# Patient Record
Sex: FEMALE | Race: WHITE | Employment: OTHER | URBAN - METROPOLITAN AREA
[De-identification: names, ages, dates, MRNs, and addresses within clinical notes are randomized per-mention and may not be internally consistent; named-entity substitution may affect disease eponyms.]

---

## 2017-01-16 ENCOUNTER — OFFICE VISIT (OUTPATIENT)
Dept: FAMILY MEDICINE CLINIC | Age: 82
End: 2017-01-16

## 2017-01-16 DIAGNOSIS — Z71.89 ACP (ADVANCE CARE PLANNING): Primary | ICD-10-CM

## 2017-01-16 NOTE — PROGRESS NOTES
Patient in for ACP with Ally Connors MSW intern ACP facilitator and Alton Mishra RN ACP facilitator. Documents reviewed and updated.

## 2017-01-18 RX ORDER — ENALAPRIL MALEATE AND HYDROCHLOROTHIAZIDE 5; 12.5 MG/1; MG/1
TABLET ORAL
Qty: 90 TAB | Refills: 3 | Status: SHIPPED | OUTPATIENT
Start: 2017-01-18 | End: 2018-01-10 | Stop reason: SDUPTHER

## 2017-01-18 RX ORDER — ROPINIROLE 1 MG/1
TABLET, FILM COATED ORAL
Qty: 90 TAB | Refills: 3 | Status: SHIPPED | OUTPATIENT
Start: 2017-01-18 | End: 2017-11-06

## 2017-01-18 RX ORDER — VERAPAMIL HYDROCHLORIDE 240 MG/1
TABLET, FILM COATED, EXTENDED RELEASE ORAL
Qty: 90 TAB | Refills: 3 | Status: SHIPPED | OUTPATIENT
Start: 2017-01-18 | End: 2018-01-10 | Stop reason: SDUPTHER

## 2017-02-06 ENCOUNTER — OFFICE VISIT (OUTPATIENT)
Dept: FAMILY MEDICINE CLINIC | Age: 82
End: 2017-02-06

## 2017-02-06 ENCOUNTER — HOSPITAL ENCOUNTER (OUTPATIENT)
Dept: LAB | Age: 82
Discharge: HOME | End: 2017-02-06
Payer: MEDICARE

## 2017-02-06 ENCOUNTER — HOSPITAL ENCOUNTER (OUTPATIENT)
Dept: GENERAL RADIOLOGY | Age: 82
Discharge: HOME | End: 2017-02-06
Payer: MEDICARE

## 2017-02-06 VITALS
DIASTOLIC BLOOD PRESSURE: 52 MMHG | TEMPERATURE: 97.6 F | SYSTOLIC BLOOD PRESSURE: 154 MMHG | HEIGHT: 65 IN | BODY MASS INDEX: 31.32 KG/M2 | RESPIRATION RATE: 16 BRPM | OXYGEN SATURATION: 95 % | HEART RATE: 69 BPM | WEIGHT: 188 LBS

## 2017-02-06 DIAGNOSIS — I87.2 VENOUS (PERIPHERAL) INSUFFICIENCY: ICD-10-CM

## 2017-02-06 DIAGNOSIS — Z13.39 SCREENING FOR ALCOHOLISM: ICD-10-CM

## 2017-02-06 DIAGNOSIS — E03.9 HYPOTHYROIDISM, UNSPECIFIED TYPE: ICD-10-CM

## 2017-02-06 DIAGNOSIS — M19.90 ARTHRITIS: ICD-10-CM

## 2017-02-06 DIAGNOSIS — E78.2 MIXED HYPERLIPIDEMIA: ICD-10-CM

## 2017-02-06 DIAGNOSIS — R73.02 IGT (IMPAIRED GLUCOSE TOLERANCE): ICD-10-CM

## 2017-02-06 DIAGNOSIS — D64.9 ANEMIA, UNSPECIFIED TYPE: ICD-10-CM

## 2017-02-06 DIAGNOSIS — R06.09 DYSPNEA ON EXERTION: ICD-10-CM

## 2017-02-06 DIAGNOSIS — D53.1 OTHER MEGALOBLASTIC ANEMIAS, NOT ELSEWHERE CLASSIFIED: ICD-10-CM

## 2017-02-06 DIAGNOSIS — I10 ESSENTIAL HYPERTENSION: ICD-10-CM

## 2017-02-06 DIAGNOSIS — N39.0 CHRONIC UTI: ICD-10-CM

## 2017-02-06 DIAGNOSIS — Z13.31 SCREENING FOR DEPRESSION: ICD-10-CM

## 2017-02-06 DIAGNOSIS — Z00.00 ROUTINE GENERAL MEDICAL EXAMINATION AT A HEALTH CARE FACILITY: Primary | ICD-10-CM

## 2017-02-06 PROCEDURE — 84439 ASSAY OF FREE THYROXINE: CPT

## 2017-02-06 PROCEDURE — 71020 XR CHEST PA LAT: CPT

## 2017-02-06 PROCEDURE — 80061 LIPID PANEL: CPT

## 2017-02-06 PROCEDURE — 84443 ASSAY THYROID STIM HORMONE: CPT

## 2017-02-06 PROCEDURE — 80053 COMPREHEN METABOLIC PANEL: CPT

## 2017-02-06 PROCEDURE — 85025 COMPLETE CBC W/AUTO DIFF WBC: CPT

## 2017-02-06 PROCEDURE — 36415 COLL VENOUS BLD VENIPUNCTURE: CPT

## 2017-02-06 PROCEDURE — 82728 ASSAY OF FERRITIN: CPT

## 2017-02-06 PROCEDURE — 82607 VITAMIN B-12: CPT

## 2017-02-06 NOTE — MR AVS SNAPSHOT
Visit Information Date & Time Provider Department Dept. Phone Encounter #  
 2/6/2017  7:45 AM Simin Sandoval [place] [address] [phone] [id number] Upcoming Health Maintenance Date Due  
 MEDICARE YEARLY EXAM 1/18/2017 GLAUCOMA SCREENING Q2Y 1/6/2019 DTaP/Tdap/Td series (2 - Td) 2/6/2027 Allergies as of 2/6/2017  Review Complete On: 2/6/2017 By: Ludwin Preciado Severity Noted Reaction Type Reactions Aspirin  02/03/2014    Other (comments) Swelling shut of eyelids Gabapentin  01/18/2016    Other (comments) Vision change Macrobid [Nitrofurantoin Monohyd/m-cryst]  02/08/2010    Unknown (comments) Pcn [Penicillins]  02/08/2010    Hives Current Immunizations  Reviewed on 11/1/2016 Name Date Influenza High Dose Vaccine PF 11/1/2016, 10/14/2015, 9/29/2014, 10/3/2013 Influenza Vaccine Split 10/22/2012, 10/11/2011, 10/13/2010 Pneumococcal Conjugate (PCV-13) 10/14/2015 Pneumococcal Vaccine (Unspecified Type) 1/1/2005 Zoster 12/15/2011 Not reviewed this visit You Were Diagnosed With   
  
 Codes Comments Routine general medical examination at a health care facility    -  Primary ICD-10-CM: Z00.00 ICD-9-CM: V70.0 Screening for alcoholism     ICD-10-CM: Z13.89 ICD-9-CM: V79.1 Screening for depression     ICD-10-CM: Z13.89 ICD-9-CM: V79.0 IGT (impaired glucose tolerance)     ICD-10-CM: R73.02 
ICD-9-CM: 790.22 Hypothyroidism, unspecified type     ICD-10-CM: E03.9 ICD-9-CM: 244.9 Essential hypertension     ICD-10-CM: I10 
ICD-9-CM: 401.9 Mixed hyperlipidemia     ICD-10-CM: E78.2 ICD-9-CM: 272.2 Venous (peripheral) insufficiency     ICD-10-CM: I87.2 ICD-9-CM: 459.81 Anemia, unspecified type     ICD-10-CM: D64.9 ICD-9-CM: 285.9 Arthritis     ICD-10-CM: M19.90 ICD-9-CM: 716.90 Chronic UTI     ICD-10-CM: N39.0 ICD-9-CM: 599.0  Dyspnea on exertion     ICD-10-CM: R06.09 
 ICD-9-CM: 786.09 Other megaloblastic anemias, not elsewhere classified     ICD-10-CM: D53.1 ICD-9-CM: 504. 3 Vitals BP Pulse Temp Resp Height(growth percentile) Weight(growth percentile) 154/52 69 97.6 °F (36.4 °C) (Oral) 16 5' 5\" (1.651 m) 188 lb (85.3 kg) SpO2 BMI OB Status Smoking Status 95% 31.28 kg/m2 Postmenopausal Former Smoker Vitals History BMI and BSA Data Body Mass Index Body Surface Area  
 31.28 kg/m 2 1.98 m 2 Preferred Pharmacy Pharmacy Name Phone [address] [facility] [phone] Your Updated Medication List  
  
   
This list is accurate as of: 2/6/17  8:30 AM.  Always use your most recent med list.  
  
  
  
  
 CRANBERRY CONCENTRATE PO Take  by mouth. cyclobenzaprine 10 mg tablet Commonly known as:  FLEXERIL  
TAKE 1 TABLET 3 TIMES DAILY AS NEEDED FOR MUSCLE SPASMS. enalapril-hydroCHLOROthiazide 5-12.5 mg tablet Commonly known as:  VASERETIC  
TAKE 1 TABLET EVERY DAY  
  
 ferrous sulfate 325 mg (65 mg iron) tablet Take  by mouth two (2) times a day. Lancets Misc Commonly known as:  [address] [place] [address],[address] As [address]  
  
 latanoprost 0.005 % ophthalmic solution Commonly known as:  Ivory Campos Administer 1 Drop to both eyes nightly. levothyroxine 100 mcg tablet Commonly known as:  SYNTHROID  
TAKE 1 TABLET DAILY BEFORE BREAKFAST. lovastatin 40 mg tablet Commonly known as:  MEVACOR  
TAKE 1 TABLET EVERY EVENING TO LOWER CHOLESTEROL MULTIVITAMIN PO Take  by mouth. nitrofurantoin 50 mg capsule Commonly known as:  MACRODANTIN Take  by mouth daily. rOPINIRole 1 mg tablet Commonly known as:  REQUIP  
TAKE 1 TABLET BY MOUTH EVERY NIGHT. STOOL SOFTENER PO Take  by mouth. trimethoprim-sulfamethoxazole  mg per tablet Commonly known as:  Deepa Vignesh Take 1 Tab by mouth daily. verapamil  mg CR tablet Commonly known as:  CALAN-SR  
TAKE 1 TABLET EVERY DAY  
  
 VITAMIN C 500 mg tablet Generic drug:  ascorbic acid (vitamin C) Take  by mouth. VITAMIN D3 1,000 unit tablet Generic drug:  cholecalciferol Take  by mouth daily. We Performed the Following AMB POC HEMOGLOBIN A1C [93121 CPT(R)] CBC WITH AUTOMATED DIFF [36977 CPT(R)] [address] [[id number] [facility]] FERRITIN [70986 CPT(R)] LIPID PANEL [93879 CPT(R)] METABOLIC PANEL, COMPREHENSIVE [24651 CPT(R)] [place] ANNUAL ALCOHOL SCREEN 15 MIN [id number] [facility]] T4, FREE [id number] CPT(R)] TSH 3RD GENERATION [60527 CPT(R)] VITAMIN B12 & FOLATE [83396 CPT(R)] To-Do List   
 02/06/2017 Imaging:  XR CHEST PA LAT Patient Instructions Medicare Wellness Visit, Female The best way to live healthy is to have a healthy lifestyle by eating a well-balanced diet, exercising regularly, limiting alcohol and stopping smoking. Regular physical exams and screening tests are another way to keep healthy. Preventive exams provided by your health care provider can find health problems before they become diseases or illnesses. Preventive services including immunizations, screening tests, monitoring and exams can help you take care of your own health. All people over age 72 should have a pneumovax  and and a prevnar shot to prevent pneumonia. These are once in a lifetime unless you and your provider decide differently. All people over 65 should have a yearly flu shot and a tetanus vaccine every 10 years. A bone mass density to screen for osteoporosis or thinning of the bones should be done every 2 years after 65. Screening for diabetes mellitus with a blood sugar test should be done every year.  
 
Glaucoma is a disease of the eye due to increased ocular pressure that can lead to blindness and it should be done every year by an eye professional. 
 
 Cardiovascular screening tests that check for elevated lipids (fatty part of blood) which can lead to heart disease and strokes should be done every 5 years. Colorectal screening that evaluates for blood or polyps in your colon should be done yearly as a stool test or every five years as a flexible sigmoidoscope or every 10 years as a colonoscopy up to age 76. Breast cancer screening with a mammogram is recommended biennially  for women age 54-69. Screening for cervical cancer with a pap smear and pelvic exam is recommended for women after age 72 years every 2 years up to age 79 or when the provider and patient decide to stop. If there is a history of cervical abnormalities or other increased risk for cancer then the test is recommended yearly. Hepatitis C screening is also recommended for anyone born between [birth year] through [address]. A shingles vaccine is also recommended once in a lifetime after age 61. Your Medicare Wellness Exam is recommended annually. Here is a list of your current Health Maintenance items with a due date: 
Health Maintenance Due Topic Date Due  Up to date   Introducing [facility] SERVICES! Meaghan Pang introduces Billdesk patient portal. Now you can access parts of your medical record, email your doctor's office, and request medication refills online. 1. In your internet browser, go to [email or web address] 2. Click on the First Time User? Click Here link in the Sign In box. You will see the New Member Sign Up page. 3. Enter your Billdesk Access Code exactly as it appears below. You will not need to use this code after youve completed the sign-up process. If you do not sign up before the expiration date, you must request a new code. · Billdesk Access Code: [email or web address] Expires: 5/7/2017  7:43 AM 
 
4.  Enter the last four digits of your Social Security Number (xxxx) and Date of Birth (mm/dd/yyyy) as indicated and click Submit. You will be taken to the next sign-up page. 5. Create a Expanite ID. This will be your Expanite login ID and cannot be changed, so think of one that is secure and easy to remember. 6. Create a Expanite password. You can change your password at any time. 7. Enter your Password Reset Question and Answer. This can be used at a later time if you forget your password. 8. Enter your e-mail address. You will receive e-mail notification when new information is available in [address] 9. Click Sign Up. You can now view and download portions of your medical record. 10. Click the Download Summary menu link to download a portable copy of your medical information. If you have questions, please visit the Frequently Asked Questions section of the Expanite website. Remember, Expanite is NOT to be used for urgent needs. For medical emergencies, dial 911. Now available from your iPhone and Android! Please provide this summary of care documentation to your next provider. Your primary care clinician is listed as Pati Luna. If you have any questions after today's visit, please call [phone].

## 2017-02-06 NOTE — PATIENT INSTRUCTIONS

## 2017-02-06 NOTE — PROGRESS NOTES
This is a Subsequent Medicare Annual Wellness Visit providing Personalized Prevention Plan Services (PPPS) (Performed 12 months after initial AWV and PPPS )    I have reviewed the patient's medical history in detail and updated the computerized patient record. History     Past Medical History   Diagnosis Date    Arthritis      hands/low back    Diabetes ([address]      borderline    GERD (gastroesophageal reflux disease)     Glaucoma     Hypercholesterolemia     Hypertension     Ill-defined condition      borderline anemia    Ill-defined condition      bladder infections    Impaired glucose tolerance     Menopause     Thyroid disease       Past Surgical History   Procedure Laterality Date    Hx cataract removal       bilateral    Hx mohs procedure       right    Hx orthopaedic       carpal tunnel release -bilateral    Hx dilation and curettage      Hx breast biopsy Bilateral 1990     benign     Current Outpatient Prescriptions   Medication Sig Dispense Refill    rOPINIRole (REQUIP) 1 mg tablet TAKE 1 TABLET BY MOUTH EVERY NIGHT. 90 Tab 3    enalapril-hydroCHLOROthiazide (VASERETIC) 5-12.5 mg tablet TAKE 1 TABLET EVERY DAY 90 Tab 3    verapamil ER (CALAN-SR) 240 mg CR tablet TAKE 1 TABLET EVERY DAY 90 Tab 3    lovastatin (MEVACOR) 40 mg tablet TAKE 1 TABLET EVERY EVENING TO LOWER CHOLESTEROL 90 Tab 5    cyclobenzaprine (FLEXERIL) 10 mg tablet TAKE 1 TABLET 3 TIMES DAILY AS NEEDED FOR MUSCLE SPASMS. 30 Tab 2    trimethoprim-sulfamethoxazole (BACTRIM, SEPTRA)  mg per tablet Take 1 Tab by mouth daily.  nitrofurantoin (MACRODANTIN) 50 mg capsule Take  by mouth daily.  ferrous sulfate 325 mg (65 mg iron) tablet Take  by mouth two (2) times a day.  levothyroxine (SYNTHROID) 100 mcg tablet TAKE 1 TABLET DAILY BEFORE BREAKFAST. 90 Tab 1    latanoprost (XALATAN) 0.005 % ophthalmic solution Administer 1 Drop to both eyes nightly.       Lancets (ACCU-CHEK MULTICLIX LANCET) Misc As dir 1 Package 11    DOCUSATE CALCIUM (STOOL SOFTENER PO) Take  by mouth.  ASCORBIC ACID/VITAMIN E (CRANBERRY CONCENTRATE PO) Take  by mouth.  ascorbic acid (VITAMIN C) 500 mg tablet Take  by mouth.  cholecalciferol, vitamin d3, (VITAMIN D) 1,000 unit tablet Take  by mouth daily.  MULTIVITAMINS (MULTIVITAMIN PO) Take  by mouth. Allergies   Allergen Reactions    Aspirin Other (comments)     Swelling shut of eyelids    Gabapentin Other (comments)     Vision change    Macrobid [Nitrofurantoin Monohyd/M-Cryst] Unknown (comments)    Pcn [Penicillins] Hives     Family History   Problem Relation Age of Onset    Coronary Artery Disease Other      mother  of MI age 80, brother  age 72 of MI, sister has hear problems    Breast Cancer Sister 48     Social History   Substance Use Topics    Smoking status: Former Smoker     Packs/day: 1.00     Years: 15.00     Types: Cigarettes     Quit date: 1995    Smokeless tobacco: Never Used    Alcohol use 0.6 oz/week     0 Standard drinks or equivalent, 1 Glasses of wine per week      Comment: Rare--maybe once a month     Patient Active Problem List   Diagnosis Code    IGT (impaired glucose tolerance) R73.02    Restless leg syndrome G25.81    Hypothyroid E03.9    Hypertension I10    Arthritis M19.90    Hyperlipidemia E78.5    Shortness of breath R06.02    Venous (peripheral) insufficiency I87.2    Advanced care planning/counseling discussion Z71.89    Anemia D64.9    Chronic UTI N39.0     ARthritis - recently injected right knee at [address], shoulders are feeling better after PT. Notes neck is popping when she moves her head. And lower back hurts when she makes beds. Hypothyroidism - no hair loss, no constipation, some dry fragile nails, no palpitations. Taking medication each morning on an empty stomach. Shortness of breath - if she walks fast or carries things up steps. Denies chest pain. Does not make her cough.   Echo and stress test normal 2014. Nor orthopnea, some irregular heartbeats. No associated nausea. Sometimes feels like she's going to pass out and has to stand still and rest.    Restless legs and venous insufficiency - medication helps but still not sleeping well. HTN - no home monitoring. No vision change, no headache, no lower extremity edema. Anemia - had colonoscopy and barium enema - all normal last year. Chronic UTI - sees urology, now alternating prophylactic antibiotic every other day. No symptoms. Depression Risk Factor Screening:     PHQ 2 / 9, over the last two weeks 2/6/2017   Little interest or pleasure in doing things Not at all   Feeling down, depressed or hopeless Not at all   Total Score PHQ 2 0     Alcohol Risk Factor Screening: On any occasion during the past 3 months, have you had more than 3 drinks containing alcohol? No    Do you average more than 7 drinks per week? No      Functional Ability and Level of Safety:     Hearing Loss   none    Activities of Daily Living   Self-care. Requires assistance with: no ADLs    Fall Risk     Fall Risk Assessment, last 12 mths 2/6/2017   Able to walk? Yes   Fall in past 12 months? No     Abuse Screen   Patient is not abused    Review of Systems   A comprehensive review of systems was negative except for that written in the HPI. Physical Examination     Evaluation of Cognitive Function:  Mood/affect:  happy  Appearance: age appropriate  Family member/caregiver input: none    Visit Vitals    /52    Pulse 69    Temp 97.6 °F (36.4 °C) (Oral)    Resp 16    Ht 5' 5\" (1.651 m)    Wt 188 lb (85.3 kg)    SpO2 95%    BMI 31.28 kg/m2     General:  Alert, cooperative, no distress, appears stated age. Head:  Normocephalic, without obvious abnormality, atraumatic. Eyes:  Conjunctivae/corneas clear. PERRL, EOMs intact. Ears:  Normal TMs and external ear canals both ears. Nose: Nares normal. Septum midline.  Mucosa normal. No drainage or sinus tenderness. Throat: Lips, mucosa, and tongue normal. Teeth and gums normal.   Neck: Supple, symmetrical, trachea midline, no adenopathy, thyroid: no enlargement/tenderness/nodules, no carotid bruit and no JVD. Back:   Symmetric, no curvature. ROM normal. No CVA tenderness. Lungs:   Clear to auscultation bilaterally. Chest wall:  No tenderness or deformity. Heart:  Regular rate and rhythm, S1, S2 normal, no murmur, click, rub or gallop. Abdomen:   Soft, non-tender. Bowel sounds normal. No masses,  No organomegaly. Extremities: Extremities normal, atraumatic, no cyanosis or edema. Pulses: 2+ and symmetric all extremities. Skin: Skin color, texture, turgor normal. No rashes or lesions. Lymph nodes: Cervical, supraclavicular, and axillary nodes normal.   Neurologic: CNII-XII intact. Normal strength, sensation and reflexes throughout. A1C=6.0%    Patient Care Team:  Yonis Granger MD as PCP - General (Internal Medicine)  Christina Murguia MD as Physician (Cardiology)    Advice/Referrals/Counseling   Education and counseling provided:  Are appropriate based on today's review and evaluation  End-of-Life planning (with patient's consent)  Screening Mammography  Colorectal cancer screening tests    Assessment/Plan       ICD-10-CM ICD-9-CM    1. Routine general medical examination at a health care facility - Health Maintenance reviewed - updated. Z00.00 V70.0    2. Screening for alcoholism Z13.89 V79.1 DEPRESSION SCREEN ANNUAL      PA ANNUAL ALCOHOL SCREEN 15 MIN   3. Screening for depression Z13.89 V79.0    4. IGT (impaired glucose tolerance) - A1C well controlled at 6, continue lifestyle modification R73.02 790.22 AMB POC HEMOGLOBIN A1C   5. Hypothyroidism, unspecified type - No changes in medications pending lab results. E03.9 244.9 TSH 3RD GENERATION      T4, FREE   6.  Essential hypertension - wide pulse pressure, given advanced age, at goal I10 18.6 CBC WITH AUTOMATED DIFF      METABOLIC PANEL, COMPREHENSIVE   7. Mixed hyperlipidemia - No changes in medications pending lab results. G59.3 799.4 METABOLIC PANEL, COMPREHENSIVE      LIPID PANEL   8. Venous (peripheral) insufficiency I87.2 459.81    9. Anemia, unspecified type - repeat CBC today, may be contributing to dyspnea D64.9 285.9 FERRITIN, B12 folate   10. Arthritis - continue PRN ibuprofen, no more than twice a day, on a full stomach M19.90 716.90    11. Chronic UTI -continue per urology N39.0 599.0    12. Dyspnea on exertion - CXR, labs, follow up with cardiology. R06.09 786.09 XR CHEST PA LAT     Orders Placed This Encounter    Depression Screen Annual    XR CHEST PA LAT     Standing Status:   Future     Standing Expiration Date:   3/6/2018     Order Specific Question:   Reason for Exam     Answer:   dyspnea on exertion     Order Specific Question:   Is Patient Allergic to Contrast Dye? Answer:   No    CBC WITH AUTOMATED DIFF    METABOLIC PANEL, COMPREHENSIVE    LIPID PANEL    TSH 3RD GENERATION    T4, FREE    FERRITIN    VITAMIN B12 & FOLATE    AMB POC HEMOGLOBIN A1C    Annual  Alcohol Screen 15 min ()     Current Outpatient Prescriptions   Medication Sig Dispense Refill    rOPINIRole (REQUIP) 1 mg tablet TAKE 1 TABLET BY MOUTH EVERY NIGHT. 90 Tab 3    enalapril-hydroCHLOROthiazide (VASERETIC) 5-12.5 mg tablet TAKE 1 TABLET EVERY DAY 90 Tab 3    verapamil ER (CALAN-SR) 240 mg CR tablet TAKE 1 TABLET EVERY DAY 90 Tab 3    lovastatin (MEVACOR) 40 mg tablet TAKE 1 TABLET EVERY EVENING TO LOWER CHOLESTEROL 90 Tab 5    cyclobenzaprine (FLEXERIL) 10 mg tablet TAKE 1 TABLET 3 TIMES DAILY AS NEEDED FOR MUSCLE SPASMS. 30 Tab 2    trimethoprim-sulfamethoxazole (BACTRIM, SEPTRA)  mg per tablet Take 1 Tab by mouth daily.  nitrofurantoin (MACRODANTIN) 50 mg capsule Take  by mouth daily.  ferrous sulfate 325 mg (65 mg iron) tablet Take  by mouth two (2) times a day.       levothyroxine (SYNTHROID) 100 mcg tablet TAKE 1 TABLET DAILY BEFORE BREAKFAST. 90 Tab 1    latanoprost (XALATAN) 0.005 % ophthalmic solution Administer 1 Drop to both eyes nightly.  Lancets (ACCU-CHEK MULTICLIX LANCET) Misc As dir 1 Package 11    DOCUSATE CALCIUM (STOOL SOFTENER PO) Take  by mouth.  ASCORBIC ACID/VITAMIN E (CRANBERRY CONCENTRATE PO) Take  by mouth.  ascorbic acid (VITAMIN C) 500 mg tablet Take  by mouth.  cholecalciferol, vitamin d3, (VITAMIN D) 1,000 unit tablet Take  by mouth daily.  MULTIVITAMINS (MULTIVITAMIN PO) Take  by mouth. Verbal and written instructions (see AVS) provided. Patient expresses understanding of diagnosis and treatment plan.

## 2017-02-06 NOTE — PROGRESS NOTES
Chief Complaint   Patient presents with   Shanita Leaks Annual Wellness Visit     Patient is here for a Medicare Wellness Visit.

## 2017-02-07 LAB
ALBUMIN SERPL-MCNC: 4.3 G/DL (ref 3.5–4.7)
ALBUMIN/GLOB SERPL: 1.9 {RATIO} (ref 1.1–2.5)
ALP SERPL-CCNC: 102 IU/L (ref 39–117)
ALT SERPL-CCNC: 10 IU/L (ref 0–32)
AST SERPL-CCNC: 17 IU/L (ref 0–40)
BASOPHILS # BLD AUTO: 0.1 X10E3/UL (ref 0–0.2)
BASOPHILS NFR BLD AUTO: 1 %
BILIRUB SERPL-MCNC: 0.3 MG/DL (ref 0–1.2)
BUN SERPL-MCNC: 22 MG/DL (ref 8–27)
BUN/CREAT SERPL: 23 (ref 11–26)
CALCIUM SERPL-MCNC: 9.7 MG/DL (ref 8.7–10.3)
CHLORIDE SERPL-SCNC: 100 MMOL/L (ref 96–106)
CHOLEST SERPL-MCNC: 190 MG/DL (ref 100–199)
CO2 SERPL-SCNC: 26 MMOL/L (ref 18–29)
CREAT SERPL-MCNC: 0.95 MG/DL (ref 0.57–1)
EOSINOPHIL # BLD AUTO: 0.6 X10E3/UL (ref 0–0.4)
EOSINOPHIL NFR BLD AUTO: 10 %
ERYTHROCYTE [DISTWIDTH] IN BLOOD BY AUTOMATED COUNT: 13.9 % (ref 12.3–15.4)
FERRITIN SERPL-MCNC: 448 NG/ML (ref 15–150)
FOLATE SERPL-MCNC: >20 NG/ML
GLOBULIN SER CALC-MCNC: 2.3 G/DL (ref 1.5–4.5)
GLUCOSE SERPL-MCNC: 131 MG/DL (ref 65–99)
HCT VFR BLD AUTO: 34.1 % (ref 34–46.6)
HDLC SERPL-MCNC: 96 MG/DL
HGB BLD-MCNC: 11 G/DL (ref 11.1–15.9)
IMM GRANULOCYTES # BLD: 0 X10E3/UL (ref 0–0.1)
IMM GRANULOCYTES NFR BLD: 0 %
INTERPRETATION, 910389: NORMAL
INTERPRETATION: NORMAL
LDLC SERPL CALC-MCNC: 67 MG/DL (ref 0–99)
LYMPHOCYTES # BLD AUTO: 1.6 X10E3/UL (ref 0.7–3.1)
LYMPHOCYTES NFR BLD AUTO: 26 %
MCH RBC QN AUTO: 30.4 PG (ref 26.6–33)
MCHC RBC AUTO-ENTMCNC: 32.3 G/DL (ref 31.5–35.7)
MCV RBC AUTO: 94 FL (ref 79–97)
MONOCYTES # BLD AUTO: 0.6 X10E3/UL (ref 0.1–0.9)
MONOCYTES NFR BLD AUTO: 9 %
NEUTROPHILS # BLD AUTO: 3.4 X10E3/UL (ref 1.4–7)
NEUTROPHILS NFR BLD AUTO: 54 %
PDF IMAGE, 910387: NORMAL
PLATELET # BLD AUTO: 265 X10E3/UL (ref 150–379)
POTASSIUM SERPL-SCNC: 4.4 MMOL/L (ref 3.5–5.2)
PROT SERPL-MCNC: 6.6 G/DL (ref 6–8.5)
RBC # BLD AUTO: 3.62 X10E6/UL (ref 3.77–5.28)
SODIUM SERPL-SCNC: 140 MMOL/L (ref 134–144)
T4 FREE SERPL-MCNC: 1.71 NG/DL (ref 0.82–1.77)
TRIGL SERPL-MCNC: 137 MG/DL (ref 0–149)
TSH SERPL DL<=0.005 MIU/L-ACNC: 2.22 UIU/ML (ref 0.45–4.5)
VIT B12 SERPL-MCNC: 1027 PG/ML (ref 211–946)
VLDLC SERPL CALC-MCNC: 27 MG/DL (ref 5–40)
WBC # BLD AUTO: 6.2 X10E3/UL (ref 3.4–10.8)

## 2017-02-08 DIAGNOSIS — R93.89 ABNORMAL CXR: Primary | ICD-10-CM

## 2017-02-08 LAB — HBA1C MFR BLD HPLC: 6 % (ref 4.8–5.6)

## 2017-02-15 ENCOUNTER — HOSPITAL ENCOUNTER (OUTPATIENT)
Dept: GENERAL RADIOLOGY | Age: 82
Discharge: HOME | End: 2017-02-15
Payer: MEDICARE

## 2017-02-15 ENCOUNTER — TELEPHONE (OUTPATIENT)
Dept: FAMILY MEDICINE CLINIC | Age: 82
End: 2017-02-15

## 2017-02-15 DIAGNOSIS — R93.89 ABNORMAL CXR: ICD-10-CM

## 2017-02-15 PROCEDURE — 71020 XR CHEST PA LAT: CPT

## 2017-02-15 NOTE — TELEPHONE ENCOUNTER
I saw this and called th patient myself on the 8th and ordered her repeat study. I thought I had documented it in an orders only encounter, but now I don't see my note. I did speak directly to patient and she will go for repeat imaging.

## 2017-03-07 ENCOUNTER — OFFICE VISIT (OUTPATIENT)
Dept: CARDIOLOGY CLINIC | Age: 82
End: 2017-03-07

## 2017-03-07 VITALS
OXYGEN SATURATION: 94 % | HEART RATE: 73 BPM | DIASTOLIC BLOOD PRESSURE: 60 MMHG | RESPIRATION RATE: 18 BRPM | SYSTOLIC BLOOD PRESSURE: 130 MMHG | HEIGHT: 65 IN | WEIGHT: 190.2 LBS | BODY MASS INDEX: 31.69 KG/M2

## 2017-03-07 DIAGNOSIS — I87.2 VENOUS (PERIPHERAL) INSUFFICIENCY: ICD-10-CM

## 2017-03-07 DIAGNOSIS — I10 ESSENTIAL HYPERTENSION: ICD-10-CM

## 2017-03-07 DIAGNOSIS — G25.81 RESTLESS LEG SYNDROME: ICD-10-CM

## 2017-03-07 DIAGNOSIS — E78.2 MIXED HYPERLIPIDEMIA: ICD-10-CM

## 2017-03-07 DIAGNOSIS — I83.812 VARICOSE VEINS OF LEFT LOWER EXTREMITIES WITH PAIN: Primary | ICD-10-CM

## 2017-03-07 NOTE — PROGRESS NOTES
Craven Cogan NP  Subjective/HPI:     China Burt is a 80 y.o. female is here for vascular consult. Ms. Rigoberto Morton reports her left leg has now become uncomfortable reporting a swelling and stinging discomfort that awakens her at night, during the day she does notice increasing left leg edema. Previously used compression stockings for alleviation of symptoms however use of the stockings has become prohibitive secondary to pain and discomfort. The patient denies chest pain/ shortness of breath, orthopnea, PND, LE edema, palpitations, syncope, presyncope or fatigue. PCP Provider  Cleopatra Rueda MD  Past Medical History:   Diagnosis Date    Arthritis     hands/low back    Diabetes ([facility] [address]     borderline    GERD (gastroesophageal reflux disease)     Glaucoma     Hypercholesterolemia     Hypertension     Ill-defined condition     borderline anemia    Ill-defined condition     bladder infections    Impaired glucose tolerance     Menopause     Thyroid disease       Past Surgical History:   Procedure Laterality Date    HX BREAST BIOPSY Bilateral     benign    HX CATARACT REMOVAL      bilateral    HX DILATION AND CURETTAGE      HX MOHS PROCEDURES      right    HX ORTHOPAEDIC      carpal tunnel release -bilateral     Allergies   Allergen Reactions    Aspirin Other (comments)     Swelling shut of eyelids    Gabapentin Other (comments)     Vision change    Macrobid [Nitrofurantoin Monohyd/M-Cryst] Unknown (comments)    Pcn [Penicillins] Hives      Family History   Problem Relation Age of Onset    Coronary Artery Disease Other      mother  of MI age 80, brother  age 72 of MI, sister has hear problems    Breast Cancer Sister 48      Current Outpatient Prescriptions   Medication Sig    rOPINIRole (REQUIP) 1 mg tablet TAKE 1 TABLET BY MOUTH EVERY NIGHT.     enalapril-hydroCHLOROthiazide (VASERETIC) 5-12.5 mg tablet TAKE 1 TABLET EVERY DAY    verapamil ER (CALAN-SR) 240 mg CR tablet TAKE 1 TABLET EVERY DAY    lovastatin (MEVACOR) 40 mg tablet TAKE 1 TABLET EVERY EVENING TO LOWER CHOLESTEROL    cyclobenzaprine (FLEXERIL) 10 mg tablet TAKE 1 TABLET 3 TIMES DAILY AS NEEDED FOR MUSCLE SPASMS.  trimethoprim-sulfamethoxazole (BACTRIM, SEPTRA)  mg per tablet Take 1 Tab by mouth daily.  nitrofurantoin (MACRODANTIN) 50 mg capsule Take  by mouth daily.  ferrous sulfate 325 mg (65 mg iron) tablet Take  by mouth two (2) times a day.  levothyroxine (SYNTHROID) 100 mcg tablet TAKE 1 TABLET DAILY BEFORE BREAKFAST.  latanoprost (XALATAN) 0.005 % ophthalmic solution Administer 1 Drop to both eyes nightly.  Lancets (ACCU-CHEK MULTICLIX LANCET) Misc As dir    DOCUSATE CALCIUM (STOOL SOFTENER PO) Take  by mouth.  ASCORBIC ACID/VITAMIN E (CRANBERRY CONCENTRATE PO) Take  by mouth.  ascorbic acid (VITAMIN C) 500 mg tablet Take  by mouth.  cholecalciferol, vitamin d3, (VITAMIN D) 1,000 unit tablet Take  by mouth daily.  MULTIVITAMINS (MULTIVITAMIN PO) Take  by mouth. No current facility-administered medications for this visit. Vitals:    03/07/17 1355   BP: 130/60   Pulse: 73   Resp: 18   SpO2: 94%   Weight: 190 lb 3.2 oz (86.3 kg)   Height: 5' 5\" (1.651 m)     Social History     Social History    Marital status:      Spouse name: N/A    Number of children: N/A    Years of education: N/A     Occupational History    Not on file. 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 Lives with .        I have reviewed the nurses notes, vitals, problem list, allergy list, medical history, family, social history and medications. Review of Symptoms:    General: Pt denies excessive weight gain or loss. Pt is able to conduct ADL's  HEENT: Denies blurred vision, headaches, epistaxis and difficulty swallowing. Respiratory: Denies shortness of breath, ROWE, wheezing or stridor. Cardiovascular: Denies precordial pain, palpitations, edema or PND  Gastrointestinal: Denies poor appetite, indigestion, abdominal pain or blood in stool  Musculoskeletal: Denies pain or swelling from muscles or joints  Neurologic: Denies tremor, paresthesias, or sensory motor disturbance  Skin: Denies rash, itching or texture change. Physical Exam:      General: Well developed, in no acute distress, cooperative and alert  HEENT: No carotid bruits, no JVD, trach is midline. Neck Supple, PEERL, EOM intact. Heart:  Normal S1/S2 negative S3 or S4. Regular, no murmur, gallop or rub.   Respiratory: Clear bilaterally x 4, no wheezing or rales  Abdomen:   Soft, non-tender, no masses, bowel sounds are active.   Extremities: Bilateral feet normal cap refill, no cyanosis, atraumatic. Left lower extremity: Moderate torturous varicosities, +1 ankle edema. Right lower extremity trivial edema with small degree of varicosities. Neuro: A&Ox3, speech clear, gait stable. Skin: Skin color is normal. No rashes or lesions.  Non diaphoretic  Vascular: 2+ pulses symmetric in all extremities    Cardiographics    ECG: Is a 54-year-old female with a history of bilateral venous reflux  Results for orders placed or performed in visit on 02/03/14   CARDIAC HOLTER MONITOR, 24 HOURS    Narrative    ECG Monitor/24 hours, Complete    Reason for Holter Monitor   PVC's    Heartbeat    Slowest 53  Average 71  Fastest  113    Results:   Underlying Rhythm: Normal sinus rhythm      Atrial Arrhythmias: premature atrial contractions; occasional and  a few short runs of PSVT    AV Conduction: normal    Ventricular Arrhythmias: premature ventricular contractions;  frequent and ventricular couplets and one ventricular triplet    ST Segment Analysis:non-specific changes     Symptom Correlation:  Shortness of breath corresponds to NSR with activity. Comment:   Normal sinus rhythm with frequent PVC's occasional PAC's, a few  short runs of PSVT     Vannesa Valdez MD            Results for orders placed or performed in visit on 12/05/11   AMB POC EKG ROUTINE W/ 12 LEADS, INTER & REP    Impression    Normal sinus rhythm. Nonspecific ST wave changes in the lateral  chest leads. Cardiology Labs:  Lab Results   Component Value Date/Time    Cholesterol, total 190 02/06/2017 08:36 AM    HDL Cholesterol 96 02/06/2017 08:36 AM    LDL, calculated 67 02/06/2017 08:36 AM    Triglyceride 137 02/06/2017 08:36 AM       Lab Results   Component Value Date/Time    Sodium 140 02/06/2017 08:36 AM    Potassium 4.4 02/06/2017 08:36 AM    Chloride 100 02/06/2017 08:36 AM    CO2 26 02/06/2017 08:36 AM    Glucose 131 02/06/2017 08:36 AM    BUN 22 02/06/2017 08:36 AM    Creatinine 0.95 02/06/2017 08:36 AM    BUN/Creatinine ratio 23 02/06/2017 08:36 AM    GFR est AA 64 02/06/2017 08:36 AM    GFR est non-AA 55 02/06/2017 08:36 AM    Calcium 9.7 02/06/2017 08:36 AM    Bilirubin, total 0.3 02/06/2017 08:36 AM    AST (SGOT) 17 02/06/2017 08:36 AM    Alk. phosphatase 102 02/06/2017 08:36 AM    Protein, total 6.6 02/06/2017 08:36 AM    Albumin 4.3 02/06/2017 08:36 AM    A-G Ratio 1.9 02/06/2017 08:36 AM    ALT (SGPT) 10 02/06/2017 08:36 AM           Assessment:     Assessment:     Aydee was seen today for vascular access problem. Diagnoses and all orders for this visit:    Venous (peripheral) insufficiency  -     DUPLEX LOWER EXT VENOUS LEFT; Future    Restless leg syndrome  -     DUPLEX LOWER EXT VENOUS LEFT; Future    Essential hypertension  -     DUPLEX LOWER EXT VENOUS LEFT; Future        ICD-10-CM ICD-9-CM    1. Venous (peripheral) insufficiency I87.2 459.81 DUPLEX LOWER EXT VENOUS LEFT   2. Restless leg syndrome G25.81 333.94 DUPLEX LOWER EXT VENOUS LEFT   3. Essential hypertension I10 401.9 DUPLEX LOWER EXT VENOUS LEFT     Orders Placed This Encounter    DUPLEX LOWER EXT VENOUS LEFT     Standing Status:   Future     Standing Expiration Date:   4/7/2018    OMEPRAZOLE, BULK,     Sig: Take 20 mg by mouth daily. Plan:     Patient is a 77-year-old female with a history of bilateral venous reflux in the lower extremities. 2 years ago she underwent right GS V ablation with resolution of her symptoms, at the time compression stockings were worn to alleviate symptoms of left lower extremity venous reflux. Her left leg has become increasingly uncomfortable and unable to use compression stockings, the discomfort often wakes her up at night. Will perform venous reflux study on the left lower extremity, she states she is unable to use compression stockings at this point and will elevate her legs as needed for discomfort. Follow-up when testing complete. Fawad Waddell NP       Pt seen and examined in details. Agree with NP A&P.      2. BP controlled.    3. On statin. Recent labs noted. 4. F/u with Dr. Denisse Marcos for cardiac care.      Serina Chaudhry MD

## 2017-03-07 NOTE — MR AVS SNAPSHOT
Visit Information Date & Time Provider Department Dept. Phone Encounter #  
 3/7/2017  2:00 PM Charlette Calhoun [facility] [id number] Follow-up Instructions Routing History Follow-up and Disposition History Your Appointments 3/21/2017  2:15 PM  
New Patient with MD [facility]) Appt Note: overdue annual  CP $ 0/tjb  
 [address]  
[phone] [address] [facility] Upcoming Health Maintenance Date Due  
 MEDICARE YEARLY EXAM 2/7/2018 GLAUCOMA SCREENING Q2Y 1/6/2019 DTaP/Tdap/Td series (2 - Td) 2/6/2027 Allergies as of 3/7/2017  Review Complete On: 3/7/2017 By: Dorota Farmer MD  
  
 Severity Noted Reaction Type Reactions Aspirin  02/03/2014    Other (comments) Swelling shut of eyelids Gabapentin  01/18/2016    Other (comments) Vision change Macrobid [Nitrofurantoin Monohyd/m-cryst]  02/08/2010    Unknown (comments) Pcn [Penicillins]  02/08/2010    Hives Current Immunizations  Reviewed on 11/1/2016 Name Date Influenza High Dose Vaccine PF 11/1/2016, 10/14/2015, 9/29/2014, 10/3/2013 Influenza Vaccine Split 10/22/2012, 10/11/2011, 10/13/2010 Pneumococcal Conjugate (PCV-13) 10/14/2015 Pneumococcal Vaccine (Unspecified Type) 1/1/2005 Zoster 12/15/2011 Not reviewed this visit You Were Diagnosed With   
  
 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 
ICD-9-CM: 401.9 Mixed hyperlipidemia     ICD-10-CM: E78.2 ICD-9-CM: 272.2 Vitals BP Pulse Resp Height(growth percentile) Weight(growth percentile) SpO2  
 130/60 (BP 1 Location: Right arm, BP Patient Position: Sitting) 73 18 5' 5\" (1.651 m) 190 lb 3.2 oz (86.3 kg) 94% BMI OB Status Smoking Status 31.65 kg/m2 Postmenopausal Former Smoker Vitals History BMI and BSA Data Body Mass Index Body Surface Area  
 31.65 kg/m 2 1.99 m 2 Preferred Pharmacy Pharmacy Name Phone [address], [address] [place] [address] Santhosh Feldman [phone] Your Updated Medication List  
  
   
This list is accurate as of: 3/7/17  2:34 PM.  Always use your most recent med list.  
  
  
  
  
 CRANBERRY CONCENTRATE PO Take  by mouth. cyclobenzaprine 10 mg tablet Commonly known as:  FLEXERIL  
TAKE 1 TABLET 3 TIMES DAILY AS NEEDED FOR MUSCLE SPASMS. enalapril-hydroCHLOROthiazide 5-12.5 mg tablet Commonly known as:  VASERETIC  
TAKE 1 TABLET EVERY DAY  
  
 ferrous sulfate 325 mg (65 mg iron) tablet Take  by mouth two (2) times a day. Lancets Misc Commonly known as:  [address] [place] [address] As dir  
  
 latanoprost 0.005 % ophthalmic solution Commonly known as:  Gio Mathews Administer 1 Drop to both eyes nightly. levothyroxine 100 mcg tablet Commonly known as:  SYNTHROID  
TAKE 1 TABLET DAILY BEFORE BREAKFAST. lovastatin 40 mg tablet Commonly known as:  MEVACOR  
TAKE 1 TABLET EVERY EVENING TO LOWER CHOLESTEROL MULTIVITAMIN PO Take  by mouth. nitrofurantoin 50 mg capsule Commonly known as:  MACRODANTIN Take  by mouth daily. rOPINIRole 1 mg tablet Commonly known as:  REQUIP  
TAKE 1 TABLET BY MOUTH EVERY NIGHT. STOOL SOFTENER PO Take  by mouth. trimethoprim-sulfamethoxazole  mg per tablet Commonly known as:  Sonja Collie Take 1 Tab by mouth daily. verapamil  mg CR tablet Commonly known as:  CALAN-SR  
TAKE 1 TABLET EVERY DAY  
  
 VITAMIN C 500 mg tablet Generic drug:  ascorbic acid (vitamin C) Take  by mouth. VITAMIN D3 1,000 unit tablet Generic drug:  cholecalciferol Take  by mouth daily. To-Do List   
 03/07/2017 Imaging:  DUPLEX LOWER EXT VENOUS BILAT Introducing [facility]! [address] introduces [employer] patient portal. Now you can access parts of your medical record, email your doctor's office, and request medication refills online. 1. In your internet browser, go to [email or web address] 2. Click on the First Time User? Click Here link in the Sign In box. You will see the New Member Sign Up page. 3. Enter your Vigoda Access Code exactly as it appears below. You will not need to use this code after youve completed the sign-up process. If you do not sign up before the expiration date, you must request a new code. · V[employer]oda Access Code: [email or web address] Expires: 5/7/2017  7:43 AM 
 
4. Enter the last four digits of your Social Security Number (xxxx) and Date of Birth (mm/dd/yyyy) as indicated and click Submit. You will be taken to the next sign-up page. 5. Create a Vigoda ID. This will be your Vigoda login ID and cannot be changed, so think of one that is secure and easy to remember. 6. Create a Vigoda password. You can change your password at any time. 7. Enter your Password Reset Question and Answer. This can be used at a later time if you forget your password. 8. Enter your e-mail address. You will receive e-mail notification when new information is available in [address] 9. Click Sign Up. You can now view and download portions of your medical record. 10. Click the Download Summary menu link to download a portable copy of your medical information. If you have questions, please visit the Frequently Asked Questions section of the Vigoda website. Remember, Vigoda is NOT to be used for urgent needs. For medical emergencies, dial 911. Now available from your iPhone and Android! Please provide this summary of care documentation to your next provider. Your primary care clinician is listed as Ivania Ye. If you have any questions after today's visit, please call [phone].

## 2017-03-21 ENCOUNTER — OFFICE VISIT (OUTPATIENT)
Dept: CARDIOLOGY CLINIC | Age: 82
End: 2017-03-21

## 2017-03-21 VITALS
OXYGEN SATURATION: 96 % | RESPIRATION RATE: 16 BRPM | WEIGHT: 190.3 LBS | HEART RATE: 83 BPM | HEIGHT: 65 IN | BODY MASS INDEX: 31.71 KG/M2

## 2017-03-21 DIAGNOSIS — R09.89 WEAK ARTERIAL PULSE: ICD-10-CM

## 2017-03-21 DIAGNOSIS — I87.2 VENOUS (PERIPHERAL) INSUFFICIENCY: ICD-10-CM

## 2017-03-21 DIAGNOSIS — E78.5 HYPERLIPIDEMIA, UNSPECIFIED HYPERLIPIDEMIA TYPE: ICD-10-CM

## 2017-03-21 DIAGNOSIS — I10 ESSENTIAL HYPERTENSION: ICD-10-CM

## 2017-03-21 DIAGNOSIS — I83.812 VARICOSE VEINS OF LEFT LOWER EXTREMITIES WITH PAIN: ICD-10-CM

## 2017-03-21 DIAGNOSIS — R06.09 DOE (DYSPNEA ON EXERTION): Primary | ICD-10-CM

## 2017-03-21 DIAGNOSIS — G25.81 RESTLESS LEG SYNDROME: ICD-10-CM

## 2017-03-21 NOTE — MR AVS SNAPSHOT
Visit Information Date & Time Provider Department Dept. Phone Encounter #  
 3/21/2017  2:30 PM Marycarmen Mensah, [address] [facility] [phone] [id number] Your Appointments 3/21/2017  3:30 PM  
Office Visit with ECHO, [facility] [address] [place] [address]) Appt Note: Per Dr YOUNG $0CP REM DUPLEX LOWER EXT VENOUS BILAT [62541 CPT(R)]  
 [address] [facility]  
[phone] [address] [facility]  
  
    
 3/30/2017  9:00 AM  
ECHO CARDIOGRAMS 2D with STRESSECHO, [facility] [address] [place] [address]) Appt Note: Per Nikolas $0CP REM 5'5\", 190.4LBS 2D ECHO COMPLETE ADULT (TTE) W OR WO CONTR [41142 CPT(R)] DUPLEX CAROTID BILATERAL [63224 CPT(R)]  DUPLEX UPPER EXT ARTERY LEFT [65178 CPT(R)] STRESS TEST LEXISCAN/CARDIOLITE [[id number] Custom] [address] [facility]  
[phone] [address] [facility]  
  
    
 3/30/2017 10:00 AM  
ULTRASOUND with Rand MARROQUIN [address] [place] [address]) Appt Note: Per Nikolas $0CP REM 5'5\", 190.4LBS 2D ECHO COMPLETE ADULT (TTE) W OR WO CONTR [54780 CPT(R)] DUPLEX CAROTID BILATERAL [83390 CPT(R)]  DUPLEX UPPER EXT ARTERY LEFT [87779 CPT(R)] STRESS TEST LEXISCAN/CARDIOLITE [[id number] Custom] [address] [facility]  
[phone] [address] [facility]  
  
    
 3/30/2017 11:00 AM  
Office Visit with ECHO, [facility] [address]) Appt Note: Per Nikolas $0CP REM 5'5\", 190.4LBS 2D ECHO COMPLETE ADULT (TTE) W OR WO CONTR [74063 CPT(R)] DUPLEX CAROTID BILATERAL [50433 CPT(R)]  DUPLEX UPPER EXT ARTERY LEFT [20520 CPT(R)] STRESS TEST LEXISCAN/CARDIOLITE [[id number] Custom] [address] [facility]  
[phone]  
  
    
 3/30/2017 12:30 PM  
STRESS TEST with NUCLEAR, [facility]  
 [facility]) Appt Note: Chema Connor $0CP REM 5'5\", 190.4LBS 2D ECHO COMPLETE ADULT (TTE) W OR WO CONTR [76545 CPT(R)] DUPLEX CAROTID BILATERAL [85046 CPT(R)]  DUPLEX UPPER EXT ARTERY LEFT [59343 CPT(R)] STRESS TEST LEXISCAN/CARDIOLITE [[id number] Custom] [address]  
[phone] [address] Upcoming Health Maintenance Date Due  
 MEDICARE YEARLY EXAM 2/7/2018 GLAUCOMA SCREENING Q2Y 1/6/2019 DTaP/Tdap/Td series (2 - Td) 2/6/2027 Allergies as of 3/21/2017  Review Complete On: 3/21/2017 By: Fawad Waddell NP Severity Noted Reaction Type Reactions Aspirin  02/03/2014    Other (comments) Swelling shut of eyelids Gabapentin  01/18/2016    Other (comments) Vision change Macrobid [Nitrofurantoin Monohyd/m-cryst]  02/08/2010    Unknown (comments) Pcn [Penicillins]  02/08/2010    Hives Current Immunizations  Reviewed on 11/1/2016 Name Date Influenza High Dose Vaccine PF 11/1/2016, 10/14/2015, 9/29/2014, 10/3/2013 Influenza Vaccine Split 10/22/2012, 10/11/2011, 10/13/2010 Pneumococcal Conjugate (PCV-13) 10/14/2015 Pneumococcal Vaccine (Unspecified Type) 1/1/2005 Zoster 12/15/2011 Not reviewed this visit You Were Diagnosed With   
  
 Codes Comments Hyperlipidemia, unspecified hyperlipidemia type    -  Primary ICD-10-CM: E78.5 ICD-9-CM: 272.4 Essential hypertension     ICD-10-CM: I10 
ICD-9-CM: 401.9 ROWE (dyspnea on exertion)     ICD-10-CM: R06.09 
ICD-9-CM: 786.09 Venous (peripheral) insufficiency     ICD-10-CM: I87.2 ICD-9-CM: 459.81 Vitals BP Pulse Resp Height(growth percentile) Weight(growth percentile) SpO2  
 (!) 0/0 (BP 1 Location: Left arm, BP Patient Position: Sitting) 83 16 5' 5\" (1.651 m) 190 lb 4.8 oz (86.3 kg) 96% BMI OB Status Smoking Status 31.67 kg/m2 Postmenopausal Former Smoker Vitals History BMI and BSA Data Body Mass Index Body Surface Area  
 31.67 kg/m 2 1.99 m 2 Preferred Pharmacy Pharmacy Name Phone [address], [address] [facility] [phone] Your Updated Medication List  
  
   
This list is accurate as of: 3/21/17  3:17 PM.  Always use your most recent med list.  
  
  
  
  
 CRANBERRY CONCENTRATE PO Take  by mouth. cyclobenzaprine 10 mg tablet Commonly known as:  FLEXERIL  
TAKE 1 TABLET 3 TIMES DAILY AS NEEDED FOR MUSCLE SPASMS. enalapril-hydroCHLOROthiazide 5-12.5 mg tablet Commonly known as:  VASERETIC  
TAKE 1 TABLET EVERY DAY  
  
 ferrous sulfate 325 mg (65 mg iron) tablet Take  by mouth two (2) times a day. Lancets Misc Commonly known as:  [address] [place] [address],[address] As dir  
  
 latanoprost 0.005 % ophthalmic solution Commonly known as:  Joe Fitch Administer 1 Drop to both eyes nightly. levothyroxine 100 mcg tablet Commonly known as:  SYNTHROID  
TAKE 1 TABLET DAILY BEFORE BREAKFAST. lovastatin 40 mg tablet Commonly known as:  MEVACOR  
TAKE 1 TABLET EVERY EVENING TO LOWER CHOLESTEROL MULTIVITAMIN PO Take  by mouth. nitrofurantoin 50 mg capsule Commonly known as:  MACRODANTIN Take  by mouth daily. rOPINIRole 1 mg tablet Commonly known as:  REQUIP  
TAKE 1 TABLET BY MOUTH EVERY NIGHT. STOOL SOFTENER PO Take  by mouth. trimethoprim-sulfamethoxazole  mg per tablet Commonly known as:  Doralee Hipps Take 1 Tab by mouth daily. verapamil  mg CR tablet Commonly known as:  CALAN-SR  
TAKE 1 TABLET EVERY DAY  
  
 VITAMIN C 500 mg tablet Generic drug:  ascorbic acid (vitamin C) Take  by mouth. VITAMIN D3 1,000 unit tablet Generic drug:  cholecalciferol Take  by mouth daily. We Performed the Following AMB POC EKG ROUTINE W/ 12 LEADS, INTER & REP [60710 CPT(R)] To-Do List   
 03/22/2017 ECHO:  2D ECHO COMPLETE ADULT (TTE) W OR WO CONTR   
  
 03/22/2017 Imaging:  DUPLEX CAROTID BILATERAL   
  
 03/22/2017 Imaging:  DUPLEX UPPER EXT ARTERY LEFT   
  
 03/22/2017 ECG:  STRESS TEST LEXISCAN/CARDIOLITE Introducing [facility]! Junito Macias introduces 24h00 patient portal. Now you can access parts of your medical record, email your doctor's office, and request medication refills online. 1. In your internet browser, go to [email or web address] 2. Click on the First Time User? Click Here link in the Sign In box. You will see the New Member Sign Up page. 3. Enter your 24h00 Access Code exactly as it appears below. You will not need to use this code after youve completed the sign-up process. If you do not sign up before the expiration date, you must request a new code. · 24h00 Access Code: [email or web address] Expires: 5/7/2017  8:43 AM 
 
4. Enter the last four digits of your Social Security Number (xxxx) and Date of Birth (mm/dd/yyyy) as indicated and click Submit. You will be taken to the next sign-up page. 5. Create a 24h00 ID. This will be your 24h00 login ID and cannot be changed, so think of one that is secure and easy to remember. 6. Create a 24h00 password. You can change your password at any time. 7. Enter your Password Reset Question and Answer. This can be used at a later time if you forget your password. 8. Enter your e-mail address. You will receive e-mail notification when new information is available in [address] 9. Click Sign Up. You can now view and download portions of your medical record. 10. Click the Download Summary menu link to download a portable copy of your medical information.  
 
If you have questions, please visit the Frequently Asked Questions section of the [employer]. Remember, [employer] is NOT to be used for urgent needs. For medical emergencies, dial 911. Now available from your iPhone and Android! Please provide this summary of care documentation to your next provider. Your primary care clinician is listed as Hedy Kennedy. If you have any questions after today's visit, please call [phone].

## 2017-03-21 NOTE — PROGRESS NOTES
Gilmar Matson NP  Subjective/HPI:     Erwin Andrews is a 80 y.o. female is here for routine f/u. The patient denies exertional chest pain/resting shortness of breath, orthopnea, PND, LE edema, palpitations, syncope, presyncope or fatigue. Patient reports she has noticed increasing dyspnea on exertion especially climbing a flight of stairs, she feels this has been progressive since her last evaluation with general cardiology in . She denies exertional chest pain, nausea vomiting or diaphoresis. Lab work from Dr. Jillian Coleman office reviewed at this consult. PCP Provider  Gabriela Yin MD  Past Medical History:   Diagnosis Date    Arthritis     hands/low back    Diabetes ([address]     borderline    GERD (gastroesophageal reflux disease)     Glaucoma     Hypercholesterolemia     Hypertension     Ill-defined condition     borderline anemia    Ill-defined condition     bladder infections    Impaired glucose tolerance     Menopause     Thyroid disease       Past Surgical History:   Procedure Laterality Date    HX BREAST BIOPSY Bilateral     benign    HX CATARACT REMOVAL      bilateral    HX DILATION AND CURETTAGE      HX MOHS PROCEDURES      right    HX ORTHOPAEDIC      carpal tunnel release -bilateral     Allergies   Allergen Reactions    Aspirin Other (comments)     Swelling shut of eyelids    Gabapentin Other (comments)     Vision change    Macrobid [Nitrofurantoin Monohyd/M-Cryst] Unknown (comments)    Pcn [Penicillins] Hives      Family History   Problem Relation Age of Onset    Coronary Artery Disease Other      mother  of MI age 80, brother  age 72 of MI, sister has hear problems    Breast Cancer Sister 48      Current Outpatient Prescriptions   Medication Sig    rOPINIRole (REQUIP) 1 mg tablet TAKE 1 TABLET BY MOUTH EVERY NIGHT.     enalapril-hydroCHLOROthiazide (VASERETIC) 5-12.5 mg tablet TAKE 1 TABLET EVERY DAY    verapamil ER (CALAN-SR) 240 mg CR tablet TAKE 1 TABLET EVERY DAY    lovastatin (MEVACOR) 40 mg tablet TAKE 1 TABLET EVERY EVENING TO LOWER CHOLESTEROL    cyclobenzaprine (FLEXERIL) 10 mg tablet TAKE 1 TABLET 3 TIMES DAILY AS NEEDED FOR MUSCLE SPASMS.  trimethoprim-sulfamethoxazole (BACTRIM, SEPTRA)  mg per tablet Take 1 Tab by mouth daily.  nitrofurantoin (MACRODANTIN) 50 mg capsule Take  by mouth daily.  ferrous sulfate 325 mg (65 mg iron) tablet Take  by mouth two (2) times a day.  levothyroxine (SYNTHROID) 100 mcg tablet TAKE 1 TABLET DAILY BEFORE BREAKFAST.  latanoprost (XALATAN) 0.005 % ophthalmic solution Administer 1 Drop to both eyes nightly.  Lancets (ACCU-CHEK MULTICLIX LANCET) Misc As dir    DOCUSATE CALCIUM (STOOL SOFTENER PO) Take  by mouth.  ASCORBIC ACID/VITAMIN E (CRANBERRY CONCENTRATE PO) Take  by mouth.  ascorbic acid (VITAMIN C) 500 mg tablet Take  by mouth.  cholecalciferol, vitamin d3, (VITAMIN D) 1,000 unit tablet Take  by mouth daily.  MULTIVITAMINS (MULTIVITAMIN PO) Take  by mouth. No current facility-administered medications for this visit. Vitals:    03/21/17 1414 03/21/17 1429   BP: 142/58 (!) 0/0   Pulse: 83    Resp: 16    SpO2: 96%    Weight: 190 lb 4.8 oz (86.3 kg)    Height: 5' 5\" (1.651 m)      Social History     Social History    Marital status:      Spouse name: N/A    Number of children: N/A    Years of education: N/A     Occupational History    Not on file. 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 Lives with .        I have reviewed the nurses notes, vitals, problem list, allergy list, medical history, family, social history and medications. Review of Symptoms:    General: Pt denies excessive weight gain or loss. Pt is able to conduct ADL's  HEENT: Denies blurred vision, headaches, epistaxis and difficulty swallowing. Respiratory: Denies shortness of breath, + ROWE, denies wheezing or stridor. Cardiovascular: Denies precordial pain, palpitations, edema or PND  Gastrointestinal: Denies poor appetite, indigestion, abdominal pain or blood in stool  Musculoskeletal: Denies pain or swelling from muscles or joints. Neurologic: Denies tremor, paresthesias, or sensory motor disturbance  Skin: Denies rash, itching or texture change. Physical Exam:      General: Well developed, in no acute distress, cooperative and alert  HEENT: No carotid bruits, no JVD, trach is midline. Neck Supple, PEERL  Heart:  Normal S1/S2 negative S3 or S4. Regular, no murmur, gallop or rub.   Respiratory: Clear bilaterally x 4, no wheezing or rales  Abdomen:   Soft, non-tender, no masses, bowel sounds are active.   Extremities:  No edema, normal cap refill, no cyanosis, atraumatic. Neuro: A&Ox3, speech clear, gait stable. Skin: Skin color is normal. No rashes or lesions. Non diaphoretic  Vascular: 2+ pulses bilateral lower extremities. +2 right radial.  Absent radial and ulnar palpable pulses left wrist, confirmed with Doppler.     Cardiographics    ECG: Sinus  Results for orders placed or performed in visit on 02/03/14   CARDIAC HOLTER MONITOR, 24 HOURS    Narrative    ECG Monitor/24 hours, Complete    Reason for Holter Monitor   PVC's    Heartbeat    Slowest 53  Average 71  Fastest  113    Results:   Underlying Rhythm: Normal sinus rhythm      Atrial Arrhythmias: premature atrial contractions; occasional and  a few short runs of PSVT    AV Conduction: normal    Ventricular Arrhythmias: premature ventricular contractions;  frequent and ventricular couplets and one ventricular triplet    ST Segment Analysis:non-specific changes     Symptom Correlation:  Shortness of breath corresponds to NSR with activity. Comment:   Normal sinus rhythm with frequent PVC's occasional PAC's, a few  short runs of PSVT     Jonathan Guerra MD            Results for orders placed or performed in visit on 12/05/11   AMB POC EKG ROUTINE W/ 12 LEADS, INTER & REP    Impression    Normal sinus rhythm. Nonspecific ST wave changes in the lateral  chest leads. Cardiology Labs:  Lab Results   Component Value Date/Time    Cholesterol, total 190 02/06/2017 08:36 AM    HDL Cholesterol 96 02/06/2017 08:36 AM    LDL, calculated 67 02/06/2017 08:36 AM    Triglyceride 137 02/06/2017 08:36 AM       Lab Results   Component Value Date/Time    Sodium 140 02/06/2017 08:36 AM    Potassium 4.4 02/06/2017 08:36 AM    Chloride 100 02/06/2017 08:36 AM    CO2 26 02/06/2017 08:36 AM    Glucose 131 02/06/2017 08:36 AM    BUN 22 02/06/2017 08:36 AM    Creatinine 0.95 02/06/2017 08:36 AM    BUN/Creatinine ratio 23 02/06/2017 08:36 AM    GFR est AA 64 02/06/2017 08:36 AM    GFR est non-AA 55 02/06/2017 08:36 AM    Calcium 9.7 02/06/2017 08:36 AM    Bilirubin, total 0.3 02/06/2017 08:36 AM    AST (SGOT) 17 02/06/2017 08:36 AM    Alk. phosphatase 102 02/06/2017 08:36 AM    Protein, total 6.6 02/06/2017 08:36 AM    Albumin 4.3 02/06/2017 08:36 AM    A-G Ratio 1.9 02/06/2017 08:36 AM    ALT (SGPT) 10 02/06/2017 08:36 AM           Assessment:     Assessment:     Aydee was seen today for new patient. Diagnoses and all orders for this visit:    ROWE (dyspnea on exertion)  -     DUPLEX UPPER EXT ARTERY LEFT; Future  -     DUPLEX CAROTID BILATERAL; Future  -     2D ECHO COMPLETE ADULT (TTE) W OR WO CONTR; Future  -     LEXISCAN/CARDIOLITE, Clinic Performed; Future    Hyperlipidemia, unspecified hyperlipidemia type  -     AMB POC EKG ROUTINE W/ 12 LEADS, INTER & REP  -     DUPLEX UPPER EXT ARTERY LEFT;  Future  - DUPLEX CAROTID BILATERAL; Future  -     2D ECHO COMPLETE ADULT (TTE) W OR WO CONTR; Future  -     LEXISCAN/CARDIOLITE, Clinic Performed; Future    Essential hypertension  -     DUPLEX UPPER EXT ARTERY LEFT; Future  -     DUPLEX CAROTID BILATERAL; Future  -     2D ECHO COMPLETE ADULT (TTE) W OR WO CONTR; Future  -     LEXISCAN/CARDIOLITE, Clinic Performed; Future    Venous (peripheral) insufficiency  -     DUPLEX UPPER EXT ARTERY LEFT; Future  -     DUPLEX CAROTID BILATERAL; Future  -     2D ECHO COMPLETE ADULT (TTE) W OR WO CONTR; Future  -     LEXISCAN/CARDIOLITE, Clinic Performed; Future    Weak arterial pulse        ICD-10-CM ICD-9-CM    1. ROWE (dyspnea on exertion) R06.09 786.09 DUPLEX UPPER EXT ARTERY LEFT      DUPLEX CAROTID BILATERAL      2D ECHO COMPLETE ADULT (TTE) W OR WO CONTR      STRESS TEST LEXISCAN/CARDIOLITE   2. Hyperlipidemia, unspecified hyperlipidemia type E78.5 272.4 AMB POC EKG ROUTINE W/ 12 LEADS, INTER & REP      DUPLEX UPPER EXT ARTERY LEFT      DUPLEX CAROTID BILATERAL      2D ECHO COMPLETE ADULT (TTE) W OR WO CONTR      STRESS TEST LEXISCAN/CARDIOLITE   3. Essential hypertension I10 401.9 DUPLEX UPPER EXT ARTERY LEFT      DUPLEX CAROTID BILATERAL      2D ECHO COMPLETE ADULT (TTE) W OR WO CONTR      STRESS TEST LEXISCAN/CARDIOLITE   4. Venous (peripheral) insufficiency I87.2 459.81 DUPLEX UPPER EXT ARTERY LEFT      DUPLEX CAROTID BILATERAL      2D ECHO COMPLETE ADULT (TTE) W OR WO CONTR      STRESS TEST LEXISCAN/CARDIOLITE   5.  Weak arterial pulse R09.89 785.9      Orders Placed This Encounter    DUPLEX UPPER EXT ARTERY LEFT     Standing Status:   Future     Standing Expiration Date:   4/21/2018    DUPLEX CAROTID BILATERAL     Standing Status:   Future     Standing Expiration Date:   4/21/2018    AMB POC EKG ROUTINE W/ 12 LEADS, INTER & REP     Order Specific Question:   Reason for Exam:     Answer:   routine    LEXISCAN/CARDIOLITE, Clinic Performed     Standing Status:   Future Standing Expiration Date:   9/21/2017     Order Specific Question:   Reason for Exam:     Answer:   ROWE    2D ECHO COMPLETE ADULT (TTE) W OR WO CONTR     Standing Status:   Future     Standing Expiration Date:   9/21/2017     Order Specific Question:   Reason for Exam:     Answer:   ROWE     Order Specific Question:   Contrast Enhancement (Bubble Study, Definity, Optison) may be used if criteria listed in established evidence-based protocol has been identified. Answer:   Yes        Plan:     1. Dyspnea on exertion: Patient reports this has been progressive and feels slightly worse than evaluation in 2014. Will evaluate with Janki Riggs as due to knee pain she is unable to ambulate sufficiently, and echocardiogram.  2.  Absent palpable left radial and ulnar pulse: Confirmed with Doppler however systolic pressure at radial and brachial is 88 mmHg. Will evaluate for subclavian steal syndrome with duplex study. At present she denies aching, cramping or  discoloration of her left hand or forearm. 3.  Hypertension controlled continue current medications  4. Hyperlipidemia: LDL 67 continue current therapy  5. Venous insufficiency and venous reflux: Followed by Dr. Senait Dobbs she is pending further evaluation of her right leg.   Follow-up with Dr. Zelda Cruz, NP

## 2017-03-30 ENCOUNTER — CLINICAL SUPPORT (OUTPATIENT)
Dept: CARDIOLOGY CLINIC | Age: 82
End: 2017-03-30

## 2017-03-30 ENCOUNTER — OFFICE VISIT (OUTPATIENT)
Dept: CARDIOLOGY CLINIC | Age: 82
End: 2017-03-30

## 2017-03-30 DIAGNOSIS — I10 ESSENTIAL HYPERTENSION: ICD-10-CM

## 2017-03-30 DIAGNOSIS — R06.09 DOE (DYSPNEA ON EXERTION): ICD-10-CM

## 2017-03-30 DIAGNOSIS — I87.2 VENOUS (PERIPHERAL) INSUFFICIENCY: ICD-10-CM

## 2017-03-30 DIAGNOSIS — E78.5 HYPERLIPIDEMIA, UNSPECIFIED HYPERLIPIDEMIA TYPE: ICD-10-CM

## 2017-03-30 NOTE — PROGRESS NOTES
Dani: Please call patient pumping strength of her heart looks good on echo, mild leak on the mitral valve. Still awaiting results of her stress test and vascular duplex.

## 2017-03-30 NOTE — PROCEDURES
[facility]  *** FINAL REPORT ***    Name: Jackie Ocasio  MRN: [id number]        Outpatient  : 05 Mar 1932  HIS Order #: [id number]  [id number] [facility] Visit #: [id number]  Date: 30 Mar 2017    TYPE OF TEST: Cerebrovascular Duplex    REASON FOR TEST  Dizziness/vertigo, Subclavian steal syndrome    Right Carotid:-             Proximal               Mid                 Distal  cm/s  Systolic  Diastolic  Systolic  Diastolic  Systolic  Diastolic  CCA:     76.2      10.0       69.0       9.0       79.0      10.0  Bulb:  ECA:    105.0  ICA:    122.0      27.0                            79.0      19.0  ICA/CCA:  1.5       2.7    ICA Stenosis: <50%    Right Vertebral:-  Finding: Antegrade  Sys:  Ninfa:    Right Subclavian: Normal    Left Carotid:-            Proximal                Mid                 Distal  cm/s  Systolic  Diastolic  Systolic  Diastolic  Systolic  Diastolic  CCA:     44.7      12.0       70.0      10.0       74.0      12.0  Bulb:  ECA:    175.0  ICA:     86.0      19.0                           112.0      29.0  ICA/CCA:  1.2       1.6    ICA Stenosis: <50%    Left Vertebral:-  Finding: Retrograde  Sys:  Ninfa:    Left Subclavian: Stenotic    INTERPRETATION/FINDINGS  1. Bilateral <50% stenosis of the internal carotid arteries. 2. No significant stenosis in the external carotid arteries  bilaterally. 3. Antegrade flow in the right vertebral artery. 4. Retrograde flow in the left vertebral artery. 5. Normal flow in the right subclavian artery. 6. Muted, Monophasic flow in the left subclavian artery suggestive of  proximal stenosis. Plaque Morphology:  1. Calcific plaque in the bulb and right ICA. 2. Calcific plaque in the bulb and left ICA. ADDITIONAL COMMENTS    I have personally reviewed the data relevant to the interpretation of  this  study. TECHNOLOGIST: Estefany Rivera RVT  Signed: 2017 12:57 PM    PHYSICIAN: Landon Zambrano.  Charlene Saravia MD  Signed: 2017 05:39 PM

## 2017-03-30 NOTE — PROCEDURES
[facility]  *** FINAL REPORT ***    Name: Bulmaro Colon  MRN: [id number]        Outpatient  : 05 Mar 1932  HIS Order #: [id number]  [address] [facility] Visit #: [id number]  Date: 30 Mar 2017    TYPE OF TEST: Extremity Arterial Duplex    REASON FOR TEST                            Right                     Left  Artery               PSV   Finding             PSV   Finding  ------------------  -----  ---------------    -----  ---------------  Subclavian:                                    42.0  Normal  Axillary:                                      47.0  Normal  Brachial:                                      52.0  Normal  Radial:                                        49.0  Normal  Ulnar:                                         44.0  Normal    Digit pressures:      R:        L:  Wrist/brachial index: R:        L:      INTERPRETATION/FINDINGS  Left arm :  1. No significant occlusive disease in the arteries examined. ADDITIONAL COMMENTS  Monophasic signal is suggestive of more proximal stenosis. Recommend  further evaluation if clinically indicated. I have personally reviewed the data relevant to the interpretation of  this  study. TECHNOLOGIST: Cliff Ledesma RVT  Signed: 2017 01:00 PM    PHYSICIAN: Yolande Mata.  Lucero Herrera MD  Signed: 2017 05:42 PM

## 2017-03-31 ENCOUNTER — TELEPHONE (OUTPATIENT)
Dept: CARDIOLOGY CLINIC | Age: 82
End: 2017-03-31

## 2017-03-31 NOTE — TELEPHONE ENCOUNTER
----- Message from Brittany Sierra NP sent at 3/31/2017  9:02 AM EDT -----  Please call patient stress test is normal thanks.

## 2017-04-03 ENCOUNTER — TELEPHONE (OUTPATIENT)
Dept: CARDIOLOGY CLINIC | Age: 82
End: 2017-04-03

## 2017-04-03 NOTE — PROGRESS NOTES
Spoke with patient  Verified patient with 2 patient identifier  Informed per Dr Holly Dorado  patient pumping strength of her heart looks good on echo, mild leak on the mitral valve. Still awaiting results of her stress test and vascular duplex.    Patient verbalized understanding

## 2017-04-03 NOTE — TELEPHONE ENCOUNTER
----- Message from Abdulkadir Dickerson MD sent at 3/31/2017  4:09 PM EDT -----  Inform her stress test and Echo are ok. Called pt,verified pt with two pt identifiers, told pt that her stress test and echo are okay. She verbalized that she understood everything. Verified appt tomorrow with  at 1:45 pm for vascular consult.

## 2017-04-04 ENCOUNTER — OFFICE VISIT (OUTPATIENT)
Dept: CARDIOLOGY CLINIC | Age: 82
End: 2017-04-04

## 2017-04-04 ENCOUNTER — TELEPHONE (OUTPATIENT)
Dept: CARDIOLOGY CLINIC | Age: 82
End: 2017-04-04

## 2017-04-04 VITALS
OXYGEN SATURATION: 97 % | BODY MASS INDEX: 31.69 KG/M2 | HEART RATE: 75 BPM | DIASTOLIC BLOOD PRESSURE: 60 MMHG | HEIGHT: 65 IN | SYSTOLIC BLOOD PRESSURE: 130 MMHG | RESPIRATION RATE: 18 BRPM | WEIGHT: 190.2 LBS

## 2017-04-04 DIAGNOSIS — I73.9 PAD (PERIPHERAL ARTERY DISEASE) (HCC): ICD-10-CM

## 2017-04-04 DIAGNOSIS — E78.2 MIXED HYPERLIPIDEMIA: ICD-10-CM

## 2017-04-04 DIAGNOSIS — I10 ESSENTIAL HYPERTENSION: ICD-10-CM

## 2017-04-04 DIAGNOSIS — I87.2 VENOUS (PERIPHERAL) INSUFFICIENCY: ICD-10-CM

## 2017-04-04 DIAGNOSIS — I83.812 VARICOSE VEINS OF LEFT LOWER EXTREMITIES WITH PAIN: Primary | ICD-10-CM

## 2017-04-04 NOTE — PROGRESS NOTES
4/4/2017 1:39 PM      Subjective:     Susan Jhaveri is here for f/u visit. She continues to c/o left LE symptoms. After last visit LE venous doppler done and significant left GSV reflux is noted. Rt GSV is s/p successful closure. Since she has not been wearing compression stockings, I have advised her to try compression stockings for 6-8 wks before considering any further intervention. Aching/pain? Left leg. [x] One leg [] Both legs  Heaviness? [x] One leg [] Both legs  Tiredness/fatigue? [] One leg [] Both legs  Itching/burning? [x] One leg [] Both legs  Swollen ankles? [x] One leg [] Both legs  Leg cramps? [x] One leg [] Both legs  Restless legs? [x] One leg [] Both legs  Throbbing? [x] One leg [] Both legs        Visit Vitals    /60 (BP 1 Location: Right arm, BP Patient Position: Sitting)    Pulse 75    Resp 18    Ht 5' 5\" (1.651 m)    Wt 190 lb 3.2 oz (86.3 kg)    SpO2 97%    BMI 31.65 kg/m2       Current Outpatient Prescriptions   Medication Sig    rOPINIRole (REQUIP) 1 mg tablet TAKE 1 TABLET BY MOUTH EVERY NIGHT.  enalapril-hydroCHLOROthiazide (VASERETIC) 5-12.5 mg tablet TAKE 1 TABLET EVERY DAY    verapamil ER (CALAN-SR) 240 mg CR tablet TAKE 1 TABLET EVERY DAY    lovastatin (MEVACOR) 40 mg tablet TAKE 1 TABLET EVERY EVENING TO LOWER CHOLESTEROL    cyclobenzaprine (FLEXERIL) 10 mg tablet TAKE 1 TABLET 3 TIMES DAILY AS NEEDED FOR MUSCLE SPASMS.  trimethoprim-sulfamethoxazole (BACTRIM, SEPTRA)  mg per tablet Take 1 Tab by mouth daily.  nitrofurantoin (MACRODANTIN) 50 mg capsule Take  by mouth daily.  ferrous sulfate 325 mg (65 mg iron) tablet Take  by mouth two (2) times a day.  levothyroxine (SYNTHROID) 100 mcg tablet TAKE 1 TABLET DAILY BEFORE BREAKFAST.  latanoprost (XALATAN) 0.005 % ophthalmic solution Administer 1 Drop to both eyes nightly.     Lancets (ACCU-CHEK MULTICLIX LANCET) Misc As dir    DOCUSATE CALCIUM (STOOL SOFTENER PO) Take  by mouth.  ASCORBIC ACID/VITAMIN E (CRANBERRY CONCENTRATE PO) Take  by mouth.  ascorbic acid (VITAMIN C) 500 mg tablet Take  by mouth.  cholecalciferol, vitamin d3, (VITAMIN D) 1,000 unit tablet Take  by mouth daily.  MULTIVITAMINS (MULTIVITAMIN PO) Take  by mouth. No current facility-administered medications for this visit. Objective:      Visit Vitals    /60 (BP 1 Location: Right arm, BP Patient Position: Sitting)    Pulse 75    Resp 18    Ht 5' 5\" (1.651 m)    Wt 190 lb 3.2 oz (86.3 kg)    SpO2 97%    BMI 31.65 kg/m2       Data Review:       Past Medical History:   Diagnosis Date    Arthritis     hands/low back    Diabetes (HCC)     borderline    GERD (gastroesophageal reflux disease)     Glaucoma     Hypercholesterolemia     Hypertension     Ill-defined condition     borderline anemia    Ill-defined condition     bladder infections    Impaired glucose tolerance     Menopause     Thyroid disease       Past Surgical History:   Procedure Laterality Date    HX BREAST BIOPSY Bilateral     benign    HX CATARACT REMOVAL      bilateral    HX DILATION AND CURETTAGE      HX MOHS PROCEDURES      right    HX ORTHOPAEDIC      carpal tunnel release -bilateral     Allergies   Allergen Reactions    Aspirin Other (comments)     Swelling shut of eyelids    Gabapentin Other (comments)     Vision change    Macrobid [Nitrofurantoin Monohyd/M-Cryst] Unknown (comments)    Pcn [Penicillins] Hives      Family History   Problem Relation Age of Onset    Coronary Artery Disease Other      mother  of MI age 80, brother  age 72 of MI, sister has hear problems    Breast Cancer Sister 48      Social History     Social History    Marital status:      Spouse name: N/A    Number of children: N/A    Years of education: N/A     Occupational History    Not on file.      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 Review of Systems     General: Not Present- Anorexia, Chills, Dietary Changes, Fatigue, Fever, Medication Changes, Night Sweats, Weight Gain > 10lbs. and Weight Loss > 10lbs. .  Skin: Not Present- Bruising and Excessive Sweating. HEENT: Not Present- Headache, Visual Loss and Vertigo. Respiratory: Not Present- Cough, Decreased Exercise Tolerance, Difficulty Breathing, Snoring and Wheezing. Cardiovascular: Not Present- Abnormal Blood Pressure, Chest Pain, Difficulty Breathing On Exertion, Fainting / Blacking Out, Irregular Heart Beat, Orthopnea, Palpitations, Paroxysmal Nocturnal Dyspnea, Rapid Heart Rate, Shortness of Breath. Gastrointestinal: Not Present- Black, Tarry Stool, Bloody Stool, Diarrhea, Hematemesis, Rectal Bleeding and Vomiting. Musculoskeletal: Not Present- Muscle Pain and Muscle Weakness. Neurological: Not Present- Dizziness. Psychiatric: Not Present- Depression. Endocrine: Not Present- Cold Intolerance, Heat Intolerance and Thyroid Problems. Hematology: Not Present- Abnormal Bleeding, Anemia, Blood Clots and Easy Bruising. Physical Exam   The physical exam findings are as follows:     General   Mental Status - Alert. General Appearance - Cooperative and Well groomed. Not in acute distress. Orientation - Oriented to time, Oriented to place and Oriented to person. Build & Nutrition - Well developed. Skin   General: - Normal.      HEENT  Head - Normal.  Eye - Normal.  Mouth & Throat - Normal.      Neck   Carotid Arteries - normal upstroke.  No Bruits. Thyroid: Gland - Normal size and consistency. Chest and Lung Exam   Inspection:   Chest Wall: - Normal. Accessory muscles - No use of accessory muscles in breathing. Auscultation:   Breath sounds: - Normal.      Cardiovascular   Inspection: Jugular vein - Bilateral - Inspection Normal.  Palpation/Percussion:   Apical Impulse: - Normal.  Auscultation: Rhythm - Regular. Heart Sounds - S1 WNL and S2 WNL. No S3 or S4. Murmurs & Other Heart Sounds: Auscultation of the heart reveals - No Murmurs. Abdomen   Palpation/Percussion: Palpation and Percussion of the abdomen reveal - No Palpable abdominal masses. Liver: - Normal.  Spleen: - Normal.  Auscultation: Auscultation of the abdomen reveals - Bowel sounds normal.      Neurologic   Mental Status: Affect - normal.  Motor: - Normal. Gait - Normal.    B/l palpable DP and PT. Lt. LE varicose veins and edema. PHYSICIAN TO COMPLETE BELOW THIS POINT    Date of Initial Physician Evaluation:_________03/07/2017____________  Check all that apply:  [x] Reviewed Venous history  [x] Physical examination of the affected leg(s)   [x] Duplex or Doppler Scan results reviewed. Duplex or Doppler Ultrasound of the venous system demonstrate:  [x] Absence of deep venous thrombosis  [x] Greater and/or lesser saphenous vein or  valvular incompetence/reflux that correlates with        patients symptoms  []   valvular incompetence/reflux that correlates with patients symptoms    [x] Graduated, elasticized compression stockings (20-30 mmHg), has been prescribed. [] Standing Photos taken of leg(s)  [] Front     [] Back     [x] Front and back   [x] Other causes of patients leg(s) symptoms have been ruled out             Assessment:       ICD-10-CM ICD-9-CM    1. 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 Venous (peripheral) insufficiency I87.2 459.81    3. Essential hypertension I10 401.9    4.  Mixed hyperlipidemia E78.2 272.2    5. PAD (peripheral artery disease) ([facility]) I73.9 443.9        CEAP class:      []C1   []C2   [x]C3    []C4    []4a    []4b   []C5   []C6           Plan:     1. Left GSV insufficiency and varicose veins: compression stockings, instruction given on daily leg elevation, mild exercise and for weight reduction. F/u in 6-8 wks. 2. BP controlled.    3. On statin. Recent labs noted.    4. F/u with Dr. Belkys Merino for cardiac care    Abdulkadir Dickerson MD  4/4/2017  1:39 PM

## 2017-04-04 NOTE — PROGRESS NOTES
Spoke with patient  Verified patient with 2 patient identifier  Informed patient per Rand Davidson NP, she has a blockage in the blood vessel to her left arm, she was asymptomatic in consult, she can follow up with Dr Ai Wilkins as planned to further discuss and to review all her other test results. Patient verbalized understanding, has f/u appointment today.

## 2017-04-04 NOTE — MR AVS SNAPSHOT
Visit Information Date & Time Provider Department Dept. Phone Encounter #  
 4/4/2017  1:45 PM Jason Alcocer, [address] [facility] [phone] [id number] Your Appointments 4/4/2017  1:45 PM  
Vascular Consult with Jason Alcocer MD  
[facility] [address] [place] [address]) Appt Note: Per Dr YOUNG $0CP REM; Test Results Per Dr YOUNG $0CP REM  
 [address] [facility]  
[phone] [address] [address] [address]  
  
    
 5/16/2017  2:45 PM  
Vascular Consult with Jason Alcocer MD  
[facility] [address] [facility]) Appt Note: 6WEEKS PER DR WHITMAN. $0CP  
 [address] [facility]  
[phone] Upcoming Health Maintenance Date Due  
 MEDICARE YEARLY EXAM 2/7/2018 GLAUCOMA SCREENING Q2Y 1/6/2019 DTaP/Tdap/Td series (2 - Td) 2/6/2027 Allergies as of 4/4/2017  Review Complete On: 4/4/2017 By: Jason Alcocer MD  
  
 Severity Noted Reaction Type Reactions Aspirin  02/03/2014    Other (comments) Swelling shut of eyelids Gabapentin  01/18/2016    Other (comments) Vision change Macrobid [Nitrofurantoin Monohyd/m-cryst]  02/08/2010    Unknown (comments) Pcn [Penicillins]  02/08/2010    Hives Current Immunizations  Reviewed on 11/1/2016 Name Date Influenza High Dose Vaccine PF 11/1/2016, 10/14/2015, 9/29/2014, 10/3/2013 Influenza Vaccine Split 10/22/2012, 10/11/2011, 10/13/2010 Pneumococcal Conjugate (PCV-13) 10/14/2015 Pneumococcal Vaccine (Unspecified Type) 1/1/2005 Zoster 12/15/2011 Not reviewed this visit You Were Diagnosed With   
  
 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   
 Essential hypertension     ICD-10-CM: I10 
ICD-9-CM: 401.9 Mixed hyperlipidemia     ICD-10-CM: E78.2 ICD-9-CM: 272.2 PAD (peripheral artery disease) (HCC)     ICD-10-CM: I73.9 ICD-9-CM: 443. 9 Vitals BP Pulse Resp Height(growth percentile) Weight(growth percentile) SpO2  
 130/60 (BP 1 Location: Right arm, BP Patient Position: Sitting) 75 18 5' 5\" (1.651 m) 190 lb 3.2 oz (86.3 kg) 97% BMI OB Status Smoking Status 31.65 kg/m2 Postmenopausal Former Smoker Vitals History BMI and BSA Data Body Mass Index Body Surface Area  
 31.65 kg/m 2 1.99 m 2 Preferred Pharmacy Pharmacy Name Phone [address], [address] [place] [address] [phone] Your Updated Medication List  
  
   
This list is accurate as of: 4/4/17  1:40 PM.  Always use your most recent med list.  
  
  
  
  
 CRANBERRY CONCENTRATE PO Take  by mouth. cyclobenzaprine 10 mg tablet Commonly known as:  FLEXERIL  
TAKE 1 TABLET 3 TIMES DAILY AS NEEDED FOR MUSCLE SPASMS. enalapril-hydroCHLOROthiazide 5-12.5 mg tablet Commonly known as:  VASERETIC  
TAKE 1 TABLET EVERY DAY  
  
 ferrous sulfate 325 mg (65 mg iron) tablet Take  by mouth two (2) times a day. Lancets Misc Commonly known as:  [address] [place] [address] As dir  
  
 latanoprost 0.005 % ophthalmic solution Commonly known as:  Jass Paldelbert Administer 1 Drop to both eyes nightly. levothyroxine 100 mcg tablet Commonly known as:  SYNTHROID  
TAKE 1 TABLET DAILY BEFORE BREAKFAST. lovastatin 40 mg tablet Commonly known as:  MEVACOR  
TAKE 1 TABLET EVERY EVENING TO LOWER CHOLESTEROL MULTIVITAMIN PO Take  by mouth. nitrofurantoin 50 mg capsule Commonly known as:  MACRODANTIN Take  by mouth daily. rOPINIRole 1 mg tablet Commonly known as:  REQUIP  
TAKE 1 TABLET BY MOUTH EVERY NIGHT. STOOL SOFTENER PO Take  by mouth. trimethoprim-sulfamethoxazole  mg per tablet Commonly known as:  Doyal Lawson Take 1 Tab by mouth daily. verapamil  mg CR tablet Commonly known as:  CALAN-SR  
TAKE 1 TABLET EVERY DAY  
  
 VITAMIN C 500 mg tablet Generic drug:  ascorbic acid (vitamin C) Take  by mouth. VITAMIN D3 1,000 unit tablet Generic drug:  cholecalciferol Take  by mouth daily. Introducing [facility]! Emily Aleman introduces Wavestream patient portal. Now you can access parts of your medical record, email your doctor's office, and request medication refills online. 1. In your internet browser, go to [email or web address] 2. Click on the First Time User? Click Here link in the Sign In box. You will see the New Member Sign Up page. 3. Enter your Wavestream Access Code exactly as it appears below. You will not need to use this code after youve completed the sign-up process. If you do not sign up before the expiration date, you must request a new code. · Wavestream Access Code: [email or web address] Expires: 5/7/2017  8:43 AM 
 
4. Enter the last four digits of your Social Security Number (xxxx) and Date of Birth (mm/dd/yyyy) as indicated and click Submit. You will be taken to the next sign-up page. 5. Create a Wavestream ID. This will be your Wavestream login ID and cannot be changed, so think of one that is secure and easy to remember. 6. Create a Wavestream password. You can change your password at any time. 7. Enter your Password Reset Question and Answer. This can be used at a later time if you forget your password. 8. Enter your e-mail address. You will receive e-mail notification when new information is available in [address] 9. Click Sign Up. You can now view and download portions of your medical record. 10. Click the Download Summary menu link to download a portable copy of your medical information. If you have questions, please visit the Frequently Asked Questions section of the [email or web address] website. Remember, [employer] is NOT to be used for urgent needs. For medical emergencies, dial 911. Now available from your iPhone and Android! Please provide this summary of care documentation to your next provider. Your primary care clinician is listed as Maris Hallie. If you have any questions after today's visit, please call [phone].

## 2017-04-04 NOTE — PROGRESS NOTES
Buddy Clement, please call patient, she has a blockage in the blood vessel to her left arm, she was asymptomatic in consult, she can follow up with Dr Darcy Quinteros as planned to further discuss and to review all her other test results

## 2017-04-04 NOTE — TELEPHONE ENCOUNTER
Spoke with patient  Verified patient with 2 patient identifier    Informed patient has vascular consult today need f/u up with  Dr Alexi Green reference test results.

## 2017-04-26 ENCOUNTER — HOSPITAL ENCOUNTER (OUTPATIENT)
Dept: GENERAL RADIOLOGY | Age: 82
Discharge: HOME | End: 2017-04-26
Attending: ORTHOPAEDIC SURGERY
Payer: MEDICARE

## 2017-04-26 DIAGNOSIS — M16.12 PRIMARY OSTEOARTHRITIS OF LEFT HIP: ICD-10-CM

## 2017-04-26 PROCEDURE — 74011636320 HC RX REV CODE- 636/320: Performed by: RADIOLOGY

## 2017-04-26 PROCEDURE — 20610 DRAIN/INJ JOINT/BURSA W/O US: CPT

## 2017-04-26 PROCEDURE — 74011250636 HC RX REV CODE- 250/636: Performed by: RADIOLOGY

## 2017-04-26 PROCEDURE — 74011000250 HC RX REV CODE- 250: Performed by: RADIOLOGY

## 2017-04-26 RX ORDER — BUPIVACAINE HYDROCHLORIDE 5 MG/ML
2 INJECTION, SOLUTION EPIDURAL; INTRACAUDAL
Status: COMPLETED | OUTPATIENT
Start: 2017-04-26 | End: 2017-04-26

## 2017-04-26 RX ORDER — LIDOCAINE HYDROCHLORIDE 10 MG/ML
5 INJECTION INFILTRATION; PERINEURAL
Status: COMPLETED | OUTPATIENT
Start: 2017-04-26 | End: 2017-04-26

## 2017-04-26 RX ORDER — TRIAMCINOLONE ACETONIDE 40 MG/ML
40 INJECTION, SUSPENSION INTRA-ARTICULAR; INTRAMUSCULAR
Status: COMPLETED | OUTPATIENT
Start: 2017-04-26 | End: 2017-04-26

## 2017-04-26 RX ADMIN — TRIAMCINOLONE ACETONIDE 40 MG: 40 INJECTION, SUSPENSION INTRA-ARTICULAR; INTRAMUSCULAR at 10:52

## 2017-04-26 RX ADMIN — IOHEXOL 1 ML: 300 INJECTION, SOLUTION INTRAVENOUS at 10:52

## 2017-04-26 RX ADMIN — LIDOCAINE HYDROCHLORIDE 5 ML: 10 INJECTION, SOLUTION INFILTRATION; PERINEURAL at 10:52

## 2017-04-26 RX ADMIN — BUPIVACAINE HYDROCHLORIDE 10 MG: 5 INJECTION, SOLUTION EPIDURAL; INTRACAUDAL; PERINEURAL at 10:52

## 2017-05-03 ENCOUNTER — TELEPHONE (OUTPATIENT)
Dept: FAMILY MEDICINE CLINIC | Age: 82
End: 2017-05-03

## 2017-05-03 NOTE — TELEPHONE ENCOUNTER
Patient calling in refill of HYDROcodone-acetaminophen (NORCO) 5-325 mg per tablet   Patient can be reached at [phone]

## 2017-05-04 RX ORDER — HYDROCODONE BITARTRATE AND ACETAMINOPHEN 5; 325 MG/1; MG/1
1 TABLET ORAL
Qty: 30 TAB | Refills: 0 | Status: SHIPPED | OUTPATIENT
Start: 2017-05-04 | End: 2017-11-22

## 2017-06-08 ENCOUNTER — OFFICE VISIT (OUTPATIENT)
Dept: FAMILY MEDICINE CLINIC | Age: 82
End: 2017-06-08

## 2017-06-08 VITALS
SYSTOLIC BLOOD PRESSURE: 151 MMHG | RESPIRATION RATE: 17 BRPM | WEIGHT: 192 LBS | DIASTOLIC BLOOD PRESSURE: 67 MMHG | HEIGHT: 65 IN | OXYGEN SATURATION: 96 % | TEMPERATURE: 97.9 F | HEART RATE: 77 BPM | BODY MASS INDEX: 31.99 KG/M2

## 2017-06-08 DIAGNOSIS — H81.10 BPPV (BENIGN PAROXYSMAL POSITIONAL VERTIGO), UNSPECIFIED LATERALITY: Primary | ICD-10-CM

## 2017-06-08 RX ORDER — MELOXICAM 7.5 MG/1
TABLET ORAL DAILY
COMMUNITY
End: 2018-07-24

## 2017-06-08 RX ORDER — MECLIZINE HCL 12.5 MG 12.5 MG/1
12.5 TABLET ORAL
Qty: 30 TAB | Refills: 2 | Status: SHIPPED | OUTPATIENT
Start: 2017-06-08 | End: 2017-06-18

## 2017-06-08 NOTE — PATIENT INSTRUCTIONS
Benign Paroxysmal Positional Vertigo (BPPV): Care Instructions  Your Care Instructions    Benign paroxysmal positional vertigo, also called BPPV, is an inner ear problem. It causes a spinning or whirling sensation when you move your head. This sensation is called vertigo. The vertigo usually lasts for less than a minute. People often have vertigo spells for a few days or weeks. Then the vertigo goes away. But it may come back again. The vertigo may be mild, or it may be bad enough to cause unsteadiness, nausea, and vomiting. When you move, your inner ear sends messages to the brain. This helps you keep your balance. Vertigo can happen when debris builds up in the inner ear. The buildup can cause the inner ear to send the wrong message to the brain. Your doctor may move you in different positions to help your vertigo get better faster. This is called the Epley maneuver. Your doctor may also prescribe medicines or exercises to help with your symptoms. Follow-up care is a key part of your treatment and safety. Be sure to make and go to all appointments, and call your doctor if you are having problems. It's also a good idea to know your test results and keep a list of the medicines you take. How can you care for yourself at home? · If your doctor suggests that you do Grace-Daroff exercises:  ¨ Sit on the edge of a bed or sofa. Quickly lie down on the side that causes the worst vertigo. Lie on your side with your ear down. ¨ Stay in this position for at least 30 seconds or until the vertigo goes away. ¨ Sit up. If this causes vertigo, wait for it to stop. ¨ Repeat the procedure on the other side. ¨ Repeat this 10 times. Do these exercises 2 times a day until the vertigo is gone. When should you call for help? Call 911 anytime you think you may need emergency care. For example, call if:  · You have symptoms of a stroke.  These may include:  ¨ Sudden numbness, tingling, weakness, or loss of movement in your face, arm, or leg, especially on only one side of your body. ¨ Sudden vision changes. ¨ Sudden trouble speaking. ¨ Sudden confusion or trouble understanding simple statements. ¨ Sudden problems with walking or balance. ¨ A sudden, severe headache that is different from past headaches. Call your doctor now or seek immediate medical care if:  · You have new or worse nausea and vomiting. · You have new symptoms such as hearing loss or roaring in your ears. Watch closely for changes in your health, and be sure to contact your doctor if:  · You are not getting better as expected. · Your vertigo gets worse. Where can you learn more? Go to http://catrachoHome-Accountreynaldo.info/. Enter  in the search box to learn more about \"Benign Paroxysmal Positional Vertigo (BPPV): Care Instructions. \"  Current as of: July 29, 2016  Content Version: 11.2  © 9761-0361 [employer]. Care instructions adapted under license by [employer] (which disclaims liability or warranty for this information). If you have questions about a medical condition or this instruction, always ask your healthcare professional. [place] [address] any warranty or liability for your use of this information. Cawthorne Exercises for Vertigo: Care Instructions  Your Care Instructions  Simple exercises can help you regain your balance when you have vertigo. If you have Ménière's disease, benign paroxysmal positional vertigo (BPPV), or another inner ear problem, you may have vertigo off and on. Do these exercises first thing in the morning and before you go to bed. You might get dizzy when you first start them. If this happens, try to do them for at least 5 minutes. Do a group of exercises at a time, starting at the top of the list. It may take several weeks before you can do all the exercises without feeling dizzy. Follow-up care is a key part of your treatment and safety.  Be sure to make and go to all appointments, and call your doctor if you are having problems. It's also a good idea to know your test results and keep a list of the medicines you take. How can you care for yourself at home? Exercise 1  While sitting on the side of the bed and holding your head still:  · Look up as far as you can. · Look down as far as you can. · Look from side to side as far as you can. · Stretch your arm straight out in front of you. Focus on your index finger. Continue to focus on your finger while you bring it to your nose. Exercise 2  While sitting on the side of the bed:  · Bring your head as far back as you can. · Bring your head forward to touch your chin to your chest.  · Turn your head from side to side. · Do these exercises first with your eyes open. Then try with your eyes closed. Exercise 3  While sitting on the side of the bed:  · Shrug your shoulders straight upward, then relax them. · Bend over and try to touch the ground with your fingers. Then go back to a sitting position. · Toss a small ball from one hand to the other. Throw the ball higher than your eyes so you have to look up. Exercise 4  While standing (with someone close by if you feel uncomfortable):  · Repeat Exercise 1.  · Repeat Exercise 2.  · Pass a ball between your legs and above your head. · Sit down and then stand up. Repeat. Turn around in a [place] a different way each time you stand. · With someone close by to help you, try the above exercises with your eyes closed. Exercise 5  In a room that is cleared of obstacles:  · Walk to a corner of the room, turn to your right, and walk back to the starting point. Now, repeat and turn left. · Walk up and down a slope. Now try stairs. · While holding on to someone's arm, try these exercises with your eyes closed. When should you call for help? Watch closely for changes in your health, and be sure to contact your doctor if:  · Your vertigo gets worse.   · You want more information about vertigo. · You want more information about exercises for vertigo. Where can you learn more? Go to http://catracho-reynaldo.info/. Enter B043 in the search box to learn more about \"Cawthorne Exercises for Vertigo: Care Instructions. \"  Current as of: July 29, 2016  Content Version: 11.2  © 9550-8329 [employer]. Care instructions adapted under license by [employer] Â® (which disclaims liability or warranty for this information). If you have questions about a medical condition or this instruction, always ask your healthcare professional. [place] [address] any warranty or liability for your use of this information.

## 2017-06-08 NOTE — MR AVS SNAPSHOT
Visit Information Date & Time Provider Department Dept. Phone Encounter #  
 6/8/2017 11:00 AM [facility] [phone] [id number] Your Appointments 6/27/2017  1:30 PM  
Vascular Consult with MD [facility]) Appt Note: 6WEEKS PER DR WHITMAN. $0CP; 6WEEKS PER DR WHITMAN. $0CP  
 [facility]  
[phone] [facility] Upcoming Health Maintenance Date Due INFLUENZA AGE 9 TO ADULT 8/1/2017 MEDICARE YEARLY EXAM 2/7/2018 GLAUCOMA SCREENING Q2Y 1/6/2019 DTaP/Tdap/Td series (2 - Td) 2/6/2027 Allergies as of 6/8/2017  Review Complete On: 6/8/2017 By: Remy Jones Severity Noted Reaction Type Reactions Aspirin  02/03/2014    Other (comments) Swelling shut of eyelids Gabapentin  01/18/2016    Other (comments) Vision change Macrobid [Nitrofurantoin Monohyd/m-cryst]  02/08/2010    Unknown (comments) Pcn [Penicillins]  02/08/2010    Hives Current Immunizations  Reviewed on 11/1/2016 Name Date Influenza High Dose Vaccine PF 11/1/2016, 10/14/2015, 9/29/2014, 10/3/2013 Influenza Vaccine Split 10/22/2012, 10/11/2011, 10/13/2010 Pneumococcal Conjugate (PCV-13) 10/14/2015 Pneumococcal Vaccine (Unspecified Type) 1/1/2005 Zoster 12/15/2011 Not reviewed this visit Vitals BP Pulse Temp Resp Height(growth percentile) Weight(growth percentile) 151/67 (BP 1 Location: Left arm, BP Patient Position: Sitting) 77 97.9 °F (36.6 °C) (Oral) 17 5' 5\" (1.651 m) 192 lb (87.1 kg) SpO2 BMI OB Status Smoking Status 96% 31.95 kg/m2 Postmenopausal Former Smoker BMI and BSA Data Body Mass Index Body Surface Area 31.95 kg/m 2 2 m 2 Preferred Pharmacy Pharmacy Name Phone [address], [address] [place] [address] Godwin Del Valle [phone] Your Updated Medication List  
  
   
This list is accurate as of: 6/8/17 12:20 PM.  Always use your most recent med list.  
  
  
  
  
 CRANBERRY CONCENTRATE PO Take  by mouth.  
  
 enalapril-hydroCHLOROthiazide 5-12.5 mg tablet Commonly known as:  VASERETIC  
TAKE 1 TABLET EVERY DAY  
  
 ferrous sulfate 325 mg (65 mg iron) tablet Take  by mouth two (2) times a day. HYDROcodone-acetaminophen 5-325 mg per tablet Commonly known as:  Maryfrances Grumbles Take 1 Tab by mouth nightly as needed for Pain. Max Daily Amount: 1 Tab. Lancets Misc Commonly known as:  [address] [place] [address]  
  
 latanoprost 0.005 % ophthalmic solution Commonly known as:  Ruth Christiansen Administer 1 Drop to both eyes nightly. levothyroxine 100 mcg tablet Commonly known as:  SYNTHROID  
TAKE 1 TABLET DAILY BEFORE BREAKFAST. lovastatin 40 mg tablet Commonly known as:  MEVACOR  
TAKE 1 TABLET EVERY EVENING TO LOWER CHOLESTEROL  
  
 meclizine 12.5 mg tablet Commonly known as:  ANTIVERT Take 1 Tab by mouth three (3) times daily as needed for up to 10 days. Do not take with hydrocodone or cyclobenzaprine  
  
 meloxicam 7.5 mg tablet Commonly known as:  MOBIC Take  by mouth daily. MULTIVITAMIN PO Take  by mouth. nitrofurantoin 50 mg capsule Commonly known as:  MACRODANTIN Take  by mouth daily. rOPINIRole 1 mg tablet Commonly known as:  REQUIP  
TAKE 1 TABLET BY MOUTH EVERY NIGHT. STOOL SOFTENER PO Take  by mouth. trimethoprim-sulfamethoxazole  mg per tablet Commonly known as:  Autumn Manus Take 1 Tab by mouth daily. verapamil  mg CR tablet Commonly known as:  CALAN-SR  
TAKE 1 TABLET EVERY DAY  
  
 VITAMIN C 500 mg tablet Generic drug:  ascorbic acid (vitamin C) Take  by mouth. VITAMIN D3 1,000 unit tablet Generic drug:  cholecalciferol Take  by mouth daily. Prescriptions Sent to Pharmacy Refills  
 meclizine (ANTIVERT) 12.5 mg tablet 2 Sig: Take 1 Tab by mouth three (3) times daily as needed for up to 10 days. Do not take with hydrocodone or cyclobenzaprine Class: Normal  
 Pharmacy: [address], [address] [place] [address] #: [phone] Route: Oral  
  
Patient Instructions Benign Paroxysmal Positional Vertigo (BPPV): Care Instructions Your Care Instructions Benign paroxysmal positional vertigo, also called BPPV, is an inner ear problem. It causes a spinning or whirling sensation when you move your head. This sensation is called vertigo. The vertigo usually lasts for less than a minute. People often have vertigo spells for a few days or weeks. Then the vertigo goes away. But it may come back again. The vertigo may be mild, or it may be bad enough to cause unsteadiness, nausea, and vomiting. When you move, your inner ear sends messages to the brain. This helps you keep your balance. Vertigo can happen when debris builds up in the inner ear. The buildup can cause the inner ear to send the wrong message to the brain. Your doctor may move you in different positions to help your vertigo get better faster. This is called the Epley maneuver. Your doctor may also prescribe medicines or exercises to help with your symptoms. Follow-up care is a key part of your treatment and safety. Be sure to make and go to all appointments, and call your doctor if you are having problems. It's also a good idea to know your test results and keep a list of the medicines you take. How can you care for yourself at home? · If your doctor suggests that you do Grace-Daroff exercises: ¨ Sit on the edge of a bed or sofa. Quickly lie down on the side that causes the worst vertigo. Lie on your side with your ear down. ¨ Stay in this position for at least 30 seconds or until the vertigo goes away. ¨ Sit up. If this causes vertigo, wait for it to stop. ¨ Repeat the procedure on the other side. ¨ Repeat this 10 times. Do these exercises 2 times a day until the vertigo is gone. When should you call for help? Call 911 anytime you think you may need emergency care. For example, call if: 
· You have symptoms of a stroke. These may include: 
¨ Sudden numbness, tingling, weakness, or loss of movement in your face, arm, or leg, especially on only one side of your body. ¨ Sudden vision changes. ¨ Sudden trouble speaking. ¨ Sudden confusion or trouble understanding simple statements. ¨ Sudden problems with walking or balance. ¨ A sudden, severe headache that is different from past headaches. Call your doctor now or seek immediate medical care if: 
· You have new or worse nausea and vomiting. · You have new symptoms such as hearing loss or roaring in your ears. Watch closely for changes in your health, and be sure to contact your doctor if: 
· You are not getting better as expected. · Your vertigo gets worse. Where can you learn more? Go to http://catracho-reynaldo.info/. Enter  in the search box to learn more about \"Benign Paroxysmal Positional Vertigo (BPPV): Care Instructions. \" Current as of: July 29, 2016 Content Version: 11.2 © 2164-1059 [employer]. Care instructions adapted under license by [employer] (which disclaims liability or warranty for this information). If you have questions about a medical condition or this instruction, always ask your healthcare professional. [place] [address] any warranty or liability for your use of this information. Cawthorne Exercises for Vertigo: Care Instructions Your Care Instructions Simple exercises can help you regain your balance when you have vertigo. If you have Ménière's disease, benign paroxysmal positional vertigo (BPPV), or another inner ear problem, you may have vertigo off and on. Do these exercises first thing in the morning and before you go to bed. You might get dizzy when you first start them. If this happens, try to do them for at least 5 minutes. Do a group of exercises at a time, starting at the top of the list. It may take several weeks before you can do all the exercises without feeling dizzy. Follow-up care is a key part of your treatment and safety. Be sure to make and go to all appointments, and call your doctor if you are having problems. It's also a good idea to know your test results and keep a list of the medicines you take. How can you care for yourself at home? Exercise 1 While sitting on the side of the bed and holding your head still: 
· Look up as far as you can. · Look down as far as you can. · Look from side to side as far as you can. · Stretch your arm straight out in front of you. Focus on your index finger. Continue to focus on your finger while you bring it to your nose. Exercise 2 While sitting on the side of the bed: · Bring your head as far back as you can. · Bring your head forward to touch your chin to your chest. 
· Turn your head from side to side. · Do these exercises first with your eyes open. Then try with your eyes closed. Exercise 3 While sitting on the side of the bed: · Shrug your shoulders straight upward, then relax them. · Bend over and try to touch the ground with your fingers. Then go back to a sitting position. · Toss a small ball from one hand to the other. Throw the ball higher than your eyes so you have to look up. Exercise 4 While standing (with someone close by if you feel uncomfortable): 
· Repeat Exercise 1. 
· Repeat Exercise 2. 
· Pass a ball between your legs and above your head. · Sit down and then stand up. Repeat. Turn around in a [place] a different way each time you stand. · With someone close by to help you, try the above exercises with your eyes closed. Exercise 5 In a room that is cleared of obstacles: · Walk to a corner of the room, turn to your right, and walk back to the starting point. Now, repeat and turn left. · Walk up and down a slope. Now try stairs. · While holding on to someone's arm, try these exercises with your eyes closed. When should you call for help? Watch closely for changes in your health, and be sure to contact your doctor if: 
· Your vertigo gets worse. · You want more information about vertigo. · You want more information about exercises for vertigo. Where can you learn more? Go to http://catracho-reynaldo.info/. Enter L831 in the search box to learn more about \"Cawthorne Exercises for Vertigo: Care Instructions. \" Current as of: July 29, 2016 Content Version: 11.2 © 3368-2664 [employer]. Care instructions adapted under license by [employer] (which disclaims liability or warranty for this information). If you have questions about a medical condition or this instruction, always ask your healthcare professional. [place] [address] any warranty or liability for your use of this information. Introducing [facility]! Alexandra Carter introduces [employer] patient portal. Now you can access parts of your medical record, email your doctor's office, and request medication refills online. 1. In your internet browser, go to [email or web address] 2. Click on the First Time User? Click Here link in the Sign In box. You will see the New Member Sign Up page. 3. Enter your [employer] Access Code exactly as it appears below. You will not need to use this code after youve completed the sign-up process. If you do not sign up before the expiration date, you must request a new code. · [employer] Access Code: [email or web address] Expires: 9/6/2017 12:11 PM 
 
4. Enter the last four digits of your Social Security Number (xxxx) and Date of Birth (mm/dd/yyyy) as indicated and click Submit.  You will be taken to the next sign-up page. 5. Create a Gencia ID. This will be your [employer] login ID and cannot be changed, so think of one that is secure and easy to remember. 6. Create a [employer] password. You can change your password at any time. 7. Enter your Password Reset Question and Answer. This can be used at a later time if you forget your password. 8. Enter your e-mail address. You will receive e-mail notification when new information is available in [address] 9. Click Sign Up. You can now view and download portions of your medical record. 10. Click the Download Summary menu link to download a portable copy of your medical information. If you have questions, please visit the Frequently Asked Questions section of the [employer] website. Remember, [employer] is NOT to be used for urgent needs. For medical emergencies, dial 911. Now available from your iPhone and Android! Please provide this summary of care documentation to your next provider. Your primary care clinician is listed as Kaitlyn Izaguirre. If you have any questions after today's visit, please call [phone].

## 2017-06-08 NOTE — PROGRESS NOTES
86yo with recent vertigo. Has been going through evaluation for back pain, will be having epidural injection next week. Symptoms onset with vertigo when she laid down on an exam table for an xray. She also noted it at PT and sometimes at night when she lays down. Also sometimes when she bends over. No new medications. No acute URI symptoms. BG and blood pressure have been ok on home monitoring and at PT. Past Medical History, Surgical History, and Social History reviewed. Current Outpatient Prescriptions on File Prior to Visit   Medication Sig Dispense Refill    HYDROcodone-acetaminophen (NORCO) 5-325 mg per tablet Take 1 Tab by mouth nightly as needed for Pain. Max Daily Amount: 1 Tab. 30 Tab 0    rOPINIRole (REQUIP) 1 mg tablet TAKE 1 TABLET BY MOUTH EVERY NIGHT. 90 Tab 3    enalapril-hydroCHLOROthiazide (VASERETIC) 5-12.5 mg tablet TAKE 1 TABLET EVERY DAY 90 Tab 3    verapamil ER (CALAN-SR) 240 mg CR tablet TAKE 1 TABLET EVERY DAY 90 Tab 3    lovastatin (MEVACOR) 40 mg tablet TAKE 1 TABLET EVERY EVENING TO LOWER CHOLESTEROL 90 Tab 5    trimethoprim-sulfamethoxazole (BACTRIM, SEPTRA)  mg per tablet Take 1 Tab by mouth daily.  nitrofurantoin (MACRODANTIN) 50 mg capsule Take  by mouth daily.  ferrous sulfate 325 mg (65 mg iron) tablet Take  by mouth two (2) times a day.  levothyroxine (SYNTHROID) 100 mcg tablet TAKE 1 TABLET DAILY BEFORE BREAKFAST. 90 Tab 1    latanoprost (XALATAN) 0.005 % ophthalmic solution Administer 1 Drop to both eyes nightly.  Lancets (ACCU-CHEK MULTICLIX LANCET) Misc As dir 1 Package 11    DOCUSATE CALCIUM (STOOL SOFTENER PO) Take  by mouth.  ASCORBIC ACID/VITAMIN E (CRANBERRY CONCENTRATE PO) Take  by mouth.  ascorbic acid (VITAMIN C) 500 mg tablet Take  by mouth.  cholecalciferol, vitamin d3, (VITAMIN D) 1,000 unit tablet Take  by mouth daily.  MULTIVITAMINS (MULTIVITAMIN PO) Take  by mouth.        No current facility-administered medications on file prior to visit. ROS negative except as per HPI. Visit Vitals    /67 (BP 1 Location: Left arm, BP Patient Position: Sitting)    Pulse 77    Temp 97.9 °F (36.6 °C) (Oral)    Resp 17    Ht 5' 5\" (1.651 m)    Wt 192 lb (87.1 kg)    SpO2 96%    BMI 31.95 kg/m2     Gen: alert, oriented, no acute distress  HEENT: Clear conjunctiva and clear sclera, PERRL,  moist mucous membranes. EACs and TMs bilaterally normal.  Lungs: no increased respiratory effort, clear to ausculation bilaterally  Heart: regular rate and rhythm, no murmurs, rubs or gallops  Abdomen: soft, nontender, not distended. Normal bowel sounds. Neuro: nystagmus with lateral gaze - otherwise intact CN exam.  DTRs and strength intact and symmetric. Sensation intact. Extremities:no edema or cyanosis    Assessment/Plan:    1. BPPV (benign paroxysmal positional vertigo), unspecified laterality  Meclizine - avoid using with hydrocodone or flexeril, and PT order given. Medications Discontinued During This Encounter   Medication Reason    cyclobenzaprine (FLEXERIL) 10 mg tablet Not A Current Medication       Orders Placed This Encounter    meloxicam (MOBIC) 7.5 mg tablet     Sig: Take  by mouth daily.  meclizine (ANTIVERT) 12.5 mg tablet     Sig: Take 1 Tab by mouth three (3) times daily as needed for up to 10 days. Do not take with hydrocodone or cyclobenzaprine     Dispense:  30 Tab     Refill:  2       Current Outpatient Prescriptions   Medication Sig Dispense Refill    meloxicam (MOBIC) 7.5 mg tablet Take  by mouth daily.  meclizine (ANTIVERT) 12.5 mg tablet Take 1 Tab by mouth three (3) times daily as needed for up to 10 days. Do not take with hydrocodone or cyclobenzaprine 30 Tab 2    HYDROcodone-acetaminophen (NORCO) 5-325 mg per tablet Take 1 Tab by mouth nightly as needed for Pain. Max Daily Amount: 1 Tab.  30 Tab 0    rOPINIRole (REQUIP) 1 mg tablet TAKE 1 TABLET BY MOUTH EVERY NIGHT. 90 Tab 3    enalapril-hydroCHLOROthiazide (VASERETIC) 5-12.5 mg tablet TAKE 1 TABLET EVERY DAY 90 Tab 3    verapamil ER (CALAN-SR) 240 mg CR tablet TAKE 1 TABLET EVERY DAY 90 Tab 3    lovastatin (MEVACOR) 40 mg tablet TAKE 1 TABLET EVERY EVENING TO LOWER CHOLESTEROL 90 Tab 5    trimethoprim-sulfamethoxazole (BACTRIM, SEPTRA)  mg per tablet Take 1 Tab by mouth daily.  nitrofurantoin (MACRODANTIN) 50 mg capsule Take  by mouth daily.  ferrous sulfate 325 mg (65 mg iron) tablet Take  by mouth two (2) times a day.  levothyroxine (SYNTHROID) 100 mcg tablet TAKE 1 TABLET DAILY BEFORE BREAKFAST. 90 Tab 1    latanoprost (XALATAN) 0.005 % ophthalmic solution Administer 1 Drop to both eyes nightly.  Lancets (ACCU-CHEK MULTICLIX LANCET) Misc As dir 1 Package 11    DOCUSATE CALCIUM (STOOL SOFTENER PO) Take  by mouth.  ASCORBIC ACID/VITAMIN E (CRANBERRY CONCENTRATE PO) Take  by mouth.  ascorbic acid (VITAMIN C) 500 mg tablet Take  by mouth.  cholecalciferol, vitamin d3, (VITAMIN D) 1,000 unit tablet Take  by mouth daily.  MULTIVITAMINS (MULTIVITAMIN PO) Take  by mouth. Verbal and written instructions (see AVS) provided. Patient expresses understanding of diagnosis and treatment plan.

## 2017-06-28 ENCOUNTER — OFFICE VISIT (OUTPATIENT)
Dept: CARDIOLOGY CLINIC | Age: 82
End: 2017-06-28

## 2017-06-28 VITALS
HEART RATE: 77 BPM | DIASTOLIC BLOOD PRESSURE: 52 MMHG | OXYGEN SATURATION: 95 % | SYSTOLIC BLOOD PRESSURE: 132 MMHG | RESPIRATION RATE: 18 BRPM | BODY MASS INDEX: 31.47 KG/M2 | HEIGHT: 65 IN | WEIGHT: 188.9 LBS

## 2017-06-28 DIAGNOSIS — I87.2 VENOUS (PERIPHERAL) INSUFFICIENCY: ICD-10-CM

## 2017-06-28 DIAGNOSIS — G25.81 RESTLESS LEG SYNDROME: ICD-10-CM

## 2017-06-28 DIAGNOSIS — I83.812 VARICOSE VEINS OF LEFT LOWER EXTREMITIES WITH PAIN: Primary | ICD-10-CM

## 2017-06-28 NOTE — PROGRESS NOTES
Chief Complaint   Patient presents with    Other     vascular consult- wants to discuss holding off on RF Ablation

## 2017-06-28 NOTE — PROGRESS NOTES
6/28/2017 4:32 PM      Subjective:     Jude Zhu is here for f/u visit. Has been wearing compression stockings. Left leg symptoms still present but better. Visit Vitals    /52 (BP 1 Location: Right arm, BP Patient Position: Sitting)    Pulse 77    Resp 18    Ht 5' 5\" (1.651 m)    Wt 188 lb 14.4 oz (85.7 kg)    SpO2 95%    BMI 31.43 kg/m2       Current Outpatient Prescriptions   Medication Sig    meloxicam (MOBIC) 7.5 mg tablet Take  by mouth daily.  HYDROcodone-acetaminophen (NORCO) 5-325 mg per tablet Take 1 Tab by mouth nightly as needed for Pain. Max Daily Amount: 1 Tab.  rOPINIRole (REQUIP) 1 mg tablet TAKE 1 TABLET BY MOUTH EVERY NIGHT.  enalapril-hydroCHLOROthiazide (VASERETIC) 5-12.5 mg tablet TAKE 1 TABLET EVERY DAY    verapamil ER (CALAN-SR) 240 mg CR tablet TAKE 1 TABLET EVERY DAY    lovastatin (MEVACOR) 40 mg tablet TAKE 1 TABLET EVERY EVENING TO LOWER CHOLESTEROL    trimethoprim-sulfamethoxazole (BACTRIM, SEPTRA)  mg per tablet Take 1 Tab by mouth daily.  nitrofurantoin (MACRODANTIN) 50 mg capsule Take  by mouth daily.  ferrous sulfate 325 mg (65 mg iron) tablet Take  by mouth two (2) times a day.  levothyroxine (SYNTHROID) 100 mcg tablet TAKE 1 TABLET DAILY BEFORE BREAKFAST.  latanoprost (XALATAN) 0.005 % ophthalmic solution Administer 1 Drop to both eyes nightly.  Lancets (ACCU-CHEK MULTICLIX LANCET) Misc As dir    DOCUSATE CALCIUM (STOOL SOFTENER PO) Take  by mouth.  ASCORBIC ACID/VITAMIN E (CRANBERRY CONCENTRATE PO) Take  by mouth.  ascorbic acid (VITAMIN C) 500 mg tablet Take  by mouth.  cholecalciferol, vitamin d3, (VITAMIN D) 1,000 unit tablet Take  by mouth daily.  MULTIVITAMINS (MULTIVITAMIN PO) Take  by mouth. No current facility-administered medications for this visit.           Objective:      Visit Vitals    /52 (BP 1 Location: Right arm, BP Patient Position: Sitting)    Pulse 77    Resp 18    Ht 5' 5\" (1.651 m)    Wt 188 lb 14.4 oz (85.7 kg)    SpO2 95%    BMI 31.43 kg/m2       Data Review:     Reviewed and/or ordered active problem list, medication list tests    Past Medical History:   Diagnosis Date    Arthritis     hands/low back    Diabetes ([facility] [place] [address]     borderline    GERD (gastroesophageal reflux disease)     Glaucoma     Hypercholesterolemia     Hypertension     Ill-defined condition     borderline anemia    Ill-defined condition     bladder infections    Impaired glucose tolerance     Menopause     Thyroid disease       Past Surgical History:   Procedure Laterality Date    HX BREAST BIOPSY Bilateral     benign    HX CATARACT REMOVAL      bilateral    HX DILATION AND CURETTAGE      HX MOHS PROCEDURES      right    HX ORTHOPAEDIC      carpal tunnel release -bilateral     Allergies   Allergen Reactions    Aspirin Other (comments)     Swelling shut of eyelids    Gabapentin Other (comments)     Vision change    Macrobid [Nitrofurantoin Monohyd/M-Cryst] Unknown (comments)    Pcn [Penicillins] Hives      Family History   Problem Relation Age of Onset    Coronary Artery Disease Other      mother  of MI age 80, brother  age 72 of MI, sister has hear problems    Breast Cancer Sister 48      Social History     Social History    Marital status:      Spouse name: N/A    Number of children: N/A    Years of education: N/A     Occupational History    Not on file. 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 Lives with . Review of Systems     General: Not Present- Anorexia, Chills, Dietary Changes, Fatigue, Fever, Medication Changes, Night Sweats, Weight Gain > 10lbs. and Weight Loss > 10lbs. .  Skin: Not Present- Bruising and Excessive Sweating. HEENT: Not Present- Headache, Visual Loss and Vertigo. Respiratory: Not Present- Cough, Decreased Exercise Tolerance, Difficulty Breathing, Snoring and Wheezing. Cardiovascular: Not Present- Abnormal Blood Pressure, Chest Pain, Difficulty Breathing On Exertion, Fainting / Blacking Out, Irregular Heart Beat,   Gastrointestinal: Not Present- Black, Tarry Stool, Bloody Stool, Diarrhea, Hematemesis, Rectal Bleeding and Vomiting. Musculoskeletal: Not Present- Muscle Pain and Muscle Weakness. Neurological: Not Present- Dizziness. Psychiatric: Not Present- Depression. Endocrine: Not Present- Cold Intolerance, Heat Intolerance and Thyroid Problems. Hematology: Not Present- Abnormal Bleeding, Anemia, Blood Clots and Easy Bruising. Physical Exam   The physical exam findings are as follows:     General   Mental Status - Alert. General Appearance - Cooperative and Well groomed. Not in acute distress. Orientation - Oriented to time, Oriented to place and Oriented to person. Build & Nutrition - Well developed. Skin   General: - Normal.      HEENT  Head - Normal.  Eye - Normal.  Mouth & Throat - Normal.      Neck   Carotid Arteries - normal upstroke. No Bruits. Thyroid: Gland - Normal size and consistency. Chest and Lung Exam   Inspection:   Chest Wall: - Normal. Accessory muscles - No use of accessory muscles in breathing. Auscultation:   Breath sounds: - Normal.      Cardiovascular   Inspection: Jugular vein - Bilateral - Inspection Normal.  Palpation/Percussion:   Apical Impulse: - Normal.  Auscultation: Rhythm - Regular. Heart Sounds - S1 WNL and S2 WNL. No S3 or S4.   Murmurs & Other Heart Sounds: Auscultation of the heart reveals - No Murmurs. Abdomen   Palpation/Percussion: Palpation and Percussion of the abdomen reveal - No Palpable abdominal masses. Liver: - Normal.  Spleen: - Normal.  Auscultation: Auscultation of the abdomen reveals - Bowel sounds normal.      Neurologic   Mental Status: Affect - normal.  Motor: - Normal. Gait - Normal.      PHYSICIAN TO COMPLETE    Date of Physician Reevaluation:___________04/04/2017____________    Patient is symptomatic with varicosities despite compliance with conservative therapy. Has failed conservative treatment. Check all that apply:  [x] Other causes of patients leg(s) symptoms have been ruled out  [x] Completed conservative treatment to include: compression stockings, medication, leg elevation, mild exercise & weight         reduction (as appropriate). Time length of conservative treatment[de-identified] _______6-8 wks__________    Patient is symptomatic with varicosities causing the following: (check all that apply):  [x] Has persistent aching, cramping, burning, pain, itching, and/or swelling during activity or after prolonged standing. [] Significant, recurrent superficial phlebitis  [] Hemorrhage from a ruptured varix  [] Non-healing skin ulceration of the leg  [] Other complications associated:  ___________________      Duplex or Doppler Ultrasound of the venous system demonstrate:  [x] Absence of deep venous thrombosis  [x] Greater and/or lesser saphenous vein or  valvular incompetence/reflux that correlates with        patients symptoms  []   valvular incompetence/reflux that correlates with patients symptoms         Assessment:       ICD-10-CM ICD-9-CM    1. 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 Venous (peripheral) insufficiency I87.2 459.81    3. Restless leg syndrome G25.81 333.94        Plan:     1. Left GSV insufficiency and varicose veins: Recommend left GSV RF ablation since still symptomatic despite conservative management.  She is planning to have samuel surgery and will call back if se decides to proceed. Continue leg elevation, exercise and wt loss.     2. BP controlled.    3. F/u with Dr. Ai Wilkins for cardiac care    Arjun Graves MD  6/28/2017  4:32 PM

## 2017-06-28 NOTE — MR AVS SNAPSHOT
Visit Information Date & Time Provider Department Dept. Phone Encounter #  
 6/28/2017  3:30 PM Alfredo Jones, [address] [place] [address] [facility] [phone] Upcoming Health Maintenance Date Due INFLUENZA AGE 9 TO ADULT 8/1/2017 MEDICARE YEARLY EXAM 2/7/2018 GLAUCOMA SCREENING Q2Y 1/6/2019 DTaP/Tdap/Td series (2 - Td) 2/6/2027 Allergies as of 6/28/2017  Review Complete On: 6/28/2017 By: Moises Garcias LPN Severity Noted Reaction Type Reactions Aspirin  02/03/2014    Other (comments) Swelling shut of eyelids Gabapentin  01/18/2016    Other (comments) Vision change Macrobid [Nitrofurantoin Monohyd/m-cryst]  02/08/2010    Unknown (comments) Pcn [Penicillins]  02/08/2010    Hives Current Immunizations  Reviewed on 11/1/2016 Name Date Influenza High Dose Vaccine PF 11/1/2016, 10/14/2015, 9/29/2014, 10/3/2013 Influenza Vaccine Split 10/22/2012, 10/11/2011, 10/13/2010 Pneumococcal Conjugate (PCV-13) 10/14/2015 Pneumococcal Vaccine (Unspecified Type) 1/1/2005 Zoster 12/15/2011 Not reviewed this visit Vitals BP Pulse Resp Height(growth percentile) Weight(growth percentile) SpO2  
 132/52 (BP 1 Location: Right arm, BP Patient Position: Sitting) 77 18 5' 5\" (1.651 m) 188 lb 14.4 oz (85.7 kg) 95% BMI OB Status Smoking Status 31.43 kg/m2 Postmenopausal Former Smoker Vitals History BMI and BSA Data Body Mass Index Body Surface Area  
 31.43 kg/m 2 1.98 m 2 Preferred Pharmacy Pharmacy Name Phone [facility], [address] [place] [address] [phone] Your Updated Medication List  
  
   
This list is accurate as of: 6/28/17  3:52 PM.  Always use your most recent med list.  
  
  
  
  
 CRANBERRY CONCENTRATE PO Take  by mouth.  
  
 enalapril-hydroCHLOROthiazide 5-12.5 mg tablet Commonly known as:  Vipul Charles  
 TAKE 1 TABLET EVERY DAY  
  
 ferrous sulfate 325 mg (65 mg iron) tablet Take  by mouth two (2) times a day. HYDROcodone-acetaminophen 5-325 mg per tablet Commonly known as:  [place] Hurt Take 1 Tab by mouth nightly as needed for Pain. Max Daily Amount: 1 Tab. Lancets Misc Commonly known as:  [address] [facility] As dir  
  
 latanoprost 0.005 % ophthalmic solution Commonly known as:  Cedar Situ Administer 1 Drop to both eyes nightly. levothyroxine 100 mcg tablet Commonly known as:  SYNTHROID  
TAKE 1 TABLET DAILY BEFORE BREAKFAST. lovastatin 40 mg tablet Commonly known as:  MEVACOR  
TAKE 1 TABLET EVERY EVENING TO LOWER CHOLESTEROL  
  
 meloxicam 7.5 mg tablet Commonly known as:  MOBIC Take  by mouth daily. MULTIVITAMIN PO Take  by mouth. nitrofurantoin 50 mg capsule Commonly known as:  MACRODANTIN Take  by mouth daily. rOPINIRole 1 mg tablet Commonly known as:  REQUIP  
TAKE 1 TABLET BY MOUTH EVERY NIGHT. STOOL SOFTENER PO Take  by mouth. trimethoprim-sulfamethoxazole  mg per tablet Commonly known as:  Veatrice Josue Take 1 Tab by mouth daily. verapamil  mg CR tablet Commonly known as:  CALAN-SR  
TAKE 1 TABLET EVERY DAY  
  
 VITAMIN C 500 mg tablet Generic drug:  ascorbic acid (vitamin C) Take  by mouth. VITAMIN D3 1,000 unit tablet Generic drug:  cholecalciferol Take  by mouth daily. Introducing [facility]! Gloria Parham introduces [employer] patient portal. Now you can access parts of your medical record, email your doctor's office, and request medication refills online. 1. In your internet browser, go to [email or web address] 2. Click on the First Time User? Click Here link in the Sign In box. You will see the New Member Sign Up page. 3. Enter your [employer] Access Code exactly as it appears below.  You will not need to use this code after youve completed the sign-up process. If you do not sign up before the expiration date, you must request a new code. · [employer] Access Code: [email or web address] Expires: 9/6/2017 12:11 PM 
 
4. Enter the last four digits of your Social Security Number (xxxx) and Date of Birth (mm/dd/yyyy) as indicated and click Submit. You will be taken to the next sign-up page. 5. Create a [employer] ID. This will be your [employer] login ID and cannot be changed, so think of one that is secure and easy to remember. 6. Create a [employer] password. You can change your password at any time. 7. Enter your Password Reset Question and Answer. This can be used at a later time if you forget your password. 8. Enter your e-mail address. You will receive e-mail notification when new information is available in [address] 9. Click Sign Up. You can now view and download portions of your medical record. 10. Click the Download Summary menu link to download a portable copy of your medical information. If you have questions, please visit the Frequently Asked Questions section of the [employer] website. Remember, [employer] is NOT to be used for urgent needs. For medical emergencies, dial 911. Now available from your iPhone and Android! Please provide this summary of care documentation to your next provider. Your primary care clinician is listed as Vale Carballo. If you have any questions after today's visit, please call [phone].

## 2017-07-12 RX ORDER — LOVASTATIN 40 MG/1
TABLET ORAL
Qty: 90 TAB | Refills: 3 | Status: SHIPPED | OUTPATIENT
Start: 2017-07-12 | End: 2018-07-09 | Stop reason: SDUPTHER

## 2017-07-24 ENCOUNTER — HOSPITAL ENCOUNTER (OUTPATIENT)
Dept: MRI IMAGING | Age: 82
Discharge: HOME | End: 2017-07-24
Attending: PHYSICAL MEDICINE & REHABILITATION
Payer: MEDICARE

## 2017-07-24 DIAGNOSIS — G89.29 CHRONIC BILATERAL LOW BACK PAIN WITH LEFT-SIDED SCIATICA: ICD-10-CM

## 2017-07-24 DIAGNOSIS — M54.42 CHRONIC BILATERAL LOW BACK PAIN WITH LEFT-SIDED SCIATICA: ICD-10-CM

## 2017-07-24 PROCEDURE — 72148 MRI LUMBAR SPINE W/O DYE: CPT

## 2017-10-02 ENCOUNTER — HOSPITAL ENCOUNTER (OUTPATIENT)
Dept: MRI IMAGING | Age: 82
Discharge: HOME | End: 2017-10-02
Attending: ORTHOPAEDIC SURGERY
Payer: MEDICARE

## 2017-10-02 DIAGNOSIS — M25.552 LEFT HIP PAIN: ICD-10-CM

## 2017-10-02 PROCEDURE — 73721 MRI JNT OF LWR EXTRE W/O DYE: CPT

## 2017-10-10 ENCOUNTER — CLINICAL SUPPORT (OUTPATIENT)
Dept: FAMILY MEDICINE CLINIC | Age: 82
End: 2017-10-10

## 2017-10-10 ENCOUNTER — TELEPHONE (OUTPATIENT)
Dept: FAMILY MEDICINE CLINIC | Age: 82
End: 2017-10-10

## 2017-10-10 DIAGNOSIS — Z23 ENCOUNTER FOR IMMUNIZATION: ICD-10-CM

## 2017-10-10 NOTE — MR AVS SNAPSHOT
Visit Information Date & Time Provider Department Dept. Phone Encounter #  
 10/10/2017  3:20 PM Yadira Mascorro [place] [address] [phone] [id number] Upcoming Health Maintenance Date Due INFLUENZA AGE 9 TO ADULT 8/1/2017 MEDICARE YEARLY EXAM 2/7/2018 GLAUCOMA SCREENING Q2Y 1/6/2019 DTaP/Tdap/Td series (2 - Td) 2/6/2027 Allergies as of 10/10/2017  Review Complete On: 10/10/2017 By: Estuardo Johnson Severity Noted Reaction Type Reactions Aspirin  02/03/2014    Other (comments) Swelling shut of eyelids Gabapentin  01/18/2016    Other (comments) Vision change Macrobid [Nitrofurantoin Monohyd/m-cryst]  02/08/2010    Unknown (comments) Pcn [Penicillins]  02/08/2010    Hives Current Immunizations  Reviewed on 11/1/2016 Name Date Influenza High Dose Vaccine PF  Incomplete, 11/1/2016, 10/14/2015, 9/29/2014, 10/3/2013 Influenza Vaccine Split 10/22/2012, 10/11/2011, 10/13/2010 Pneumococcal Conjugate (PCV-13) 10/14/2015 Pneumococcal Vaccine (Unspecified Type) 1/1/2005 Zoster 12/15/2011 Not reviewed this visit You Were Diagnosed With   
  
 Codes Comments Encounter for immunization     ICD-10-CM: I77 ICD-9-CM: V03.89 Vitals OB Status Smoking Status Postmenopausal Former Smoker Preferred Pharmacy Pharmacy Name Phone [address], [address] [place] [address] [phone] Your Updated Medication List  
  
   
This list is accurate as of: 10/10/17  4:07 PM.  Always use your most recent med list.  
  
  
  
  
 CRANBERRY CONCENTRATE PO Take  by mouth.  
  
 enalapril-hydroCHLOROthiazide 5-12.5 mg tablet Commonly known as:  VASERETIC  
TAKE 1 TABLET EVERY DAY  
  
 ferrous sulfate 325 mg (65 mg iron) tablet Take  by mouth two (2) times a day. HYDROcodone-acetaminophen 5-325 mg per tablet Commonly known as:  Sutton Minor  
 Take 1 Tab by mouth nightly as needed for Pain. Max Daily Amount: 1 Tab. Lancets Misc Commonly known as:  [address] [place] [address]  
  
 latanoprost 0.005 % ophthalmic solution Commonly known as:  Garret Public Administer 1 Drop to both eyes nightly. levothyroxine 100 mcg tablet Commonly known as:  SYNTHROID  
TAKE 1 TABLET DAILY BEFORE BREAKFAST. lovastatin 40 mg tablet Commonly known as:  MEVACOR  
TAKE 1 TABLET EVERY EVENING TO LOWER CHOLESTEROL  
  
 meloxicam 7.5 mg tablet Commonly known as:  MOBIC Take  by mouth daily. MULTIVITAMIN PO Take  by mouth. nitrofurantoin 50 mg capsule Commonly known as:  MACRODANTIN Take  by mouth daily. rOPINIRole 1 mg tablet Commonly known as:  REQUIP  
TAKE 1 TABLET BY MOUTH EVERY NIGHT. STOOL SOFTENER PO Take  by mouth. trimethoprim-sulfamethoxazole  mg per tablet Commonly known as:  Marshia Mor Take 1 Tab by mouth daily. verapamil  mg CR tablet Commonly known as:  CALAN-SR  
TAKE 1 TABLET EVERY DAY  
  
 VITAMIN C 500 mg tablet Generic drug:  ascorbic acid (vitamin C) Take  by mouth. VITAMIN D3 1,000 unit tablet Generic drug:  cholecalciferol Take  by mouth daily. We Performed the Following ADMIN INFLUENZA VIRUS VAC [ [facility]] INFLUENZA VIRUS VACCINE, HIGH DOSE SEASONAL, PRESERVATIVE FREE [56230 CPT(R)] Patient Instructions Vaccine Information Statement Influenza (Flu) Vaccine (Inactivated or Recombinant): What you need to know Many Vaccine Information Statements are available in Citizen of [place] and other languages. See www.immunize.org/vis Hojas de Información Sobre Vacunas están disponibles en Español y en muchos otros idiomas. Visite www.immunize.org/vis 1. Why get vaccinated? Influenza (flu) is a contagious disease that spreads around the [place] every year, usually between October and May. Flu is caused by influenza viruses, and is spread mainly by coughing, sneezing, and close contact. Anyone can get flu. Flu strikes suddenly and can last several days. Symptoms vary by age, but can include: 
 fever/chills  sore throat  muscle aches  fatigue  cough  headache  runny or stuffy nose Flu can also lead to pneumonia and blood infections, and cause diarrhea and seizures in children. If you have a medical condition, such as heart or lung disease, flu can make it worse. Flu is more dangerous for some people. Infants and young children, people 72years of age and older, pregnant women, and people with certain health conditions or a weakened immune system are at greatest risk. Each year thousands of people in the [facility] die from flu, and many more are hospitalized. Flu vaccine can: 
 keep you from getting flu, 
 make flu less severe if you do get it, and 
 keep you from spreading flu to your family and other people. 2. Inactivated and recombinant flu vaccines A dose of flu vaccine is recommended every flu season. Children 6 months through 6years of age may need two doses during the same flu season. Everyone else needs only one dose each flu season. Some inactivated flu vaccines contain a very small amount of a mercury-based preservative called thimerosal. Studies have not shown thimerosal in vaccines to be harmful, but flu vaccines that do not contain thimerosal are available. There is no live flu virus in flu shots. They cannot cause the flu. There are many flu viruses, and they are always changing. Each year a new flu vaccine is made to protect against three or four viruses that are likely to cause disease in the upcoming flu season. But even when the vaccine doesnt exactly match these viruses, it may still provide some protection Flu vaccine cannot prevent: 
 flu that is caused by a virus not covered by the vaccine, or 
  illnesses that look like flu but are not. It takes about 2 weeks for protection to develop after vaccination, and protection lasts through the flu season. 3. Some people should not get this vaccine Tell the person who is giving you the vaccine:  If you have any severe, life-threatening allergies. If you ever had a life-threatening allergic reaction after a dose of flu vaccine, or have a severe allergy to any part of this vaccine, you may be advised not to get vaccinated. Most, but not all, types of flu vaccine contain a small amount of egg protein.  If you ever had Guillain-Barré Syndrome (also called GBS). Some people with a history of GBS should not get this vaccine. This should be discussed with your doctor.  If you are not feeling well. It is usually okay to get flu vaccine when you have a mild illness, but you might be asked to come back when you feel better. 4. Risks of a vaccine reaction With any medicine, including vaccines, there is a chance of reactions. These are usually mild and go away on their own, but serious reactions are also possible. Most people who get a flu shot do not have any problems with it. Minor problems following a flu shot include:  
 soreness, redness, or swelling where the shot was given  hoarseness  sore, red or itchy eyes  cough  fever  aches  headache  itching  fatigue If these problems occur, they usually begin soon after the shot and last 1 or 2 days. More serious problems following a flu shot can include the following:  There may be a small increased risk of Guillain-Barré Syndrome (GBS) after inactivated flu vaccine. This risk has been estimated at 1 or 2 additional cases per million people vaccinated. This is much lower than the risk of severe complications from flu, which can be prevented by flu vaccine.    
 
 Young children who get the flu shot along with pneumococcal vaccine (PCV13) and/or DTaP vaccine at the same time might be slightly more likely to have a seizure caused by fever. Ask your doctor for more information. Tell your doctor if a child who is getting flu vaccine has ever had a seizure. Problems that could happen after any injected vaccine:  People sometimes faint after a medical procedure, including vaccination. Sitting or lying down for about 15 minutes can help prevent fainting, and injuries caused by a fall. Tell your doctor if you feel dizzy, or have vision changes or ringing in the ears.  Some people get severe pain in the shoulder and have difficulty moving the arm where a shot was given. This happens very rarely.  Any medication can cause a severe allergic reaction. Such reactions from a vaccine are very rare, estimated at about 1 in a million doses, and would happen within a few minutes to a few hours after the vaccination. As with any medicine, there is a very remote chance of a vaccine causing a serious injury or death. The safety of vaccines is always being monitored. For more information, visit: www.cdc.gov/vaccinesafety/ 
 
 
The [facility] Vaccine Injury Compensation Program (VICP) is a federal program that was created to compensate people who may have been injured by certain vaccines. Persons who believe they may have been injured by a vaccine can learn about the program and about filing a claim by calling 1-640.106.1509 or visiting the [employer] website at www.[facility].gov/vaccinecompensation. There is a time limit to file a claim for compensation. 7. How can I learn more?  Ask your healthcare provider. He or she can give you the vaccine package insert or suggest other sources of information.  Call your local or state health department.  Contact the Centers for Disease Control and Prevention (CDC): 
- Call 4-865.495.6591 (1-995-USX-INFO) or 
- Visit CDCs website at www.cdc.gov/flu Vaccine Information Statement Inactivated Influenza Vaccine 8/7/2015 
[address] [id number] Department [facility] [employer] Centers for Disease Control and Prevention Office Use Only Introducing \[facility] [place] [facility]\"" & HEALTH SERVICES! Florence Bustillo introduces [employer] patient portal. Now you can access parts of your medical record, email your doctor's office, and request medication refills online. 1. In your internet browser, go to [email or web address] 2. Click on the First Time User? Click Here link in the Sign In box. You will see the New Member Sign Up page. 3. Enter your [employer] Access Code exactly as it appears below. You will not need to use this code after youve completed the sign-up process. If you do not sign up before the expiration date, you must request a new code. · [employer] Access Code: [email or web address] Expires: 12/27/2017  9:48 AM 
 
4. Enter the last four digits of your Social Security Number (xxxx) and Date of Birth (mm/dd/yyyy) as indicated and click Submit.  You will be taken to the next sign-up page. 5. Create a [employer] [facility] ID. This will be your [employer] [facility] login ID and cannot be changed, so think of one that is secure and easy to remember. 6. Create a [employer] [facility] password. You can change your password at any time. 7. Enter your Password Reset Question and Answer. This can be used at a later time if you forget your password. 8. Enter your e-mail address. You will receive e-mail notification when new information is available in [address]. 9. Click Sign Up. You can now view and download portions of your medical record. 10. Click the Download Summary menu link to download a portable copy of your medical information. If you have questions, please visit the Frequently Asked Questions section of the [employer] website. Remember, [employer] [facility] is NOT to be used for urgent needs. For medical emergencies, dial 911. Now available from your iPhone and Android! Please provide this summary of care documentation to your next provider. Your primary care clinician is listed as Maura Guevara. If you have any questions after today's visit, please call [phone].

## 2017-10-10 NOTE — TELEPHONE ENCOUNTER
Patient would like to get an appt for a pre op with Dr Leonie Segundo only for her hip surgery sched on November 20. she can be reached at [phone]

## 2017-10-10 NOTE — PATIENT INSTRUCTIONS
Vaccine Information Statement    Influenza (Flu) Vaccine (Inactivated or Recombinant): What you need to know    Many Vaccine Information Statements are available in [place] and other languages. See www.immunize.org/vis  Hojas de Información Sobre Vacunas están disponibles en Español y en muchos otros idiomas. Visite www.immunize.org/vis    1. Why get vaccinated? Influenza (flu) is a contagious disease that spreads around the [place] every year, usually between October and May. Flu is caused by influenza viruses, and is spread mainly by coughing, sneezing, and close contact. Anyone can get flu. Flu strikes suddenly and can last several days. Symptoms vary by age, but can include:   fever/chills   sore throat   muscle aches   fatigue   cough   headache    runny or stuffy nose    Flu can also lead to pneumonia and blood infections, and cause diarrhea and seizures in children. If you have a medical condition, such as heart or lung disease, flu can make it worse. Flu is more dangerous for some people. Infants and young children, people 72years of age and older, pregnant women, and people with certain health conditions or a weakened immune system are at greatest risk. Each year thousands of people in the [facility] die from flu, and many more are hospitalized. Flu vaccine can:   keep you from getting flu,   make flu less severe if you do get it, and   keep you from spreading flu to your family and other people. 2. Inactivated and recombinant flu vaccines    A dose of flu vaccine is recommended every flu season. Children 6 months through 6years of age may need two doses during the same flu season. Everyone else needs only one dose each flu season.        Some inactivated flu vaccines contain a very small amount of a mercury-based preservative called thimerosal. Studies have not shown thimerosal in vaccines to be harmful, but flu vaccines that do not contain thimerosal are available. There is no live flu virus in flu shots. They cannot cause the flu. There are many flu viruses, and they are always changing. Each year a new flu vaccine is made to protect against three or four viruses that are likely to cause disease in the upcoming flu season. But even when the vaccine doesnt exactly match these viruses, it may still provide some protection    Flu vaccine cannot prevent:   flu that is caused by a virus not covered by the vaccine, or   illnesses that look like flu but are not. It takes about 2 weeks for protection to develop after vaccination, and protection lasts through the flu season. 3. Some people should not get this vaccine    Tell the person who is giving you the vaccine:     If you have any severe, life-threatening allergies. If you ever had a life-threatening allergic reaction after a dose of flu vaccine, or have a severe allergy to any part of this vaccine, you may be advised not to get vaccinated. Most, but not all, types of flu vaccine contain a small amount of egg protein.  If you ever had Guillain-Barré Syndrome (also called GBS). Some people with a history of GBS should not get this vaccine. This should be discussed with your doctor.  If you are not feeling well. It is usually okay to get flu vaccine when you have a mild illness, but you might be asked to come back when you feel better. 4. Risks of a vaccine reaction    With any medicine, including vaccines, there is a chance of reactions. These are usually mild and go away on their own, but serious reactions are also possible. Most people who get a flu shot do not have any problems with it.      Minor problems following a flu shot include:    soreness, redness, or swelling where the shot was given     hoarseness   sore, red or itchy eyes   cough   fever   aches   headache   itching   fatigue  If these problems occur, they usually begin soon after the shot and last 1 or 2 days. More serious problems following a flu shot can include the following:     There may be a small increased risk of Guillain-Barré Syndrome (GBS) after inactivated flu vaccine. This risk has been estimated at 1 or 2 additional cases per million people vaccinated. This is much lower than the risk of severe complications from flu, which can be prevented by flu vaccine.  Young children who get the flu shot along with pneumococcal vaccine (PCV13) and/or DTaP vaccine at the same time might be slightly more likely to have a seizure caused by fever. Ask your doctor for more information. Tell your doctor if a child who is getting flu vaccine has ever had a seizure. Problems that could happen after any injected vaccine:      People sometimes faint after a medical procedure, including vaccination. Sitting or lying down for about 15 minutes can help prevent fainting, and injuries caused by a fall. Tell your doctor if you feel dizzy, or have vision changes or ringing in the ears.  Some people get severe pain in the shoulder and have difficulty moving the arm where a shot was given. This happens very rarely.  Any medication can cause a severe allergic reaction. Such reactions from a vaccine are very rare, estimated at about 1 in a million doses, and would happen within a few minutes to a few hours after the vaccination. As with any medicine, there is a very remote chance of a vaccine causing a serious injury or death. The safety of vaccines is always being monitored. For more information, visit: www.cdc.gov/vaccinesafety/    5. What if there is a serious reaction? What should I look for?  Look for anything that concerns you, such as signs of a severe allergic reaction, very high fever, or unusual behavior.     Signs of a severe allergic reaction can include hives, swelling of the face and throat, difficulty breathing, a fast heartbeat, dizziness, and weakness - usually within a few minutes to a few hours after the vaccination. What should I do?  If you think it is a severe allergic reaction or other emergency that cant wait, call 9-1-1 and get the person to the nearest hospital. Otherwise, call your doctor.  Reactions should be reported to the Vaccine Adverse Event Reporting System (VAERS). Your doctor should file this report, or you can do it yourself through  the VAERS web site at www.vaers. [facility].gov, or by calling 8-448.240.7032. VAERS does not give medical advice. 6. The National Vaccine Injury Compensation Program    The [facility] Vaccine Injury Compensation Program (VICP) is a federal program that was created to compensate people who may have been injured by certain vaccines. Persons who believe they may have been injured by a vaccine can learn about the program and about filing a claim by calling 1-305.277.4744 or visiting the [employer] website at www.[facility].gov/vaccinecompensation. There is a time limit to file a claim for compensation. 7. How can I learn more?  Ask your healthcare provider. He or she can give you the vaccine package insert or suggest other sources of information.  Call your local or state health department.  Contact the Centers for Disease Control and Prevention (CDC):  - Call 8-652.625.7714 (1-800-CDC-INFO) or  - Visit CDCs website at www.cdc.gov/flu    Vaccine Information Statement   Inactivated Influenza Vaccine   8/7/2015  42 JIGNESH Ashby [id number]    Department of Health and Human Services  Centers for Disease Control and Prevention    Office Use Only

## 2017-10-10 NOTE — PROGRESS NOTES
Marlen Shankar is a 80 y.o. female who presents for Influenza immunization. She denies any symptoms , reactions or allergies that would exclude them from being immunized today. Risks and adverse reactions were discussed and the VIS was given to them. All questions were addressed. She was observed for 10 min post injection. There were no reactions observed.     Yeison Rowland

## 2017-10-19 RX ORDER — OMEPRAZOLE 20 MG/1
CAPSULE, DELAYED RELEASE ORAL
Qty: 90 CAP | Refills: 0 | Status: SHIPPED | OUTPATIENT
Start: 2017-10-19 | End: 2018-07-24 | Stop reason: SDUPTHER

## 2017-11-06 ENCOUNTER — HOSPITAL ENCOUNTER (OUTPATIENT)
Dept: PREADMISSION TESTING | Age: 82
Discharge: HOME | End: 2017-11-06
Payer: MEDICARE

## 2017-11-06 ENCOUNTER — HOSPITAL ENCOUNTER (OUTPATIENT)
Dept: PHYSICAL THERAPY | Age: 82
Discharge: HOME | End: 2017-11-06

## 2017-11-06 VITALS
SYSTOLIC BLOOD PRESSURE: 157 MMHG | TEMPERATURE: 97.2 F | DIASTOLIC BLOOD PRESSURE: 45 MMHG | WEIGHT: 190 LBS | BODY MASS INDEX: 31.65 KG/M2 | HEIGHT: 65 IN | OXYGEN SATURATION: 95 % | RESPIRATION RATE: 18 BRPM | HEART RATE: 66 BPM

## 2017-11-06 LAB
ABO + RH BLD: NORMAL
ALBUMIN SERPL-MCNC: 3.6 G/DL (ref 3.5–5)
ALBUMIN/GLOB SERPL: 1 {RATIO} (ref 1.1–2.2)
ALP SERPL-CCNC: 116 U/L (ref 45–117)
ALT SERPL-CCNC: 16 U/L (ref 12–78)
ANION GAP SERPL CALC-SCNC: 5 MMOL/L (ref 5–15)
APPEARANCE UR: CLEAR
AST SERPL-CCNC: 16 U/L (ref 15–37)
BACTERIA URNS QL MICRO: NEGATIVE /HPF
BILIRUB SERPL-MCNC: 0.3 MG/DL (ref 0.2–1)
BILIRUB UR QL: NEGATIVE
BLOOD GROUP ANTIBODIES SERPL: NORMAL
BUN SERPL-MCNC: 23 MG/DL (ref 6–20)
BUN/CREAT SERPL: 23 (ref 12–20)
CALCIUM SERPL-MCNC: 8.9 MG/DL (ref 8.5–10.1)
CHLORIDE SERPL-SCNC: 104 MMOL/L (ref 97–108)
CO2 SERPL-SCNC: 30 MMOL/L (ref 21–32)
COLOR UR: ABNORMAL
CREAT SERPL-MCNC: 0.98 MG/DL (ref 0.55–1.02)
EPITH CASTS URNS QL MICRO: ABNORMAL /LPF
ERYTHROCYTE [DISTWIDTH] IN BLOOD BY AUTOMATED COUNT: 12.9 % (ref 11.5–14.5)
EST. AVERAGE GLUCOSE BLD GHB EST-MCNC: 131 MG/DL
GLOBULIN SER CALC-MCNC: 3.7 G/DL (ref 2–4)
GLUCOSE SERPL-MCNC: 91 MG/DL (ref 65–100)
GLUCOSE UR STRIP.AUTO-MCNC: NEGATIVE MG/DL
HBA1C MFR BLD: 6.2 % (ref 4.2–6.3)
HCT VFR BLD AUTO: 31.8 % (ref 35–47)
HGB BLD-MCNC: 10.2 G/DL (ref 11.5–16)
HGB UR QL STRIP: NEGATIVE
HYALINE CASTS URNS QL MICRO: ABNORMAL /LPF (ref 0–5)
INR PPP: 1.1 (ref 0.9–1.1)
KETONES UR QL STRIP.AUTO: NEGATIVE MG/DL
LEUKOCYTE ESTERASE UR QL STRIP.AUTO: ABNORMAL
MCH RBC QN AUTO: 30.6 PG (ref 26–34)
MCHC RBC AUTO-ENTMCNC: 32.1 G/DL (ref 30–36.5)
MCV RBC AUTO: 95.5 FL (ref 80–99)
NITRITE UR QL STRIP.AUTO: NEGATIVE
PH UR STRIP: 5.5 [PH] (ref 5–8)
PLATELET # BLD AUTO: 221 K/UL (ref 150–400)
POTASSIUM SERPL-SCNC: 4.4 MMOL/L (ref 3.5–5.1)
PROT SERPL-MCNC: 7.3 G/DL (ref 6.4–8.2)
PROT UR STRIP-MCNC: NEGATIVE MG/DL
PROTHROMBIN TIME: 11.1 SEC (ref 9–11.1)
RBC # BLD AUTO: 3.33 M/UL (ref 3.8–5.2)
RBC #/AREA URNS HPF: ABNORMAL /HPF (ref 0–5)
SODIUM SERPL-SCNC: 139 MMOL/L (ref 136–145)
SP GR UR REFRACTOMETRY: 1.02 (ref 1–1.03)
SPECIMEN EXP DATE BLD: NORMAL
UA: UC IF INDICATED,UAUC: ABNORMAL
UROBILINOGEN UR QL STRIP.AUTO: 0.2 EU/DL (ref 0.2–1)
WBC # BLD AUTO: 6.5 K/UL (ref 3.6–11)
WBC URNS QL MICRO: ABNORMAL /HPF (ref 0–4)

## 2017-11-06 PROCEDURE — G8979 MOBILITY GOAL STATUS: HCPCS

## 2017-11-06 PROCEDURE — 83036 HEMOGLOBIN GLYCOSYLATED A1C: CPT | Performed by: ORTHOPAEDIC SURGERY

## 2017-11-06 PROCEDURE — 85610 PROTHROMBIN TIME: CPT | Performed by: ORTHOPAEDIC SURGERY

## 2017-11-06 PROCEDURE — 80053 COMPREHEN METABOLIC PANEL: CPT | Performed by: ORTHOPAEDIC SURGERY

## 2017-11-06 PROCEDURE — G8980 MOBILITY D/C STATUS: HCPCS

## 2017-11-06 PROCEDURE — 86900 BLOOD TYPING SEROLOGIC ABO: CPT | Performed by: ORTHOPAEDIC SURGERY

## 2017-11-06 PROCEDURE — 85027 COMPLETE CBC AUTOMATED: CPT | Performed by: ORTHOPAEDIC SURGERY

## 2017-11-06 PROCEDURE — 81001 URINALYSIS AUTO W/SCOPE: CPT | Performed by: ORTHOPAEDIC SURGERY

## 2017-11-06 PROCEDURE — 97530 THERAPEUTIC ACTIVITIES: CPT

## 2017-11-06 PROCEDURE — G8978 MOBILITY CURRENT STATUS: HCPCS

## 2017-11-06 PROCEDURE — 97161 PT EVAL LOW COMPLEX 20 MIN: CPT

## 2017-11-06 PROCEDURE — 36415 COLL VENOUS BLD VENIPUNCTURE: CPT | Performed by: ORTHOPAEDIC SURGERY

## 2017-11-06 RX ORDER — SODIUM CHLORIDE, SODIUM LACTATE, POTASSIUM CHLORIDE, CALCIUM CHLORIDE 600; 310; 30; 20 MG/100ML; MG/100ML; MG/100ML; MG/100ML
25 INJECTION, SOLUTION INTRAVENOUS CONTINUOUS
Status: CANCELLED | OUTPATIENT
Start: 2017-11-20

## 2017-11-06 RX ORDER — MECLIZINE HCL 12.5 MG 12.5 MG/1
12.5 TABLET ORAL
COMMUNITY
End: 2018-07-24

## 2017-11-06 RX ORDER — DULOXETIN HYDROCHLORIDE 30 MG/1
30 CAPSULE, DELAYED RELEASE ORAL EVERY EVENING
COMMUNITY
End: 2018-07-24

## 2017-11-06 NOTE — PERIOP NOTES
[facility], [place]  Preoperative Instructions        Surgery Date 11/20/2017         Time of Arrival : to be called    1. On the day of your surgery, please report to the Surgical Services Registration Desk and sign in at your designated time. The Surgery Center is located to the right of the Emergency Room. 2. You must have someone with you to drive you home. You should not drive a car for 24 hours following surgery. Please make arrangements for a friend or family member to stay with you for the first 24 hours after your surgery. 3. Do not have anything to eat or drink (including water, gum, mints, coffee, juice) after midnight. ?This may not apply to medications prescribed by your physician. ?(Please note below the special instructions with medications to take the morning of your procedure.)    4. We recommend you do not drink any alcoholic beverages for 24 hours before and after your surgery. 5. Contact your surgeons office for instructions on the following medications: non-steroidal anti-inflammatory drugs (i.e. Advil, Aleve), vitamins, and supplements. (Some surgeons will want you to stop these medications prior to surgery and others may allow you to take them)  **If you are currently taking Plavix, Coumadin, Aspirin and/or other blood-thinning agents, contact your surgeon for instructions. ** Your surgeon will partner with the physician prescribing these medications to determine if it is safe to stop or if you need to continue taking. Please do not stop taking these medications without instructions from your surgeon    6. Wear comfortable clothes. Wear glasses instead of contacts. Do not bring any money or jewelry. Please bring picture ID, insurance card, and any prearranged co-payment or hospital payment. Do not wear make-up, particularly mascara the morning of your surgery. Do not wear nail polish, particularly if you are having foot /hand surgery.   Wear your hair loose or down, no ponytails, buns, qing pins or clips. All body piercings must be removed. Please shower with antibacterial soap for three consecutive days before and on the morning of surgery, but do not apply any lotions, powders or deodorants after the shower on the day of surgery. Please use a fresh towels after each shower. Please sleep in clean clothes and change bed linens the night before surgery. Please do not shave for 48 hours prior to surgery. Shaving of the face is acceptable. 7. You should understand that if you do not follow these instructions your surgery may be cancelled. If your physical condition changes (I.e. fever, cold or flu) please contact your surgeon as soon as possible. 8. It is important that you be on time. If a situation occurs where you may be late, please call [phone] (OR Holding Area). 9. If you have any questions and or problems, please call [phone] (Pre-admission Testing). 10. Your surgery time may be subject to change. You will receive a phone call the evening prior if your time changes. 11.  If having outpatient surgery, you must have someone to drive you here, stay with you during the duration of your stay, and to drive you home at time of discharge. 12.   In an effort to improve the efficiency, privacy, and safety for all of our Pre-op patients visitors are not allowed in the Holding area. Once you arrive and are registered your family/visitors will be asked to remain in the waiting room. The Pre-op staff will get you from the Surgical Waiting Area and will explain to you and your family/visitors that the Pre-op phase is beginning. The staff will answer any questions and provide instructions for tracking of the patient, by use of the existing tracking number and color-coded status board in the waiting room.   At this time the staff will also ask for your designated spokesperson information in the event that the physician or staff need to provide an update or obtain any pertinent information. The designated spokesperson will be notified if the physician needs to speak to family during the pre-operative phase. If at any time your family/visitors has questions or concerns they may approach the volunteer desk in the waiting area for assistance. Special Instructions:    MEDICATIONS TO TAKE THE MORNING OF SURGERY WITH A SIP OF WATER: eye drops, meclizine if needed (for vertigo), verapamil, nitrofurantoin, levothyroxine      I understand a pre-operative phone call will be made to verify my surgery time. In the event that I am not available, I give permission for a message to be left on my answering service and/or with another person?   Yes [phone]           ___________________      __________   _________    (Signature of Patient)             (Witness)                (Date and Time)

## 2017-11-06 NOTE — PERIOP NOTES
Incentive Spirometer        Using the incentive spirometer helps expand the small air sacs of your lungs, helps you breathe deeply, and helps improve your lung function. Use your incentive spirometer twice a day (10 breaths each time) prior to surgery. How to Use Your Incentive Spirometer:  1. Hold the incentive spirometer in an upright position. 2. Breathe out as usual.   3. Place the mouthpiece in your mouth and seal your lips tightly around it. 4. Take a deep breath. Breathe in slowly and as deeply as possible. Keep the blue flow rate guide between the arrows. 5. Hold your breath as long as possible. Then exhale slowly and allow the piston to fall to the bottom of the column. 6. Rest for a few seconds and repeat steps one through five at least 10 times. PAT Tidal Volume__________1750________  x_____2___________  Date________11/6/2017___    Selvin Rolling THE INCENTIVE SPIROMETER WITH YOU TO THE HOSPITAL ON THE DAY OF YOUR SURGERY. Opportunity given to ask and answer questions as well as to observe return demonstration.     Patient signature_____________________________    Witness____________________________

## 2017-11-06 NOTE — PROGRESS NOTES
[facility]  Physical Therapy Pre-surgery evaluation  [address], [address] [facility]    physical Therapy pre THR surgery EVALUATION  Patient: Kassidy Higginbotham (51 y.o. 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 Discussed anticipated disposition to home with possible discharge within a 1 to 2 day time frame post-surgery. Patient and  (spouse) in agreement. Patient's LEFS score indicates fairly high functional decline, though this may also correlate with age. Thus, expect her to make typical progress, if not 1-2 additional visits may be required. Despite spouse appearing frail, they report that they have plenty of home support via friends etc to assist PRN at [place]. GOALS: (Goals have been discussed and agreed upon with patient.)  DISCHARGE GOALS: Time Frame: 1 DAY  1. Patient will demonstrate increased strength, range of motion, and pain control via a home exercise program in order to minimize functional deficits in preparation for their upcoming surgery. This will be achieved by using education, demonstration and through the use of an informational handout including a home exercise program.  REHABILITATION POTENTIAL FOR STATED GOALS: Good     RECOMMENDATIONS AND PLANNED INTERVENTIONS: (Benefits and precautions of physical therapy have been discussed with the patient.)  1. Home Exercise Program  TREATMENT PLAN EFFECTIVE DATES: 11/6/2017 TO 11/6/2017  FREQUENCY/DURATION: Patient to continue to perform home exercise program at least twice daily until her surgery.      SUBJECTIVE:   Patient stated My goal is to get back to OP therapy at [address] [place] [address] Road:   HISTORY:    Past Medical History:   Diagnosis Date  Arthritis     hands/low back    Diabetes ([facility] [address]     borderline    GERD (gastroesophageal reflux disease)     Glaucoma     Hypercholesterolemia     Hypertension     Ill-defined condition     borderline anemia    Ill-defined condition     bladder infections    Impaired glucose tolerance     Menopause     Thyroid disease     hypothyroidism     Past Surgical History:   Procedure Laterality Date    HX BREAST BIOPSY Bilateral 1990    benign    HX CATARACT REMOVAL      bilateral    HX DILATION AND CURETTAGE      HX ORTHOPAEDIC      carpal tunnel release -bilateral    HX ROTATOR CUFF REPAIR Right      Prior Level of Function/Home Situation: Mod-I with use of a SPC vs furniture walking (rarely). She's had x1 fall in the past year where she stepped off a curb and fell during distraction. She is retired, but still active, and volunteered up until C/Chapin Dejesus ago where due to her hip she can no longer participat. She denies fear of falling and does taking sleeping pills at night. She has trouble standing long periods and has altered some IADL's/ADL's for that; she cannot don/doff socks independently. She wears a M/L support brace on her R knee all the time. She's been to OP PT at [facility] many times, most recently finishing 'a month ago'. She also states she went to OP PT visit for surgery at [facility] in the past week. Personal factors and/or comorbidities impacting plan of care:     Patient []   does  [x]   does not state signs/symptoms of shortness of breath/dyspnea on exertion/respiratory distress.     Home Situation  Home Environment: Private residence  # Steps to Enter: 0  One/Two Story Residence: Split level (chair lift )  # of Interior Steps: 8  Interior Rails: Left  Lift Chair Available: No  Living Alone: No  Support Systems: Spouse/Significant Other/Partner  Patient Expects to be Discharged to[de-identified] Private residence  Current DME Used/Available at Home: Malinda Walton, maritza, Walker, rollator, Walker, rolling, Commode, bedside  Tub or Shower Type: Tub/Shower combination    EXAMINATION/PRESENTATION/DECISION MAKING:     ADLs (Current Functional Status): Bathing/Showering:   [x] Independent  [] Requires Assistance from Someone  [] Sponge Bath Only   Ambulation:  [x] Independent  [] Walk Indoors Only  [x] Walk Outdoors  [x] Use Assistive Device  [] Use Wheelchair Only     Dressing:  [x] Independent WITH EXCEPTION OF SOCKS dependent on spouse to accomplish     Requires Assistance from Someone for:  [] Sock/Shoes  [] Pants  [] Everything   Household Activities:  [x] Routine house and yard work  [] Light Housework Only  [] None       Critical Behavior:  Neurologic State: Alert  Orientation Level: Oriented X4  Cognition: Appropriate decision making  Safety/Judgement: Awareness of environment    Strength:    Strength: Within functional limits                    Tone & Sensation:   Tone: Normal              Sensation: Impaired               Range Of Motion:  AROM: Within functional limits                       Coordination:  Coordination: Within functional limits    Functional Mobility:  Transfers:  Sit to Stand: Modified independent (arm rests)  Stand to Sit: Modified independent (arm rests)                       Balance:   Sitting: Intact, Without support  Standing: Intact, With support  Ambulation/Gait Training:  Distance (ft): 30 Feet (ft)  Assistive Device: Cane, straight  Ambulation - Level of Assistance: Modified independent     Gait Description (WDL): Exceptions to WDL                                         Therapeutic Exercises:   The patient was educated in, has demonstrated, and has received written instructions to complete for their home exercise program per total hip replacement protocol.     Functional Measure:  Lower Extremity Functional Scale (LEFS):      Score 7/80     Percentage of impairment CH  0% CI  1-19% CJ  20-39% CK  40-59% CL  60-79% CM  80-99% CN  100%   LEFS score:  0-80 80 64-79 47-63 31-46 16-30 1-15 0     Cutt-offs: None established  TKA and AUNDREA:   (Hanh et al, 2000)  Juan Fletcher [address] = 9 points   MCID = 9 points           G codes: In compliance with CMSs Claims Based Outcome Reporting, the following G-code set was chosen for this patient based on their primary functional limitation being treated: The outcome measure chosen to determine the severity of the functional limitation was the LEFS with a score of 7/80 which was correlated with the impairment scale. ? Mobility - Walking and Moving Around:     - CURRENT STATUS: CM - 80%-99% impaired, limited or restricted    - GOAL STATUS: CM - 80%-99% impaired, limited or restricted    - D/C STATUS:  CM - 80%-99% impaired, limited or restricted       Pain:Pain Scale 1: Numeric (0 - 10)  Pain Intensity 1: 5  Pain Location 1: Hip  Pain Orientation 1: Left  Pain Description 1: Aching;Constant       Patient []   does  [x]   does not demonstrate signs/symptoms of shortness of breath/dyspnea on exertion/respiratory distress. COMMUNICATION/EDUCATION:   The patient was educated on:  [x]         Early post operative mobility is imperative to achieve a patient's desired outcomes and to restore biological function. [x]         Post operative hip precautions may/may not be applicable. These precautions are based on the patient's physician and the procedure(s) performed.     [x]         Anterior hip precautions including:  ·       No hip extension  ·       No external rotation  ·       No crossing of the legs/midline    []         Posterior hip precautions including:  ·       No hip flexion >90 degrees  ·       No internal rotation  ·       No crossing of the legs/midline    The patients plan of care was discussed as follows:   [x]         The patient verbalized understanding of her plan in preparation for their upcoming surgery  [x]         The patient's  was present for this session  []        The patient reports that he/she does not have a  identified at this time  [x]         The  verbalized understanding of the education regarding the patient's upcoming surgery  [x]         Patient/family agree to work toward stated goals and plan of care. []         Patient understands intent and goals of therapy, but is neutral about his/her participation. []         Patient is unable to participate in goal setting and plan of care.       Thank you for this referral.  Braxton Castillo, PT, DPT, CEEAA      Time Calculation: 32 mins

## 2017-11-07 ENCOUNTER — OFFICE VISIT (OUTPATIENT)
Dept: FAMILY MEDICINE CLINIC | Age: 82
End: 2017-11-07

## 2017-11-07 VITALS
OXYGEN SATURATION: 95 % | BODY MASS INDEX: 31.16 KG/M2 | TEMPERATURE: 97.9 F | WEIGHT: 187 LBS | SYSTOLIC BLOOD PRESSURE: 144 MMHG | HEIGHT: 65 IN | RESPIRATION RATE: 16 BRPM | HEART RATE: 73 BPM | DIASTOLIC BLOOD PRESSURE: 56 MMHG

## 2017-11-07 DIAGNOSIS — R73.02 IGT (IMPAIRED GLUCOSE TOLERANCE): ICD-10-CM

## 2017-11-07 DIAGNOSIS — I87.2 VENOUS (PERIPHERAL) INSUFFICIENCY: ICD-10-CM

## 2017-11-07 DIAGNOSIS — R09.89 WIDENED PULSE PRESSURE: ICD-10-CM

## 2017-11-07 DIAGNOSIS — I10 ESSENTIAL HYPERTENSION: ICD-10-CM

## 2017-11-07 DIAGNOSIS — I73.9 PAD (PERIPHERAL ARTERY DISEASE) (HCC): ICD-10-CM

## 2017-11-07 DIAGNOSIS — E78.2 MIXED HYPERLIPIDEMIA: ICD-10-CM

## 2017-11-07 DIAGNOSIS — Z01.818 PREOP EXAMINATION: Primary | ICD-10-CM

## 2017-11-07 DIAGNOSIS — E03.9 HYPOTHYROIDISM, UNSPECIFIED TYPE: ICD-10-CM

## 2017-11-07 DIAGNOSIS — G25.81 RESTLESS LEG SYNDROME: ICD-10-CM

## 2017-11-07 DIAGNOSIS — M16.10 HIP ARTHRITIS: ICD-10-CM

## 2017-11-07 PROBLEM — I83.812 VARICOSE VEINS OF LEFT LOWER EXTREMITIES WITH PAIN: Status: RESOLVED | Noted: 2017-03-07 | Resolved: 2017-11-07

## 2017-11-07 LAB
BACTERIA SPEC CULT: NORMAL
BACTERIA SPEC CULT: NORMAL
SERVICE CMNT-IMP: NORMAL

## 2017-11-07 RX ORDER — VANCOMYCIN/0.9 % SOD CHLORIDE 1.5G/250ML
1500 PLASTIC BAG, INJECTION (ML) INTRAVENOUS ONCE
Status: CANCELLED | OUTPATIENT
Start: 2017-11-20 | End: 2017-11-20

## 2017-11-07 RX ORDER — CELECOXIB 200 MG/1
400 CAPSULE ORAL ONCE
Status: CANCELLED | OUTPATIENT
Start: 2017-11-20 | End: 2017-11-20

## 2017-11-07 RX ORDER — LEVOFLOXACIN 5 MG/ML
500 INJECTION, SOLUTION INTRAVENOUS ONCE
Status: CANCELLED | OUTPATIENT
Start: 2017-11-20 | End: 2017-11-20

## 2017-11-07 NOTE — PATIENT INSTRUCTIONS
Hold meloxicam and vitamins 7 days prior to surgery. Hip Replacement (Anterior) Precautions: What to Expect at [address] will need to be careful to protect your new joint after hip replacement surgery. Along with doing your physical therapy exercises, there are many things you can do to help your hip heal. Your recovery may be faster if you follow these precautions. Try to keep your hip within the safe positions while it heals. Some leg and foot movements may increase the risk of dislocating your hip. Try to avoid those positions. How can you care for yourself at home? Safe positions for your hip    1. Keep your toes pointing forward or slightly in. Don't rotate your leg too far out to the side. 2. Move your leg or knee forward. Try not to step back. 3. Keep your knees apart. Don't cross your legs. Try not to stretch your hip too far back    1. Don't step backward or bend backward. 2. Avoid lying on your stomach. Don't cross your legs    1. Imagine there's a line running down the middle of your body. Keep your legs from crossing over it. · Don't cross your legs when you sit. · Don't cross your ankles while lying down. · It may help to keep a pillow between your knees when you're in bed. Other tips  · Go slowly when you climb stairs. Make sure the lights are on. Have someone watch you, if possible. When you climb stairs:  ¨ Step up first with your unaffected leg. Then bring the affected leg up to the same step. Bring your crutches or cane up. ¨ To go down stairs, reverse the order. First, put your crutches or cane on the lower step. Then bring the affected leg down to that step. Finally, step down with the unaffected leg. · You can ride in a car, but stop at least once every hour to get out and walk around. · If your doctor recommends exercises, do them as directed. Cut back on your exercises if your muscles start to ache, but don't stop doing them.   When should you call for help?  Call 911 anytime you think you may need emergency care. For example, call if:  · You passed out (lost consciousness). · You have sudden chest pain and shortness of breath, or you cough up blood. · You have severe pain in your chest.  Call your doctor now or seek immediate medical care if:  · You have signs that your hip may be dislocated, including:  ¨ Severe pain and not being able to stand. ¨ A crooked leg that looks like your hip is out of position. ¨ Not being able to bend or straighten your leg. · Your leg or foot turns cold or changes color. · You have tingling, weakness, or numbness in your leg or foot. · You have signs of a blood clot, such as:  ¨ Pain in your calf, back of the knee, thigh, or groin. ¨ Redness and swelling in your leg or groin. · You have pain that does not get better after you take pain medicine. · Your incision opens and starts to bleed, or there's more bleeding. · You have signs of infection, such as:  ¨ Increased pain, swelling, warmth, or redness. ¨ Red streaks leading from the incision. ¨ Pus draining from the incision. ¨ A fever. Watch closely for changes in your health, and be sure to contact your doctor if:  · You do not have a bowel movement after taking a laxative. · You do not get better as expected. Follow-up care is a key part of your treatment and safety. Be sure to make and go to all appointments, and call your doctor if you are having problems. It's also a good idea to know your test results and keep a list of the medicines you take. Where can you learn more? Go to http://catracho-reynaldo.info/. Enter T678 in the search box to learn more about \"Hip Replacement (Anterior) Precautions: What to Expect at Home. \"  Current as of: March 21, 2017  Content Version: 11.4  © 3763-7740 [employer]. Care instructions adapted under license by [employer] (which disclaims liability or warranty for this information).  If you have questions about a medical condition or this instruction, always ask your healthcare professional. [place] [address] any warranty or liability for your use of this information.

## 2017-11-07 NOTE — PROGRESS NOTES
80 y.o. female presents for pre-op exam for left hip replacement surgery on 11/20 with Dr. Nahum Sky. Plans to go home after procedure instead of a rehab. Patient Active Problem List    Diagnosis    PAD (peripheral artery disease) ([facility] [address] - has seen DR Jerry Valera and had a SV procedure.  Anemia - stable, iron and b12 were normal.    Chronic UTI    Venous (peripheral) insufficiency     S/p GSV procedure by Dr. Jerry Valera. Not using compression stockings.  Hypothyroid - No hair loss, no constipation, no dry skin or brittle nails, no palpitations. Taking medication each morning on an empty stomach.  Hypertension - No home monitoring. Compliant with medication. No shortness of breath, no chest pain, no vision change, no headache, no lower extremity edema.  Arthritis     Bilateral knees and shoulders      Hyperlipidemia - Takes medication at night as directed, no muscle aches. Tries to watch diet.  IGT (impaired glucose tolerance) - last A1C at goal    Restless leg syndrome - wakes up with burning in legs around 3AM        Past Medical History:   Diagnosis Date    Arthritis     hands/low back    Diabetes ([facility] [address]     borderline    GERD (gastroesophageal reflux disease)     Glaucoma     Hypercholesterolemia     Hypertension     Ill-defined condition     borderline anemia    Ill-defined condition     bladder infections    Impaired glucose tolerance     Menopause     Thyroid disease     hypothyroidism       No cardiac history - normal stress test 3/17, normal echo 3/17. No chronic kidney disease. No obstructive sleep apnea. Past Surgical History:   Procedure Laterality Date    HX BREAST BIOPSY Bilateral 1990    benign    HX CATARACT REMOVAL      bilateral    HX DILATION AND CURETTAGE      HX ORTHOPAEDIC      carpal tunnel release -bilateral    HX ROTATOR CUFF REPAIR Right        No history of adverse anesthesia reactions. No prior bleeding or clotting complications.     Family History   Problem Relation Age of Onset    Coronary Artery Disease Other      mother  of MI age 80, brother  age 72 of MI, sister has hear problems    Breast Cancer Sister 48    Heart Attack Mother     Other Father      pneumonia    Alzheimer Brother     Cancer Sister     Cancer Sister     Heart Attack Brother     Suicide Brother        Social History   Substance Use Topics    Smoking status: Former Smoker     Packs/day: 1.00     Years: 15.00     Types: Cigarettes     Quit date: 1995    Smokeless tobacco: Never Used    Alcohol use 0.6 oz/week     1 Glasses of wine, 0 Standard drinks or equivalent per week      Comment: Rare--maybe once a month       Social History     Social History Narrative    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 Lives with . Outpatient Prescriptions Marked as Taking for the 17 encounter (Office Visit) with Malu Quinn MD   Medication Sig Dispense Refill    meclizine (ANTIVERT) 12.5 mg tablet Take 12.5 mg by mouth daily as needed.  DULoxetine (CYMBALTA) 30 mg capsule Take 30 mg by mouth every evening.  omeprazole (PRILOSEC) 20 mg capsule TAKE 1 CAPSULE DAILY. 90 Cap 0    ACCU-CHEK MULTICLIX LANCET misc AS DIRECTED 100 Each PRN    lovastatin (MEVACOR) 40 mg tablet TAKE 1 TABLET EVERY EVENING TO LOWER CHOLESTEROL 90 Tab 3    meloxicam (MOBIC) 7.5 mg tablet Take  by mouth daily.  HYDROcodone-acetaminophen (NORCO) 5-325 mg per tablet Take 1 Tab by mouth nightly as needed for Pain. Max Daily Amount: 1 Tab. 30 Tab 0    enalapril-hydroCHLOROthiazide (VASERETIC) 5-12.5 mg tablet TAKE 1 TABLET EVERY DAY 90 Tab 3    verapamil ER (CALAN-SR) 240 mg CR tablet TAKE 1 TABLET EVERY DAY 90 Tab 3    trimethoprim-sulfamethoxazole (BACTRIM, SEPTRA)  mg per tablet Take 1 Tab by mouth daily. Indications: patient takes on EVEN months of the year      nitrofurantoin (MACRODANTIN) 50 mg capsule Take 50 mg by mouth daily.  Indications: patient takes on ODD months of the year      ferrous sulfate 325 mg (65 mg iron) tablet Take  by mouth two (2) times a day.  levothyroxine (SYNTHROID) 100 mcg tablet TAKE 1 TABLET DAILY BEFORE BREAKFAST. 90 Tab 1    latanoprost (XALATAN) 0.005 % ophthalmic solution Administer 1 Drop to both eyes nightly.  DOCUSATE CALCIUM (STOOL SOFTENER PO) Take  by mouth.  ASCORBIC ACID/VITAMIN E (CRANBERRY CONCENTRATE PO) Take  by mouth.  ascorbic acid (VITAMIN C) 500 mg tablet Take  by mouth.  cholecalciferol, vitamin d3, (VITAMIN D) 1,000 unit tablet Take  by mouth daily.  MULTIVITAMINS (MULTIVITAMIN PO) Take  by mouth. Allergies   Allergen Reactions    Aspirin Other (comments)     Swelling shut of eyelids    Gabapentin Other (comments)     Vision change    Macrobid [Nitrofurantoin Monohyd/M-Cryst] Unknown (comments)    Pcn [Penicillins] Hives       No latex allergy. Review of Systems:  Good exercise tolerance - limited by knee and hip pain, not shortness of breath. No recent acute illness.   Gen: no fatigue, fever, chills, weight loss or weight gain  Eyes: no excessive tearing, itching, or discharge  Nose: no rhinorrhea, no sinus pain  Mouth: no oral lesions, no sore throat  Resp: no shortness of breath, no wheezing, no cough  CV: no chest pain, no orthopnea, no paroxysmal nocturnal dyspnea, no lower extremity edema, no palpitations  Abd: no nausea, no heartburn, no diarrhea, no constipation, no abdominal pain  Neuro: no headaches, no syncope or presyncopal episodes  Endo: no polyuria, no polydipsia  Heme: no lymphadenopathy, no easy bruising or bleeding    Visit Vitals    /66 (BP 1 Location: Left arm, BP Patient Position: Sitting)    Pulse 73    Temp 97.9 °F (36.6 °C) (Oral)    Resp 16    Ht 5' 5\" (1.651 m)    Wt 187 lb (84.8 kg)    SpO2 95%    BMI 31.12 kg/m2     Gen: alert, oriented, no acute distress  Ears: external auditory canals clear, TMs without erythema or effusion  Eyes: pupils equal round reactive to light, sclera clear, conjunctiva clear  Oral: moist mucus membranes, no oral lesions, no pharyngeal inflammation or exudate  Neck: thyroid symmetric and not enlarged, no carotid bruits, no jugular vein distention  Resp: no increase work of breathing, lungs clear to ausculation bilaterally, no wheezing, rales or rhonchi  CV: S1, S2 normal. No murmurs, rubs, or gallops. Abd: soft, not tender, not distended. No hepatosplenomegaly. Normal bowel sounds. Neuro: cranial nerves intact, normal strength and movement in all extremities, reflexes and sensation intact and symmetric. Skin: no lesion or rash  Extremities: no cyanosis or edema    Lab Results   Component Value Date/Time    WBC 6.5 11/06/2017 02:08 PM    HGB 10.2 11/06/2017 02:08 PM    HCT 31.8 11/06/2017 02:08 PM    PLATELET 362 60/54/1174 02:08 PM    MCV 95.5 11/06/2017 02:08 PM       Lab Results   Component Value Date/Time    Hemoglobin A1c 6.2 11/06/2017 02:08 PM    Hemoglobin A1c (POC) 6.0 02/06/2017 08:00 AM       Lab Results   Component Value Date/Time    Sodium 139 11/06/2017 02:08 PM    Potassium 4.4 11/06/2017 02:08 PM    Chloride 104 11/06/2017 02:08 PM    CO2 30 11/06/2017 02:08 PM    Anion gap 5 11/06/2017 02:08 PM    Glucose 91 11/06/2017 02:08 PM    BUN 23 11/06/2017 02:08 PM    Creatinine 0.98 11/06/2017 02:08 PM    BUN/Creatinine ratio 23 11/06/2017 02:08 PM    GFR est AA >60 11/06/2017 02:08 PM    GFR est non-AA 54 11/06/2017 02:08 PM    Calcium 8.9 11/06/2017 02:08 PM    Bilirubin, total 0.3 11/06/2017 02:08 PM    AST (SGOT) 16 11/06/2017 02:08 PM    Alk.  phosphatase 116 11/06/2017 02:08 PM    Protein, total 7.3 11/06/2017 02:08 PM    Albumin 3.6 11/06/2017 02:08 PM    Globulin 3.7 11/06/2017 02:08 PM    A-G Ratio 1.0 11/06/2017 02:08 PM    ALT (SGPT) 16 11/06/2017 02:08 PM       Lab Results   Component Value Date/Time    INR 1.1 11/06/2017 02:08 PM    Prothrombin time 11.1 11/06/2017 02:08 PM Stress test and echo 3/30/17 with no signs of ischemia    Assessment/Plan:    1. Preop examination  Cleared for procedure    2. Hip arthritis  Plans for left hip replacement    3. PAD (peripheral artery disease) (HCC)  No contraindication to surgery    4. IGT (impaired glucose tolerance)  Well controlled    5. Restless leg syndrome  Intolerant of many medications. 6. Hypothyroidism, unspecified type  At goal.  Continue current medications. Lab Results   Component Value Date/Time    TSH 2.220 02/06/2017 08:36 AM       7. Essential hypertension with widened pulse pressure  At goal.  Continue current medications. 8. Mixed hyperlipidemia  At goal.  Continue current medications. Lab Results   Component Value Date/Time    Cholesterol, total 190 02/06/2017 08:36 AM    HDL Cholesterol 96 02/06/2017 08:36 AM    LDL, calculated 67 02/06/2017 08:36 AM    VLDL, calculated 27 02/06/2017 08:36 AM    Triglyceride 137 02/06/2017 08:36 AM       9. Venous (peripheral) insufficiency      Medically cleared for above procedure. 7 days prior to surgery, hold the following medications:  meloxicam      Verbal and written instructions (see AVS) provided. Patient expresses understanding of diagnosis and treatment plan.

## 2017-11-07 NOTE — MR AVS SNAPSHOT
Visit Information Date & Time Provider Department Dept. Phone Encounter #  
 11/7/2017  2:00 PM Fransisco Closs, [facility] [phone] [id number] Upcoming Health Maintenance Date Due  
 MEDICARE YEARLY EXAM 2/7/2018 GLAUCOMA SCREENING Q2Y 1/6/2019 DTaP/Tdap/Td series (2 - Td) 2/6/2027 Allergies as of 11/7/2017  Review Complete On: 11/7/2017 By: Fransisco Closs, MD  
  
 Severity Noted Reaction Type Reactions Aspirin  02/03/2014    Other (comments) Swelling shut of eyelids Gabapentin  01/18/2016    Other (comments) Vision change Macrobid [Nitrofurantoin Monohyd/m-cryst]  02/08/2010    Unknown (comments) Pcn [Penicillins]  02/08/2010    Hives Current Immunizations  Reviewed on 10/10/2017 Name Date Influenza High Dose Vaccine PF 10/10/2017, 11/1/2016, 10/14/2015, 9/29/2014, 10/3/2013 Influenza Vaccine Split 10/22/2012, 10/11/2011, 10/13/2010 Pneumococcal Conjugate (PCV-13) 10/14/2015 Pneumococcal Vaccine (Unspecified Type) 1/1/2005 Zoster 12/15/2011 Not reviewed this visit You Were Diagnosed With   
  
 Codes Comments Preop examination    -  Primary ICD-10-CM: K74.987 ICD-9-CM: V72.84 Hip arthritis     ICD-10-CM: M16.10 ICD-9-CM: 716.95   
 PAD (peripheral artery disease) (HCC)     ICD-10-CM: I73.9 ICD-9-CM: 443.9 IGT (impaired glucose tolerance)     ICD-10-CM: R73.02 
ICD-9-CM: 790.22 Restless leg syndrome     ICD-10-CM: G25.81 ICD-9-CM: 333.94 Hypothyroidism, unspecified type     ICD-10-CM: E03.9 ICD-9-CM: 244.9 Essential hypertension     ICD-10-CM: I10 
ICD-9-CM: 401.9 Mixed hyperlipidemia     ICD-10-CM: E78.2 ICD-9-CM: 272.2 Venous (peripheral) insufficiency     ICD-10-CM: I87.2 ICD-9-CM: 459.81 Widened pulse pressure     ICD-10-CM: R09.89 ICD-9-CM: 857. 9 Vitals BP Pulse Temp Resp Height(growth percentile) Weight(growth percentile) 144/56 73 97.9 °F (36.6 °C) (Oral) 16 5' 5\" (1.651 m) 187 lb (84.8 kg) SpO2 BMI OB Status Smoking Status 95% 31.12 kg/m2 Postmenopausal Former Smoker Vitals History BMI and BSA Data Body Mass Index Body Surface Area  
 31.12 kg/m 2 1.97 m 2 Preferred Pharmacy Pharmacy Name Phone [address], [address] [place] [address] [phone] Your Updated Medication List  
  
   
This list is accurate as of: 11/7/17  2:44 PM.  Always use your most recent med list.  
  
  
  
  
 [address] Generic drug:  Lancets AS DIRECTED CRANBERRY CONCENTRATE PO Take  by mouth. DULoxetine 30 mg capsule Commonly known as:  CYMBALTA Take 30 mg by mouth every evening. enalapril-hydroCHLOROthiazide 5-12.5 mg tablet Commonly known as:  VASERETIC  
TAKE 1 TABLET EVERY DAY  
  
 ferrous sulfate 325 mg (65 mg iron) tablet Take  by mouth two (2) times a day. HYDROcodone-acetaminophen 5-325 mg per tablet Commonly known as:  Lenon Gauze Take 1 Tab by mouth nightly as needed for Pain. Max Daily Amount: 1 Tab.  
  
 latanoprost 0.005 % ophthalmic solution Commonly known as:  Ofelia Turner Administer 1 Drop to both eyes nightly. levothyroxine 100 mcg tablet Commonly known as:  SYNTHROID  
TAKE 1 TABLET DAILY BEFORE BREAKFAST. lovastatin 40 mg tablet Commonly known as:  MEVACOR  
TAKE 1 TABLET EVERY EVENING TO LOWER CHOLESTEROL  
  
 meclizine 12.5 mg tablet Commonly known as:  ANTIVERT Take 12.5 mg by mouth daily as needed. meloxicam 7.5 mg tablet Commonly known as:  MOBIC Take  by mouth daily. MULTIVITAMIN PO Take  by mouth. nitrofurantoin 50 mg capsule Commonly known as:  MACRODANTIN Take 50 mg by mouth daily. Indications: patient takes on ODD months of the year  
  
 omeprazole 20 mg capsule Commonly known as:  PRILOSEC  
TAKE 1 CAPSULE DAILY.   
  
 STOOL SOFTENER PO  
 Take  by mouth. trimethoprim-sulfamethoxazole  mg per tablet Commonly known as:  Mahad Campi Take 1 Tab by mouth daily. Indications: patient takes on EVEN months of the year  
  
 verapamil  mg CR tablet Commonly known as:  CALAN-SR  
TAKE 1 TABLET EVERY DAY  
  
 VITAMIN C 500 mg tablet Generic drug:  ascorbic acid (vitamin C) Take  by mouth. VITAMIN D3 1,000 unit tablet Generic drug:  cholecalciferol Take  by mouth daily. Patient Instructions Hold meloxicam and vitamins 7 days prior to surgery. Hip Replacement (Anterior) Precautions: What to Expect at Home Your Recovery You will need to be careful to protect your new joint after hip replacement surgery. Along with doing your physical therapy exercises, there are many things you can do to help your hip heal. Your recovery may be faster if you follow these precautions. Try to keep your hip within the safe positions while it heals. Some leg and foot movements may increase the risk of dislocating your hip. Try to avoid those positions. How can you care for yourself at home? Safe positions for your hip 1. Keep your toes pointing forward or slightly in. Don't rotate your leg too far out to the side. 2. Move your leg or knee forward. Try not to step back. 3. Keep your knees apart. Don't cross your legs. Try not to stretch your hip too far back 1. Don't step backward or bend backward. 2. Avoid lying on your stomach. Don't cross your legs 1. Imagine there's a line running down the middle of your body. Keep your legs from crossing over it. · Don't cross your legs when you sit. · Don't cross your ankles while lying down. · It may help to keep a pillow between your knees when you're in bed. Other tips · Go slowly when you climb stairs. Make sure the lights are on. Have someone watch you, if possible. When you climb stairs: ¨ Step up first with your unaffected leg. Then bring the affected leg up to the same step. Bring your crutches or cane up. ¨ To go down stairs, reverse the order. First, put your crutches or cane on the lower step. Then bring the affected leg down to that step. Finally, step down with the unaffected leg. · You can ride in a car, but stop at least once every hour to get out and walk around. · If your doctor recommends exercises, do them as directed. Cut back on your exercises if your muscles start to ache, but don't stop doing them. When should you call for help? Call 911 anytime you think you may need emergency care. For example, call if: 
· You passed out (lost consciousness). · You have sudden chest pain and shortness of breath, or you cough up blood. · You have severe pain in your chest. 
Call your doctor now or seek immediate medical care if: 
· You have signs that your hip may be dislocated, including: ¨ Severe pain and not being able to stand. ¨ A crooked leg that looks like your hip is out of position. ¨ Not being able to bend or straighten your leg. · Your leg or foot turns cold or changes color. · You have tingling, weakness, or numbness in your leg or foot. · You have signs of a blood clot, such as: 
¨ Pain in your calf, back of the knee, thigh, or groin. ¨ Redness and swelling in your leg or groin. · You have pain that does not get better after you take pain medicine. · Your incision opens and starts to bleed, or there's more bleeding. · You have signs of infection, such as: 
¨ Increased pain, swelling, warmth, or redness. ¨ Red streaks leading from the incision. ¨ Pus draining from the incision. ¨ A fever. Watch closely for changes in your health, and be sure to contact your doctor if: 
· You do not have a bowel movement after taking a laxative. · You do not get better as expected. Follow-up care is a key part of your treatment and safety.  Be sure to make and go to all appointments, and call your doctor if you are having problems. It's also a good idea to know your test results and keep a list of the medicines you take. Where can you learn more? Go to http://catracho-reynaldo.info/. Enter Q051 in the search box to learn more about \"Hip Replacement (Anterior) Precautions: What to Expect at Home. \" Current as of: March 21, 2017 Content Version: 11.4 © 5218-4509 [employer]. Care instructions adapted under license by [employer] (which disclaims liability or warranty for this information). If you have questions about a medical condition or this instruction, always ask your healthcare professional. [address] any warranty or liability for your use of this information. Introducing [facility]! Ceci Colunga introduces [employer] patient portal. Now you can access parts of your medical record, email your doctor's office, and request medication refills online. 1. In your internet browser, go to [email or web address]. Face to Face [email or web address] 2. Click on the First Time User? Click Here link in the Sign In box. You will see the New Member Sign Up page. 3. Enter your [employer] Access Code exactly as it appears below. You will not need to use this code after youve completed the sign-up process. If you do not sign up before the expiration date, you must request a new code. · [employer] Access Code: [email or web address] Expires: 12/27/2017  8:48 AM 
 
4. Enter the last four digits of your Social Security Number (xxxx) and Date of Birth (mm/dd/yyyy) as indicated and click Submit. You will be taken to the next sign-up page. 5. Create a [employer] ID. This will be your [employer] login ID and cannot be changed, so think of one that is secure and easy to remember. 6. Create a [employer] password. You can change your password at any time. 7. Enter your Password Reset Question and Answer.  This can be used at a later time if you forget your password. 8. Enter your e-mail address. You will receive e-mail notification when new information is available in [address] 9. Click Sign Up. You can now view and download portions of your medical record. 10. Click the Download Summary menu link to download a portable copy of your medical information. If you have questions, please visit the Frequently Asked Questions section of the [employer] website. Remember, [employer] is NOT to be used for urgent needs. For medical emergencies, dial 911. Now available from your iPhone and Android! Please provide this summary of care documentation to your next provider. Your primary care clinician is listed as [facility]. If you have any questions after today's visit, please call [phone].

## 2017-11-07 NOTE — PERIOP NOTES
Spoke with Shital at Dr. Zahira Higgins' office, orders to discontinue pre op lyrica (d/t patient's allergies), but okay for pre op celebrex, per Dr. Tano Lion. Vancomycin and Levaquin ordered pre op d/t patient's allergies, per Dr. Zahira Higgins' orders.

## 2017-11-08 RX ORDER — ACETAMINOPHEN 500 MG
1000 TABLET ORAL ONCE
Status: CANCELLED | OUTPATIENT
Start: 2017-11-20 | End: 2017-11-20

## 2017-11-17 NOTE — PERIOP NOTES
Pre-op call made and patient given TOA. Patient instructed may have uo to 8 ounces of water up to 4 am and then NPO.  Patient verbalized understanding

## 2017-11-20 ENCOUNTER — APPOINTMENT (OUTPATIENT)
Dept: GENERAL RADIOLOGY | Age: 82
DRG: 470 | End: 2017-11-20
Attending: ORTHOPAEDIC SURGERY
Payer: MEDICARE

## 2017-11-20 ENCOUNTER — ANESTHESIA (OUTPATIENT)
Dept: SURGERY | Age: 82
DRG: 470 | End: 2017-11-20
Payer: MEDICARE

## 2017-11-20 ENCOUNTER — ANESTHESIA EVENT (OUTPATIENT)
Dept: SURGERY | Age: 82
DRG: 470 | End: 2017-11-20
Payer: MEDICARE

## 2017-11-20 ENCOUNTER — HOSPITAL ENCOUNTER (INPATIENT)
Age: 82
LOS: 2 days | Discharge: HOME HEALTH | DRG: 470 | End: 2017-11-22
Attending: ORTHOPAEDIC SURGERY | Admitting: ORTHOPAEDIC SURGERY
Payer: MEDICARE

## 2017-11-20 PROBLEM — M16.9 OSTEOARTHRITIS OF HIP: Status: ACTIVE | Noted: 2017-11-20

## 2017-11-20 LAB
GLUCOSE BLD STRIP.AUTO-MCNC: 142 MG/DL (ref 65–100)
SERVICE CMNT-IMP: ABNORMAL

## 2017-11-20 PROCEDURE — 77030035236 HC SUT PDS STRATFX BARB J&J -B: Performed by: ORTHOPAEDIC SURGERY

## 2017-11-20 PROCEDURE — 77030018846 HC SOL IRR STRL H20 ICUM -A: Performed by: ORTHOPAEDIC SURGERY

## 2017-11-20 PROCEDURE — 77030036722: Performed by: ORTHOPAEDIC SURGERY

## 2017-11-20 PROCEDURE — 77030020788: Performed by: ORTHOPAEDIC SURGERY

## 2017-11-20 PROCEDURE — 77030034479 HC ADH SKN CLSR PRINEO J&J -B: Performed by: ORTHOPAEDIC SURGERY

## 2017-11-20 PROCEDURE — 77010033678 HC OXYGEN DAILY

## 2017-11-20 PROCEDURE — 77030006789 HC BLD SAW OSC STRY -C: Performed by: ORTHOPAEDIC SURGERY

## 2017-11-20 PROCEDURE — 74011250637 HC RX REV CODE- 250/637: Performed by: ORTHOPAEDIC SURGERY

## 2017-11-20 PROCEDURE — G8979 MOBILITY GOAL STATUS: HCPCS

## 2017-11-20 PROCEDURE — G8978 MOBILITY CURRENT STATUS: HCPCS

## 2017-11-20 PROCEDURE — 82962 GLUCOSE BLOOD TEST: CPT

## 2017-11-20 PROCEDURE — 77030011640 HC PAD GRND REM COVD -A: Performed by: ORTHOPAEDIC SURGERY

## 2017-11-20 PROCEDURE — 77030033138 HC SUT PGA STRATFX J&J -B: Performed by: ORTHOPAEDIC SURGERY

## 2017-11-20 PROCEDURE — 65270000029 HC RM PRIVATE

## 2017-11-20 PROCEDURE — 77030031139 HC SUT VCRL2 J&J -A: Performed by: ORTHOPAEDIC SURGERY

## 2017-11-20 PROCEDURE — 77030018836 HC SOL IRR NACL ICUM -A: Performed by: ORTHOPAEDIC SURGERY

## 2017-11-20 PROCEDURE — 74011250636 HC RX REV CODE- 250/636: Performed by: ORTHOPAEDIC SURGERY

## 2017-11-20 PROCEDURE — 74011000258 HC RX REV CODE- 258: Performed by: PHYSICIAN ASSISTANT

## 2017-11-20 PROCEDURE — 74011250636 HC RX REV CODE- 250/636: Performed by: ANESTHESIOLOGY

## 2017-11-20 PROCEDURE — 76001 XR FLUOROSCOPY OVER 60 MINUTES: CPT

## 2017-11-20 PROCEDURE — 74011000250 HC RX REV CODE- 250: Performed by: ORTHOPAEDIC SURGERY

## 2017-11-20 PROCEDURE — C1776 JOINT DEVICE (IMPLANTABLE): HCPCS | Performed by: ORTHOPAEDIC SURGERY

## 2017-11-20 PROCEDURE — 74011250636 HC RX REV CODE- 250/636

## 2017-11-20 PROCEDURE — 74011250637 HC RX REV CODE- 250/637: Performed by: PHYSICIAN ASSISTANT

## 2017-11-20 PROCEDURE — 76210000017 HC OR PH I REC 1.5 TO 2 HR: Performed by: ORTHOPAEDIC SURGERY

## 2017-11-20 PROCEDURE — 77030014647 HC SEAL FBRN TISSL BAXT -D: Performed by: ORTHOPAEDIC SURGERY

## 2017-11-20 PROCEDURE — 74011000250 HC RX REV CODE- 250: Performed by: PHYSICIAN ASSISTANT

## 2017-11-20 PROCEDURE — 8E0YXBZ COMPUTER ASSISTED PROCEDURE OF LOWER EXTREMITY: ICD-10-PCS | Performed by: ORTHOPAEDIC SURGERY

## 2017-11-20 PROCEDURE — 77030026438 HC STYL ET INTUB CARD -A: Performed by: NURSE ANESTHETIST, CERTIFIED REGISTERED

## 2017-11-20 PROCEDURE — 97116 GAIT TRAINING THERAPY: CPT

## 2017-11-20 PROCEDURE — 73501 X-RAY EXAM HIP UNI 1 VIEW: CPT

## 2017-11-20 PROCEDURE — 77030022704 HC SUT VLOC COVD -B: Performed by: ORTHOPAEDIC SURGERY

## 2017-11-20 PROCEDURE — 77030020782 HC GWN BAIR PAWS FLX 3M -B

## 2017-11-20 PROCEDURE — 77030018723 HC ELCTRD BLD COVD -A: Performed by: ORTHOPAEDIC SURGERY

## 2017-11-20 PROCEDURE — 77030032490 HC SLV COMPR SCD KNE COVD -B: Performed by: ORTHOPAEDIC SURGERY

## 2017-11-20 PROCEDURE — 76010000162 HC OR TIME 1.5 TO 2 HR INTENSV-TIER 1: Performed by: ORTHOPAEDIC SURGERY

## 2017-11-20 PROCEDURE — 77030003666 HC NDL SPINAL BD -A: Performed by: ORTHOPAEDIC SURGERY

## 2017-11-20 PROCEDURE — 77030008684 HC TU ET CUF COVD -B: Performed by: NURSE ANESTHETIST, CERTIFIED REGISTERED

## 2017-11-20 PROCEDURE — 74011000258 HC RX REV CODE- 258: Performed by: ORTHOPAEDIC SURGERY

## 2017-11-20 PROCEDURE — 97161 PT EVAL LOW COMPLEX 20 MIN: CPT

## 2017-11-20 PROCEDURE — 77030038269 HC DRN EXT URIN PURWCK BARD -A

## 2017-11-20 PROCEDURE — 76060000034 HC ANESTHESIA 1.5 TO 2 HR: Performed by: ORTHOPAEDIC SURGERY

## 2017-11-20 PROCEDURE — 74011250636 HC RX REV CODE- 250/636: Performed by: PHYSICIAN ASSISTANT

## 2017-11-20 PROCEDURE — 77030008467 HC STPLR SKN COVD -B: Performed by: ORTHOPAEDIC SURGERY

## 2017-11-20 PROCEDURE — 74011000250 HC RX REV CODE- 250

## 2017-11-20 PROCEDURE — 0SRB0JA REPLACEMENT OF LEFT HIP JOINT WITH SYNTHETIC SUBSTITUTE, UNCEMENTED, OPEN APPROACH: ICD-10-PCS | Performed by: ORTHOPAEDIC SURGERY

## 2017-11-20 DEVICE — IMPLANTABLE DEVICE
Type: IMPLANTABLE DEVICE | Site: HIP | Status: FUNCTIONAL
Brand: NOVATION

## 2017-11-20 DEVICE — IMPLANTABLE DEVICE
Type: IMPLANTABLE DEVICE | Site: HIP | Status: FUNCTIONAL
Brand: ALTEON

## 2017-11-20 DEVICE — LINER ACET PRSS FT HIP POROUS POLYETH MTL: Type: IMPLANTABLE DEVICE | Site: HIP | Status: FUNCTIONAL

## 2017-11-20 DEVICE — IMPLANTABLE DEVICE
Type: IMPLANTABLE DEVICE | Site: HIP | Status: FUNCTIONAL
Brand: EXACTECH

## 2017-11-20 RX ORDER — SODIUM CHLORIDE, SODIUM LACTATE, POTASSIUM CHLORIDE, CALCIUM CHLORIDE 600; 310; 30; 20 MG/100ML; MG/100ML; MG/100ML; MG/100ML
25 INJECTION, SOLUTION INTRAVENOUS CONTINUOUS
Status: DISCONTINUED | OUTPATIENT
Start: 2017-11-20 | End: 2017-11-20 | Stop reason: HOSPADM

## 2017-11-20 RX ORDER — LIDOCAINE HYDROCHLORIDE 20 MG/ML
INJECTION, SOLUTION EPIDURAL; INFILTRATION; INTRACAUDAL; PERINEURAL AS NEEDED
Status: DISCONTINUED | OUTPATIENT
Start: 2017-11-20 | End: 2017-11-20 | Stop reason: HOSPADM

## 2017-11-20 RX ORDER — FENTANYL CITRATE 50 UG/ML
INJECTION, SOLUTION INTRAMUSCULAR; INTRAVENOUS AS NEEDED
Status: DISCONTINUED | OUTPATIENT
Start: 2017-11-20 | End: 2017-11-20 | Stop reason: HOSPADM

## 2017-11-20 RX ORDER — VANCOMYCIN/0.9 % SOD CHLORIDE 1.5G/250ML
1500 PLASTIC BAG, INJECTION (ML) INTRAVENOUS EVERY 12 HOURS
Status: COMPLETED | OUTPATIENT
Start: 2017-11-20 | End: 2017-11-20

## 2017-11-20 RX ORDER — HYDROMORPHONE HYDROCHLORIDE 1 MG/ML
0.5 INJECTION, SOLUTION INTRAMUSCULAR; INTRAVENOUS; SUBCUTANEOUS
Status: ACTIVE | OUTPATIENT
Start: 2017-11-20 | End: 2017-11-21

## 2017-11-20 RX ORDER — SODIUM CHLORIDE 9 MG/ML
INJECTION, SOLUTION INTRAVENOUS
Status: DISPENSED
Start: 2017-11-20 | End: 2017-11-20

## 2017-11-20 RX ORDER — ACETAMINOPHEN 500 MG
1000 TABLET ORAL ONCE
Status: COMPLETED | OUTPATIENT
Start: 2017-11-20 | End: 2017-11-20

## 2017-11-20 RX ORDER — POLYETHYLENE GLYCOL 3350 17 G/17G
17 POWDER, FOR SOLUTION ORAL DAILY
Status: DISCONTINUED | OUTPATIENT
Start: 2017-11-21 | End: 2017-11-22 | Stop reason: HOSPADM

## 2017-11-20 RX ORDER — LIDOCAINE HYDROCHLORIDE 10 MG/ML
0.1 INJECTION, SOLUTION EPIDURAL; INFILTRATION; INTRACAUDAL; PERINEURAL AS NEEDED
Status: DISCONTINUED | OUTPATIENT
Start: 2017-11-20 | End: 2017-11-20 | Stop reason: HOSPADM

## 2017-11-20 RX ORDER — NALOXONE HYDROCHLORIDE 0.4 MG/ML
0.4 INJECTION, SOLUTION INTRAMUSCULAR; INTRAVENOUS; SUBCUTANEOUS AS NEEDED
Status: DISCONTINUED | OUTPATIENT
Start: 2017-11-20 | End: 2017-11-22 | Stop reason: HOSPADM

## 2017-11-20 RX ORDER — DIPHENHYDRAMINE HYDROCHLORIDE 50 MG/ML
12.5 INJECTION, SOLUTION INTRAMUSCULAR; INTRAVENOUS AS NEEDED
Status: DISCONTINUED | OUTPATIENT
Start: 2017-11-20 | End: 2017-11-20 | Stop reason: HOSPADM

## 2017-11-20 RX ORDER — HYDROMORPHONE HYDROCHLORIDE 1 MG/ML
.2-.5 INJECTION, SOLUTION INTRAMUSCULAR; INTRAVENOUS; SUBCUTANEOUS
Status: DISCONTINUED | OUTPATIENT
Start: 2017-11-20 | End: 2017-11-20 | Stop reason: HOSPADM

## 2017-11-20 RX ORDER — SODIUM CHLORIDE 9 MG/ML
125 INJECTION, SOLUTION INTRAVENOUS CONTINUOUS
Status: DISPENSED | OUTPATIENT
Start: 2017-11-20 | End: 2017-11-21

## 2017-11-20 RX ORDER — MORPHINE SULFATE 10 MG/ML
2 INJECTION, SOLUTION INTRAMUSCULAR; INTRAVENOUS
Status: DISCONTINUED | OUTPATIENT
Start: 2017-11-20 | End: 2017-11-20 | Stop reason: HOSPADM

## 2017-11-20 RX ORDER — VANCOMYCIN/0.9 % SOD CHLORIDE 1.5G/250ML
1500 PLASTIC BAG, INJECTION (ML) INTRAVENOUS ONCE
Status: COMPLETED | OUTPATIENT
Start: 2017-11-20 | End: 2017-11-20

## 2017-11-20 RX ORDER — ADHESIVE BANDAGE
30 BANDAGE TOPICAL DAILY PRN
Status: DISCONTINUED | OUTPATIENT
Start: 2017-11-21 | End: 2017-11-22 | Stop reason: HOSPADM

## 2017-11-20 RX ORDER — SODIUM CHLORIDE 0.9 % (FLUSH) 0.9 %
5-10 SYRINGE (ML) INJECTION AS NEEDED
Status: DISCONTINUED | OUTPATIENT
Start: 2017-11-20 | End: 2017-11-20 | Stop reason: HOSPADM

## 2017-11-20 RX ORDER — DIPHENHYDRAMINE HCL 12.5MG/5ML
12.5 ELIXIR ORAL
Status: DISCONTINUED | OUTPATIENT
Start: 2017-11-20 | End: 2017-11-22 | Stop reason: HOSPADM

## 2017-11-20 RX ORDER — FENTANYL CITRATE 50 UG/ML
INJECTION, SOLUTION INTRAMUSCULAR; INTRAVENOUS
Status: COMPLETED
Start: 2017-11-20 | End: 2017-11-20

## 2017-11-20 RX ORDER — FACIAL-BODY WIPES
10 EACH TOPICAL DAILY PRN
Status: DISCONTINUED | OUTPATIENT
Start: 2017-11-22 | End: 2017-11-22 | Stop reason: HOSPADM

## 2017-11-20 RX ORDER — SODIUM CHLORIDE 0.9 % (FLUSH) 0.9 %
5-10 SYRINGE (ML) INJECTION AS NEEDED
Status: DISCONTINUED | OUTPATIENT
Start: 2017-11-20 | End: 2017-11-22 | Stop reason: HOSPADM

## 2017-11-20 RX ORDER — PRAVASTATIN SODIUM 40 MG/1
40 TABLET ORAL
Status: DISCONTINUED | OUTPATIENT
Start: 2017-11-20 | End: 2017-11-22 | Stop reason: HOSPADM

## 2017-11-20 RX ORDER — LATANOPROST 50 UG/ML
1 SOLUTION/ DROPS OPHTHALMIC
Status: DISCONTINUED | OUTPATIENT
Start: 2017-11-20 | End: 2017-11-22 | Stop reason: HOSPADM

## 2017-11-20 RX ORDER — CELECOXIB 100 MG/1
100 CAPSULE ORAL 2 TIMES DAILY
Status: DISCONTINUED | OUTPATIENT
Start: 2017-11-20 | End: 2017-11-22 | Stop reason: HOSPADM

## 2017-11-20 RX ORDER — MIDAZOLAM HYDROCHLORIDE 1 MG/ML
0.5 INJECTION, SOLUTION INTRAMUSCULAR; INTRAVENOUS
Status: DISCONTINUED | OUTPATIENT
Start: 2017-11-20 | End: 2017-11-20 | Stop reason: HOSPADM

## 2017-11-20 RX ORDER — ACETAMINOPHEN 10 MG/ML
INJECTION, SOLUTION INTRAVENOUS AS NEEDED
Status: DISCONTINUED | OUTPATIENT
Start: 2017-11-20 | End: 2017-11-20 | Stop reason: HOSPADM

## 2017-11-20 RX ORDER — DEXAMETHASONE SODIUM PHOSPHATE 4 MG/ML
10 INJECTION, SOLUTION INTRA-ARTICULAR; INTRALESIONAL; INTRAMUSCULAR; INTRAVENOUS; SOFT TISSUE ONCE
Status: COMPLETED | OUTPATIENT
Start: 2017-11-21 | End: 2017-11-21

## 2017-11-20 RX ORDER — ACETAMINOPHEN 325 MG/1
650 TABLET ORAL EVERY 6 HOURS
Status: DISCONTINUED | OUTPATIENT
Start: 2017-11-20 | End: 2017-11-22 | Stop reason: HOSPADM

## 2017-11-20 RX ORDER — OXYCODONE HYDROCHLORIDE 5 MG/1
5 TABLET ORAL
Status: DISCONTINUED | OUTPATIENT
Start: 2017-11-20 | End: 2017-11-22 | Stop reason: HOSPADM

## 2017-11-20 RX ORDER — LEVOTHYROXINE SODIUM 100 UG/1
100 TABLET ORAL
Status: DISCONTINUED | OUTPATIENT
Start: 2017-11-21 | End: 2017-11-22 | Stop reason: HOSPADM

## 2017-11-20 RX ORDER — PHENYLEPHRINE HCL IN 0.9% NACL 0.4MG/10ML
SYRINGE (ML) INTRAVENOUS AS NEEDED
Status: DISCONTINUED | OUTPATIENT
Start: 2017-11-20 | End: 2017-11-20 | Stop reason: HOSPADM

## 2017-11-20 RX ORDER — AMOXICILLIN 250 MG
1 CAPSULE ORAL 2 TIMES DAILY
Status: DISCONTINUED | OUTPATIENT
Start: 2017-11-20 | End: 2017-11-22 | Stop reason: HOSPADM

## 2017-11-20 RX ORDER — PROPOFOL 10 MG/ML
INJECTION, EMULSION INTRAVENOUS AS NEEDED
Status: DISCONTINUED | OUTPATIENT
Start: 2017-11-20 | End: 2017-11-20 | Stop reason: HOSPADM

## 2017-11-20 RX ORDER — LEVOFLOXACIN 5 MG/ML
500 INJECTION, SOLUTION INTRAVENOUS ONCE
Status: COMPLETED | OUTPATIENT
Start: 2017-11-20 | End: 2017-11-20

## 2017-11-20 RX ORDER — OXYCODONE HYDROCHLORIDE 5 MG/1
10 TABLET ORAL
Status: DISCONTINUED | OUTPATIENT
Start: 2017-11-20 | End: 2017-11-22 | Stop reason: HOSPADM

## 2017-11-20 RX ORDER — ROCURONIUM BROMIDE 10 MG/ML
INJECTION, SOLUTION INTRAVENOUS AS NEEDED
Status: DISCONTINUED | OUTPATIENT
Start: 2017-11-20 | End: 2017-11-20 | Stop reason: HOSPADM

## 2017-11-20 RX ORDER — ONDANSETRON 2 MG/ML
4 INJECTION INTRAMUSCULAR; INTRAVENOUS
Status: DISCONTINUED | OUTPATIENT
Start: 2017-11-20 | End: 2017-11-22 | Stop reason: HOSPADM

## 2017-11-20 RX ORDER — TRANEXAMIC ACID 100 MG/ML
INJECTION, SOLUTION INTRAVENOUS
Status: DISPENSED
Start: 2017-11-20 | End: 2017-11-20

## 2017-11-20 RX ORDER — SUCCINYLCHOLINE CHLORIDE 20 MG/ML
INJECTION INTRAMUSCULAR; INTRAVENOUS AS NEEDED
Status: DISCONTINUED | OUTPATIENT
Start: 2017-11-20 | End: 2017-11-20 | Stop reason: HOSPADM

## 2017-11-20 RX ORDER — ONDANSETRON 2 MG/ML
4 INJECTION INTRAMUSCULAR; INTRAVENOUS AS NEEDED
Status: DISCONTINUED | OUTPATIENT
Start: 2017-11-20 | End: 2017-11-20 | Stop reason: HOSPADM

## 2017-11-20 RX ORDER — ONDANSETRON 2 MG/ML
INJECTION INTRAMUSCULAR; INTRAVENOUS AS NEEDED
Status: DISCONTINUED | OUTPATIENT
Start: 2017-11-20 | End: 2017-11-20 | Stop reason: HOSPADM

## 2017-11-20 RX ORDER — CELECOXIB 200 MG/1
400 CAPSULE ORAL ONCE
Status: COMPLETED | OUTPATIENT
Start: 2017-11-20 | End: 2017-11-20

## 2017-11-20 RX ORDER — PANTOPRAZOLE SODIUM 40 MG/1
40 TABLET, DELAYED RELEASE ORAL DAILY
Status: DISCONTINUED | OUTPATIENT
Start: 2017-11-21 | End: 2017-11-22 | Stop reason: HOSPADM

## 2017-11-20 RX ORDER — GUAIFENESIN 100 MG/5ML
81 LIQUID (ML) ORAL 2 TIMES DAILY
Status: DISCONTINUED | OUTPATIENT
Start: 2017-11-20 | End: 2017-11-20

## 2017-11-20 RX ORDER — SODIUM CHLORIDE 0.9 % (FLUSH) 0.9 %
5-10 SYRINGE (ML) INJECTION EVERY 8 HOURS
Status: DISCONTINUED | OUTPATIENT
Start: 2017-11-21 | End: 2017-11-22 | Stop reason: HOSPADM

## 2017-11-20 RX ORDER — ENOXAPARIN SODIUM 100 MG/ML
40 INJECTION SUBCUTANEOUS EVERY 24 HOURS
Status: DISCONTINUED | OUTPATIENT
Start: 2017-11-21 | End: 2017-11-22 | Stop reason: HOSPADM

## 2017-11-20 RX ORDER — DULOXETIN HYDROCHLORIDE 30 MG/1
30 CAPSULE, DELAYED RELEASE ORAL EVERY EVENING
Status: DISCONTINUED | OUTPATIENT
Start: 2017-11-20 | End: 2017-11-22 | Stop reason: HOSPADM

## 2017-11-20 RX ORDER — FENTANYL CITRATE 50 UG/ML
25 INJECTION, SOLUTION INTRAMUSCULAR; INTRAVENOUS
Status: DISCONTINUED | OUTPATIENT
Start: 2017-11-20 | End: 2017-11-20 | Stop reason: HOSPADM

## 2017-11-20 RX ORDER — FENTANYL CITRATE 50 UG/ML
50 INJECTION, SOLUTION INTRAMUSCULAR; INTRAVENOUS AS NEEDED
Status: DISCONTINUED | OUTPATIENT
Start: 2017-11-20 | End: 2017-11-20 | Stop reason: HOSPADM

## 2017-11-20 RX ADMIN — SUCCINYLCHOLINE CHLORIDE 100 MG: 20 INJECTION INTRAMUSCULAR; INTRAVENOUS at 08:26

## 2017-11-20 RX ADMIN — ONDANSETRON 4 MG: 2 INJECTION INTRAMUSCULAR; INTRAVENOUS at 11:34

## 2017-11-20 RX ADMIN — VANCOMYCIN HYDROCHLORIDE 1500 MG: 10 INJECTION, POWDER, LYOPHILIZED, FOR SOLUTION INTRAVENOUS at 07:20

## 2017-11-20 RX ADMIN — ONDANSETRON 4 MG: 2 INJECTION INTRAMUSCULAR; INTRAVENOUS at 10:06

## 2017-11-20 RX ADMIN — VANCOMYCIN HYDROCHLORIDE 1500 MG: 10 INJECTION, POWDER, LYOPHILIZED, FOR SOLUTION INTRAVENOUS at 21:04

## 2017-11-20 RX ADMIN — Medication 10 ML: at 21:14

## 2017-11-20 RX ADMIN — FENTANYL CITRATE 25 MCG: 50 INJECTION, SOLUTION INTRAMUSCULAR; INTRAVENOUS at 10:28

## 2017-11-20 RX ADMIN — FENTANYL CITRATE 50 MCG: 50 INJECTION, SOLUTION INTRAMUSCULAR; INTRAVENOUS at 08:37

## 2017-11-20 RX ADMIN — OXYCODONE HYDROCHLORIDE 10 MG: 5 TABLET ORAL at 21:05

## 2017-11-20 RX ADMIN — FENTANYL CITRATE 25 MCG: 50 INJECTION, SOLUTION INTRAMUSCULAR; INTRAVENOUS at 09:50

## 2017-11-20 RX ADMIN — LATANOPROST 1 DROP: 50 SOLUTION OPHTHALMIC at 21:04

## 2017-11-20 RX ADMIN — FENTANYL CITRATE 25 MCG: 50 INJECTION, SOLUTION INTRAMUSCULAR; INTRAVENOUS at 11:15

## 2017-11-20 RX ADMIN — PROPOFOL 70 MG: 10 INJECTION, EMULSION INTRAVENOUS at 08:26

## 2017-11-20 RX ADMIN — ACETAMINOPHEN 1000 MG: 500 TABLET ORAL at 07:21

## 2017-11-20 RX ADMIN — HYDROMORPHONE HYDROCHLORIDE 0.5 MG: 1 INJECTION, SOLUTION INTRAMUSCULAR; INTRAVENOUS; SUBCUTANEOUS at 11:00

## 2017-11-20 RX ADMIN — FENTANYL CITRATE 25 MCG: 50 INJECTION, SOLUTION INTRAMUSCULAR; INTRAVENOUS at 10:40

## 2017-11-20 RX ADMIN — FENTANYL CITRATE 50 MCG: 50 INJECTION, SOLUTION INTRAMUSCULAR; INTRAVENOUS at 08:26

## 2017-11-20 RX ADMIN — ACETAMINOPHEN 1000 MG: 10 INJECTION, SOLUTION INTRAVENOUS at 09:06

## 2017-11-20 RX ADMIN — Medication 120 MCG: at 08:51

## 2017-11-20 RX ADMIN — TRANEXAMIC ACID 1000 MG: 100 INJECTION, SOLUTION INTRAVENOUS at 10:31

## 2017-11-20 RX ADMIN — CELECOXIB 400 MG: 200 CAPSULE ORAL at 07:21

## 2017-11-20 RX ADMIN — SODIUM CHLORIDE 125 ML/HR: 900 INJECTION, SOLUTION INTRAVENOUS at 10:32

## 2017-11-20 RX ADMIN — Medication 40 MCG: at 08:26

## 2017-11-20 RX ADMIN — PROPOFOL 30 MG: 10 INJECTION, EMULSION INTRAVENOUS at 08:45

## 2017-11-20 RX ADMIN — PRAVASTATIN SODIUM 40 MG: 40 TABLET ORAL at 21:04

## 2017-11-20 RX ADMIN — FENTANYL CITRATE 25 MCG: 50 INJECTION, SOLUTION INTRAMUSCULAR; INTRAVENOUS at 10:35

## 2017-11-20 RX ADMIN — OXYCODONE HYDROCHLORIDE 10 MG: 5 TABLET ORAL at 17:22

## 2017-11-20 RX ADMIN — LEVOFLOXACIN 500 MG: 5 INJECTION, SOLUTION INTRAVENOUS at 08:34

## 2017-11-20 RX ADMIN — ACETAMINOPHEN 650 MG: 325 TABLET ORAL at 19:09

## 2017-11-20 RX ADMIN — CELECOXIB 100 MG: 100 CAPSULE ORAL at 17:22

## 2017-11-20 RX ADMIN — DOCUSATE SODIUM AND SENNOSIDES 1 TABLET: 8.6; 5 TABLET, FILM COATED ORAL at 13:22

## 2017-11-20 RX ADMIN — ROCURONIUM BROMIDE 5 MG: 10 INJECTION, SOLUTION INTRAVENOUS at 08:26

## 2017-11-20 RX ADMIN — DOCUSATE SODIUM AND SENNOSIDES 1 TABLET: 8.6; 5 TABLET, FILM COATED ORAL at 17:22

## 2017-11-20 RX ADMIN — OXYCODONE HYDROCHLORIDE 10 MG: 5 TABLET ORAL at 13:22

## 2017-11-20 RX ADMIN — DULOXETINE 30 MG: 30 CAPSULE, DELAYED RELEASE ORAL at 17:22

## 2017-11-20 RX ADMIN — SODIUM CHLORIDE 125 ML/HR: 900 INJECTION, SOLUTION INTRAVENOUS at 19:13

## 2017-11-20 RX ADMIN — Medication 40 MCG: at 08:43

## 2017-11-20 RX ADMIN — FENTANYL CITRATE 25 MCG: 50 INJECTION, SOLUTION INTRAMUSCULAR; INTRAVENOUS at 10:45

## 2017-11-20 RX ADMIN — Medication 80 MCG: at 08:48

## 2017-11-20 RX ADMIN — FENTANYL CITRATE 25 MCG: 50 INJECTION, SOLUTION INTRAMUSCULAR; INTRAVENOUS at 09:58

## 2017-11-20 RX ADMIN — SODIUM CHLORIDE, SODIUM LACTATE, POTASSIUM CHLORIDE, AND CALCIUM CHLORIDE 25 ML/HR: 600; 310; 30; 20 INJECTION, SOLUTION INTRAVENOUS at 07:11

## 2017-11-20 RX ADMIN — LIDOCAINE HYDROCHLORIDE 80 MG: 20 INJECTION, SOLUTION EPIDURAL; INFILTRATION; INTRACAUDAL; PERINEURAL at 08:26

## 2017-11-20 NOTE — PERIOP NOTES
TRANSFER - OUT REPORT:    Verbal report given to [place] RN(name) on Ion Case  being transferred to [place] [id number](unit) for routine post - op       Report consisted of patients Situation, Background, Assessment and   Recommendations(SBAR). Information from the following report(s) SBAR, Kardex, OR Summary, Intake/Output, MAR and Recent Results was reviewed with the receiving nurse. Opportunity for questions and clarification was provided.       Patient transported with:   O2 @ 3 liters  Tech

## 2017-11-20 NOTE — IP AVS SNAPSHOT
[address] [facility] 
[phone] Patient: Lizzie Allan MRN: [id number] [facility] [place]:5/3/5233 About your hospitalization You were admitted on:  November 20, 2017 You last received care in the:  [facility] ORTHOPEDICS You were discharged on:  November 22, 2017 Why you were hospitalized Your primary diagnosis was:  Not on File Your diagnoses also included:  Osteoarthritis Of Hip Things You Need To Do (next 8 weeks) Schedule an appointment with Victorina Fan MD as soon as possible for a visit in 2 week(s) Phone:  [phone] Where:  [facility], [address], [address] [address] Wednesday Nov 29, 2017 Follow up with Sukh Claire MD  
  
Phone:  [phone] Where:  [address], [address] Friday Dec 29, 2017 ROUTINE CARE with Sukh Claire MD at  9:15 AM  
Where:  [address] ([facility]) Discharge Orders None A check [place] indicates which time of day the medication should be taken. My Medications STOP taking these medications HYDROcodone-acetaminophen 5-325 mg per tablet Commonly known as:  [address] these medications as instructed Instructions Each Dose to Equal  
 Morning Noon Evening Bedtime [address] [facility] Generic drug:  Lancets Your last dose was: Your next dose is:    
   
   
 AS DIRECTED  
     
   
   
   
  
 acetaminophen 325 mg tablet Commonly known as:  TYLENOL Your last dose was: Your next dose is: Take 2 Tabs by mouth every six (6) hours for 14 days. 650 mg CRANBERRY CONCENTRATE PO Your last dose was: Your next dose is: Take  by mouth. DULoxetine 30 mg capsule Commonly known as:  CYMBALTA Your last dose was: Your next dose is: Take 30 mg by mouth every evening. 30 mg  
    
   
   
   
  
 enalapril-hydroCHLOROthiazide 5-12.5 mg tablet Commonly known as:  Jenny Brito Your last dose was: Your next dose is: TAKE 1 TABLET EVERY DAY  
     
   
   
   
  
 enoxaparin 40 mg/0.4 mL Commonly known as:  LOVENOX Start taking on:  11/23/2017 Your last dose was: Your next dose is: 0.4 mL by SubCUTAneous route every twenty-four (24) hours for 18 days. 40 mg  
    
   
   
   
  
 ferrous sulfate 325 mg (65 mg iron) tablet Your last dose was: Your next dose is: Take  by mouth two (2) times a day. latanoprost 0.005 % ophthalmic solution Commonly known as:  Nava Cobos Your last dose was: Your next dose is:    
   
   
 Administer 1 Drop to both eyes nightly. 1 Drop  
    
   
   
   
  
 levothyroxine 100 mcg tablet Commonly known as:  SYNTHROID Your last dose was: Your next dose is: TAKE 1 TABLET DAILY BEFORE BREAKFAST. lovastatin 40 mg tablet Commonly known as:  MEVACOR Your last dose was: Your next dose is: TAKE 1 TABLET EVERY EVENING TO LOWER CHOLESTEROL  
     
   
   
   
  
 meclizine 12.5 mg tablet Commonly known as:  ANTIVERT Your last dose was: Your next dose is: Take 12.5 mg by mouth daily as needed. 12.5 mg  
    
   
   
   
  
 meloxicam 7.5 mg tablet Commonly known as:  MOBIC Your last dose was: Your next dose is: Take  by mouth daily. MULTIVITAMIN PO Your last dose was: Your next dose is: Take  by mouth. nitrofurantoin 50 mg capsule Commonly known as:  MACRODANTIN Your last dose was: Your next dose is: Take 50 mg by mouth daily. Indications: patient takes on ODD months of the year 50 mg  
    
   
   
   
  
 omeprazole 20 mg capsule Commonly known as:  PRILOSEC Your last dose was: Your next dose is: TAKE 1 CAPSULE DAILY. oxyCODONE IR 5 mg immediate release tablet Commonly known as:  Perla Aranda Your last dose was: Your next dose is: Take 1-2 Tabs by mouth every four (4) hours as needed. Max Daily Amount: 60 mg.  
 5-10 mg  
    
   
   
   
  
 polyethylene glycol 17 gram packet Commonly known as:  Mariela Look Your last dose was: Your next dose is: Take 1 Packet by mouth daily as needed (constipation) for up to 15 days. 17 g  
    
   
   
   
  
 senna-docusate 8.6-50 mg per tablet Commonly known as:  Shannon Pilon Your last dose was: Your next dose is: Take 1 Tab by mouth daily. 1 Tab STOOL SOFTENER PO Your last dose was: Your next dose is: Take  by mouth. trimethoprim-sulfamethoxazole  mg per tablet Commonly known as:  Pauline Diaz Your last dose was: Your next dose is: Take 1 Tab by mouth daily. Indications: patient takes on EVEN months of the year 1 Tab  
    
   
   
   
  
 verapamil  mg CR tablet Commonly known as:  CALAN-SR Your last dose was: Your next dose is: TAKE 1 TABLET EVERY DAY  
     
   
   
   
  
 VITAMIN C 500 mg tablet Generic drug:  ascorbic acid (vitamin C) Your last dose was: Your next dose is: Take  by mouth. VITAMIN D3 1,000 unit tablet Generic drug:  cholecalciferol Your last dose was: Your next dose is: Take  by mouth daily. Where to Get Your Medications Information on where to get these meds will be given to you by the nurse or doctor. ! Ask your nurse or doctor about these medications  
  acetaminophen 325 mg tablet  
 enoxaparin 40 mg/0.4 mL  
 oxyCODONE IR 5 mg immediate release tablet  
 polyethylene glycol 17 gram packet  
 senna-docusate 8.6-50 mg per tablet Discharge Instructions Mervin Preston Surgery: Total Hip Replacement Surgeon:   Elias Wheeler MD 
Surgery Date:  11/20/2017 ? To relieve pain: ? Use ice/gel packs. ? Put the ice pack directly over the wound, or anywhere you are hurting or swollen. ? To control pain and swelling, keep ice on regularly, especially after physical activity. ? The packs should stay cold for 3-4 hours. When it is not cold anymore, rotate with the packs in the freezer. ? Elevate your leg. This will also keep swelling down. ? Rest for at least 20 minutes between activity or exercises. ? To keep track of your pain medications, write down what you take and when you take it. ? The last dose of pain medication you got in the hospital was:    
 
Medication Dose Date & Time ? Choose your medications based on the pain scale below: ? To keep your pain under control, take Tylenol every 6 hours for 14 days - even if you feel like you dont need it. ? For mild to moderate pain (1-6 on pain scale), take one pain pill every 3-4 hours as needed. ? For severe pain (7-10 on pain scale), take two pain pills every 3-4 hours as needed. ? To prevent nausea, take your pain medications with food. Pain Scale ? As your pain lessens: 
 
? Slowly start taking less pain medication. You may do this by waiting longer between doses or by taking smaller doses. ? Stop using the pain medications as soon as you no longer need it, usually in 2-3 weeks. ? Lovenox ? You will take Lovenox for three weeks after surgery. It is an injection in the side of your stomach. The nurse will teach you to do this before you go. ? To prevent stomach upset or bleeding: ? Do not take non-steroidal anti-inflammatory medications (Ibuprofen, Advil, Motrin, Naproxen, etc.) ? Take Pepcid 20 mg twice a day, or a similar home medication, while you are taking a blood thinner. OPSITE (Honeycomb dressing) ? Keep your clear, waterproof dressing in place for 5-7 days after your leave the hospital. 
 
? If you are still having drainage, you will need to change your dressing in 5-7 days. You will be given one extra dressing to use at home. ? If there is no more drainage from the wound, you may leave it open to the air. OPSITE DRESSING INSTRUCTIONS PRINEO DRESSING INSTRUCTIONS ? Marnell Formica is a type of skin glue and mesh that is keeping your wound together. ? Leave this covering alone. Do not peel it off.  
 
? Do not apply oils or lotions over it. ? You may shower with this dressing but do not soak it. Gently pat your wound dry when you get out of the shower. ? DO NOTs: 
? Do not rub your surgical wound ? Do not put lotion or oils on your wound. ? Do not take a tub bath or go swimming until your doctor says it is ok. 
 
? You may shower with this dressing over your wound. ? After showering pat the dressing dry. ? If you have staples a home health nurse will remove them in about 10 days. ? To increase and promote healing: 
 
? Stop Smoking (or at least cut back on 
     Smoking). ? Eat a well-balanced diet (high in protein 
     and vitamin C). ? If you have a poor appetite, drink Ensure, Glucerna, or Gordon Instant Breakfast for the next 30 days. ? If you are diabetic, you should control your blood sugars to prevent infection and help your wound  
      to heal. 
               
 
 
 
? To prevent constipation, stay active and drink plenty of fluid. ? While using pain medications, you should also take stool softeners and laxatives, such as Pericolace 
     and Miralax. ? If you are having too many bowel Movements, then you may need 
     to stop taking the laxatives. ? You should have a bowel movement 3-4 days  
     after surgery and then at least every other day while 
     on pain medication. ? To improve your recovery, you must be active! ? Use your walker and take short walks (in your home). about every 2 hours during the day. ? Try to increase how far you walk each day. ? You can put as much weight on your leg as you can tolerate while walking. WBAT   
 
? Home health physical therapy will come to your 
      home a few times per week to teach you how to 
      get out of bed, to safely walk in your home, and 
      to do your exercises. ? NO DRIVING until your surgeon tells you it is ok. 
 
? You can return to work when cleared by a physician. ? Please call your physician immediately if you have: 
 
? Constant bleeding from your wound. ? Increasing redness or swelling around your wound (Some warmth, bruising and swelling is normal). ? Change in wound drainage (increase in amount, color, or bad smell). ? Change in mental status (unusual behavior ? Temperature over 101.5 degrees Fahrenheit ? Pain or redness in the calf (back of your lower leg  see picture) ? Increased swelling of the thigh, ankle, calf, or foot. ? Emergency: CALL 911 if you have: 
 
? Shortness of breath ? Chest pain when you cough or taking a deep breath ? Please call your surgeons office at [phone] for a follow up appointment. _ 
? You should call as soon as you get home or the next day after discharge. Ask to make an appointment in 2 weeks. ? If you have questions or concerns during normal business hours, you may reach Dr. Carolyne Bell' Team at [phone]. ACO Transitions of Care Introducing [employer] [address] Stratton offers a voluntary care coordination program to provide high quality service and care to [facility] fee-for-service beneficiaries. Madina Sol was designed to help you enhance your health and well-being through the following services: ? Transitions of Care  support for individuals who are transitioning from one care setting to another (example: Hospital to home). ? Chronic and Complex Care Coordination  support for individuals and caregivers of those with serious or chronic illnesses or with more than one chronic (ongoing) condition and those who take a number of different medications. If you meet specific medical criteria, a [facility] [address] may call you directly to coordinate your care with your primary care physician and your other care providers. For questions about the [facility] programs, please, contact your physicians office. For general questions or additional information about Accountable Care Organizations: 
Please visit www.medicare.gov/acos. html or call 1-800-MEDICARE (0-619.426.2989) TTY users should call 1-583.237.7025. [employer] Announcement We are excited to announce that we are making your provider's discharge notes available to you in [employer]. You will see these notes when they are completed and signed by the physician that discharged you from your recent hospital stay. If you have any questions or concerns about any information you see in [employer], please call the Health Information Department where you were seen or reach out to your Primary Care Provider for more information about your plan of care. Introducing [facility]! Ceci Colunga introduces [employer] patient portal. Now you can access parts of your medical record, email your doctor's office, and request medication refills online. 1. In your internet browser, go to [email or web address] 2. Click on the First Time User? Click Here link in the Sign In box. You will see the New Member Sign Up page. 3. Enter your [employer] Access Code exactly as it appears below. You will not need to use this code after youve completed the sign-up process. If you do not sign up before the expiration date, you must request a new code. · [employer] Access Code: [email or web address] Expires: 12/27/2017  8:48 AM 
 
4. Enter the last four digits of your Social Security Number (xxxx) and Date of Birth (mm/dd/yyyy) as indicated and click Submit. You will be taken to the next sign-up page. 5. Create a [employer] ID. This will be your [employer] login ID and cannot be changed, so think of one that is secure and easy to remember. 6. Create a [employer] password. You can change your password at any time. 7. Enter your Password Reset Question and Answer. This can be used at a later time if you forget your password. 8. Enter your e-mail address. You will receive e-mail notification when new information is available in [address] 9. Click Sign Up. You can now view and download portions of your medical record. 10. Click the Download Summary menu link to download a portable copy of your medical information. If you have questions, please visit the Frequently Asked Questions section of the [employer] website. Remember, [employer] is NOT to be used for urgent needs. For medical emergencies, dial 911. Now available from your iPhone and Android! Providers Seen During Your Hospitalization Provider Specialty Primary office phone Meg Desir MD Orthopedic Surgery [phone] Your Primary Care Physician (PCP) Primary Care Physician Office Phone Office Fax [phone] [address] [phone] You are allergic to the following Allergen Reactions Aspirin Other (comments) Swelling shut of eyelids Gabapentin Other (comments) Vision change Macrobid (Nitrofurantoin Monohyd/M-Cryst) Unknown (comments) Pcn (Penicillins) Hives Recent Documentation Height Weight Breastfeeding? BMI OB Status Smoking Status 1.651 m 85.6 kg No 31.4 kg/m2 Postmenopausal Former Smoker Emergency Contacts Name Discharge Info Relation Home Work Mobile Gerard Gallardo DISCHARGE CAREGIVER [3] Spouse [3] [phone] Eliel Dumas CAREGIVER [3] Friend [5] [phone] Patient Belongings The following personal items are in your possession at time of discharge: 
  Dental Appliances: None  Visual Aid: Glasses      Home Medications: None   Jewelry: None  Clothing: At bedside    Other Valuables:  (clothes, money sent home 25.$)  Personal Items Sent to Safe: none Please provide this summary of care documentation to your next provider. Signatures-by signing, you are acknowledging that this After Visit Summary has been reviewed with you and you have received a copy. Patient Signature:  ____________________________________________________________ Date:  ____________________________________________________________  
  
Aroldo Rain Provider Signature:  ____________________________________________________________ Date:  ____________________________________________________________

## 2017-11-20 NOTE — IP AVS SNAPSHOT
[address] [facility] 
[phone] Patient: Vahid Vallecillo MRN: [id number] TSK:5/4/[birth year]21 About your hospitalization You were admitted on:  November 20, 2017 You last received care in the:  [facility] ORTHOPEDICS You were discharged on:  November 22, 2017 Why you were hospitalized Your primary diagnosis was:  Not on File Your diagnoses also included:  Osteoarthritis Of Hip Things You Need To Do (next 8 weeks) Schedule an appointment with Prince John MD as soon as possible for a visit in 2 week(s) Phone:  [phone] Where:  [address] [facility], [address], [address] [address] Wednesday Nov 29, 2017 Follow up with Drake Chadwick MD  
  
Phone:  [phone] Where:  [address], [address] Friday Dec 29, 2017 ROUTINE CARE with Drake Chadwick MD at  9:15 AM  
Where:  [address] ([facility]) Discharge Orders None A check or[place] indicates which time of day the medication should be taken. My Medications STOP taking these medications HYDROcodone-acetaminophen 5-325 mg per tablet Commonly known as:  [address] these medications as instructed Instructions Each Dose to Equal  
 Morning Noon Evening Bedtime [address] [facility] Generic drug:  Lancets Your last dose was: Your next dose is:    
   
   
 AS DIRECTED  
     
   
   
   
  
 acetaminophen 325 mg tablet Commonly known as:  TYLENOL Your last dose was: Your next dose is: Take 2 Tabs by mouth every six (6) hours for 14 days. 650 mg CRANBERRY CONCENTRATE PO Your last dose was: Your next dose is: Take  by mouth. DULoxetine 30 mg capsule Commonly known as:  CYMBALTA Your last dose was: Your next dose is: Take 30 mg by mouth every evening. 30 mg  
    
   
   
   
  
 enalapril-hydroCHLOROthiazide 5-12.5 mg tablet Commonly known as:  Luis F Perez Your last dose was: Your next dose is: TAKE 1 TABLET EVERY DAY  
     
   
   
   
  
 enoxaparin 40 mg/0.4 mL Commonly known as:  LOVENOX Start taking on:  11/23/2017 Your last dose was: Your next dose is: 0.4 mL by SubCUTAneous route every twenty-four (24) hours for 18 days. 40 mg  
    
   
   
   
  
 ferrous sulfate 325 mg (65 mg iron) tablet Your last dose was: Your next dose is: Take  by mouth two (2) times a day. latanoprost 0.005 % ophthalmic solution Commonly known as:  Lyssa Rosales Your last dose was: Your next dose is:    
   
   
 Administer 1 Drop to both eyes nightly. 1 Drop  
    
   
   
   
  
 levothyroxine 100 mcg tablet Commonly known as:  SYNTHROID Your last dose was: Your next dose is: TAKE 1 TABLET DAILY BEFORE BREAKFAST. lovastatin 40 mg tablet Commonly known as:  MEVACOR Your last dose was: Your next dose is: TAKE 1 TABLET EVERY EVENING TO LOWER CHOLESTEROL  
     
   
   
   
  
 meclizine 12.5 mg tablet Commonly known as:  ANTIVERT Your last dose was: Your next dose is: Take 12.5 mg by mouth daily as needed. 12.5 mg  
    
   
   
   
  
 meloxicam 7.5 mg tablet Commonly known as:  MOBIC Your last dose was: Your next dose is: Take  by mouth daily. MULTIVITAMIN PO Your last dose was: Your next dose is: Take  by mouth. nitrofurantoin 50 mg capsule Commonly known as:  MACRODANTIN Your last dose was: Your next dose is: Take 50 mg by mouth daily. Indications: patient takes on ODD months of the year 50 mg  
    
   
   
   
  
 omeprazole 20 mg capsule Commonly known as:  PRILOSEC Your last dose was: Your next dose is: TAKE 1 CAPSULE DAILY. oxyCODONE IR 5 mg immediate release tablet Commonly known as:  Kanika Peraza Your last dose was: Your next dose is: Take 1-2 Tabs by mouth every four (4) hours as needed. Max Daily Amount: 60 mg.  
 5-10 mg  
    
   
   
   
  
 polyethylene glycol 17 gram packet Commonly known as:  Viv Flores Your last dose was: Your next dose is: Take 1 Packet by mouth daily as needed (constipation) for up to 15 days. 17 g  
    
   
   
   
  
 senna-docusate 8.6-50 mg per tablet Commonly known as:  Lanney Brunner Your last dose was: Your next dose is: Take 1 Tab by mouth daily. 1 Tab STOOL SOFTENER PO Your last dose was: Your next dose is: Take  by mouth. trimethoprim-sulfamethoxazole  mg per tablet Commonly known as:  Matthew [place] Your last dose was: Your next dose is: Take 1 Tab by mouth daily. Indications: patient takes on EVEN months of the year 1 Tab  
    
   
   
   
  
 verapamil  mg CR tablet Commonly known as:  CALAN-SR Your last dose was: Your next dose is: TAKE 1 TABLET EVERY DAY  
     
   
   
   
  
 VITAMIN C 500 mg tablet Generic drug:  ascorbic acid (vitamin C) Your last dose was: Your next dose is: Take  by mouth. VITAMIN D3 1,000 unit tablet Generic drug:  cholecalciferol Your last dose was: Your next dose is: Take  by mouth daily. Where to Get Your Medications Information on where to get these meds will be given to you by the nurse or doctor. ! Ask your nurse or doctor about these medications  
  acetaminophen 325 mg tablet  
 enoxaparin 40 mg/0.4 mL  
 oxyCODONE IR 5 mg immediate release tablet  
 polyethylene glycol 17 gram packet  
 senna-docusate 8.6-50 mg per tablet Discharge Instructions Bishnu Walton Surgery: Total Hip Replacement Surgeon:   Sarina Mcdonald MD 
Surgery Date:  11/20/2017 ? To relieve pain: ? Use ice/gel packs. ? Put the ice pack directly over the wound, or anywhere you are hurting or swollen. ? To control pain and swelling, keep ice on regularly, especially after physical activity. ? The packs should stay cold for 3-4 hours. When it is not cold anymore, rotate with the packs in the freezer. ? Elevate your leg. This will also keep swelling down. ? Rest for at least 20 minutes between activity or exercises. ? To keep track of your pain medications, write down what you take and when you take it. ? The last dose of pain medication you got in the hospital was:    
 
Medication Dose Date & Time ? Choose your medications based on the pain scale below: ? To keep your pain under control, take Tylenol every 6 hours for 14 days - even if you feel like you dont need it. ? For mild to moderate pain (1-6 on pain scale), take one pain pill every 3-4 hours as needed. ? For severe pain (7-10 on pain scale), take two pain pills every 3-4 hours as needed. ? To prevent nausea, take your pain medications with food. Pain Scale ? As your pain lessens: 
 
? Slowly start taking less pain medication. You may do this by waiting longer between doses or by taking smaller doses. ? Stop using the pain medications as soon as you no longer need it, usually in 2-3 weeks. ? Lovenox ? You will take Lovenox for three weeks after surgery. It is an injection in the side of your stomach. The nurse will teach you to do this before you go. ? To prevent stomach upset or bleeding: ? Do not take non-steroidal anti-inflammatory medications (Ibuprofen, Advil, Motrin, Naproxen, etc.) ? Take Pepcid 20 mg twice a day, or a similar home medication, while you are taking a blood thinner. OPSITE (Honeycomb dressing) ? Keep your clear, waterproof dressing in place for 5-7 days after your leave the hospital. 
 
? If you are still having drainage, you will need to change your dressing in 5-7 days. You will be given one extra dressing to use at home. ? If there is no more drainage from the wound, you may leave it open to the air. OPSITE DRESSING INSTRUCTIONS PRINEO DRESSING INSTRUCTIONS ? Katie Grayos is a type of skin glue and mesh that is keeping your wound together. ? Leave this covering alone. Do not peel it off.  
 
? Do not apply oils or lotions over it. ? You may shower with this dressing but do not soak it. Gently pat your wound dry when you get out of the shower. ? DO NOTs: 
? Do not rub your surgical wound ? Do not put lotion or oils on your wound. ? Do not take a tub bath or go swimming until your doctor says it is ok. 
 
? You may shower with this dressing over your wound. ? After showering pat the dressing dry. ? If you have staples a home health nurse will remove them in about 10 days. ? To increase and promote healing: 
 
? Stop Smoking (or at least cut back on 
     Smoking). ? Eat a well-balanced diet (high in protein 
     and vitamin C). ? If you have a poor appetite, drink Ensure, Glucerna, or [place] Instant Breakfast for the next 30 days. ? If you are diabetic, you should control your blood sugars to prevent infection and help your wound  
      to heal. 
               
 
 
 
? To prevent constipation, stay active and drink plenty of fluid. ? While using pain medications, you should also take stool softeners and laxatives, such as Pericolace 
     and Miralax. ? If you are having too many bowel Movements, then you may need 
     to stop taking the laxatives. ? You should have a bowel movement 3-4 days  
     after surgery and then at least every other day while 
     on pain medication. ? To improve your recovery, you must be active! ? Use your walker and take short walks (in your home). about every 2 hours during the day. ? Try to increase how far you walk each day. ? You can put as much weight on your leg as you can tolerate while walking. WBAT   
 
? Home health physical therapy will come to your 
      home a few times per week to teach you how to 
      get out of bed, to safely walk in your home, and 
      to do your exercises. ? NO DRIVING until your surgeon tells you it is ok. 
 
? You can return to work when cleared by a physician. ? Please call your physician immediately if you have: 
 
? Constant bleeding from your wound. ? Increasing redness or swelling around your wound (Some warmth, bruising and swelling is normal). ? Change in wound drainage (increase in amount, color, or bad smell). ? Change in mental status (unusual behavior ? Temperature over 101.5 degrees Fahrenheit ? Pain or redness in the calf (back of your lower leg  see picture) ? Increased swelling of the thigh, ankle, calf, or foot. ? Emergency: CALL 911 if you have: 
 
? Shortness of breath ? Chest pain when you cough or taking a deep breath ? Please call your surgeons office at [phone] for a follow up appointment. _ 
? You should call as soon as you get home or the next day after discharge. Ask to make an appointment in 2 weeks. ? If you have questions or concerns during normal business hours, you may reach Dr. Kim Mead' Team at [phone]. ACO Transitions of Care Introducing [employer] [address] offers a voluntary care coordination program to provide high quality service and care to [facility] fee-for-service beneficiaries. Iris Huertas was designed to help you enhance your health and well-being through the following services: ? Transitions of Care  support for individuals who are transitioning from one care setting to another (example: Hospital to home). ? Chronic and Complex Care Coordination  support for individuals and caregivers of those with serious or chronic illnesses or with more than one chronic (ongoing) condition and those who take a number of different medications. If you meet specific medical criteria, a [facility] [address] may call you directly to coordinate your care with your primary care physician and your other care providers. For questions about the [facility] programs, please, contact your physicians office. For general questions or additional information about Accountable Care Organizations: 
Please visit www.medicare.gov/acos. html or call 1-800-MEDICARE (6-806.315.6097) TTY users should call 4-952.231.1670. [employer] Announcement We are excited to announce that we are making your provider's discharge notes available to you in [employer]. You will see these notes when they are completed and signed by the physician that discharged you from your recent hospital stay. If you have any questions or concerns about any information you see in [employer], please call the Health Information Department where you were seen or reach out to your Primary Care Provider for more information about your plan of care. Introducing [facility] HEALTH SERVICES! [facility] introduces [employer] patient portal. Now you can access parts of your medical record, email your doctor's office, and request medication refills online. 1. In your internet browser, go to [email or web address] 2. Click on the First Time User? Click Here link in the Sign In box. You will see the New Member Sign Up page. 3. Enter your Prolify Access Code exactly as it appears below. You will not need to use this code after youve completed the sign-up process. If you do not sign up before the expiration date, you must request a new code. · Prol[employer] Access Code: [email or web address] Expires: 12/27/2017  8:48 AM 
 
4. Enter the last four digits of your Social Security Number (xxxx) and Date of Birth (mm/dd/yyyy) as indicated and click Submit. You will be taken to the next sign-up page. 5. Create a Prolify ID. This will be your Prolify login ID and cannot be changed, so think of one that is secure and easy to remember. 6. Create a Prolify password. You can change your password at any time. 7. Enter your Password Reset Question and Answer. This can be used at a later time if you forget your password. 8. Enter your e-mail address. You will receive e-mail notification when new information is available in [address] 9. Click Sign Up. You can now view and download portions of your medical record. 10. Click the Download Summary menu link to download a portable copy of your medical information. If you have questions, please visit the Frequently Asked Questions section of the [employer] website. Remember, Prolify is NOT to be used for urgent needs. For medical emergencies, dial 911. Now available from your iPhone and Android! Providers Seen During Your Hospitalization Provider Specialty Primary office phone Sarina Mcdonald MD Orthopedic Surgery [phone] Your Primary Care Physician (PCP) Primary Care Physician Office Phone Office Fax [phone] [address] 89 [phone] You are allergic to the following Allergen Reactions Aspirin Other (comments) Swelling shut of eyelids Gabapentin Other (comments) Vision change Macrobid (Nitrofurantoin Monohyd/M-Cryst) Unknown (comments) Pcn (Penicillins) Hives Recent Documentation Height Weight Breastfeeding? BMI OB Status Smoking Status 1.651 m 85.6 kg No 31.4 kg/m2 Postmenopausal Former Smoker Emergency Contacts Name Discharge Info Relation Home Work Mobile Gerard Gallardo DISCHARGE CAREGIVER [3] Spouse [3] [phone] Garcia Dominguez CAREGIVER [3] Friend [5] [phone] Patient Belongings The following personal items are in your possession at time of discharge: 
  Dental Appliances: None  Visual Aid: Glasses      Home Medications: None   Jewelry: None  Clothing: At bedside    Other Valuables:  (clothes, money sent home 25.$)  Personal Items Sent to Safe: none Please provide this summary of care documentation to your next provider. Signatures-by signing, you are acknowledging that this After Visit Summary has been reviewed with you and you have received a copy. Patient Signature:  ____________________________________________________________ Date:  ____________________________________________________________  
  
Josph Perfect Provider Signature:  ____________________________________________________________ Date:  ____________________________________________________________

## 2017-11-20 NOTE — PROGRESS NOTES
Ortho/ NeuroSurgery NP Note    POD# 0  s/p LEFT TOTAL HIP ARTHROPLASTY ANTERIOR APPROACH WITH NAVIGATION     Pt resting in bed. No complaints. A&O x4   VSS Afebrile. Patient has had something to eat. No nausea. Most Recent Labs:   Lab Results   Component Value Date/Time    HGB 10.2 11/06/2017 02:08 PM    Hemoglobin A1c 6.2 11/06/2017 02:08 PM       MRSA Screen Pre-op Negative    Body mass index is 31.4 kg/(m^2). BMI greater than 30 is classified as obesity and greater than 40 is classified as morbid obesity. STOP BANG Score: 2    Social History: No significant history. [place] out. Patient needs to void today. Dressing c.d.i    Calves soft and supple; No pain with passive stretch  Sensation and motor intact  SCDs for mechanical DVT proph    Plan:  1) PT BID starting today  2) Lachelle-op Antibiotics Vancomycin  3) Lovenox for DVT Prophylaxis  4) Protonix for GI Prophylaxis   5) Discharge plans to home with her . Mike Barker NP     Clarified anticoagulation- Lovenox for 21 days due to ASA allergy.

## 2017-11-20 NOTE — PERIOP NOTES
Patient: China Burt MRN: [id number]  SSN: [id number]   YOB: 1932  Age: 80 y.o. Sex: female     Patient is status post Procedure(s):  LEFT TOTAL HIP ARTHROPLASTY ANTERIOR APPROACH WITH NAVIGATION. Surgeon(s) and Role:     * Erika Lund MD - Primary    Local/Dose/Irrigation:  100ml sheikh solution with morphine (no toradol)                  Peripheral IV 11/20/17 Right Hand (Active)   Site Assessment Clean, dry, & intact 11/20/2017  7:11 AM   Phlebitis Assessment 0 11/20/2017  7:11 AM   Infiltration Assessment 0 11/20/2017  7:11 AM   Dressing Status Clean, dry, & intact 11/20/2017  7:11 AM   Dressing Type Tape;Transparent 11/20/2017  7:11 AM   Hub Color/Line Status Pink; Infusing 11/20/2017  7:11 AM            Airway - Endotracheal Tube 11/20/17 Oral (Active)   Line Suleman Lips 11/20/2017 12:00 AM                   Dressing/Packing:  Wound Hip Left-DRESSING TYPE: Sutures; Topical skin adhesive/glue (honeycomb) (11/20/17 0849)  Splint/Cast:  ]

## 2017-11-20 NOTE — IP AVS SNAPSHOT
[address] [facility] 
[phone] Patient: Miladis Bergman MRN: [id number] LZT:8/3[birth year] My Medications STOP taking these medications HYDROcodone-acetaminophen 5-325 mg per tablet Commonly known as:  [address] these medications as instructed Instructions Each Dose to Equal  
 Morning Noon Evening Bedtime 5900 [facility] Generic drug:  Lancets Your last dose was: Your next dose is:    
   
   
 AS DIRECTED  
     
   
   
   
  
 acetaminophen 325 mg tablet Commonly known as:  TYLENOL Your last dose was: Your next dose is: Take 2 Tabs by mouth every six (6) hours for 14 days. 650 mg CRANBERRY CONCENTRATE PO Your last dose was: Your next dose is: Take  by mouth. DULoxetine 30 mg capsule Commonly known as:  CYMBALTA Your last dose was: Your next dose is: Take 30 mg by mouth every evening. 30 mg  
    
   
   
   
  
 enalapril-hydroCHLOROthiazide 5-12.5 mg tablet Commonly known as:  Shearon Market Your last dose was: Your next dose is: TAKE 1 TABLET EVERY DAY  
     
   
   
   
  
 enoxaparin 40 mg/0.4 mL Commonly known as:  LOVENOX Start taking on:  11/23/2017 Your last dose was: Your next dose is: 0.4 mL by SubCUTAneous route every twenty-four (24) hours for 18 days. 40 mg  
    
   
   
   
  
 ferrous sulfate 325 mg (65 mg iron) tablet Your last dose was: Your next dose is: Take  by mouth two (2) times a day. latanoprost 0.005 % ophthalmic solution Commonly known as:  Catrachito Dior Your last dose was: Your next dose is:    
   
   
 Administer 1 Drop to both eyes nightly. 1 Drop levothyroxine 100 mcg tablet Commonly known as:  SYNTHROID Your last dose was: Your next dose is: TAKE 1 TABLET DAILY BEFORE BREAKFAST. lovastatin 40 mg tablet Commonly known as:  MEVACOR Your last dose was: Your next dose is: TAKE 1 TABLET EVERY EVENING TO LOWER CHOLESTEROL  
     
   
   
   
  
 meclizine 12.5 mg tablet Commonly known as:  ANTIVERT Your last dose was: Your next dose is: Take 12.5 mg by mouth daily as needed. 12.5 mg  
    
   
   
   
  
 meloxicam 7.5 mg tablet Commonly known as:  MOBIC Your last dose was: Your next dose is: Take  by mouth daily. MULTIVITAMIN PO Your last dose was: Your next dose is: Take  by mouth. nitrofurantoin 50 mg capsule Commonly known as:  MACRODANTIN Your last dose was: Your next dose is: Take 50 mg by mouth daily. Indications: patient takes on ODD months of the year 50 mg  
    
   
   
   
  
 omeprazole 20 mg capsule Commonly known as:  PRILOSEC Your last dose was: Your next dose is: TAKE 1 CAPSULE DAILY. oxyCODONE IR 5 mg immediate release tablet Commonly known as:  Perla Aranda Your last dose was: Your next dose is: Take 1-2 Tabs by mouth every four (4) hours as needed. Max Daily Amount: 60 mg.  
 5-10 mg  
    
   
   
   
  
 polyethylene glycol 17 gram packet Commonly known as:  Mariela Look Your last dose was: Your next dose is: Take 1 Packet by mouth daily as needed (constipation) for up to 15 days. 17 g  
    
   
   
   
  
 senna-docusate 8.6-50 mg per tablet Commonly known as:  Shannon Marcial Your last dose was: Your next dose is: Take 1 Tab by mouth daily. 1 Tab STOOL SOFTENER PO Your last dose was: Your next dose is: Take  by mouth. trimethoprim-sulfamethoxazole  mg per tablet Commonly known as:  Zahrakinza Chavarria Your last dose was: Your next dose is: Take 1 Tab by mouth daily. Indications: patient takes on EVEN months of the year 1 Tab  
    
   
   
   
  
 verapamil  mg CR tablet Commonly known as:  CALAN-SR Your last dose was: Your next dose is: TAKE 1 TABLET EVERY DAY  
     
   
   
   
  
 VITAMIN C 500 mg tablet Generic drug:  ascorbic acid (vitamin C) Your last dose was: Your next dose is: Take  by mouth. VITAMIN D3 1,000 unit tablet Generic drug:  cholecalciferol Your last dose was: Your next dose is: Take  by mouth daily. Where to Get Your Medications Information on where to get these meds will be given to you by the nurse or doctor. ! Ask your nurse or doctor about these medications  
  acetaminophen 325 mg tablet  
 enoxaparin 40 mg/0.4 mL  
 oxyCODONE IR 5 mg immediate release tablet  
 polyethylene glycol 17 gram packet  
 senna-docusate 8.6-50 mg per tablet

## 2017-11-20 NOTE — ANESTHESIA PREPROCEDURE EVALUATION
Anesthetic History   No history of anesthetic complications            Review of Systems / Medical History  Patient summary reviewed, nursing notes reviewed and pertinent labs reviewed    Pulmonary        Sleep apnea        Comments: Former Smoker - 15 pack years   Neuro/Psych   Within defined limits           Cardiovascular    Hypertension          PAD and hyperlipidemia    Exercise tolerance: >4 METS     GI/Hepatic/Renal     GERD           Endo/Other    Diabetes  Hypothyroidism  Obesity and arthritis     Other Findings   Comments: Restless leg syndrome  Venous insufficiency           Physical Exam    Airway  Mallampati: II  TM Distance: 4 - 6 cm  Neck ROM: normal range of motion   Mouth opening: Normal     Cardiovascular  Regular rate and rhythm,  S1 and S2 normal,  no murmur, click, rub, or gallop             Dental  No notable dental hx       Pulmonary  Breath sounds clear to auscultation               Abdominal  GI exam deferred       Other Findings            Anesthetic Plan    ASA: 3  Anesthesia type: general          Induction: Intravenous  Anesthetic plan and risks discussed with: Patient

## 2017-11-20 NOTE — PERIOP NOTES
Handoff Report from Operating Room to PACU    Report received from Bianca Perez RN and Arlette Sandhu CRNA regarding Topherlljuan August. Surgeon(s):  Renetta Sellers MD  And Procedure(s) (LRB):  LEFT TOTAL HIP ARTHROPLASTY ANTERIOR APPROACH WITH NAVIGATION (Left)  confirmed   with allergies and dressings discussed. Anesthesia type, drugs, patient history, complications, estimated blood loss, vital signs, intake and output, and last pain medication, lines and temperature were reviewed.

## 2017-11-20 NOTE — PROGRESS NOTES
Physical Therapy Goals  Initiated 11/20/2017  1. Patient will move from supine to sit and sit to supine , scoot up and down and roll side to side in bed with independence within 7 day(s). 2. Patient will transfer from bed to chair and chair to bed with independence using the least restrictive device within 7 day(s). 3. Patient will perform sit to stand with independence within 7 day(s). 4. Patient will ambulate with modified independence for 1000 feet with the least restrictive device within 7 day(s). 5. Patient will ascend/descend 12 stairs with single handrail(s) with independence within 7 day(s). physical Therapy EVALUATION  Patient: Nanette Darby (25 y.o. female)  Date: 11/20/2017  Primary Diagnosis: LEFT HIP OA  Osteoarthritis of hip  Procedure(s) (LRB):  LEFT TOTAL HIP ARTHROPLASTY ANTERIOR APPROACH WITH NAVIGATION (Left) Day of Surgery   Precautions:   WBAT    ASSESSMENT :  Based on the objective data described below, the patient presents with L hip weakness impacting functional mobility s/p L AUNDREA. Patient with fall history, use of SPC vs no device living with spouse at home. She states she was doing HEP provided at prehab daily. Received in supine and pleasantly agreeable. She mobilized well with CGA majority including gait with RW with good transition without cues from step-to to step-through with expected antalgic pattern. Patient did require min A to boost LLE up to supine despite teaching on crossed-leg technique. Expect good progress ahead as she is motivated and eager to return home with friends and spousal support. HH PT recommended. Patient will benefit from skilled intervention to address the above impairments.   Patients rehabilitation potential is considered to be Excellent  Factors which may influence rehabilitation potential include:   [x]         None noted  []         Mental ability/status  []         Medical condition  []         Home/family situation and support systems  [] Safety awareness  []         Pain tolerance/management  []         Other:      PLAN :  Recommendations and Planned Interventions:  [x]           Bed Mobility Training             []    Neuromuscular Re-Education  [x]           Transfer Training                   []    Orthotic/Prosthetic Training  [x]           Gait Training                         []    Modalities  [x]           Therapeutic Exercises           []    Edema Management/Control  [x]           Therapeutic Activities            [x]    Patient and Family Training/Education  []           Other (comment):    Frequency/Duration: Patient will be followed by physical therapy  twice daily to address goals. Discharge Recommendations: Home Health  Further Equipment Recommendations for Discharge: has a RW      SUBJECTIVE:   Patient stated I'm so glad I have you.     OBJECTIVE DATA SUMMARY:   HISTORY:    Past Medical History:   Diagnosis Date    Arthritis     hands/low back    Diabetes ([facility] [address]     borderline    GERD (gastroesophageal reflux disease)     Glaucoma     Hypercholesterolemia     Hypertension     Ill-defined condition     borderline anemia    Ill-defined condition     bladder infections    Impaired glucose tolerance     Menopause     Thyroid disease     hypothyroidism     Past Surgical History:   Procedure Laterality Date    HX BREAST BIOPSY Bilateral 1990    benign    HX CATARACT REMOVAL      bilateral    HX DILATION AND CURETTAGE      HX ORTHOPAEDIC      carpal tunnel release -bilateral    HX ROTATOR CUFF REPAIR Right      Prior Level of Function/Home Situation: From prehab note, of which patient denies changes and she was performing HEP daily: Mod-I with use of a SPC vs furniture walking (rarely). She's had x1 fall in the past year where she stepped off a curb and fell during distraction. She is retired, but still active, and volunteered up until [place]ANNAH/[place] Dejesus ago where due to her hip she can no longer participat.  She denies fear of falling and does taking sleeping pills at night. She has trouble standing long periods and has altered some IADL's/ADL's for that; she cannot don/doff socks independently. She wears a M/L support brace on her R knee all the time. She's been to OP PT at [facility] many times, most recently finishing 'a month ago'. She also states she went to OP PT visit for surgery at [facility] in the past week. Personal factors and/or comorbidities impacting plan of care:     Home Situation  Home Environment: Private residence  # Steps to Enter: 0  One/Two Story Residence: Two story  # of Interior Steps: 12  Height of Each Step (in): 1.5 inches  Interior Rails: Left  Lift Chair Available: No  Living Alone: No  Support Systems: Family member(s), Friends \ neighbors  Patient Expects to be Discharged to[de-identified] Private residence  Current DME Used/Available at Home: Cane, straight  Tub or Shower Type: Tub/Shower combination    EXAMINATION/PRESENTATION/DECISION MAKING:   Critical Behavior:  Neurologic State: Alert  Orientation Level: Oriented X4  Cognition: Appropriate decision making  Safety/Judgement: Awareness of environment  Hearing: Auditory  Auditory Impairment: None  Range Of Motion:  AROM: Within functional limits (gen dec L hip )                       Strength:    Strength: Within functional limits (gen dec L hip )                    Tone & Sensation:   Tone: Normal              Sensation: Intact               Coordination:  Coordination: Within functional limits       Functional Mobility:  Bed Mobility:  Rolling: Contact guard assistance  Supine to Sit: Contact guard assistance  Sit to Supine: Minimum assistance (boosting LLE )  Scooting: Contact guard assistance  Transfers:  Sit to Stand: Contact guard assistance  Stand to Sit: Contact guard assistance                       Balance:   Sitting: Intact; Without support  Standing: Intact; With support (RW)  Ambulation/Gait Training:  Distance (ft): 220 Feet (ft)  Assistive Device: Crispin Lofton rolling;Gait belt  Ambulation - Level of Assistance: Contact guard assistance     Gait Description (WDL): Exceptions to WDL  Gait Abnormalities: Antalgic (step through)     Left Side Weight Bearing: As tolerated        Speed/Gisel: Pace decreased (<100 feet/min)                  VC's for stepping over thresholds     Functional Measure:  Barthel Index:    Bathin  Bladder: 10  Bowels: 10  Groomin  Dressin  Feeding: 10  Mobility: 10  Stairs: 0  Toilet Use: 5  Transfer (Bed to Chair and Back): 10  Total: 65       Barthel and G-code impairment scale:  Percentage of impairment CH  0% CI  1-19% CJ  20-39% CK  40-59% CL  60-79% CM  80-99% CN  100%   Barthel Score 0-100 100 99-80 79-60 59-40 20-39 1-19   0   Barthel Score 0-20 20 17-19 13-16 9-12 5-8 1-4 0      The Barthel ADL Index: Guidelines  1. The index should be used as a record of what a patient does, not as a record of what a patient could do. 2. The main aim is to establish degree of independence from any help, physical or verbal, however minor and for whatever reason. 3. The need for supervision renders the patient not independent. 4. A patient's performance should be established using the best available evidence. Asking the patient, friends/relatives and nurses are the usual sources, but direct observation and common sense are also important. However direct testing is not needed. 5. Usually the patient's performance over the preceding 24-48 hours is important, but occasionally longer periods will be relevant. 6. Middle categories imply that the patient supplies over 50 per cent of the effort. 7. Use of aids to be independent is allowed. Katina Wang., Barthel, D.W. ([id number]). Functional evaluation: the Barthel Index. [address] [facility] (14)2. [facility] [place] maria isabel CODEY Willett, Moy Olivares.[facility]., Robert, [address] ().  Measuring the change indisability after inpatient rehabilitation; comparison of the responsiveness of the Barthel Index and Functional Ainsworth Measure. Journal of Neurology, Neurosurgery, and Psychiatry, 66(4), 464-437. REMA Vigil, CARMEN Sanchez, & Savage Tellez M.A. (2004.) Assessment of post-stroke quality of life in cost-effectiveness studies: The usefulness of the Barthel Index and the EuroQoL-5D. Quality of Life Research, 13, 249-23         G codes: In compliance with CMSs Claims Based Outcome Reporting, the following G-code set was chosen for this patient based on their primary functional limitation being treated: The outcome measure chosen to determine the severity of the functional limitation was the Barthel with a score of 65/100 which was correlated with the impairment scale. ? Mobility - Walking and Moving Around:     - CURRENT STATUS: CJ - 20%-39% impaired, limited or restricted    - GOAL STATUS: CI - 1%-19% impaired, limited or restricted    - D/C STATUS:  ---------------To be determined---------------        Pain:Pain Scale 1: Numeric (0 - 10)  Pain Intensity 1: 3  Pain Location 1: Hip  Pain Orientation 1: Left  Pain Description 1: Aching  Pain Intervention(s) 1: Medication (see MAR)  Activity Tolerance:   VSS     Please refer to the flowsheet for vital signs taken during this treatment. After treatment:   []         Patient left in no apparent distress sitting up in chair  [x]         Patient left in no apparent distress in bed  [x]         Call bell left within reach  [x]         Nursing notified  [x]         Caregiver present  []         Bed alarm activated    COMMUNICATION/EDUCATION:   The patients plan of care was discussed with: Registered Nurse. [x]         Fall prevention education was provided and the patient/caregiver indicated understanding. [x]         Patient/family have participated as able in goal setting and plan of care. [x]         Patient/family agree to work toward stated goals and plan of care.   []         Patient understands intent and goals of therapy, but is neutral about his/her participation. []         Patient is unable to participate in goal setting and plan of care.     Thank you for this referral.  Zoraida Munson, PT, DPT, CEEAA      Time Calculation: 25 mins

## 2017-11-20 NOTE — ANESTHESIA POSTPROCEDURE EVALUATION
Post-Anesthesia Evaluation and Assessment    Patient: Florentin Mcmullen MRN: [id number]  SSN: [id number]    YOB: 1932  Age: 80 y.o. Sex: female       Cardiovascular Function/Vital Signs  Visit Vitals    BP (!) 124/39    Pulse 60    Temp 36.9 °C (98.4 °F)    Resp 14    Ht 5' 5\" (1.651 m)    Wt 85.6 kg (188 lb 11.4 oz)    SpO2 99%    BMI 31.4 kg/m2       Patient is status post No value filed. anesthesia for Procedure(s):  LEFT TOTAL HIP ARTHROPLASTY ANTERIOR APPROACH WITH NAVIGATION. Nausea/Vomiting: None    Postoperative hydration reviewed and adequate. Pain:  Pain Scale 1: (P) Numeric (0 - 10) (11/20/17 1130)  Pain Intensity 1: (P) 4 (11/20/17 1130)   Managed    Neurological Status:   Neuro (WDL): Exceptions to WDL (11/20/17 1016)  Neuro  Neurologic State: Drowsy (11/20/17 1016)  Orientation Level: Oriented to person (11/20/17 1016)  Cognition: Follows commands (11/20/17 1016)  LUE Motor Response: Purposeful (11/20/17 1016)  LLE Motor Response: Weak (11/20/17 1016)  RUE Motor Response: Purposeful (11/20/17 1016)  RLE Motor Response: Purposeful (11/20/17 1016)   At baseline    Mental Status and Level of Consciousness: Arousable    Pulmonary Status:   O2 Device: Nasal cannula (11/20/17 1130)   Adequate oxygenation and airway patent    Complications related to anesthesia: None    Post-anesthesia assessment completed.  No concerns    Signed By: Mason Mcallister MD     November 20, 2017

## 2017-11-20 NOTE — H&P
Date of Surgery Update:  Delmy Gandhi was seen and examined on the day of surgery prior to the procedure. There were no significant clinical changes since the completion of the History and Physical.    Exam today prior to surgery showed no acute cardiac findings, no respiratory difficulty, and no abdominal complaints or pain. This patient is a candidate for TXA. Documentation of Medical Necessity:    Symptoms: pain with activity and at rest, antalgia, interferes with ADLs  Conservative Treatment: activity modification, multiple medications, injection  Physical Findings: poor ROM, pain at joint line, antalgia on ambulation, crepitation  Imaging: significant OA, femoral head collapse    Indications:   Failure of conservative treatments with daily pain and functional limitations. Appropriate imaging demonstrating significant disease. Appropriate physical findings consistent with significant degenerative joint disease. All pertinent risks, benefits, and alternatives to operative management including continued conservative care were explained at length. The patient has elected to proceed with appropriately indicated and medically necessary total joint arthroplasty. They understand no guarantees can be given about the outcome.     Signed By: LANEY Chavez     November 20, 2017 7:13 AM

## 2017-11-20 NOTE — H&P
Oliverio Nix MD - Adult Reconstruction and Total Joint Replacement     Orthopaedic History and Physical        NAME: Billy Lilly       :  3/5/1932       MRN:  [id number]            Subjective:   Patient ID: Billy Lilly is a 80 y.o. female.     Chief Complaint: Follow-up of the Left Hip     She continues to endorse severe L hip pain. The pain is limiting her ability to stay active. She recently had a L hip MRI and was told that she has severe arthritis. Previous intraarticular corticosteroid injections in the L hip failed to offer significant long term relief. She would like to continue with a L AUNDREA. No complaints of the contralateral side.      Patient Active Problem List    Diagnosis Date Noted    PAD (peripheral artery disease) ([facility] [address] 2017    Anemia 2017    Chronic UTI 2017    Venous (peripheral) insufficiency 2015    Hypothyroid 2012    Hypertension 2012    Arthritis 2012    Hyperlipidemia 2012    IGT (impaired glucose tolerance) 2011    Restless leg syndrome 2011     Past Medical History:   Diagnosis Date    Arthritis     hands/low back    Diabetes ([facility] [address]     borderline    GERD (gastroesophageal reflux disease)     Glaucoma     Hypercholesterolemia     Hypertension     Ill-defined condition     borderline anemia    Ill-defined condition     bladder infections    Impaired glucose tolerance     Menopause     Thyroid disease     hypothyroidism      Past Surgical History:   Procedure Laterality Date    HX BREAST BIOPSY Bilateral     benign    HX CATARACT REMOVAL      bilateral    HX DILATION AND CURETTAGE      HX ORTHOPAEDIC      carpal tunnel release -bilateral    HX ROTATOR CUFF REPAIR Right       Prior to Admission medications    Medication Sig Start Date End Date Taking? Authorizing Provider   meclizine (ANTIVERT) 12.5 mg tablet Take 12.5 mg by mouth daily as needed.    Yes Historical Provider   omeprazole (PRILOSEC) 20 mg capsule TAKE 1 CAPSULE DAILY. 10/19/17  Yes Gerda Liu MD   lovastatin (MEVACOR) 40 mg tablet TAKE 1 TABLET EVERY EVENING TO LOWER CHOLESTEROL 7/12/17  Yes Danielle Jackson MD   enalapril-hydroCHLOROthiazide (VASERETIC) 5-12.5 mg tablet TAKE 1 TABLET EVERY DAY 1/18/17  Yes Gerda Liu MD   verapamil ER (CALAN-SR) 240 mg CR tablet TAKE 1 TABLET EVERY DAY 1/18/17  Yes Gerda Liu MD   nitrofurantoin (MACRODANTIN) 50 mg capsule Take 50 mg by mouth daily. Indications: patient takes on ODD months of the year   Yes Historical Provider   levothyroxine (SYNTHROID) 100 mcg tablet TAKE 1 TABLET DAILY BEFORE BREAKFAST. 2/8/16  Yes Gerda Liu MD   latanoprost (XALATAN) 0.005 % ophthalmic solution Administer 1 Drop to both eyes nightly. Yes Historical Provider   DULoxetine (CYMBALTA) 30 mg capsule Take 30 mg by mouth every evening. Historical Provider   ACCU-CHEK MULTICLIX LANCET misc AS DIRECTED 10/19/17   Gerda Liu MD   meloxicam [facility]) 7.5 mg tablet Take  by mouth daily. Historical Provider   HYDROcodone-acetaminophen (NORCO) 5-325 mg per tablet Take 1 Tab by mouth nightly as needed for Pain. Max Daily Amount: 1 Tab. 5/4/17   Gerda Liu MD   trimethoprim-sulfamethoxazole (BACTRIM, SEPTRA)  mg per tablet Take 1 Tab by mouth daily. Indications: patient takes on EVEN months of the year    Historical Provider   ferrous sulfate 325 mg (65 mg iron) tablet Take  by mouth two (2) times a day. Historical Provider   DOCUSATE CALCIUM (STOOL SOFTENER PO) Take  by mouth. Historical Provider   ASCORBIC ACID/VITAMIN E (CRANBERRY CONCENTRATE PO) Take  by mouth. Historical Provider   ascorbic acid (VITAMIN C) 500 mg tablet Take  by mouth. Historical Provider   cholecalciferol, vitamin d3, (VITAMIN D) 1,000 unit tablet Take  by mouth daily. Historical Provider   MULTIVITAMINS (MULTIVITAMIN PO) Take  by mouth.     Historical Provider     Allergies   Allergen Reactions    Aspirin Other (comments)     Swelling shut of eyelids    Gabapentin Other (comments)     Vision change    Macrobid [Nitrofurantoin Monohyd/M-Cryst] Unknown (comments)    Pcn [Penicillins] Hives      Social History   Substance Use Topics    Smoking status: Former Smoker     Packs/day: 1.00     Years: 15.00     Types: Cigarettes     Quit date: 1995    Smokeless tobacco: Never Used    Alcohol use 0.6 oz/week     1 Glasses of wine, 0 Standard drinks or equivalent per week      Comment: Rare--maybe once a month      Family History   Problem Relation Age of Onset    Coronary Artery Disease Other      mother  of MI age 80, brother  age 72 of MI, sister has hear problems    Breast Cancer Sister 48    Heart Attack Mother     Other Father      pneumonia    Alzheimer Brother     Cancer Sister     Cancer Sister     Heart Attack Brother     Suicide Brother         REVIEW OF SYSTEMS: A comprehensive review of systems was negative except for that written in the HPI. Objective:   Constitutional: She appears stated age. Pt is cooperative and is in no acute distress. Well nourished. Well developed. Body habitus is obese. Gait is antalgic centered around the L hip. Assistive devices: cane  Eyes:  Sclera are nonicteric. Respiratory:  No labored breathing. Cardiovascular:  No marked edema. Well perfused extremities bilaterally. Skin:  No marked skin ulcers. No lymphedema or skin abnormalities. Neurological:  No marked sensory loss noted. Grossly neurovascularly intact. Both lower extremities are intact to distal sensory and motor function. Psychiatric: Alert and oriented x3.     MUSCULOSKELETAL: Lumbar spine is nonfocal. Mild bilateral low paraspinal tenderness. Painless trunk ROM. Painful L hip ROM. No trochanteric TTP. Good hip flexor. abductor strength. No obvious LLD. NVID.         Radiographs:        I reviewed her L hip MRI that reveals severe degenerative changes and joint space narrowing in the L hip that is worse than plain films suggest. Possible collapse of the L femoral head. Assessment:      1. Primary osteoarthritis of left hip    2. Obesity (BMI 30-39. 9)            Plan: At this time, I recommended a L AUNDREA. Conservative treatments including activity modification, medication, and steroid injections have not successfully relieved pain. Surgery was discussed at length with the pt today. We went over all the pertinent risks, benefits, and alternatives to the procedure. They understand no guarantees can be made about the outcome and they would like to proceed. I discussed the pre-op total joint class and general recovery timeline with the pt. The pt understands the need for medical clearance. We will plan for the L AUNDREA in 3-6 weeks.        The patient was counseled on the potential orthopedic and overall health benefits of maintaining a healthy weight. We discussed possible lifestyle changes including but not limited to a change in diet and an increase in exercise that can lead to a healthier body habitus.  I recommended the patient works on weight loss.       Scribed for Dr. Ruben Varela by [facility] Shawn PA

## 2017-11-21 LAB
ANION GAP SERPL CALC-SCNC: 6 MMOL/L (ref 5–15)
BUN SERPL-MCNC: 20 MG/DL (ref 6–20)
BUN/CREAT SERPL: 19 (ref 12–20)
CALCIUM SERPL-MCNC: 8.1 MG/DL (ref 8.5–10.1)
CHLORIDE SERPL-SCNC: 107 MMOL/L (ref 97–108)
CO2 SERPL-SCNC: 26 MMOL/L (ref 21–32)
CREAT SERPL-MCNC: 1.05 MG/DL (ref 0.55–1.02)
GLUCOSE SERPL-MCNC: 135 MG/DL (ref 65–100)
HGB BLD-MCNC: 8.4 G/DL (ref 11.5–16)
POTASSIUM SERPL-SCNC: 3.9 MMOL/L (ref 3.5–5.1)
SODIUM SERPL-SCNC: 139 MMOL/L (ref 136–145)

## 2017-11-21 PROCEDURE — 77030011943

## 2017-11-21 PROCEDURE — G8987 SELF CARE CURRENT STATUS: HCPCS | Performed by: OCCUPATIONAL THERAPIST

## 2017-11-21 PROCEDURE — 85018 HEMOGLOBIN: CPT | Performed by: PHYSICIAN ASSISTANT

## 2017-11-21 PROCEDURE — 97535 SELF CARE MNGMENT TRAINING: CPT | Performed by: OCCUPATIONAL THERAPIST

## 2017-11-21 PROCEDURE — 36415 COLL VENOUS BLD VENIPUNCTURE: CPT | Performed by: PHYSICIAN ASSISTANT

## 2017-11-21 PROCEDURE — 80048 BASIC METABOLIC PNL TOTAL CA: CPT | Performed by: PHYSICIAN ASSISTANT

## 2017-11-21 PROCEDURE — 74011250637 HC RX REV CODE- 250/637: Performed by: NURSE PRACTITIONER

## 2017-11-21 PROCEDURE — 97110 THERAPEUTIC EXERCISES: CPT

## 2017-11-21 PROCEDURE — 74011250637 HC RX REV CODE- 250/637: Performed by: PHYSICIAN ASSISTANT

## 2017-11-21 PROCEDURE — 74011250636 HC RX REV CODE- 250/636: Performed by: PHYSICIAN ASSISTANT

## 2017-11-21 PROCEDURE — 97165 OT EVAL LOW COMPLEX 30 MIN: CPT | Performed by: OCCUPATIONAL THERAPIST

## 2017-11-21 PROCEDURE — 65270000029 HC RM PRIVATE

## 2017-11-21 PROCEDURE — 97116 GAIT TRAINING THERAPY: CPT

## 2017-11-21 PROCEDURE — 97530 THERAPEUTIC ACTIVITIES: CPT

## 2017-11-21 PROCEDURE — G8988 SELF CARE GOAL STATUS: HCPCS | Performed by: OCCUPATIONAL THERAPIST

## 2017-11-21 PROCEDURE — 74011250636 HC RX REV CODE- 250/636: Performed by: NURSE PRACTITIONER

## 2017-11-21 RX ORDER — TAMSULOSIN HYDROCHLORIDE 0.4 MG/1
0.4 CAPSULE ORAL ONCE
Status: COMPLETED | OUTPATIENT
Start: 2017-11-21 | End: 2017-11-21

## 2017-11-21 RX ADMIN — ACETAMINOPHEN 650 MG: 325 TABLET ORAL at 21:46

## 2017-11-21 RX ADMIN — ACETAMINOPHEN 650 MG: 325 TABLET ORAL at 12:15

## 2017-11-21 RX ADMIN — ENOXAPARIN SODIUM 40 MG: 40 INJECTION SUBCUTANEOUS at 09:00

## 2017-11-21 RX ADMIN — CELECOXIB 100 MG: 100 CAPSULE ORAL at 09:02

## 2017-11-21 RX ADMIN — LATANOPROST 1 DROP: 50 SOLUTION OPHTHALMIC at 21:52

## 2017-11-21 RX ADMIN — DULOXETINE 30 MG: 30 CAPSULE, DELAYED RELEASE ORAL at 17:07

## 2017-11-21 RX ADMIN — PRAVASTATIN SODIUM 40 MG: 40 TABLET ORAL at 21:47

## 2017-11-21 RX ADMIN — DEXAMETHASONE SODIUM PHOSPHATE 10 MG: 4 INJECTION, SOLUTION INTRAMUSCULAR; INTRAVENOUS at 09:02

## 2017-11-21 RX ADMIN — OXYCODONE HYDROCHLORIDE 10 MG: 5 TABLET ORAL at 09:01

## 2017-11-21 RX ADMIN — OXYCODONE HYDROCHLORIDE 10 MG: 5 TABLET ORAL at 02:09

## 2017-11-21 RX ADMIN — OXYCODONE HYDROCHLORIDE 10 MG: 5 TABLET ORAL at 06:24

## 2017-11-21 RX ADMIN — PANTOPRAZOLE SODIUM 40 MG: 40 TABLET, DELAYED RELEASE ORAL at 09:01

## 2017-11-21 RX ADMIN — DOCUSATE SODIUM AND SENNOSIDES 1 TABLET: 8.6; 5 TABLET, FILM COATED ORAL at 09:01

## 2017-11-21 RX ADMIN — LEVOTHYROXINE SODIUM 100 MCG: 100 TABLET ORAL at 06:23

## 2017-11-21 RX ADMIN — OXYCODONE HYDROCHLORIDE 10 MG: 5 TABLET ORAL at 21:47

## 2017-11-21 RX ADMIN — TAMSULOSIN HYDROCHLORIDE 0.4 MG: 0.4 CAPSULE ORAL at 09:01

## 2017-11-21 RX ADMIN — POLYETHYLENE GLYCOL 3350 17 G: 17 POWDER, FOR SOLUTION ORAL at 09:00

## 2017-11-21 RX ADMIN — ACETAMINOPHEN 650 MG: 325 TABLET ORAL at 06:23

## 2017-11-21 RX ADMIN — Medication 10 ML: at 17:08

## 2017-11-21 RX ADMIN — CELECOXIB 100 MG: 100 CAPSULE ORAL at 17:08

## 2017-11-21 RX ADMIN — ACETAMINOPHEN 650 MG: 325 TABLET ORAL at 02:09

## 2017-11-21 RX ADMIN — OXYCODONE HYDROCHLORIDE 10 MG: 5 TABLET ORAL at 12:15

## 2017-11-21 RX ADMIN — DIPHENHYDRAMINE HYDROCHLORIDE 12.5 MG: 12.5 SOLUTION ORAL at 02:26

## 2017-11-21 RX ADMIN — DOCUSATE SODIUM AND SENNOSIDES 1 TABLET: 8.6; 5 TABLET, FILM COATED ORAL at 17:08

## 2017-11-21 NOTE — PROGRESS NOTES
Problem: Mobility Impaired (Adult and Pediatric)  Goal: *Acute Goals and Plan of Care (Insert Text)  Physical Therapy Goals  Initiated 11/20/2017  1. Patient will move from supine to sit and sit to supine , scoot up and down and roll side to side in bed with independence within 7 day(s). 2.  Patient will transfer from bed to chair and chair to bed with independence using the least restrictive device within 7 day(s). 3.  Patient will perform sit to stand with independence within 7 day(s). 4.  Patient will ambulate with modified independence for 1000 feet with the least restrictive device within 7 day(s). 5.  Patient will ascend/descend 12 stairs with single handrail(s) with independence within 7 day(s). physical Therapy TREATMENT  Patient: Paul Aguilera (73 y.o. female)  Date: 11/21/2017  Diagnosis: LEFT HIP OA  Osteoarthritis of hip <principal problem not specified>  Procedure(s) (LRB):  LEFT TOTAL HIP ARTHROPLASTY ANTERIOR APPROACH WITH NAVIGATION (Left) 1 Day Post-Op  Precautions: WBAT  Chart, physical therapy assessment, plan of care and goals were reviewed. ASSESSMENT:  Pt cleared by nurse to mobilize. Pt received up in chair. Pt reported pain decreased compared to morning session. Pt performed sit to stand transfer at [place] [address]. Pt ambulated 225ft x2 with RW at [place] [address]. Pt moving better this afternoon with less antalgic gait due to decreased pain. Pt also moving safer with walker staying close and staying inside with turns. Pt ascended and descended 4 stairs at [place] [address]. Pt performed car transfer at [place] [address]. Pt with improved mobility this afternoon but will benefit  from morning PT session before discharge. Pt will received  PT after discharge.     Progression toward goals:  [x]      Improving appropriately and progressing toward goals  []      Improving slowly and progressing toward goals  []      Not making progress toward goals and plan of care will be adjusted     PLAN:  Patient continues to benefit from skilled intervention to address the above impairments. Continue treatment per established plan of care. Discharge Recommendations:  Home Health  Further Equipment Recommendations for Discharge:  none     SUBJECTIVE:   \"i'm feeling better\"    OBJECTIVE DATA SUMMARY:   Functional Mobility Training:  Bed Mobility:  Rolling: Contact guard assistance  Supine to Sit: Contact guard assistance; Additional time  Sit to Supine: Stand-by asssistance  Scooting: Contact guard assistance; Additional time         Transfers:  Sit to Stand: Contact guard assistance  Stand to Sit: Stand-by asssistance                             Ambulation/Gait Training:  Distance (ft): 450 Feet (ft) (225x2 )  Assistive Device: Gait belt;Walker, rolling  Ambulation - Level of Assistance: Contact guard assistance        Gait Abnormalities: Antalgic              Speed/Gisel: Pace decreased (<100 feet/min)  Step Length: Right shortened;Left shortened                    Stairs:  Number of Stairs Trained: 4  Stairs - Level of Assistance: Contact guard assistance  Rail Use: Both      Pain:  Pain Scale 1: Numeric (0 - 10)  Pain Intensity 1: 7  Pain Location 1: Hip  Pain Orientation 1: Left  Pain Description 1: Aching  Pain Intervention(s) 1: Medication (see MAR)  Activity Tolerance:   Pt moving better due to less pain this afternoon. Would benefit from morning session tomorrow before discharge.    After treatment:   [] Patient left in no apparent distress sitting up  in chair  [x] Patient left in no apparent distress in bed  [x] Call bell left within reach  [x] Nursing notified  [] Caregiver present  [] Bed alarm activated    COMMUNICATION/COLLABORATION:   The patients plan of care was discussed with: Registered Nurse    Maria Antonia Matta   Time Calculation: 30 mins

## 2017-11-21 NOTE — INTERDISCIPLINARY ROUNDS
Bedside interdisciplinary rounds were held today to discuss patient plan of care and outcomes. The following members were present: Nurse Practitioner, Nurse, Clinical Care Leader, Pharmacy, Physical Therapy, and Case Management. Plan:  Urinary retention this morning with a I&O catheter - monitor for void by around 3 pm.  Continue working with PT. Likely discharge to home tomorrow.

## 2017-11-21 NOTE — PROGRESS NOTES
Problem: Mobility Impaired (Adult and Pediatric)  Goal: *Acute Goals and Plan of Care (Insert Text)  Physical Therapy Goals  Initiated 11/20/2017  1. Patient will move from supine to sit and sit to supine , scoot up and down and roll side to side in bed with independence within 7 day(s). 2.  Patient will transfer from bed to chair and chair to bed with independence using the least restrictive device within 7 day(s). 3.  Patient will perform sit to stand with independence within 7 day(s). 4.  Patient will ambulate with modified independence for 1000 feet with the least restrictive device within 7 day(s). 5.  Patient will ascend/descend 12 stairs with single handrail(s) with independence within 7 day(s). physical Therapy TREATMENT  Patient: Delmy Gandhi (41 y.o. female)  Date: 11/21/2017  Diagnosis: LEFT HIP OA  Osteoarthritis of hip <principal problem not specified>  Procedure(s) (LRB):  LEFT TOTAL HIP ARTHROPLASTY ANTERIOR APPROACH WITH NAVIGATION (Left) 1 Day Post-Op  Precautions: WBAT  Chart, physical therapy assessment, plan of care and goals were reviewed. ASSESSMENT:  Pt cleared by nurse to mobilize . Pt received in bed supine. Pt reported pain at 7/10. Pt on oxygen on arrival o2 stats at 98%. Pt on RA with all activity. Pt performed exercises supine with assistance from therapist. Pt performed sup[facility]. Pt performed sit stand transfer at [address] with time. Pt [facility] 220[facility] Pt requiring cueing to walk closer to walker and to stay within walker with turns. Pt able to perform with constant cueing. Pt very painful with ambulation will perform stairs and car transfer in afternoon session. Pts o2 stats >90% throughout session on RA. Pts pain limiting mobility in morning session. [facility] to improve strength and mobility.    Progression toward goals:  []      Improving appropriately and progressing toward goals  [x]      Improving slowly and progressing toward goals  []      Not making progress toward goals and plan of care will be adjusted     PLAN:  Patient continues to benefit from skilled intervention to address the above impairments. Continue treatment per established plan of care. Discharge Recommendations:  Home Health  Further Equipment Recommendations for Discharge:  none     SUBJECTIVE:   Patient stated I'm more painful today.     OBJECTIVE DATA SUMMARY:   Critical Behavior:  Neurologic State: Alert, Appropriate for age  Orientation Level: Appropriate for age, Oriented X4  Cognition: Appropriate decision making, Follows commands  Safety/Judgement: Awareness of environment  Functional Mobility Training:  Bed Mobility:  Rolling: Contact guard assistance  Supine to Sit: Contact guard assistance; Additional time     Scooting: Contact guard assistance; Additional time         Transfers:  Sit to Stand: Contact guard assistance                                Balance:  Sitting: Intact  Standing: Intact  Standing - Static: Good  Standing - Dynamic : Fair  Ambulation/Gait Training:  Distance (ft): 220 Feet (ft)  Assistive Device: Walker, rolling;Gait belt  Ambulation - Level of Assistance: Contact guard assistance        Gait Abnormalities: Antalgic;Decreased step clearance; Step to gait              Speed/Gisel: Fluctuations  Step Length: Right shortened;Left shortened                         Therapeutic Exercises:   SUPINE  EXERCISES   Sets   Reps   Active Active Assist   Passive Self ROM   Comments   Ankle Pumps 1 10 [x]                           []                           []                           []                              Quad Sets 1 10 [x]                           []                           []                           []                              Heel Slides 1 10 [x]                           []                           []                           []                              Hip Abduction 1 10 [x] []                           []                           []                              Glut Sets 1 10 [x]                           []                           []                           []                              External rotation  1 10 [x]                           []                           []                           []                                 []                           []                           []                           []                                  Pain:  Pain Scale 1: Numeric (0 - 10)  Pain Intensity 1: 7  Pain Location 1: Hip  Pain Orientation 1: Left  Pain Description 1: Aching  Pain Intervention(s) 1: Medication (see MAR)  Activity Tolerance:   Pts pain limiting bed mobility this morning.    After treatment:   [x] Patient left in no apparent distress sitting up in chair  [] Patient left in no apparent distress in bed  [x] Call bell left within reach  [x] Nursing notified  [x] Caregiver present  [] Bed alarm activated    COMMUNICATION/COLLABORATION:   The patients plan of care was discussed with: Registered Nurse    Alvarado Turner   Time Calculation: 32 mins

## 2017-11-21 NOTE — ROUTINE PROCESS
Bedside and Verbal shift change report given to Jeannine Jenkins (oncoming nurse) by Jani Rose RN (offgoing nurse). Report included the following information SBAR, Kardex, OR Summary, Procedure Summary, Intake/Output, MAR, Accordion, Recent Results and Med Rec Status.

## 2017-11-21 NOTE — PROGRESS NOTES
Bedside and Verbal shift change report given to [facility] [address] (oncoming nurse) by Kvng Sidhu (offgoing nurse). Report included the following information SBAR, Kardex, Intake/Output, MAR and Recent Results.

## 2017-11-21 NOTE — PROGRESS NOTES
Problem: Self Care Deficits Care Plan (Adult)  Goal: *Acute Goals and Plan of Care (Insert Text)  Occupational Therapy Goals  Initiated 11/21/2017  1. Patient will perform standing grooming with supervision within 7 day(s). 2.  Patient will perform upper body dressing and lower body dressing with modified independence within 7 day(s). 3.  Patient will perform simple home management with supervision/set-up within 7 day(s). 4.  Patient will perform toilet transfers with modified independence within 7 day(s). 5.  Patient will perform all aspects of toileting with modified independence within 7 day(s). 6.  Patient will participate in upper extremity therapeutic exercise/activities with supervision/set-up for 10 minutes within 7 day(s). Occupational Therapy EVALUATION  Patient: Ion Case (53 y.o. female)  Date: 11/21/2017  Primary Diagnosis: LEFT HIP OA  Osteoarthritis of hip  Procedure(s) (LRB):  LEFT TOTAL HIP ARTHROPLASTY ANTERIOR APPROACH WITH NAVIGATION (Left) 1 Day Post-Op   Precautions:   WBAT    ASSESSMENT :  Based on the objective data described below, the patient presents with pain, decreased balance, generalized weakness and decreased endurance POD 1 of L AUNDREA. Pt is functioning at set up to moderate assistance levels for self care and is CGA for functional mobility/transfers at this time. Pt was educated in home safety and fall prevention and was issued a kit of adaptive aids to increase independence, safety and comfort during adls. . Pt plans to return home with her  who will assist her. Pt would benefit from [facility] therapies at discharge. Patient will benefit from skilled intervention to address the above impairments.   Patients rehabilitation potential is considered to be Good  Factors which may influence rehabilitation potential include:   []             None noted  []             Mental ability/status  []             Medical condition  []             Home/family situation and support systems  []             Safety awareness  [x]             Pain tolerance/management  []             Other:      PLAN :  Recommendations and Planned Interventions:  [x]               Self Care Training                  [x]        Therapeutic Activities  [x]               Functional Mobility Training    []        Cognitive Retraining  [x]               Therapeutic Exercises           [x]        Endurance Activities  [x]               Balance Training                   []        Neuromuscular Re-Education  []               Visual/Perceptual Training     [x]   Home Safety Training  [x]               Patient Education                 [x]        Family Training/Education  []               Other (comment):    Frequency/Duration: Patient will be followed by occupational therapy 5 times a week to address goals. Discharge Recommendations: Home Health  Further Equipment Recommendations for Discharge: tbd     SUBJECTIVE:   Patient stated my  will help me.     OBJECTIVE DATA SUMMARY:   HISTORY:   Past Medical History:   Diagnosis Date    Arthritis     hands/low back    Diabetes ([address]     borderline    GERD (gastroesophageal reflux disease)     Glaucoma     Hypercholesterolemia     Hypertension     Ill-defined condition     borderline anemia    Ill-defined condition     bladder infections    Impaired glucose tolerance     Menopause     Thyroid disease     hypothyroidism     Past Surgical History:   Procedure Laterality Date    HX BREAST BIOPSY Bilateral 1990    benign    HX CATARACT REMOVAL      bilateral    HX DILATION AND CURETTAGE      HX ORTHOPAEDIC      carpal tunnel release -bilateral    HX ROTATOR CUFF REPAIR Right        Prior Level of Function/Environment/Context: Pt lives with her  who will assist her at discharge.   Pt was independent in adls and most IADLs at home  Expanded or extensive additional review of patient history:     Home Situation  Home Environment: Private residence  # Steps to Enter: 0  One/Two Story Residence: Two story  # of Interior Steps: 12  Height of Each Step (in): 1.5 inches  Interior Rails: Left  Lift Chair Available: No  Living Alone: No  Support Systems: Family member(s), Friends \ neighbors  Patient Expects to be Discharged to[de-identified] Private residence  Current DME Used/Available at Home: Cane, straight  Tub or Shower Type: Tub/Shower combination  [x]  Right hand dominant   []  Left hand dominant    EXAMINATION OF PERFORMANCE DEFICITS:  Cognitive/Behavioral Status:  Neurologic State: Alert  Orientation Level: Appropriate for age  Cognition: Appropriate decision making; Appropriate for age attention/concentration; Follows commands  Perception: Appears intact  Perseveration: No perseveration noted  Safety/Judgement: Awareness of environment    Skin: generally intact, incision not seen     Edema: none observed    Hearing: Auditory  Auditory Impairment: None    Vision/Perceptual:                                Corrective Lenses: Glasses    Range of Motion:  BUEs:  AROM: Within functional limits                         Strength:  BUEs:  Strength: Generally decreased, functional       strength equal bilaterally          Coordination:  Coordination: Generally decreased, functional  Fine Motor Skills-Upper: Left Intact; Right Intact    Gross Motor Skills-Upper: Left Intact; Right Intact    Tone & Sensation:    Tone: Normal  Sensation: Intact                      Balance:  Sitting: Intact  Sitting - Static: Good (unsupported)  Sitting - Dynamic:  (unable to reach to feet--educated on kit of adaptive aids )  Standing: Intact  Standing - Static: Good  Standing - Dynamic : Good    Functional Mobility and Transfers for ADLs:  Bed Mobility:  Rolling:  (pt seated upon arrival)  Supine to Sit: Contact guard assistance; Additional time  Sit to Supine: Stand-by asssistance  Scooting: Contact guard assistance; Additional time    Transfers:  Sit to Stand: Contact guard assistance  Stand to Sit: Stand-by asssistance  Toilet Transfer : Contact guard assistance    ADL Assessment:  Feeding: Independent    Oral Facial Hygiene/Grooming: Stand-by assistance;Setup    Bathing: Moderate assistance    Upper Body Dressing: Setup    Lower Body Dressing: Moderate assistance    Toileting: Stand by assistance                ADL Intervention and task modifications:     Pt participated in OT evaluation and performed ambulation to the bathroom, standing grooming, and education on using the adaptive aids issued this date. Cognitive Retraining  Safety/Judgement: Awareness of environment    Therapeutic Exercise:  *encouraged balancing rest and activity and changing positions often   Functional Measure:  Barthel Index:    Bathin  Bladder: 10  Bowels: 10  Groomin  Dressin  Feeding: 10  Mobility: 10  Stairs: 0  Toilet Use: 5  Transfer (Bed to Chair and Back): 10  Total: 65       Barthel and G-code impairment scale:  Percentage of impairment CH  0% CI  1-19% CJ  20-39% CK  40-59% CL  60-79% CM  80-99% CN  100%   Barthel Score 0-100 100 99-80 79-60 59-40 20-39 1-19   0   Barthel Score 0-20 20 17-19 13-16 9-12 5-8 1-4 0      The Barthel ADL Index: Guidelines  1. The index should be used as a record of what a patient does, not as a record of what a patient could do. 2. The main aim is to establish degree of independence from any help, physical or verbal, however minor and for whatever reason. 3. The need for supervision renders the patient not independent. 4. A patient's performance should be established using the best available evidence. Asking the patient, friends/relatives and nurses are the usual sources, but direct observation and common sense are also important. However direct testing is not needed. 5. Usually the patient's performance over the preceding 24-48 hours is important, but occasionally longer periods will be relevant.   6. Middle categories imply that the patient supplies over 50 per cent of the effort. 7. Use of aids to be independent is allowed. Isaías Lion., Barthel, D.W. (5782). Functional evaluation: the Barthel Index. [address] (14)2. CODEY Barahona, Concetta Flores., Margarito Sales., Robert, [address] (1999). Measuring the change indisability after inpatient rehabilitation; comparison of the responsiveness of the Barthel Index and Functional Boley Measure. Journal of Neurology, Neurosurgery, and Psychiatry, 66(4), 019-089. REMA Walters, CARMEN Sanchez, & Kuldip Luis M.A. (2004.) Assessment of post-stroke quality of life in cost-effectiveness studies: The usefulness of the Barthel Index and the EuroQoL-5D. Quality of Life Research, 13, 592-41       G codes: In compliance with CMSs Claims Based Outcome Reporting, the following G-code set was chosen for this patient based on their primary functional limitation being treated: The outcome measure chosen to determine the severity of the functional limitation was the Barthel Index with a score of 65/100 which was correlated with the impairment scale. ? Self Care:     - CURRENT STATUS: CJ - 20%-39% impaired, limited or restricted    - GOAL STATUS: CI - 1%-19% impaired, limited or restricted    - D/C STATUS:  ---------------To be determined---------------     Occupational Therapy Evaluation Charge Determination   History Examination Decision-Making   LOW Complexity : Brief history review  MEDIUM Complexity : 3-5 performance deficits relating to physical, cognitive , or psychosocial skils that result in activity limitations and / or participation restrictions MEDIUM Complexity : Patient may present with comorbidities that affect occupational performnce.  Miniml to moderate modification of tasks or assistance (eg, physical or verbal ) with assesment(s) is necessary to enable patient to complete evaluation       Based on the above components, the patient evaluation is determined to be of the following complexity level: LOW   Pain:  Pain Scale 1: Numeric (0 - 10)  Pain Intensity 1: 8  Pain Location 1: Hip  Pain Orientation 1: Left  Pain Description 1: Aching  Pain Intervention(s) 1: Medication (see MAR)  Activity Tolerance:   Good, however, pt in 8/10 pain and nursing was informed  Please refer to the flowsheet for vital signs taken during this treatment. After treatment:   [x] Patient left in no apparent distress sitting up in chair  [] Patient left in no apparent distress in bed  [x] Call bell left within reach  [x] Nursing notified  [x] Caregiver present  [] Bed alarm activated    COMMUNICATION/EDUCATION:   The patients plan of care was discussed with: Registered Nurse. [x] Home safety education was provided and the patient/caregiver indicated understanding. [x] Patient/family have participated as able in goal setting and plan of care. [x] Patient/family agree to work toward stated goals and plan of care. [] Patient understands intent and goals of therapy, but is neutral about his/her participation. [] Patient is unable to participate in goal setting and plan of care. This patients plan of care is appropriate for delegation to [facility].     Thank you for this referral.  Wisam Rodriguez, OTR/L  Time Calculation: 30 mins

## 2017-11-21 NOTE — PROGRESS NOTES
Occupational Therapy  Orders received and medical record reviewed. Pt participated in OT Evaluation. Pt is planning discharge tomorrow. Will continue to follow. Full OT Note to follow.

## 2017-11-21 NOTE — PROGRESS NOTES
Ortho / Neurosurgery NP Note    POD# 1  s/p LEFT TOTAL HIP ARTHROPLASTY ANTERIOR APPROACH WITH NAVIGATION   Pt seen with family at beside    Pt resting in bed. No complaints. VSS Afebrile. Voiding status: unable to void since surgery. Required straight cath at 0700 this morning with 1400 cc out! Reattempt void trial today. If no void, may need to reinsert llanes since she was initially retaining such a large amount. Labs  Lab Results   Component Value Date/Time    HGB 8.4 11/21/2017 02:22 AM      Lab Results   Component Value Date/Time    INR 1.1 11/06/2017 02:08 PM        Body mass index is 31.4 kg/(m^2). : A BMI > 30 is classified as obesity and > 40 is classified as morbid obesity. Dressing c.d.i  Cryotherapy in place over incision  Calves soft and supple; No pain with passive stretch  Sensation and motor intact  SCDs for mechanical DVT proph while in bed     PLAN:  1) PT BID  2) Lovenox for DVT Prophylaxis (ASA allergy)  3) GI Prophylaxis protonix  4) Plan d/c home with family when clears PT. Maybe today vs tomorrow.     Poncho Holder, MOISES

## 2017-11-22 VITALS
RESPIRATION RATE: 21 BRPM | HEIGHT: 65 IN | DIASTOLIC BLOOD PRESSURE: 50 MMHG | OXYGEN SATURATION: 97 % | SYSTOLIC BLOOD PRESSURE: 128 MMHG | BODY MASS INDEX: 31.44 KG/M2 | HEART RATE: 86 BPM | WEIGHT: 188.71 LBS | TEMPERATURE: 97.8 F

## 2017-11-22 LAB — HGB BLD-MCNC: 7.7 G/DL (ref 11.5–16)

## 2017-11-22 PROCEDURE — 97530 THERAPEUTIC ACTIVITIES: CPT

## 2017-11-22 PROCEDURE — 74011250636 HC RX REV CODE- 250/636: Performed by: NURSE PRACTITIONER

## 2017-11-22 PROCEDURE — 36415 COLL VENOUS BLD VENIPUNCTURE: CPT | Performed by: PHYSICIAN ASSISTANT

## 2017-11-22 PROCEDURE — 97116 GAIT TRAINING THERAPY: CPT

## 2017-11-22 PROCEDURE — 74011250637 HC RX REV CODE- 250/637: Performed by: PHYSICIAN ASSISTANT

## 2017-11-22 PROCEDURE — 85018 HEMOGLOBIN: CPT | Performed by: PHYSICIAN ASSISTANT

## 2017-11-22 RX ORDER — ACETAMINOPHEN 325 MG/1
650 TABLET ORAL EVERY 6 HOURS
Qty: 112 TAB | Refills: 0 | Status: SHIPPED | OUTPATIENT
Start: 2017-11-22 | End: 2017-12-06

## 2017-11-22 RX ORDER — OXYCODONE HYDROCHLORIDE 5 MG/1
5-10 TABLET ORAL
Qty: 80 TAB | Refills: 0 | Status: SHIPPED | OUTPATIENT
Start: 2017-11-22 | End: 2017-12-29

## 2017-11-22 RX ORDER — ENOXAPARIN SODIUM 100 MG/ML
40 INJECTION SUBCUTANEOUS EVERY 24 HOURS
Qty: 7.2 ML | Refills: 0 | Status: SHIPPED | OUTPATIENT
Start: 2017-11-23 | End: 2017-12-11

## 2017-11-22 RX ORDER — AMOXICILLIN 250 MG
1 CAPSULE ORAL DAILY
Qty: 30 TAB | Refills: 0 | Status: SHIPPED | OUTPATIENT
Start: 2017-11-22 | End: 2017-12-29

## 2017-11-22 RX ORDER — POLYETHYLENE GLYCOL 3350 17 G/17G
17 POWDER, FOR SOLUTION ORAL
Qty: 15 PACKET | Refills: 0 | Status: SHIPPED | OUTPATIENT
Start: 2017-11-22 | End: 2017-12-07

## 2017-11-22 RX ADMIN — ENOXAPARIN SODIUM 40 MG: 40 INJECTION SUBCUTANEOUS at 08:56

## 2017-11-22 RX ADMIN — ACETAMINOPHEN 650 MG: 325 TABLET ORAL at 13:41

## 2017-11-22 RX ADMIN — OXYCODONE HYDROCHLORIDE 10 MG: 5 TABLET ORAL at 08:58

## 2017-11-22 RX ADMIN — ACETAMINOPHEN 650 MG: 325 TABLET ORAL at 02:16

## 2017-11-22 RX ADMIN — LEVOTHYROXINE SODIUM 100 MCG: 100 TABLET ORAL at 06:57

## 2017-11-22 RX ADMIN — Medication 10 ML: at 13:41

## 2017-11-22 RX ADMIN — DOCUSATE SODIUM AND SENNOSIDES 1 TABLET: 8.6; 5 TABLET, FILM COATED ORAL at 08:57

## 2017-11-22 RX ADMIN — POLYETHYLENE GLYCOL 3350 17 G: 17 POWDER, FOR SOLUTION ORAL at 08:57

## 2017-11-22 RX ADMIN — CELECOXIB 100 MG: 100 CAPSULE ORAL at 08:57

## 2017-11-22 RX ADMIN — PANTOPRAZOLE SODIUM 40 MG: 40 TABLET, DELAYED RELEASE ORAL at 08:57

## 2017-11-22 RX ADMIN — OXYCODONE HYDROCHLORIDE 10 MG: 5 TABLET ORAL at 02:16

## 2017-11-22 RX ADMIN — OXYCODONE HYDROCHLORIDE 10 MG: 5 TABLET ORAL at 06:57

## 2017-11-22 NOTE — OP NOTES
Negra Mclaughlin MD - Adult Reconstruction and Total Joint Replacement    Darlene Pierce - MRN [id number] - : 3/5/1932 (80 y.o.)      Date: 2017    Pre-operative Diagnosis: Left Hip DJD     Post-operative Diagnosis: same     Procedure:  (1) Left Total Hip Arthroplasty, Direct Anterior Approach    (2) Intraoperative Fluoroscopy    (3) Imageless Computer Navigation     Implants Used:     Implant Name Type Inv. Item Serial No.  Lot No. LRB No. Used Action   SHELL ACET M-HLE 52MM ENMANUEL -- INTEGRIP REV - [id number]  SHELL ACET M-HLE 52MM ENMANUEL -- INTEGRIP REV [id number] [employer] N/A Left 1 Implanted   ALTEON BONE SCREW 6.5MM DIAMETER X 20MM LENGTH   [id number] [employer] N/A Left 1 Implanted   NOVATION CROWN CUP CONNEXION GXL NEUTRAL LINER GROUP 2, 36MM I.D.   [id number] [employer] N/A Left 1 Implanted   STEM TAPR SO HA  SZ 10 -- NOVATION - [id number]  STEM TAPR SO HA  SZ 10 -- NOVATION [id number] [employer] N/A Left 1 Implanted   HEAD FEM COCR 36MM +0 -- OFFSET  - [id number]  HEAD FEM COCR 36MM +0 -- OFFSET  [id number] [employer] N/A Left 1 Implanted   HIP PRES FIT POR MTL- POLY LNR -- H2 BSV - [id number]   HIP PRES FIT POR MTL- POLY LNR -- H2 BSV   [employer]   Left 1 Implanted       Anesthesia: GETA + local    Surgeon: Negra Mclaughlin     Assist: LANEY Macedo    EBL: 300cc     Drains: none     Specimens: none     Complications: none     Condition: stable to PACU     Brief History: Longstanding hip pain, unresponsive to conservative treatment. The risks, benefits, and alternatives to total hip replacement were thoroughly explained. The patient understood no guarantees could be given about the outcome of the procedure and wished to proceed. Description of Procedure: After being identified in the preoperative holding area and having their operative site marked, the patient was brought back to the operating room where they underwent anesthesia to good effect.  They were  placed in the supine position with a bump under the hip. The operative extremity was then prepped and draped in the usual fashion using sterile technique. Preoperative antibiotics had been administered. An appropriate time-out was performed. We began by placing the pelvic tracker with two percutaneous Shanz pins into the ipsilateral ilium. The pelvis was then registered with the navigation system. A curvilinear incision was made 2 fingerbreadths lateral and distal to the ASIS. The skin flaps were developed down to the tensor fascia. This was divided sharply. Blunt dissection was taken medially over the tensor muscle. Retractors were placed superior and inferior to the femoral neck. The anterior fat pad was debrided. Circumflex vessels were identified and cauterized. A provisional neck cut was performed. The head was removed from the acetabulum. We then excised the labrum. We sequentially reamed for the acetabular shell. This was placed in the appropriate abduction and version. Position was confirmed by fluoroscopy and navigation. The liner was then impacted into the locking mechanism. We then turned our attention to the femur. Elevators were used to gain access to the proximal femur. Soft tissue releases were performed as necessary. The lateralizer was utilized. We then sequentially broached up to the final size trial. We placed a trial neck and head and had excellent restoration of leg length and offset with good soft tissue balance. We selected these as our final implants. Final components were inserted and the hip was reduced after thorough lavage. Restoration of appropriate leg length was confirmed by navigation. The tensor fascia was closed. Periarticular injection was performed. Skin closure was performed in layers. A sterile dressing was applied. The patient was awakened, moved to the stretcher, and taken to the recovery room in stable condition. At the conclusion of the procedure, all counts were correct.  There were no immediate complications. Additional Procedure:   Date: 11/20/2017    Preoperative diagnosis: LEFT HIP OA    Postoperative diagnosis: LEFT HIP OA    Procedure performed: Procedure(s):  LEFT TOTAL HIP ARTHROPLASTY ANTERIOR APPROACH WITH NAVIGATION    Anesthesia: General    Surgeon(s) and Role:     * Meg Desir MD - Primary    Assistant: LANEY Galvan      This describes the use of intraoperative fluoroscopy. Fluoroscopic imaging was utilized in orthogonal planes as well as using live technique for all phases of the procedure, described separately in the operative report, including hardware placement.     Meg Desir MD

## 2017-11-22 NOTE — DISCHARGE INSTRUCTIONS
[facility]  [facility]: Total [facility] Replacement  Surgeon:   Ricardo Tobias MD  Surgery Date:  11/20/2017     To relieve pain:   Use ice/gel packs.  Put the ice pack directly over the wound, or anywhere you are hurting or swollen.  To control pain and swelling, keep ice on regularly, especially after physical activity.  The packs should stay cold for 3-4 hours. When it is not cold anymore, rotate with the packs in the freezer.  Elevate your leg. This will also keep swelling down.  Rest for at least 20 minutes between activity or exercises.  To keep track of your pain medications, write down what you take and when you take it.  The last dose of pain medication you got in the hospital was:       Medication    Dose    Date & Time             Choose your medications based on the pain scale below:     To keep your pain under control, take Tylenol every 6 hours for 14 days - even if you feel like you dont need it.  For mild to moderate pain (1-6 on pain scale), take one pain pill every 3-4 hours as needed.  For severe pain (7-10 on pain scale), take two pain pills every 3-4 hours as needed.  To prevent nausea, take your pain medications with food. Pain Scale                   As your pain lessens:     Slowly start taking less pain medication. You may do this by waiting longer between doses or by taking smaller doses.  Stop using the pain medications as soon as you no longer need it, usually in 2-3 weeks.  Lovenox   You will take Lovenox for three weeks after surgery. It is an injection in the side of your stomach. The nurse will teach you to do this before you go.               To prevent stomach upset or bleeding:   Do not take non-steroidal anti-inflammatory medications (Ibuprofen, Advil, Motrin, Naproxen, etc.)    Take Pepcid 20 mg twice a day, or a similar home medication, while you are taking a blood thinner. OPSITE (Honeycomb dressing)     Keep your clear, waterproof dressing in place for 5-7 days after your leave the hospital.     If you are still having drainage, you will need to change your dressing in 5-7 days. You will be given one extra dressing to use at home.  If there is no more drainage from the wound, you may leave it open to the air. OPSITE DRESSING INSTRUCTIONS                  PRINEO DRESSING INSTRUCTIONS      Prineo is a type of skin glue and mesh that is keeping your wound together.  Leave this covering alone. Do not peel it off.  Do not apply oils or lotions over it.  You may shower with this dressing but do not soak it. Gently pat your wound dry when you get out of the shower.  DO NOTs:   Do not rub your surgical wound   Do not put lotion or oils on your wound.  Do not take a tub bath or go swimming until your doctor says it is ok.  You may shower with this dressing over your wound.  After showering pat the dressing dry.  If you have staples a home health nurse will remove them in about 10 days.  To increase and promote healing:     Stop Smoking (or at least cut back on       Smoking).  Eat a well-balanced diet (high in protein       and vitamin C).  If you have a poor appetite, drink Ensure, Glucerna, or [employer] Instant Breakfast for the next 30 days.  If you are diabetic, you should control your blood         sugars to prevent infection and help your wound         to heal.                         To prevent constipation, stay active and drink plenty of fluid.  While using pain medications, you should also take stool softeners and laxatives, such as Pericolace       and Miralax.  If you are having too many bowel       Movements, then you may need       to stop taking the laxatives.      You should have a bowel movement 3-4 days after surgery and then at least every other day while       on pain medication.  To improve your recovery, you must be active!  Use your walker and take short walks         (in your home). about every 2 hours during the day.  Try to increase how far you walk each day.  You can put as much weight on your leg as you can tolerate while walking. [facility] physical therapy will come to your        home a few times per week to teach you how to        get out of bed, to safely walk in your home, and        to do your exercises.  NO DRIVING until your surgeon tells you it is ok.  You can return to work when cleared by a physician.  Please call your physician immediately if you have:     Constant bleeding from your wound.  Increasing redness or swelling around your wound (Some warmth, bruising and swelling is normal).  Change in wound drainage (increase in amount, color, or bad smell).  Change in mental status (unusual behavior   Temperature over 101.5 degrees Fahrenheit      Pain or redness in the calf (back of your lower leg - see picture)     Increased swelling of the thigh, ankle, calf, or foot.  Emergency: CALL 911 if you have:     Shortness of breath     Chest pain when you cough or taking a deep breath     Please call your surgeons office at [phone] for a follow up appointment. _   You should call as soon as you get home or the next day after discharge. Ask to make an appointment in 2 weeks.  If you have questions or concerns during normal business hours, you may reach Dr. Carolyne Bell' Team at [phone].

## 2017-11-22 NOTE — PROGRESS NOTES
lovenox teaching done with patient and pt. . pt verbalizes understanding and was able to show demonstrations.

## 2017-11-22 NOTE — PROGRESS NOTES
Bedside and Verbal shift change report given to [address] (oncoming nurse) by Cinthya Carrera (offgoing nurse). Report included the following information SBAR, Kardex, Intake/Output, MAR and Recent Results.

## 2017-11-22 NOTE — PROGRESS NOTES
Ortho / Neurosurgery NP Note    POD# 1  s/p LEFT TOTAL HIP ARTHROPLASTY ANTERIOR APPROACH WITH NAVIGATION   Pt seen with  at bedside    Pt resting in chair after working with PT  No complaints. Denies dizziness , CP, lightheadedness, SOB  VSS Afebrile. Voiding status: + void  Tolerating PO well    Labs  Lab Results   Component Value Date/Time    HGB 7.7 11/22/2017 03:47 AM      Lab Results   Component Value Date/Time    INR 1.1 11/06/2017 02:08 PM        Body mass index is 31.4 kg/(m^2). : A BMI > 30 is classified as obesity and > 40 is classified as morbid obesity. Dressing c.d.i  Mild expected Post-op edema  Cryotherapy in place over incision  Calves soft and supple; No pain with passive stretch  Sensation and motor intact  SCDs for mechanical DVT proph while in bed     PLAN:  1) PT BID  2) Aspirin 325 mg PO BID for DVT Prophylaxis   3) GI Prophylaxis Pepcid  4) Plan d/c home with family today. Pt has f/u already scheduled with PCP on Dec 29th. Will route d/c summary to PCP.     Prosper Ngo NP

## 2017-11-22 NOTE — DISCHARGE SUMMARY
Ortho Discharge Summary    Patient ID:  Nanette Darby  [id number]  female  80 y.o.  3/5/1932    Admit date: 11/20/2017    Discharge date: 11/22/2017    Admitting Physician: Diedra Osgood, MD     Consulting Physician(s):   Treatment Team: Attending Provider: Diedra Osgood, MD    Date of Surgery:   11/20/2017     Preoperative Diagnosis:  LEFT HIP OA    Postoperative Diagnosis:   LEFT HIP OA    Procedure(s):     LEFT TOTAL HIP ARTHROPLASTY ANTERIOR APPROACH WITH NAVIGATION     Anesthesia Type:   General     Surgeon: Diedra Osgood, MD                            HPI:  Pt is a 80 y.o. female who has a history of LEFT HIP OA  with pain and limitations of activities of daily living who presents at this time for a left AUNDREA following the failure of conservative management. PMH:   Past Medical History:   Diagnosis Date    Arthritis     hands/low back    Diabetes ([address]     borderline    GERD (gastroesophageal reflux disease)     Glaucoma     Hypercholesterolemia     Hypertension     Ill-defined condition     borderline anemia    Ill-defined condition     bladder infections    Impaired glucose tolerance     Menopause     Thyroid disease     hypothyroidism       Body mass index is 31.4 kg/(m^2). : A BMI > 30 is classified as obesity and > 40 is classified as morbid obesity. Medications upon admission :   Prior to Admission Medications   Prescriptions Last Dose Informant Patient Reported? Taking? ACCU-CHEK MULTICLIX LANCET misc   No No   Sig: AS DIRECTED   ASCORBIC ACID/VITAMIN E (CRANBERRY CONCENTRATE PO) 11/13/2017  Yes No   Sig: Take  by mouth. DOCUSATE CALCIUM (STOOL SOFTENER PO) 11/13/2017  Yes No   Sig: Take  by mouth. DULoxetine (CYMBALTA) 30 mg capsule 11/13/2017  Yes No   Sig: Take 30 mg by mouth every evening. HYDROcodone-acetaminophen (NORCO) 5-325 mg per tablet 11/13/2017  No No   Sig: Take 1 Tab by mouth nightly as needed for Pain. Max Daily Amount: 1 Tab.    MULTIVITAMINS (MULTIVITAMIN PO) 11/13/2017  Yes No   Sig: Take  by mouth. ascorbic acid (VITAMIN C) 500 mg tablet 11/13/2017  Yes No   Sig: Take  by mouth. cholecalciferol, vitamin d3, (VITAMIN D) 1,000 unit tablet 11/13/2017  Yes No   Sig: Take  by mouth daily. enalapril-hydroCHLOROthiazide (VASERETIC) 5-12.5 mg tablet 11/19/2017 at 0800  No Yes   Sig: TAKE 1 TABLET EVERY DAY   ferrous sulfate 325 mg (65 mg iron) tablet 11/13/2017  Yes No   Sig: Take  by mouth two (2) times a day. latanoprost (XALATAN) 0.005 % ophthalmic solution 11/19/2017 at 2000  Yes Yes   Sig: Administer 1 Drop to both eyes nightly. levothyroxine (SYNTHROID) 100 mcg tablet 11/20/2017 at 0500  No Yes   Sig: TAKE 1 TABLET DAILY BEFORE BREAKFAST.   lovastatin (MEVACOR) 40 mg tablet 11/19/2017 at 2000  No Yes   Sig: TAKE 1 TABLET EVERY EVENING TO LOWER CHOLESTEROL   meclizine (ANTIVERT) 12.5 mg tablet 11/20/2017 at 0500  Yes Yes   Sig: Take 12.5 mg by mouth daily as needed. meloxicam (MOBIC) 7.5 mg tablet 11/13/2017  Yes No   Sig: Take  by mouth daily. nitrofurantoin (MACRODANTIN) 50 mg capsule 11/20/2017 at 0500  Yes Yes   Sig: Take 50 mg by mouth daily. Indications: patient takes on ODD months of the year   omeprazole (PRILOSEC) 20 mg capsule 11/19/2017 at 0800  No Yes   Sig: TAKE 1 CAPSULE DAILY. trimethoprim-sulfamethoxazole (BACTRIM, SEPTRA)  mg per tablet 11/19/2017 at 0800  Yes No   Sig: Take 1 Tab by mouth daily. Indications: patient takes on EVEN months of the year   verapamil ER (CALAN-SR) 240 mg CR tablet 11/20/2017 at 0800  No Yes   Sig: TAKE 1 TABLET EVERY DAY      Facility-Administered Medications: None        Allergies: Allergies   Allergen Reactions    Aspirin Other (comments)     Swelling shut of eyelids    Gabapentin Other (comments)     Vision change    Macrobid [Nitrofurantoin Monohyd/M-Cryst] Unknown (comments)    Pcn [Penicillins] [facility] Course: The patient underwent surgery.   Complications:  None; patient tolerated the procedure well. Was taken to the PACU in stable condition and then transferred to the ortho floor. Perioperative Antibiotics:  Ancef     Postoperative Pain Management:  Oxycodone      DVT Prophylaxis: Aspirin 325    Postoperative transfusions:    Number of units banked PRBCs =   none ; Hgb dropped from Expected Acute blood loss post-op anemia. Asymptomatic - pt to follow up outpatient with PCP    Post Op complications: none    Hemoglobin at discharge:    Lab Results   Component Value Date/Time    HGB 7.7 (L) 11/22/2017 03:47 AM    INR 1.1 11/06/2017 02:08 PM       Dressing was changed on POD # 1. Incision - clean, dry and intact. No significant erythema or swelling. Neurovascular exam found to be within normal limits. Wound appears to be healing without any evidence of infection. Physical Therapy started on the day following surgery and participated in bed mobility, transfers and ambulation. Gait:  Gait  Speed/Gisel: Pace decreased (<100 feet/min)  Step Length: Right shortened, Left shortened  Gait Abnormalities: Antalgic  Ambulation - Level of Assistance: Contact guard assistance  Distance (ft): 450 Feet (ft) (225x2 )  Assistive Device: Gait belt, Walker, rolling  Rail Use: Both  Stairs - Level of Assistance: Contact guard assistance  Number of Stairs Trained: 4                   Discharged to: Home. Condition on Discharge:   stable    Discharge instructions:  - Anticoagulate with Aspirin 325 BID  - Take pain medications as prescribed  - Resume pre hospital diet      - Discharge activity: activity as tolerated  - Ambulate with Walker;    - Weight bearing status WBAT  - Wound Care Keep wound clean and dry.   See discharge instruction sheet.  - Staples to be removed 10 days after surgery            -DISCHARGE MEDICATION LIST     Current Discharge Medication List      START taking these medications    Details   acetaminophen (TYLENOL) 325 mg tablet Take 2 Tabs by mouth every six (6) hours for 14 days. Qty: 112 Tab, Refills: 0      enoxaparin (LOVENOX) 40 mg/0.4 mL 0.4 mL by SubCUTAneous route every twenty-four (24) hours for 18 days. Qty: 7.2 mL, Refills: 0      oxyCODONE IR (ROXICODONE) 5 mg immediate release tablet Take 1-2 Tabs by mouth every four (4) hours as needed. Max Daily Amount: 60 mg.  Qty: 80 Tab, Refills: 0      senna-docusate (PERICOLACE) 8.6-50 mg per tablet Take 1 Tab by mouth daily. Qty: 30 Tab, Refills: 0      polyethylene glycol (MIRALAX) 17 gram packet Take 1 Packet by mouth daily as needed (constipation) for up to 15 days. Qty: 15 Packet, Refills: 0         CONTINUE these medications which have NOT CHANGED    Details   meclizine (ANTIVERT) 12.5 mg tablet Take 12.5 mg by mouth daily as needed. omeprazole (PRILOSEC) 20 mg capsule TAKE 1 CAPSULE DAILY. Qty: 90 Cap, Refills: 0      lovastatin (MEVACOR) 40 mg tablet TAKE 1 TABLET EVERY EVENING TO LOWER CHOLESTEROL  Qty: 90 Tab, Refills: 3      enalapril-hydroCHLOROthiazide (VASERETIC) 5-12.5 mg tablet TAKE 1 TABLET EVERY DAY  Qty: 90 Tab, Refills: 3      verapamil ER (CALAN-SR) 240 mg CR tablet TAKE 1 TABLET EVERY DAY  Qty: 90 Tab, Refills: 3      nitrofurantoin (MACRODANTIN) 50 mg capsule Take 50 mg by mouth daily. Indications: patient takes on ODD months of the year    Associated Diagnoses: Essential hypertension      levothyroxine (SYNTHROID) 100 mcg tablet TAKE 1 TABLET DAILY BEFORE BREAKFAST. Qty: 90 Tab, Refills: 1      latanoprost (XALATAN) 0.005 % ophthalmic solution Administer 1 Drop to both eyes nightly. DULoxetine (CYMBALTA) 30 mg capsule Take 30 mg by mouth every evening. ACCU-CHEK MULTICLIX LANCET misc AS DIRECTED  Qty: 100 Each, Refills: PRN      meloxicam (MOBIC) 7.5 mg tablet Take  by mouth daily. trimethoprim-sulfamethoxazole (BACTRIM, SEPTRA)  mg per tablet Take 1 Tab by mouth daily.  Indications: patient takes on EVEN months of the year    Associated Diagnoses: Essential hypertension      ferrous sulfate 325 mg (65 mg iron) tablet Take  by mouth two (2) times a day. Associated Diagnoses: Essential hypertension      DOCUSATE CALCIUM (STOOL SOFTENER PO) Take  by mouth. ASCORBIC ACID/VITAMIN E (CRANBERRY CONCENTRATE PO) Take  by mouth. ascorbic acid (VITAMIN C) 500 mg tablet Take  by mouth. Associated Diagnoses: Hyperlipidemia      cholecalciferol, vitamin d3, (VITAMIN D) 1,000 unit tablet Take  by mouth daily. Associated Diagnoses: Menopause      MULTIVITAMINS (MULTIVITAMIN PO) Take  by mouth. Associated Diagnoses: Menopause         STOP taking these medications       HYDROcodone-acetaminophen (NORCO) 5-325 mg per tablet Comments:   Reason for Stopping:            per medical continuation form      -Follow up in office in 2 weeks with Dr. Lindsey Castillo  - F/u Dec 29 with PCP to check BP and Hgb      Signed:  Francesca Cardona.  Amy Mancera, MSN, ACNP, ONP-C  Orthopaedic Nurse Practitioner    11/22/2017  11:39 AM

## 2017-11-22 NOTE — PROGRESS NOTES
Problem: Mobility Impaired (Adult and Pediatric)  Goal: *Acute Goals and Plan of Care (Insert Text)  Physical Therapy Goals  Initiated 11/20/2017  1. Patient will move from supine to sit and sit to supine , scoot up and down and roll side to side in bed with independence within 7 day(s). 2.  Patient will transfer from bed to chair and chair to bed with independence using the least restrictive device within 7 day(s). 3.  Patient will perform sit to stand with independence within 7 day(s). 4.  Patient will ambulate with modified independence for 1000 feet with the least restrictive device within 7 day(s). 5.  Patient will ascend/descend 12 stairs with single handrail(s) with independence within 7 day(s). physical Therapy TREATMENT  Patient: Mervin Preston (28 y.o. female)  Date: 11/22/2017  Diagnosis: LEFT HIP OA  Osteoarthritis of hip <principal problem not specified>  Procedure(s) (LRB):  LEFT TOTAL HIP ARTHROPLASTY ANTERIOR APPROACH WITH NAVIGATION (Left) 2 Days Post-Op  Precautions: WBAT  Chart, physical therapy assessment, plan of care and goals were reviewed. ASSESSMENT:  Pt cleared by nurse to mobilize. Pt received up in chair. Pt agreeable to therapy. Pt performed sit to stand transfer at [place] [address]. Pt ambulated 450ft with RW at [facility]. Pt requiring cueing to increase step length pt able to correct. Pt ascended ans descended 8 stairs at [facility] with no cueing required. Pt performed car transfer at [place] [address]. Pt with improved mobility safety today. Pt independent with exercises. From physical therapy stand point pt cleared for discharge. Pt will benefit from [facility] to improve strength and endurance.     Progression toward goals:  [x]      Improving appropriately and progressing toward goals  []      Improving slowly and progressing toward goals  []      Not making progress toward goals and plan of care will be adjusted     PLAN:  Patient continues to benefit from skilled intervention to address the above impairments. Continue treatment per established plan of care. Discharge Recommendations:  Home Health  Further Equipment Recommendations for Discharge:  none     SUBJECTIVE:   Patient stated i'm glad I stayed overnight I feel better today.     OBJECTIVE DATA SUMMARY:   Critical Behavior:  Neurologic State: Alert, Appropriate for age  Orientation Level: Oriented X4  Cognition: Appropriate decision making, Appropriate for age attention/concentration, Appropriate safety awareness, Follows commands  Safety/Judgement: Awareness of environment  Functional Mobility Training:                     Transfers:  Sit to Stand: Stand-by asssistance  Stand to Sit: Stand-by asssistance                             Balance:  Sitting: Intact  Sitting - Static: Good (unsupported)  Standing: Intact  Standing - Static: Good  Standing - Dynamic : Good  Ambulation/Gait Training:  Distance (ft): 450 Feet (ft)  Assistive Device: Gait belt;Walker, rolling  Ambulation - Level of Assistance: Contact guard assistance;Stand-by asssistance        Gait Abnormalities: Trunk sway increased; Antalgic              Speed/Gisel: Pace decreased (<100 feet/min)  Step Length: Right shortened;Left shortened                    Stairs:  Number of Stairs Trained: 8  Stairs - Level of Assistance: Contact guard assistance  Rail Use: Both  Therapeutic Exercises:   Pt independent with exercises. Pain:  Pain Scale 1: Numeric (0 - 10)  Pain Intensity 1: 5  Pain Location 1: Hip  Pain Orientation 1: Left  Pain Description 1: Aching  Pain Intervention(s) 1: Medication (see MAR)  Activity Tolerance:   Pt moving well with no safety concerns.      After treatment:   [x] Patient left in no apparent distress sitting up in chair  [] Patient left in no apparent distress in bed  [x] Call bell left within reach  [x] Nursing notified  [] Caregiver present  [] Bed alarm activated    COMMUNICATION/COLLABORATION:   The patients plan of care was discussed with: Registered Nurse    Alvarado Turner   Time Calculation: 25 mins

## 2017-11-22 NOTE — ROUTINE PROCESS
Bedside and Verbal shift change report given to [place] (oncoming nurse) by Mary Ragsdale RN (offgoing nurse). Report included the following information SBAR, Kardex, OR Summary, Procedure Summary, Intake/Output, MAR, Accordion, Recent Results and Med Rec Status.

## 2017-11-22 NOTE — PROGRESS NOTES
Ortho:    POD#2 s/p AUNDREA    OOB in chair. Ambulating around room with walker without difficulty. Specifically denies orthostasis. Tolerating diet. Voiding now   BP stable. Afebrile. Recent Labs      11/22/17   0347   HGB  7.7*       Dressing c.d.i, cryotherapy  Calves soft and supple;  No pain with passive stretch  Sensation and motor intact     PLAN:  PT/OT  Continue current pain management and DVT ppx  Plan for discharge today    Florentin Interiano PA-C

## 2017-11-28 ENCOUNTER — PATIENT OUTREACH (OUTPATIENT)
Dept: FAMILY MEDICINE CLINIC | Age: 82
End: 2017-11-28

## 2017-11-29 NOTE — PROGRESS NOTES
Hospital Discharge [facility] Follow-Up Patient had LEFT TOTAL HIP ARTHROPLASTY ANTERIOR APPROACH WITH NAVIGATION performed by Dr Ridge Blanco on 11/20/17 and was discharged home on 11/22/17. Per patient she is receiving PT through home health.         RRAT score: 13     Diagnosis: Osteoarthritis of left hip     Procedure:LEFT TOTAL HIP ARTHROPLASTY ANTERIOR APPROACH WITH NAVIGATION         Discharge Instructions/Plans: Discharged home with Home health for PT       Goals Addressed      Prevent complications post hospitalization.  Returns to baseline activity level. Patient has ortho follow up on 12/4/17 and PCP follow up on 12/29/17.

## 2017-11-30 ENCOUNTER — PATIENT OUTREACH (OUTPATIENT)
Dept: FAMILY MEDICINE CLINIC | Age: 82
End: 2017-11-30

## 2017-11-30 NOTE — PROGRESS NOTES
Goals Addressed      Prevent complications post hospitalization. Upon discharge patient's hgb was 7. 7. She has been scheduled to see Dr France Honeycutt on 12/29/17 to follow up on blood count. Patient advises when she first got home she did experience shortness of breath but it has improved. Discussed signs of low blood count. Discussed with Dr France Honeycutt who wants patient to have repeat CBC on Monday. Patient will come in Monday on way to orthopedist to repeat CBC.11/30/17         Returns to baseline activity level. Patient states she is continuing with [facility]. She reports she seems to be progressing. 11/30/17p

## 2017-12-04 ENCOUNTER — HOSPITAL ENCOUNTER (OUTPATIENT)
Dept: LAB | Age: 82
Discharge: HOME | End: 2017-12-04
Payer: MEDICARE

## 2017-12-04 ENCOUNTER — LAB ONLY (OUTPATIENT)
Dept: FAMILY MEDICINE CLINIC | Age: 82
End: 2017-12-04

## 2017-12-04 DIAGNOSIS — D64.9 ANEMIA, UNSPECIFIED TYPE: Primary | ICD-10-CM

## 2017-12-04 PROCEDURE — 85025 COMPLETE CBC W/AUTO DIFF WBC: CPT

## 2017-12-05 LAB
BASOPHILS # BLD AUTO: 0.2 X10E3/UL (ref 0–0.2)
BASOPHILS NFR BLD AUTO: 2 %
EOSINOPHIL # BLD AUTO: 0.7 X10E3/UL (ref 0–0.4)
EOSINOPHIL NFR BLD AUTO: 8 %
ERYTHROCYTE [DISTWIDTH] IN BLOOD BY AUTOMATED COUNT: 13.7 % (ref 12.3–15.4)
HCT VFR BLD AUTO: 27.6 % (ref 34–46.6)
HGB BLD-MCNC: 9 G/DL (ref 11.1–15.9)
IMM GRANULOCYTES # BLD: 0 X10E3/UL (ref 0–0.1)
IMM GRANULOCYTES NFR BLD: 1 %
LYMPHOCYTES # BLD AUTO: 1.5 X10E3/UL (ref 0.7–3.1)
LYMPHOCYTES NFR BLD AUTO: 17 %
MCH RBC QN AUTO: 31.1 PG (ref 26.6–33)
MCHC RBC AUTO-ENTMCNC: 32.6 G/DL (ref 31.5–35.7)
MCV RBC AUTO: 96 FL (ref 79–97)
MONOCYTES # BLD AUTO: 0.7 X10E3/UL (ref 0.1–0.9)
MONOCYTES NFR BLD AUTO: 8 %
NEUTROPHILS # BLD AUTO: 5.7 X10E3/UL (ref 1.4–7)
NEUTROPHILS NFR BLD AUTO: 64 %
PLATELET # BLD AUTO: 465 X10E3/UL (ref 150–379)
RBC # BLD AUTO: 2.89 X10E6/UL (ref 3.77–5.28)
WBC # BLD AUTO: 8.9 X10E3/UL (ref 3.4–10.8)

## 2017-12-06 ENCOUNTER — TELEPHONE (OUTPATIENT)
Dept: FAMILY MEDICINE CLINIC | Age: 82
End: 2017-12-06

## 2017-12-06 NOTE — TELEPHONE ENCOUNTER
From [facility]    Patient inquiring about blood work done on Monday.  Never received a call. Best contact   M .

## 2017-12-06 NOTE — TELEPHONE ENCOUNTER
Explained to patient that Dr Lilian Og resulted that Hemoglobin is improving. Pt voiced understanding. Catalina Javed

## 2017-12-08 ENCOUNTER — PATIENT OUTREACH (OUTPATIENT)
Dept: FAMILY MEDICINE CLINIC | Age: 82
End: 2017-12-08

## 2017-12-08 NOTE — PROGRESS NOTES
Goals Addressed      Prevent complications post hospitalization. Upon discharge patient's hgb was 7. 7. She has been scheduled to see Dr Justine Buenrostro on 12/29/17 to follow up on blood count. Patient advises when she first got home she did experience shortness of breath but it has improved. Discussed signs of low blood count. Discussed with Dr Justine Buenrostro who wants patient to have repeat CBC on Monday. Patient will come in Monday on way to orthopedist to repeat CBC.11/30/17        Patient came in on Monday for hemoglobin recheck. Hemoglobin improved from 7.7 at discharge to 9. 0.on 12/4/17.12/8/17 lmp       Returns to baseline activity level. Patient states she is continuing with [facility]. She reports she seems to be progressing. 11/30/17lmp    Patient reports she is progressing well with PT. She did come in to office on 12/4/17 to have CBC rechecked due to low hemoglobin at time of discharge. She was ambulating well with assistance of walker. 12/8/17 lmp

## 2017-12-13 ENCOUNTER — PATIENT OUTREACH (OUTPATIENT)
Dept: FAMILY MEDICINE CLINIC | Age: 82
End: 2017-12-13

## 2017-12-13 NOTE — PROGRESS NOTES
Goals Addressed      Returns to baseline activity level. Patient states she is continuing with Physical [facility]. She reports she seems to be progressing. 11/30/17lmp    Patient reports she is progressing well with PT. She did come in to office on 12/4/17 to have CBC rechecked due to low hemoglobin at time of discharge. She was ambulating well with assistance of walker. 12/8/17 lmp    Patient reports she is progressing well. She is now utilizing outpatient PT with [facility]. She still using walker for ambulation but is progressing towards a cane. 12/13/17 lmp

## 2017-12-20 ENCOUNTER — PATIENT OUTREACH (OUTPATIENT)
Dept: FAMILY MEDICINE CLINIC | Age: 82
End: 2017-12-20

## 2017-12-29 ENCOUNTER — HOSPITAL ENCOUNTER (OUTPATIENT)
Dept: LAB | Age: 82
Discharge: HOME | End: 2017-12-29
Payer: MEDICARE

## 2017-12-29 ENCOUNTER — OFFICE VISIT (OUTPATIENT)
Dept: FAMILY MEDICINE CLINIC | Age: 82
End: 2017-12-29

## 2017-12-29 VITALS
BODY MASS INDEX: 29.99 KG/M2 | HEIGHT: 65 IN | DIASTOLIC BLOOD PRESSURE: 73 MMHG | OXYGEN SATURATION: 96 % | HEART RATE: 83 BPM | RESPIRATION RATE: 17 BRPM | SYSTOLIC BLOOD PRESSURE: 110 MMHG | TEMPERATURE: 97.9 F | WEIGHT: 180 LBS

## 2017-12-29 DIAGNOSIS — N39.0 CHRONIC UTI: ICD-10-CM

## 2017-12-29 DIAGNOSIS — E78.2 MIXED HYPERLIPIDEMIA: ICD-10-CM

## 2017-12-29 DIAGNOSIS — R73.02 IGT (IMPAIRED GLUCOSE TOLERANCE): ICD-10-CM

## 2017-12-29 DIAGNOSIS — D64.9 ANEMIA, UNSPECIFIED TYPE: Primary | ICD-10-CM

## 2017-12-29 DIAGNOSIS — I10 ESSENTIAL HYPERTENSION: ICD-10-CM

## 2017-12-29 DIAGNOSIS — E03.9 HYPOTHYROIDISM, UNSPECIFIED TYPE: ICD-10-CM

## 2017-12-29 PROCEDURE — 83036 HEMOGLOBIN GLYCOSYLATED A1C: CPT

## 2017-12-29 PROCEDURE — 85025 COMPLETE CBC W/AUTO DIFF WBC: CPT

## 2017-12-29 PROCEDURE — 80053 COMPREHEN METABOLIC PANEL: CPT

## 2017-12-29 PROCEDURE — 80061 LIPID PANEL: CPT

## 2017-12-29 PROCEDURE — 84443 ASSAY THYROID STIM HORMONE: CPT

## 2017-12-29 NOTE — MR AVS SNAPSHOT
Visit Information Date & Time Provider Department Dept. Phone Encounter #  
 12/29/2017  9:15 AM Malu Quinn, [address] [facility] [phone] [id number] Upcoming Health Maintenance Date Due  
 MEDICARE YEARLY EXAM 2/7/2018 GLAUCOMA SCREENING Q2Y 1/6/2019 DTaP/Tdap/Td series (2 - Td) 2/6/2027 Allergies as of 12/29/2017  Review Complete On: 12/29/2017 By: Malu Quinn MD  
  
 Severity Noted Reaction Type Reactions Aspirin  02/03/2014    Other (comments) Swelling shut of eyelids Gabapentin  01/18/2016    Other (comments) Vision change Macrobid [Nitrofurantoin Monohyd/m-cryst]  02/08/2010    Unknown (comments) Pcn [Penicillins]  02/08/2010    Hives Current Immunizations  Reviewed on 10/10/2017 Name Date Influenza High Dose Vaccine PF 10/10/2017, 11/1/2016, 10/14/2015, 9/29/2014, 10/3/2013 Influenza Vaccine Split 10/22/2012, 10/11/2011, 10/13/2010 Pneumococcal Conjugate (PCV-13) 10/14/2015 Pneumococcal Vaccine (Unspecified Type) 1/1/2005 Zoster 12/15/2011 Not reviewed this visit You Were Diagnosed With   
  
 Codes Comments Anemia, unspecified type    -  Primary ICD-10-CM: D64.9 ICD-9-CM: 285.9 Hypothyroidism, unspecified type     ICD-10-CM: E03.9 ICD-9-CM: 244.9 Essential hypertension     ICD-10-CM: I10 
ICD-9-CM: 401.9 Mixed hyperlipidemia     ICD-10-CM: E78.2 ICD-9-CM: 272.2 IGT (impaired glucose tolerance)     ICD-10-CM: R73.02 
ICD-9-CM: 790.22 Chronic UTI     ICD-10-CM: N39.0 ICD-9-CM: 599.0 Vitals BP Pulse Temp Resp Height(growth percentile) Weight(growth percentile) 110/73 (BP 1 Location: Left arm, BP Patient Position: Sitting) 83 97.9 °F (36.6 °C) (Oral) 17 5' 5\" (1.651 m) 180 lb (81.6 kg) SpO2 BMI OB Status Smoking Status 96% 29.95 kg/m2 Postmenopausal Former Smoker Vitals History BMI and BSA Data Body Mass Index Body Surface Area 29.95 kg/m 2 1.93 m 2 Preferred Pharmacy Pharmacy Name Phone [address], [address] [facility] [phone] Your Updated Medication List  
  
   
This list is accurate as of: 12/29/17  9:50 AM.  Always use your most recent med list.  
  
  
  
  
 [address] [facility] Generic drug:  Lancets AS DIRECTED CRANBERRY CONCENTRATE PO Take  by mouth. DULoxetine 30 mg capsule Commonly known as:  CYMBALTA Take 30 mg by mouth every evening. enalapril-hydroCHLOROthiazide 5-12.5 mg tablet Commonly known as:  VASERETIC  
TAKE 1 TABLET EVERY DAY  
  
 ferrous sulfate 325 mg (65 mg iron) tablet Take  by mouth two (2) times a day. latanoprost 0.005 % ophthalmic solution Commonly known as:  Catrachito Dior Administer 1 Drop to both eyes nightly. levothyroxine 100 mcg tablet Commonly known as:  SYNTHROID  
TAKE 1 TABLET DAILY BEFORE BREAKFAST. lovastatin 40 mg tablet Commonly known as:  MEVACOR  
TAKE 1 TABLET EVERY EVENING TO LOWER CHOLESTEROL  
  
 meclizine 12.5 mg tablet Commonly known as:  ANTIVERT Take 12.5 mg by mouth daily as needed. meloxicam 7.5 mg tablet Commonly known as:  MOBIC Take  by mouth daily. MULTIVITAMIN PO Take  by mouth. nitrofurantoin 50 mg capsule Commonly known as:  MACRODANTIN Take 50 mg by mouth daily. Indications: patient takes on ODD months of the year  
  
 omeprazole 20 mg capsule Commonly known as:  PRILOSEC  
TAKE 1 CAPSULE DAILY. STOOL SOFTENER PO Take  by mouth. trimethoprim-sulfamethoxazole  mg per tablet Commonly known as:  Kermitt  Take 1 Tab by mouth daily. Indications: patient takes on EVEN months of the year  
  
 verapamil  mg CR tablet Commonly known as:  CALAN-SR  
TAKE 1 TABLET EVERY DAY  
  
 VITAMIN C 500 mg tablet Generic drug:  ascorbic acid (vitamin C) Take  by mouth. VITAMIN D3 1,000 unit tablet Generic drug:  cholecalciferol Take  by mouth daily. We Performed the Following CBC WITH AUTOMATED DIFF [89827 CPT(R)] HEMOGLOBIN A1C WITH EAG [74784 CPT(R)] LIPID PANEL [57083 CPT(R)] METABOLIC PANEL, COMPREHENSIVE [78571 CPT(R)] TSH 3RD GENERATION [51760 CPT(R)] Patient Instructions High Cholesterol: Care Instructions Your Care Instructions Cholesterol is a type of fat in your blood. It is needed for many body functions, such as making new cells. Cholesterol is made by your body. It also comes from food you eat. High cholesterol means that you have too much of the fat in your blood. This raises your risk of a heart attack and stroke. LDL and HDL are part of your total cholesterol. LDL is the \"bad\" cholesterol. High LDL can raise your risk for heart disease, heart attack, and stroke. HDL is the \"good\" cholesterol. It helps clear bad cholesterol from the body. High HDL is linked with a lower risk of heart disease, heart attack, and stroke. Your cholesterol levels help your doctor find out your risk for having a heart attack or stroke. You and your doctor can talk about whether you need to lower your risk and what treatment is best for you. A heart-healthy lifestyle along with medicines can help lower your cholesterol and your risk. The way you choose to lower your risk will depend on how high your risk is for heart attack and stroke. It will also depend on how you feel about taking medicines. Follow-up care is a key part of your treatment and safety. Be sure to make and go to all appointments, and call your doctor if you are having problems. It's also a good idea to know your test results and keep a list of the medicines you take. How can you care for yourself at home? · Eat a variety of foods every day.  Good choices include fruits, vegetables, whole grains (like oatmeal), dried beans and peas, nuts and seeds, soy products (like tofu), and fat-free or low-fat dairy products. · Replace butter, margarine, and hydrogenated or partially hydrogenated oils with olive and canola oils. (Canola oil margarine without trans fat is fine.) · Replace red meat with fish, poultry, and soy protein (like tofu). · Limit processed and packaged foods like chips, crackers, and cookies. · Bake, broil, or steam foods. Don't nayak them. · Be physically active. Get at least 30 minutes of exercise on most days of the week. Walking is a good choice. You also may want to do other activities, such as running, swimming, cycling, or playing tennis or team sports. · Stay at a healthy weight or lose weight by making the changes in eating and physical activity listed above. Losing just a small amount of weight, even 5 to 10 pounds, can reduce your risk for having a heart attack or stroke. · Do not smoke. When should you call for help? Watch closely for changes in your health, and be sure to contact your doctor if: 
? · You need help making lifestyle changes. ? · You have questions about your medicine. Where can you learn more? Go to http://catrachoshopatplacesreynaldo.info/. Enter Z003 in the search box to learn more about \"High Cholesterol: Care Instructions. \" Current as of: September 21, 2016 Content Version: 11.4 © 2144-4673 [employer] Care instructions adapted under license by [employer] (which disclaims liability or warranty for this information). If you have questions about a medical condition or this instruction, always ask your healthcare professional. [address] any warranty or liability for your use of this information. Introducing [facility]! Lesvia [employer] introduces [employer] patient portal. Now you can access parts of your medical record, email your doctor's office, and request medication refills online.    
 
1. In your internet browser, go to [email or web address] Inchart 2. Click on the First Time User? Click Here link in the Sign In box. You will see the New Member Sign Up page. 3. Enter your [employer] Access Code exactly as it appears below. You will not need to use this code after youve completed the sign-up process. If you do not sign up before the expiration date, you must request a new code. · [employer] Access Code: [email or web address] Expires: 3/29/2018  9:08 AM 
 
4. Enter the last four digits of your Social Security Number (xxxx) and Date of Birth (mm/dd/yyyy) as indicated and click Submit. You will be taken to the next sign-up page. 5. Create a AkeLext ID. This will be your [employer] login ID and cannot be changed, so think of one that is secure and easy to remember. 6. Create a [employer] password. You can change your password at any time. 7. Enter your Password Reset Question and Answer. This can be used at a later time if you forget your password. 8. Enter your e-mail address. You will receive e-mail notification when new information is available in [address] 9. Click Sign Up. You can now view and download portions of your medical record. 10. Click the Download Summary menu link to download a portable copy of your medical information. If you have questions, please visit the Frequently Asked Questions section of the [employer] website. Remember, [employer] is NOT to be used for urgent needs. For medical emergencies, dial 911. Now available from your iPhone and Android! Please provide this summary of care documentation to your next provider. Your primary care clinician is listed as Tinoco Marking. If you have any questions after today's visit, please call [phone].

## 2017-12-29 NOTE — PROGRESS NOTES
HPI:  80 y.o.  presents for follow up appointment. Since last visit had left hip replacement on 11/20/17. Feels like she's recovering well, but wakes up around 4AM with lower back pain. Still in outpatient PT. Now progressed from walker to cane. All in all, glad she went through the surgery. Now pain is different and gets a little better each day. Still icing a lot. Was anemic post op. Complains of dry cough and shortness of breath. No dizziness or light headed spells. No falls. Patient Active Problem List    Diagnosis    Osteoarthritis of hip - see above    PAD (peripheral artery disease) (HCC) - no pains or sores on legs    Anemia    Chronic UTI - taking alternating antibiotic prophylaxis. No symptoms of infection    Venous (peripheral) insufficiency     S/p GSV procedure by Dr. Sabrina Gonzales. Not using compression stockings.  Hypothyroid - No hair loss, no constipation, no dry skin or brittle nails, no palpitations. Taking medication each morning on an empty stomach.  Hypertension - No home monitoring. Compliant with medication. no chest pain, no vision change, no headache, no lower extremity edema.  Arthritis     Bilateral knees and shoulders      Hyperlipidemia - Takes medication at night as directed, no muscle aches. Tries to watch diet.  IGT (impaired glucose tolerance) -  No polyuria/polydipsia. No unexplained weight loss. No change in vision. No tingling or numbness in feet.        Restless leg syndrome         Past Medical History:   Diagnosis Date    Arthritis     hands/low back    Diabetes ([address]     borderline    GERD (gastroesophageal reflux disease)     Glaucoma     Hypercholesterolemia     Hypertension     Ill-defined condition     borderline anemia    Ill-defined condition     bladder infections    Impaired glucose tolerance     Menopause     Thyroid disease     hypothyroidism       Social History   Substance Use Topics    Smoking status: Former Smoker     Packs/day: 1.00     Years: 15.00     Types: Cigarettes     Quit date: 1/8/1995    Smokeless tobacco: Never Used    Alcohol use 0.6 oz/week     1 Glasses of wine, 0 Standard drinks or equivalent per week      Comment: Rare--maybe once a month       Outpatient Prescriptions Marked as Taking for the 12/29/17 encounter (Office Visit) with Keyon Buenrostro MD   Medication Sig Dispense Refill    meclizine (ANTIVERT) 12.5 mg tablet Take 12.5 mg by mouth daily as needed.  DULoxetine (CYMBALTA) 30 mg capsule Take 30 mg by mouth every evening.  omeprazole (PRILOSEC) 20 mg capsule TAKE 1 CAPSULE DAILY. 90 Cap 0    ACCU-CHEK MULTICLIX LANCET misc AS DIRECTED 100 Each PRN    lovastatin (MEVACOR) 40 mg tablet TAKE 1 TABLET EVERY EVENING TO LOWER CHOLESTEROL 90 Tab 3    meloxicam (MOBIC) 7.5 mg tablet Take  by mouth daily.  enalapril-hydroCHLOROthiazide (VASERETIC) 5-12.5 mg tablet TAKE 1 TABLET EVERY DAY 90 Tab 3    verapamil ER (CALAN-SR) 240 mg CR tablet TAKE 1 TABLET EVERY DAY 90 Tab 3    trimethoprim-sulfamethoxazole (BACTRIM, SEPTRA)  mg per tablet Take 1 Tab by mouth daily. Indications: patient takes on EVEN months of the year      nitrofurantoin (MACRODANTIN) 50 mg capsule Take 50 mg by mouth daily. Indications: patient takes on ODD months of the year      ferrous sulfate 325 mg (65 mg iron) tablet Take  by mouth two (2) times a day.  levothyroxine (SYNTHROID) 100 mcg tablet TAKE 1 TABLET DAILY BEFORE BREAKFAST. 90 Tab 1    latanoprost (XALATAN) 0.005 % ophthalmic solution Administer 1 Drop to both eyes nightly.  DOCUSATE CALCIUM (STOOL SOFTENER PO) Take  by mouth.  ASCORBIC ACID/VITAMIN E (CRANBERRY CONCENTRATE PO) Take  by mouth.  ascorbic acid (VITAMIN C) 500 mg tablet Take  by mouth.  cholecalciferol, vitamin d3, (VITAMIN D) 1,000 unit tablet Take  by mouth daily.  MULTIVITAMINS (MULTIVITAMIN PO) Take  by mouth.          Allergies   Allergen Reactions    Aspirin Other (comments)     Swelling shut of eyelids    Gabapentin Other (comments)     Vision change    Macrobid [Nitrofurantoin Monohyd/M-Cryst] Unknown (comments)    Pcn [Penicillins] Hives       ROS:  ROS negative except as per HPI. PE:  Visit Vitals    /73 (BP 1 Location: Left arm, BP Patient Position: Sitting)    Pulse 83    Temp 97.9 °F (36.6 °C) (Oral)    Resp 17    Ht 5' 5\" (1.651 m)    Wt 180 lb (81.6 kg)    SpO2 96%    BMI 29.95 kg/m2     Gen: alert, oriented, no acute distress  Head: normocephalic, atraumatic  Ears: external auditory canals clear, TMs without erythema or effusion  Eyes: pupils equal round reactive to light, sclera clear, conjunctiva clear  Oral: moist mucus membranes, no oral lesions, no pharyngeal inflammation or exudate  Neck: symmetric normal sized thyroid, no carotid bruits, no jugular vein distention  Resp: no increase work of breathing, lungs clear to ausculation bilaterally, no wheezing, rales or rhonchi  CV: S1, S2 normal.  No murmurs, rubs, or gallops. Abd: soft, not tender, not distended. No hepatosplenomegaly. Normal bowel sounds. Neuro: cranial nerves intact, normal strength and movement in all extremities, reflexes and sensation intact and symmetric. Skin: no lesion or rash  Extremities: no  edema      Assessment/Plan:    1. Anemia, unspecified type  No changes in medications pending lab results. 2. Hypothyroidism, unspecified type  No changes in medications pending lab results. 3. Essential hypertension  At goal.  Continue current medications. 4. Mixed hyperlipidemia  At goal.  Continue current medications. 5. IGT (impaired glucose tolerance)  No changes in medications pending lab results. 6. Chronic UTI  No changes in medications pending lab results. Health Maintenance reviewed - updated.     Orders Placed This Encounter    CBC WITH AUTOMATED DIFF    METABOLIC PANEL, COMPREHENSIVE    HEMOGLOBIN A1C WITH EAG  LIPID PANEL    TSH 3RD GENERATION       Medications Discontinued During This Encounter   Medication Reason    oxyCODONE IR (ROXICODONE) 5 mg immediate release tablet Not A Current Medication    senna-docusate (PERICOLACE) 8.6-50 mg per tablet Not A Current Medication       Current Outpatient Prescriptions   Medication Sig Dispense Refill    meclizine (ANTIVERT) 12.5 mg tablet Take 12.5 mg by mouth daily as needed.  DULoxetine (CYMBALTA) 30 mg capsule Take 30 mg by mouth every evening.  omeprazole (PRILOSEC) 20 mg capsule TAKE 1 CAPSULE DAILY. 90 Cap 0    ACCU-CHEK MULTICLIX LANCET misc AS DIRECTED 100 Each PRN    lovastatin (MEVACOR) 40 mg tablet TAKE 1 TABLET EVERY EVENING TO LOWER CHOLESTEROL 90 Tab 3    meloxicam (MOBIC) 7.5 mg tablet Take  by mouth daily.  enalapril-hydroCHLOROthiazide (VASERETIC) 5-12.5 mg tablet TAKE 1 TABLET EVERY DAY 90 Tab 3    verapamil ER (CALAN-SR) 240 mg CR tablet TAKE 1 TABLET EVERY DAY 90 Tab 3    trimethoprim-sulfamethoxazole (BACTRIM, SEPTRA)  mg per tablet Take 1 Tab by mouth daily. Indications: patient takes on EVEN months of the year      nitrofurantoin (MACRODANTIN) 50 mg capsule Take 50 mg by mouth daily. Indications: patient takes on ODD months of the year      ferrous sulfate 325 mg (65 mg iron) tablet Take  by mouth two (2) times a day.  levothyroxine (SYNTHROID) 100 mcg tablet TAKE 1 TABLET DAILY BEFORE BREAKFAST. 90 Tab 1    latanoprost (XALATAN) 0.005 % ophthalmic solution Administer 1 Drop to both eyes nightly.  DOCUSATE CALCIUM (STOOL SOFTENER PO) Take  by mouth.  ASCORBIC ACID/VITAMIN E (CRANBERRY CONCENTRATE PO) Take  by mouth.  ascorbic acid (VITAMIN C) 500 mg tablet Take  by mouth.  cholecalciferol, vitamin d3, (VITAMIN D) 1,000 unit tablet Take  by mouth daily.  MULTIVITAMINS (MULTIVITAMIN PO) Take  by mouth. Recommended healthy diet low in carbohydrates, fats, sodium and cholesterol. Recommended regular cardiovascular exercise 3-6 times per week for 30-60 minutes daily. Verbal and written instructions (see AVS) provided. Patient expresses understanding of diagnosis and treatment plan.

## 2017-12-29 NOTE — PATIENT INSTRUCTIONS

## 2017-12-30 LAB
ALBUMIN SERPL-MCNC: 4.4 G/DL (ref 3.5–4.7)
ALBUMIN/GLOB SERPL: 2 {RATIO} (ref 1.2–2.2)
ALP SERPL-CCNC: 111 IU/L (ref 39–117)
ALT SERPL-CCNC: 11 IU/L (ref 0–32)
AST SERPL-CCNC: 15 IU/L (ref 0–40)
BASOPHILS # BLD AUTO: 0.1 X10E3/UL (ref 0–0.2)
BASOPHILS NFR BLD AUTO: 1 %
BILIRUB SERPL-MCNC: 0.3 MG/DL (ref 0–1.2)
BUN SERPL-MCNC: 17 MG/DL (ref 8–27)
BUN/CREAT SERPL: 19 (ref 12–28)
CALCIUM SERPL-MCNC: 9.6 MG/DL (ref 8.7–10.3)
CHLORIDE SERPL-SCNC: 101 MMOL/L (ref 96–106)
CHOLEST SERPL-MCNC: 193 MG/DL (ref 100–199)
CO2 SERPL-SCNC: 25 MMOL/L (ref 18–29)
CREAT SERPL-MCNC: 0.9 MG/DL (ref 0.57–1)
EOSINOPHIL # BLD AUTO: 0.7 X10E3/UL (ref 0–0.4)
EOSINOPHIL NFR BLD AUTO: 13 %
ERYTHROCYTE [DISTWIDTH] IN BLOOD BY AUTOMATED COUNT: 13.6 % (ref 12.3–15.4)
EST. AVERAGE GLUCOSE BLD GHB EST-MCNC: 134 MG/DL
GLOBULIN SER CALC-MCNC: 2.2 G/DL (ref 1.5–4.5)
GLUCOSE SERPL-MCNC: 125 MG/DL (ref 65–99)
HBA1C MFR BLD: 6.3 % (ref 4.8–5.6)
HCT VFR BLD AUTO: 31.3 % (ref 34–46.6)
HDLC SERPL-MCNC: 82 MG/DL
HGB BLD-MCNC: 10.2 G/DL (ref 11.1–15.9)
IMM GRANULOCYTES # BLD: 0 X10E3/UL (ref 0–0.1)
IMM GRANULOCYTES NFR BLD: 0 %
INTERPRETATION, 910389: NORMAL
INTERPRETATION: NORMAL
LDLC SERPL CALC-MCNC: 83 MG/DL (ref 0–99)
LYMPHOCYTES # BLD AUTO: 1.2 X10E3/UL (ref 0.7–3.1)
LYMPHOCYTES NFR BLD AUTO: 22 %
MCH RBC QN AUTO: 31.2 PG (ref 26.6–33)
MCHC RBC AUTO-ENTMCNC: 32.6 G/DL (ref 31.5–35.7)
MCV RBC AUTO: 96 FL (ref 79–97)
MONOCYTES # BLD AUTO: 0.5 X10E3/UL (ref 0.1–0.9)
MONOCYTES NFR BLD AUTO: 9 %
NEUTROPHILS # BLD AUTO: 3 X10E3/UL (ref 1.4–7)
NEUTROPHILS NFR BLD AUTO: 55 %
PDF IMAGE, 910387: NORMAL
PLATELET # BLD AUTO: 272 X10E3/UL (ref 150–379)
POTASSIUM SERPL-SCNC: 4.5 MMOL/L (ref 3.5–5.2)
PROT SERPL-MCNC: 6.6 G/DL (ref 6–8.5)
RBC # BLD AUTO: 3.27 X10E6/UL (ref 3.77–5.28)
SODIUM SERPL-SCNC: 142 MMOL/L (ref 134–144)
TRIGL SERPL-MCNC: 141 MG/DL (ref 0–149)
TSH SERPL DL<=0.005 MIU/L-ACNC: 0.63 UIU/ML (ref 0.45–4.5)
VLDLC SERPL CALC-MCNC: 28 MG/DL (ref 5–40)
WBC # BLD AUTO: 5.6 X10E3/UL (ref 3.4–10.8)

## 2018-01-04 ENCOUNTER — TELEPHONE (OUTPATIENT)
Dept: FAMILY MEDICINE CLINIC | Age: 83
End: 2018-01-04

## 2018-01-04 NOTE — TELEPHONE ENCOUNTER
Patient verified her name and . Patient given results per Dr. Steffany Peterson result letter. Patient verbalized understanding.

## 2018-01-12 RX ORDER — VERAPAMIL HYDROCHLORIDE 240 MG/1
TABLET, FILM COATED, EXTENDED RELEASE ORAL
Qty: 90 TAB | Refills: 0 | Status: SHIPPED | OUTPATIENT
Start: 2018-01-12 | End: 2018-07-09 | Stop reason: SDUPTHER

## 2018-01-12 RX ORDER — OMEPRAZOLE 20 MG/1
CAPSULE, DELAYED RELEASE ORAL
Qty: 90 CAP | Refills: 3 | Status: SHIPPED | OUTPATIENT
Start: 2018-01-12 | End: 2019-03-29 | Stop reason: SDUPTHER

## 2018-01-12 RX ORDER — ENALAPRIL MALEATE AND HYDROCHLOROTHIAZIDE 5; 12.5 MG/1; MG/1
TABLET ORAL
Qty: 90 TAB | Refills: 0 | Status: SHIPPED | OUTPATIENT
Start: 2018-01-12 | End: 2018-07-09 | Stop reason: SDUPTHER

## 2018-03-28 ENCOUNTER — HOSPITAL ENCOUNTER (OUTPATIENT)
Dept: MAMMOGRAPHY | Age: 83
Discharge: HOME | End: 2018-03-28
Attending: INTERNAL MEDICINE
Payer: MEDICARE

## 2018-03-28 DIAGNOSIS — Z12.31 VISIT FOR SCREENING MAMMOGRAM: ICD-10-CM

## 2018-03-28 PROCEDURE — 77067 SCR MAMMO BI INCL CAD: CPT

## 2018-04-12 ENCOUNTER — TELEPHONE (OUTPATIENT)
Dept: FAMILY MEDICINE CLINIC | Age: 83
End: 2018-04-12

## 2018-04-12 RX ORDER — CYCLOBENZAPRINE HCL 10 MG
TABLET ORAL
Qty: 30 TAB | Refills: 0 | Status: SHIPPED | OUTPATIENT
Start: 2018-04-12 | End: 2018-10-25

## 2018-07-09 RX ORDER — ENALAPRIL MALEATE AND HYDROCHLOROTHIAZIDE 5; 12.5 MG/1; MG/1
TABLET ORAL
Qty: 90 TAB | Refills: 3 | Status: SHIPPED | OUTPATIENT
Start: 2018-07-09 | End: 2018-10-25 | Stop reason: ALTCHOICE

## 2018-07-09 RX ORDER — VERAPAMIL HYDROCHLORIDE 240 MG/1
TABLET, FILM COATED, EXTENDED RELEASE ORAL
Qty: 90 TAB | Refills: 3 | Status: SHIPPED | OUTPATIENT
Start: 2018-07-09 | End: 2018-10-22 | Stop reason: ALTCHOICE

## 2018-07-09 RX ORDER — LOVASTATIN 40 MG/1
TABLET ORAL
Qty: 90 TAB | Refills: 3 | Status: SHIPPED | OUTPATIENT
Start: 2018-07-09 | End: 2019-06-11 | Stop reason: SDUPTHER

## 2018-07-24 ENCOUNTER — OFFICE VISIT (OUTPATIENT)
Dept: FAMILY MEDICINE CLINIC | Age: 83
End: 2018-07-24

## 2018-07-24 VITALS
OXYGEN SATURATION: 93 % | HEART RATE: 65 BPM | TEMPERATURE: 97.7 F | DIASTOLIC BLOOD PRESSURE: 61 MMHG | SYSTOLIC BLOOD PRESSURE: 94 MMHG | WEIGHT: 170 LBS | BODY MASS INDEX: 28.32 KG/M2 | RESPIRATION RATE: 16 BRPM | HEIGHT: 65 IN

## 2018-07-24 DIAGNOSIS — N39.0 CHRONIC UTI: ICD-10-CM

## 2018-07-24 DIAGNOSIS — I10 ESSENTIAL HYPERTENSION: ICD-10-CM

## 2018-07-24 DIAGNOSIS — M16.0 OSTEOARTHRITIS OF BOTH HIPS, UNSPECIFIED OSTEOARTHRITIS TYPE: ICD-10-CM

## 2018-07-24 DIAGNOSIS — R19.8 ABDOMINAL FULLNESS: ICD-10-CM

## 2018-07-24 DIAGNOSIS — Z00.00 MEDICARE ANNUAL WELLNESS VISIT, SUBSEQUENT: Primary | ICD-10-CM

## 2018-07-24 DIAGNOSIS — G25.81 RESTLESS LEG SYNDROME: ICD-10-CM

## 2018-07-24 DIAGNOSIS — R73.02 IGT (IMPAIRED GLUCOSE TOLERANCE): ICD-10-CM

## 2018-07-24 DIAGNOSIS — E03.9 HYPOTHYROIDISM, UNSPECIFIED TYPE: ICD-10-CM

## 2018-07-24 DIAGNOSIS — I73.9 PAD (PERIPHERAL ARTERY DISEASE) (HCC): ICD-10-CM

## 2018-07-24 DIAGNOSIS — D64.9 ANEMIA, UNSPECIFIED TYPE: ICD-10-CM

## 2018-07-24 RX ORDER — GABAPENTIN 300 MG/1
CAPSULE ORAL
Qty: 90 CAP | Refills: 0 | Status: SHIPPED | OUTPATIENT
Start: 2018-07-24 | End: 2018-08-21

## 2018-07-24 NOTE — PATIENT INSTRUCTIONS
Constipation: Care Instructions  Your Care Instructions    Constipation means that you have a hard time passing stools (bowel movements). People pass stools from 3 times a day to once every 3 days. What is normal for you may be different. Constipation may occur with pain in the rectum and cramping. The pain may get worse when you try to pass stools. Sometimes there are small amounts of bright red blood on toilet paper or the surface of stools. This is because of enlarged veins near the rectum (hemorrhoids). A few changes in your diet and lifestyle may help you avoid ongoing constipation. Your doctor may also prescribe medicine to help loosen your stool. Some medicines can cause constipation. These include pain medicines and antidepressants. Tell your doctor about all the medicines you take. Your doctor may want to make a medicine change to ease your symptoms. Follow-up care is a key part of your treatment and safety. Be sure to make and go to all appointments, and call your doctor if you are having problems. It's also a good idea to know your test results and keep a list of the medicines you take. How can you care for yourself at home? · Drink plenty of fluids, enough so that your urine is light yellow or clear like water. If you have kidney, heart, or liver disease and have to limit fluids, talk with your doctor before you increase the amount of fluids you drink. · Include high-fiber foods in your diet each day. These include fruits, vegetables, beans, and whole grains. · Get at least 30 minutes of exercise on most days of the week. Walking is a good choice. You also may want to do other activities, such as running, swimming, cycling, or playing tennis or team sports. · Take a fiber supplement, such as Citrucel or Metamucil, every day. Read and follow all instructions on the label. · Schedule time each day for a bowel movement. A daily routine may help.  Take your time having your bowel movement. · Support your feet with a small step stool when you sit on the toilet. This helps flex your hips and places your pelvis in a squatting position. · Your doctor may recommend an over-the-counter laxative to relieve your constipation. Examples are Milk of Magnesia and MiraLax. Read and follow all instructions on the label. Do not use laxatives on a long-term basis. When should you call for help? Call your doctor now or seek immediate medical care if:    · You have new or worse belly pain.     · You have new or worse nausea or vomiting.     · You have blood in your stools.    Watch closely for changes in your health, and be sure to contact your doctor if:    · Your constipation is getting worse.     · You do not get better as expected. Where can you learn more? Go to http://catracho-reynaldo.info/. Enter 21  in the search box to learn more about \"Constipation: Care Instructions. \"  Current as of: November 20, 2017  Content Version: 11.7  © 9061-6901 [employer]. Care instructions adapted under license by [employer] (which disclaims liability or warranty for this information). If you have questions about a medical condition or this instruction, always ask your healthcare professional. [place] [address] any warranty or liability for your use of this information. Medicare Wellness Visit, Female    The best way to live healthy is to have a lifestyle where you eat a well-balanced diet, exercise regularly, limit alcohol use, and quit all forms of tobacco/nicotine, if applicable. Regular preventive services are another way to keep healthy. Preventive services (vaccines, screening tests, monitoring & exams) can help personalize your care plan, which helps you manage your own care. Screening tests can find health problems at the earliest stages, when they are easiest to treat.      [facility] follows the current, evidence-based guidelines published by the [facility] (USPSTF) when recommending preventive services for our patients. Because we follow these guidelines, sometimes recommendations change over time as research supports it. (For example, mammograms used to be recommended annually. Even though Medicare will still pay for an annual mammogram, the newer guidelines recommend a mammogram every two years for women of average risk.)    Of course, you and your provider may decide to screen more often for some diseases, based on your risk and co-morbidities (chronic disease you are already diagnosed with). Preventive services for you include:    - Medicare offers their members a free annual wellness visit, which is time for you and your primary care provider to discuss and plan for your preventive service needs. Take advantage of this benefit every year!    -All people over age 72 should receive the recommended pneumonia vaccines. Current USPSTF guidelines recommend a series of two vaccines for the best pneumonia protection.     -All adults should have a yearly flu vaccine and a tetanus vaccine every 10 years. All adults age 61 years should receive a shingles vaccine once in their lifetime.      -A bone mass density test is recommended when a woman turns 65 to screen for osteoporosis. This test is only recommended once as a screening. Some providers will use this same test as a disease monitoring tool if you already have osteoporosis.     -All adults age 38-68 years who are overweight should have a diabetes screening test once every three years.     -Other screening tests & preventive services for persons with diabetes include: an eye exam to screen for diabetic retinopathy, a kidney function test, a foot exam, and stricter control over your cholesterol.     -Cardiovascular screening for adults with routine risk involves an electrocardiogram (ECG) at intervals determined by the provider.     -Colorectal cancer screenings should be done for adults age 54-65 years with normal risk. There are a number of acceptable methods of screening for this type of cancer. Each test has its own benefits and drawbacks. Discuss with your provider what is most appropriate for you during your annual wellness visit. The different tests include: colonoscopy (considered the best screening method), a fecal occult blood test, a fecal DNA test, and sigmoidoscopy. -Breast cancer screenings are recommended every other year for women of normal risk age 54-69 years.     -Cervical cancer screenings for women over age 72 are only recommended with certain risk factors.     -All adults born between [facility] should be screened once for Hepatitis C.      Here is a list of your current Health Maintenance items (your personalized list of preventive services) with a due date:  Health Maintenance Due   Topic Date Due    Up to date

## 2018-07-24 NOTE — MR AVS SNAPSHOT
[facility] 
 
 
 [address] 
[phone] Patient: Kimberley Mohan MRN:  [id number] Visit Information Date & Time Provider Department Dept. Phone Encounter #  
 7/24/2018  2:45 PM Cory Mcdonoughyasmine [facility] [phone] [id number] Follow-up Instructions Return in about 3 months (around 10/24/2018). Upcoming Health Maintenance Date Due  
 MEDICARE YEARLY EXAM 3/14/2018 Influenza Age 5 to Adult 8/1/2018 GLAUCOMA SCREENING Q2Y 1/6/2019 DTaP/Tdap/Td series (2 - Td) 2/6/2027 Allergies as of 7/24/2018  Review Complete On: 7/24/2018 By: Maco Dobson MD  
  
 Severity Noted Reaction Type Reactions Aspirin  02/03/2014    Other (comments) Swelling shut of eyelids Macrobid [Nitrofurantoin Monohyd/m-cryst]  02/08/2010    Unknown (comments) Pcn [Penicillins]  02/08/2010    Hives Current Immunizations  Reviewed on 10/10/2017 Name Date Influenza High Dose Vaccine PF 10/10/2017, 11/1/2016, 10/14/2015, 9/29/2014, 10/3/2013 Influenza Vaccine Split 10/22/2012, 10/11/2011, 10/13/2010 Pneumococcal Conjugate (PCV-13) 10/14/2015 Pneumococcal Vaccine (Unspecified Type) 1/1/2005 Zoster 12/15/2011 Not reviewed this visit You Were Diagnosed With   
  
 Codes Comments PAD (peripheral artery disease) (HCC)    -  Primary ICD-10-CM: I73.9 ICD-9-CM: 443.9 Hypothyroidism, unspecified type     ICD-10-CM: E03.9 ICD-9-CM: 244.9 Essential hypertension     ICD-10-CM: I10 
ICD-9-CM: 401.9 Restless leg syndrome     ICD-10-CM: G25.81 ICD-9-CM: 333.94 Osteoarthritis of both hips, unspecified osteoarthritis type     ICD-10-CM: M16.0 ICD-9-CM: 715.95 Chronic UTI     ICD-10-CM: N39.0 ICD-9-CM: 599.0 Anemia, unspecified type     ICD-10-CM: D64.9 ICD-9-CM: 285.9  IGT (impaired glucose tolerance)     ICD-10-CM: R73.02 
 ICD-9-CM: 790.22 Abdominal fullness     ICD-10-CM: R19.8 ICD-9-CM: 585. 9 Vitals BP Pulse Temp Resp Height(growth percentile) Weight(growth percentile) 94/61 (BP 1 Location: Left arm, BP Patient Position: Sitting) 65 97.7 °F (36.5 °C) (Oral) 16 5' 5\" (1.651 m) 170 lb (77.1 kg) SpO2 BMI OB Status Smoking Status 93% 28.29 kg/m2 Postmenopausal Former Smoker Vitals History BMI and BSA Data Body Mass Index Body Surface Area  
 28.29 kg/m 2 1.88 m 2 Preferred Pharmacy Pharmacy Name Phone [address], [address] [place] [address] [place] [phone] Your Updated Medication List  
  
   
This list is accurate as of 7/24/18  3:45 PM.  Always use your most recent med list.  
  
  
  
  
 [facility] Generic drug:  Lancets AS DIRECTED CRANBERRY CONCENTRATE PO Take  by mouth. cyclobenzaprine 10 mg tablet Commonly known as:  FLEXERIL  
TAKE 1 TABLET 3 TIMES DAILY AS NEEDED FOR MUSCLE SPASMS. enalapril-hydroCHLOROthiazide 5-12.5 mg tablet Commonly known as:  VASERETIC  
TAKE 1 TABLET BY MOUTH DAILY. ferrous sulfate 325 mg (65 mg iron) tablet Take  by mouth two (2) times a day.  
  
 gabapentin 300 mg capsule Commonly known as:  NEURONTIN  
TAKE 1 CAPSULE AT BEDTIME  
  
 latanoprost 0.005 % ophthalmic solution Commonly known as:  Ike Slimmer Administer 1 Drop to both eyes nightly. levothyroxine 100 mcg tablet Commonly known as:  SYNTHROID  
TAKE 1 TABLET DAILY BEFORE BREAKFAST. lovastatin 40 mg tablet Commonly known as:  MEVACOR  
TAKE 1 TABLET EVERY EVENING TO LOWER CHOLESTEROL MULTIVITAMIN PO Take  by mouth. omeprazole 20 mg capsule Commonly known as:  PRILOSEC  
TAKE 1 CAPSULE DAILY. STOOL SOFTENER PO Take  by mouth.  
  
 verapamil  mg CR tablet Commonly known as:  CALAN-SR  
TAKE 1 TABLET BY MOUTH DAILY. VITAMIN C 500 mg tablet Generic drug:  ascorbic acid (vitamin C) Take  by mouth. VITAMIN D3 1,000 unit tablet Generic drug:  cholecalciferol Take  by mouth daily. Prescriptions Sent to Pharmacy Refills  
 gabapentin (NEURONTIN) 300 mg capsule 0 Sig: TAKE 1 CAPSULE AT BEDTIME Class: Normal  
 Pharmacy: [address], [address] [facility] Severe Ph #: [phone] We Performed the Following CBC WITH AUTOMATED DIFF [43359 CPT(R)] METABOLIC PANEL, COMPREHENSIVE [94224 CPT(R)] TSH 3RD GENERATION [78139 CPT(R)] Follow-up Instructions Return in about 3 months (around 10/24/2018). To-Do List   
 07/24/2018 Imaging:  US ABD COMP Patient Instructions Constipation: Care Instructions Your Care Instructions Constipation means that you have a hard time passing stools (bowel movements). People pass stools from 3 times a day to once every 3 days. What is normal for you may be different. Constipation may occur with pain in the rectum and cramping. The pain may get worse when you try to pass stools. Sometimes there are small amounts of bright red blood on toilet paper or the surface of stools. This is because of enlarged veins near the rectum (hemorrhoids). A few changes in your diet and lifestyle may help you avoid ongoing constipation. Your doctor may also prescribe medicine to help loosen your stool. Some medicines can cause constipation. These include pain medicines and antidepressants. Tell your doctor about all the medicines you take. Your doctor may want to make a medicine change to ease your symptoms. Follow-up care is a key part of your treatment and safety. Be sure to make and go to all appointments, and call your doctor if you are having problems. It's also a good idea to know your test results and keep a list of the medicines you take. How can you care for yourself at home? · Drink plenty of fluids, enough so that your urine is light yellow or clear like water. If you have kidney, heart, or liver disease and have to limit fluids, talk with your doctor before you increase the amount of fluids you drink. · Include high-fiber foods in your diet each day. These include fruits, vegetables, beans, and whole grains. · Get at least 30 minutes of exercise on most days of the week. Walking is a good choice. You also may want to do other activities, such as running, swimming, cycling, or playing tennis or team sports. · Take a fiber supplement, such as Citrucel or Metamucil, every day. Read and follow all instructions on the label. · Schedule time each day for a bowel movement. A daily routine may help. Take your time having your bowel movement. · Support your feet with a small step stool when you sit on the toilet. This helps flex your hips and places your pelvis in a squatting position. · Your doctor may recommend an over-the-counter laxative to relieve your constipation. Examples are Milk of Magnesia and MiraLax. Read and follow all instructions on the label. Do not use laxatives on a long-term basis. When should you call for help? Call your doctor now or seek immediate medical care if: 
  · You have new or worse belly pain.  
  · You have new or worse nausea or vomiting.  
  · You have blood in your stools.  
 Watch closely for changes in your health, and be sure to contact your doctor if: 
  · Your constipation is getting worse.  
  · You do not get better as expected. Where can you learn more? Go to http://catracho-reynaldo.info/. Enter 21 [phone] in the search box to learn more about \"Constipation: Care Instructions. \" Current as of: November 20, 2017 Content Version: 11.7 © 3858-3760 [employer].  Care instructions adapted under license by [employer] (which disclaims liability or warranty for this information). If you have questions about a medical condition or this instruction, always ask your healthcare professional. [address] any warranty or liability for your use of this information. Introducing [facility] HE[facility]! [facility] introduces Makeblock patient portal. Now you can access parts of your medical record, email your doctor's office, and request medication refills online. 1. In your internet browser, go to [email or web address] 2. Click on the First Time User? Click Here link in the Sign In box. You will see the New Member Sign Up page. 3. Enter your Makeblock Access Code exactly as it appears below. You will not need to use this code after youve completed the sign-up process. If you do not sign up before the expiration date, you must request a new code. · Makeblock Access Code: [email or web address] Expires: 10/22/2018  3:45 PM 
 
4. Enter the last four digits of your Social Security Number (xxxx) and Date of Birth (mm/dd/yyyy) as indicated and click Submit. You will be taken to the next sign-up page. 5. Create a Makeblock ID. This will be your Makeblock login ID and cannot be changed, so think of one that is secure and easy to remember. 6. Create a Makeblock password. You can change your password at any time. 7. Enter your Password Reset Question and Answer. This can be used at a later time if you forget your password. 8. Enter your e-mail address. You will receive e-mail notification when new information is available in [address] 9. Click Sign Up. You can now view and download portions of your medical record. 10. Click the Download Summary menu link to download a portable copy of your medical information. If you have questions, please visit the Frequently Asked Questions section of the Makeblock website. Remember, Makeblock is NOT to be used for urgent needs. For medical emergencies, dial 911. Now available from your iPhone and Android! Please provide this summary of care documentation to your next provider. Your primary care clinician is listed as Malu Quinn. If you have any questions after today's visit, please call [phone].

## 2018-07-25 ENCOUNTER — HOSPITAL ENCOUNTER (OUTPATIENT)
Dept: ULTRASOUND IMAGING | Age: 83
Discharge: HOME | End: 2018-07-25
Attending: INTERNAL MEDICINE
Payer: MEDICARE

## 2018-07-25 DIAGNOSIS — R19.8 ABDOMINAL FULLNESS: ICD-10-CM

## 2018-07-25 LAB
ALBUMIN SERPL-MCNC: 4.2 G/DL (ref 3.5–4.7)
ALBUMIN/GLOB SERPL: 1.8 {RATIO} (ref 1.2–2.2)
ALP SERPL-CCNC: 114 IU/L (ref 39–117)
ALT SERPL-CCNC: 9 IU/L (ref 0–32)
AST SERPL-CCNC: 16 IU/L (ref 0–40)
BASOPHILS # BLD AUTO: 0.1 X10E3/UL (ref 0–0.2)
BASOPHILS NFR BLD AUTO: 2 %
BILIRUB SERPL-MCNC: 0.3 MG/DL (ref 0–1.2)
BUN SERPL-MCNC: 22 MG/DL (ref 8–27)
BUN/CREAT SERPL: 15 (ref 12–28)
CALCIUM SERPL-MCNC: 9.3 MG/DL (ref 8.7–10.3)
CHLORIDE SERPL-SCNC: 104 MMOL/L (ref 96–106)
CO2 SERPL-SCNC: 25 MMOL/L (ref 20–29)
CREAT SERPL-MCNC: 1.43 MG/DL (ref 0.57–1)
EOSINOPHIL # BLD AUTO: 1.1 X10E3/UL (ref 0–0.4)
EOSINOPHIL NFR BLD AUTO: 16 %
ERYTHROCYTE [DISTWIDTH] IN BLOOD BY AUTOMATED COUNT: 13.8 % (ref 12.3–15.4)
GLOBULIN SER CALC-MCNC: 2.4 G/DL (ref 1.5–4.5)
GLUCOSE SERPL-MCNC: 101 MG/DL (ref 65–99)
HCT VFR BLD AUTO: 31.9 % (ref 34–46.6)
HGB BLD-MCNC: 10.5 G/DL (ref 11.1–15.9)
IMM GRANULOCYTES # BLD: 0 X10E3/UL (ref 0–0.1)
IMM GRANULOCYTES NFR BLD: 0 %
INTERPRETATION: NORMAL
LYMPHOCYTES # BLD AUTO: 1.8 X10E3/UL (ref 0.7–3.1)
LYMPHOCYTES NFR BLD AUTO: 25 %
MCH RBC QN AUTO: 30.8 PG (ref 26.6–33)
MCHC RBC AUTO-ENTMCNC: 32.9 G/DL (ref 31.5–35.7)
MCV RBC AUTO: 94 FL (ref 79–97)
MONOCYTES # BLD AUTO: 0.6 X10E3/UL (ref 0.1–0.9)
MONOCYTES NFR BLD AUTO: 9 %
NEUTROPHILS # BLD AUTO: 3.5 X10E3/UL (ref 1.4–7)
NEUTROPHILS NFR BLD AUTO: 48 %
PLATELET # BLD AUTO: 235 X10E3/UL (ref 150–379)
POTASSIUM SERPL-SCNC: 4.5 MMOL/L (ref 3.5–5.2)
PROT SERPL-MCNC: 6.6 G/DL (ref 6–8.5)
RBC # BLD AUTO: 3.41 X10E6/UL (ref 3.77–5.28)
SODIUM SERPL-SCNC: 145 MMOL/L (ref 134–144)
TSH SERPL DL<=0.005 MIU/L-ACNC: 0.16 UIU/ML (ref 0.45–4.5)
WBC # BLD AUTO: 7.2 X10E3/UL (ref 3.4–10.8)

## 2018-07-25 PROCEDURE — 76700 US EXAM ABDOM COMPLETE: CPT

## 2018-07-25 NOTE — PROGRESS NOTES
Labs show a decrease in kidney function. Please stop your enalapril-HCTZ and return for repeat labs and BP follow up in 1 week.

## 2018-07-25 NOTE — PROGRESS NOTES
This is a Subsequent Medicare Annual Wellness Exam (AWV) (Performed 12 months after IPPE or effective date of Medicare Part B enrollment)    I have reviewed the patient's medical history in detail and updated the computerized patient record. History     Past Medical History:   Diagnosis Date    Arthritis     hands/low back    Diabetes ([address]     borderline    GERD (gastroesophageal reflux disease)     Glaucoma     Hypercholesterolemia     Hypertension     Ill-defined condition     borderline anemia    Ill-defined condition     bladder infections    Impaired glucose tolerance     Menopause     Thyroid disease     hypothyroidism      Past Surgical History:   Procedure Laterality Date    HX BREAST BIOPSY Bilateral 1990    benign    HX CATARACT REMOVAL      bilateral    HX DILATION AND CURETTAGE      HX ORTHOPAEDIC      carpal tunnel release -bilateral    HX ROTATOR CUFF REPAIR Right      Current Outpatient Prescriptions   Medication Sig Dispense Refill    gabapentin (NEURONTIN) 300 mg capsule TAKE 1 CAPSULE AT BEDTIME 90 Cap 0    verapamil ER (CALAN-SR) 240 mg CR tablet TAKE 1 TABLET BY MOUTH DAILY. 90 Tab 3    enalapril-hydroCHLOROthiazide (VASERETIC) 5-12.5 mg tablet TAKE 1 TABLET BY MOUTH DAILY. 90 Tab 3    lovastatin (MEVACOR) 40 mg tablet TAKE 1 TABLET EVERY EVENING TO LOWER CHOLESTEROL 90 Tab 3    cyclobenzaprine (FLEXERIL) 10 mg tablet TAKE 1 TABLET 3 TIMES DAILY AS NEEDED FOR MUSCLE SPASMS. 30 Tab 0    omeprazole (PRILOSEC) 20 mg capsule TAKE 1 CAPSULE DAILY. 90 Cap 3    ACCU-CHEK MULTICLIX LANCET misc AS DIRECTED 100 Each PRN    ferrous sulfate 325 mg (65 mg iron) tablet Take  by mouth two (2) times a day.  levothyroxine (SYNTHROID) 100 mcg tablet TAKE 1 TABLET DAILY BEFORE BREAKFAST. 90 Tab 1    latanoprost (XALATAN) 0.005 % ophthalmic solution Administer 1 Drop to both eyes nightly.  DOCUSATE CALCIUM (STOOL SOFTENER PO) Take  by mouth.       ASCORBIC ACID/VITAMIN E (CRANBERRY CONCENTRATE PO) Take  by mouth.  ascorbic acid (VITAMIN C) 500 mg tablet Take  by mouth.  cholecalciferol, vitamin d3, (VITAMIN D) 1,000 unit tablet Take  by mouth daily.  MULTIVITAMINS (MULTIVITAMIN PO) Take  by mouth. Allergies   Allergen Reactions    Aspirin Other (comments)     Swelling shut of eyelids    Macrobid [Nitrofurantoin Monohyd/M-Cryst] Unknown (comments)    Pcn [Penicillins] Hives     Family History   Problem Relation Age of Onset    Coronary Artery Disease Other      mother  of MI age 80, brother  age 72 of MI, sister has hear problems    Breast Cancer Sister 48    Heart Attack Mother     Other Father      pneumonia    Alzheimer Brother     Cancer Sister     Cancer Sister     Heart Attack Brother     Suicide Brother      Social History   Substance Use Topics    Smoking status: Former Smoker     Packs/day: 1.00     Years: 15.00     Types: Cigarettes     Quit date: 1995    Smokeless tobacco: Never Used    Alcohol use 0.6 oz/week     1 Glasses of wine, 0 Standard drinks or equivalent per week      Comment: Rare--maybe once a month     Patient Active Problem List    Diagnosis    Osteoarthritis of hip     S/p 1 replacement      [facility]per[facility]) [facility]    Anemia     Post op after THR      Chronic UTI     Previously on antibiotic prophylaxis      Venous (peripheral) insufficiency     S/p GSV procedure by Dr. Kami Hughes. Not using compression stockings.  Hypothyroid    Hypertension    Arthritis     Bilateral knees and shoulders      Hyperlipidemia    IGT (impaired glucose tolerance)    Restless leg syndrome       Depression Risk Factor Screening:     PHQ over the last two weeks 2018   Little interest or pleasure in doing things Not at all   Feeling down, depressed, irritable, or hopeless Not at all   Total Score PHQ 2 0     Alcohol Risk Factor Screening: You do not drink alcohol or very rarely.     Functional Ability and Level of Safety:   Hearing Loss  Hearing is good. Activities of Daily Living  The home contains: no safety equipment. Patient does total self care    Fall Risk  Fall Risk Assessment, last 12 mths 7/24/2018   Able to walk? Yes   Fall in past 12 months? Yes   Fall with injury? No   Number of falls in past 12 months 1   Fall Risk Score 1       Abuse Screen  Patient is not abused    Cognitive Screening   Evaluation of Cognitive Function:  Has your family/caregiver stated any concerns about your memory: no      Patient Care Team   Patient Care Team:  Dawit Singh MD as PCP - General (Internal Medicine)  Griselda Guardado MD as Physician (Cardiology)  Margarito Finley MD as Physician (Cardiology)  Sue Vega, RN as Ambulatory Care Navigator    Assessment/Plan   Education and counseling provided:  Are appropriate based on today's review and evaluation  Pneumococcal Vaccine  Influenza Vaccine  Appropriate cancer screening tests    Diagnoses and all orders for this visit:    1. Medicare annual wellness visit, subsequent    Other orders  -     CKD REPORT        Health Maintenance Due   Topic Date Due    MEDICARE YEARLY EXAM  03/14/2018         HPI:  Zelda Morin also presents for follow up of chronic conditions. Patient Active Problem List    Diagnosis    Osteoarthritis of hip     S/p 1 replacement      PAD (peripheral artery disease) ([facility] [address] - denies any claudication, denies dizziness or vertigo or visual disturbances.  Anemia     Post op after THR. Unknown if it is resolved at this time. Patient denies any lightheaded or syncopal episodes.  Chronic UTI     Previously on antibiotic prophylaxis. Stopped about 1 month ago and feeling okay.  Venous (peripheral) insufficiency     S/p GSV procedure by Dr. Kathryn Sotelo. Not using compression stockings.  Hypothyroid - No hair loss, no constipation, no dry skin or brittle nails, no palpitations.   Taking medication each morning on an empty stomach.  Hypertension -No home monitoring. Compliant with medication. No shortness of breath, no chest pain, no vision change, no headache, no lower extremity edema.  Arthritis     Bilateral knees and shoulders -everything feels stiff from time to time however lately she notes abdominal fullness and pain when she sits up first thing in the morning. She states that the pain that starts at her ribs and extends down to her pelvis bilaterally. It is not relieved by having a bowel movement, it does not make her feel nauseous. It goes away after she gets up and gets moving. No weight gain.  Hyperlipidemia - Takes medication at night as directed, no muscle aches. Tries to watch diet.  IGT (impaired glucose tolerance) -  No polyuria/polydipsia. No unexplained weight loss. No change in vision.  Restless leg syndrome -would like to restart gabapentin as legs have been hurting her whenever she lays down to sleep at night. See Past Medical History, Surgical History, Social History, Medications, and Allergies documented above. ROS:  ROS negative except as per HPI. PE:  Visit Vitals    BP 94/61 (BP 1 Location: Left arm, BP Patient Position: Sitting)    Pulse 65    Temp 97.7 °F (36.5 °C) (Oral)    Resp 16    Ht 5' 5\" (1.651 m)    Wt 170 lb (77.1 kg)    SpO2 93%    BMI 28.29 kg/m2     Gen: alert, oriented, no acute distress  Head: normocephalic, atraumatic  Ears: external auditory canals clear, TMs without erythema or effusion  Eyes: pupils equal round reactive to light, sclera clear, conjunctiva clear  Oral: moist mucus membranes, no oral lesions, no pharyngeal inflammation or exudate  Neck: symmetric normal sized thyroid, no carotid bruits, no jugular vein distention  Resp: no increase work of breathing, lungs clear to ausculation bilaterally, no wheezing, rales or rhonchi  CV: S1, S2 normal.  No murmurs, rubs, or gallops.     Abd: soft, left upper quadrant tender, not distended. No hepatosplenomegaly. Normal bowel sounds. Neuro: cranial nerves intact, normal strength and movement in all extremities, reflexes and sensation intact and symmetric. Skin: no lesion or rash  Extremities: no cyanosis or edema      Assessment/Plan:      2. PAD (peripheral artery disease) (HCC)  Asymptomatic on statin. ASA  therapy not prescibed for medical reasons. 3. Abdominal fullness  Concerning symptom of abdominal pain and fullness in the mornings coupled with weight loss. Will get imaging to rule out any occult malignancy or ascites. She does have some constipation which may be the culprit. Discussed how to treat constipation with diet and over-the-counter agents. - US ABD COMP; Future    4. Hypothyroidism, unspecified type  No change pending lab work. 5. Essential hypertension  At goal on current medications. - CBC WITH AUTOMATED DIFF  - METABOLIC PANEL, COMPREHENSIVE  - TSH 3RD GENERATION    6. Restless leg syndrome  Restart gabapentin, cautioned about oversedation.  - gabapentin (NEURONTIN) 300 mg capsule; TAKE 1 CAPSULE AT BEDTIME  Dispense: 90 Cap; Refill: 0    7. Osteoarthritis of both hips, unspecified osteoarthritis type  Doing better since hip replacement. 8. Chronic UTI  Stable off prophylactic antibiotics. 9. Anemia, unspecified type  We will repeat hemoglobin today. Asymptomatic. 10. IGT (impaired glucose tolerance)  Asymptomatic. No change pending labs.       Orders Placed This Encounter    US ABD COMP     Standing Status:   Future     Number of Occurrences:   1     Standing Expiration Date:   8/24/2019    CBC WITH AUTOMATED DIFF    METABOLIC PANEL, COMPREHENSIVE    TSH 3RD GENERATION    CKD REPORT    gabapentin (NEURONTIN) 300 mg capsule     Sig: TAKE 1 CAPSULE AT BEDTIME     Dispense:  90 Cap     Refill:  0       Medications Discontinued During This Encounter   Medication Reason    omeprazole (PRILOSEC) 20 mg capsule Duplicate Order    DULoxetine (CYMBALTA) 30 mg capsule Not A Current Medication    meclizine (ANTIVERT) 12.5 mg tablet Not A Current Medication    meloxicam (MOBIC) 7.5 mg tablet Not A Current Medication    nitrofurantoin (MACRODANTIN) 50 mg capsule Not A Current Medication    trimethoprim-sulfamethoxazole (BACTRIM, SEPTRA)  mg per tablet Not A Current Medication       Current Outpatient Prescriptions   Medication Sig Dispense Refill    gabapentin (NEURONTIN) 300 mg capsule TAKE 1 CAPSULE AT BEDTIME 90 Cap 0    verapamil ER (CALAN-SR) 240 mg CR tablet TAKE 1 TABLET BY MOUTH DAILY. 90 Tab 3    enalapril-hydroCHLOROthiazide (VASERETIC) 5-12.5 mg tablet TAKE 1 TABLET BY MOUTH DAILY. 90 Tab 3    lovastatin (MEVACOR) 40 mg tablet TAKE 1 TABLET EVERY EVENING TO LOWER CHOLESTEROL 90 Tab 3    cyclobenzaprine (FLEXERIL) 10 mg tablet TAKE 1 TABLET 3 TIMES DAILY AS NEEDED FOR MUSCLE SPASMS. 30 Tab 0    omeprazole (PRILOSEC) 20 mg capsule TAKE 1 CAPSULE DAILY. 90 Cap 3    ACCU-CHEK MULTICLIX LANCET misc AS DIRECTED 100 Each PRN    ferrous sulfate 325 mg (65 mg iron) tablet Take  by mouth two (2) times a day.  levothyroxine (SYNTHROID) 100 mcg tablet TAKE 1 TABLET DAILY BEFORE BREAKFAST. 90 Tab 1    latanoprost (XALATAN) 0.005 % ophthalmic solution Administer 1 Drop to both eyes nightly.  DOCUSATE CALCIUM (STOOL SOFTENER PO) Take  by mouth.  ASCORBIC ACID/VITAMIN E (CRANBERRY CONCENTRATE PO) Take  by mouth.  ascorbic acid (VITAMIN C) 500 mg tablet Take  by mouth.  cholecalciferol, vitamin d3, (VITAMIN D) 1,000 unit tablet Take  by mouth daily.  MULTIVITAMINS (MULTIVITAMIN PO) Take  by mouth. Recommended healthy diet low in carbohydrates, fats, sodium and cholesterol. Recommended regular cardiovascular exercise 3-6 times per week for 30-60 minutes daily. Verbal and written instructions (see AVS) provided. Patient expresses understanding of diagnosis and treatment plan.

## 2018-07-27 NOTE — PROGRESS NOTES
Pt notified per Dr. Komal Freeman,  labs show a decrease in kidney function. Please stop your enalapril-HCTZ and return for repeat labs and BP follow up in 1 week.   Pt verbalized understanding of instructions and will come in next Friday for labs and BP check

## 2018-07-27 NOTE — PROGRESS NOTES
Pt notified per Dr. Tina Sloan abdomen ultrasound looks ok. Nothing to explain bloated heavy feeling. Pt verbalized understanding.

## 2018-08-03 ENCOUNTER — CLINICAL SUPPORT (OUTPATIENT)
Dept: FAMILY MEDICINE CLINIC | Age: 83
End: 2018-08-03

## 2018-08-03 VITALS
OXYGEN SATURATION: 94 % | HEART RATE: 69 BPM | BODY MASS INDEX: 29.49 KG/M2 | RESPIRATION RATE: 20 BRPM | TEMPERATURE: 98.2 F | WEIGHT: 177 LBS | DIASTOLIC BLOOD PRESSURE: 73 MMHG | SYSTOLIC BLOOD PRESSURE: 167 MMHG | HEIGHT: 65 IN

## 2018-08-03 DIAGNOSIS — R89.9 ABNORMAL LABORATORY TEST RESULT: Primary | ICD-10-CM

## 2018-08-03 NOTE — PROGRESS NOTES
MsEnrique Donnell Aguilar here for blood pressure check per Dr. Venita Nesbitt.     BMP ordered per Dr. Shalini Nesbitt said to stay off of enalapril- hctz

## 2018-08-03 NOTE — MR AVS SNAPSHOT
Анна Etlan 
 
 
 [address], [place] [address] 
[phone] Patient: Makayla Moreno MRN:  [id number] Visit Information Date & Time Provider Department Dept. Phone Encounter #  
 8/3/2018  1:35 PM Simin Rodriguez [facility] [phone] [id number] Your Appointments 10/25/2018  2:00 PM  
ROUTINE CARE with MD Noa Rodriguez [address] ([facility]) Appt Note: 3 month f/u test bp  
 [address], [address] [facility] [address]  
[phone]  
  
   
 [address], [place] [address] [address] Upcoming Health Maintenance Date Due Influenza Age 5 to Adult 8/1/2018 GLAUCOMA SCREENING Q2Y 1/6/2019 MEDICARE YEARLY EXAM 7/25/2019 DTaP/Tdap/Td series (2 - Td) 2/6/2027 Allergies as of 8/3/2018  Review Complete On: 8/3/2018 By: Leon Adler Severity Noted Reaction Type Reactions Aspirin  02/03/2014    Other (comments) Swelling shut of eyelids Macrobid [Nitrofurantoin Monohyd/m-cryst]  02/08/2010    Unknown (comments) Pcn [Penicillins]  02/08/2010    Hives Current Immunizations  Reviewed on 10/10/2017 Name Date Influenza High Dose Vaccine PF 10/10/2017, 11/1/2016, 10/14/2015, 9/29/2014, 10/3/2013 Influenza Vaccine Split 10/22/2012, 10/11/2011, 10/13/2010 Pneumococcal Conjugate (PCV-13) 10/14/2015 Pneumococcal Vaccine (Unspecified Type) 1/1/2005 Zoster 12/15/2011 Not reviewed this visit You Were Diagnosed With   
  
 Codes Comments Abnormal laboratory test result    -  Primary ICD-10-CM: R89.9 ICD-9-CM: 796.4 Vitals BP Pulse Temp Resp Height(growth percentile) Weight(growth percentile) 167/73 (BP 1 Location: Right arm, BP Patient Position: Sitting) 69 98.2 °F (36.8 °C) (Oral) 20 5' 5\" (1.651 m) 177 lb (80.3 kg) SpO2 BMI OB Status Smoking Status 94% 29.45 kg/m2 Postmenopausal Former Smoker Vitals History BMI and BSA Data Body Mass Index Body Surface Area  
 29.45 kg/m 2 1.92 m 2 Preferred Pharmacy Pharmacy Name Phone [address], [place] [facility] [phone] Your Updated Medication List  
  
   
This list is accurate as of 8/3/18  1:42 PM.  Always use your most recent med list.  
  
  
  
  
 [address] [facility] Generic drug:  Lancets AS DIRECTED CRANBERRY CONCENTRATE PO Take  by mouth. cyclobenzaprine 10 mg tablet Commonly known as:  FLEXERIL  
TAKE 1 TABLET 3 TIMES DAILY AS NEEDED FOR MUSCLE SPASMS. enalapril-hydroCHLOROthiazide 5-12.5 mg tablet Commonly known as:  VASERETIC  
TAKE 1 TABLET BY MOUTH DAILY. ferrous sulfate 325 mg (65 mg iron) tablet Take  by mouth two (2) times a day.  
  
 gabapentin 300 mg capsule Commonly known as:  NEURONTIN  
TAKE 1 CAPSULE AT BEDTIME  
  
 latanoprost 0.005 % ophthalmic solution Commonly known as:  Kenith Standing Administer 1 Drop to both eyes nightly. levothyroxine 100 mcg tablet Commonly known as:  SYNTHROID  
TAKE 1 TABLET DAILY BEFORE BREAKFAST. lovastatin 40 mg tablet Commonly known as:  MEVACOR  
TAKE 1 TABLET EVERY EVENING TO LOWER CHOLESTEROL MULTIVITAMIN PO Take  by mouth. omeprazole 20 mg capsule Commonly known as:  PRILOSEC  
TAKE 1 CAPSULE DAILY. STOOL SOFTENER PO Take  by mouth.  
  
 verapamil  mg CR tablet Commonly known as:  CALAN-SR  
TAKE 1 TABLET BY MOUTH DAILY. VITAMIN C 500 mg tablet Generic drug:  ascorbic acid (vitamin C) Take  by mouth. VITAMIN D3 1,000 unit tablet Generic drug:  cholecalciferol Take  by mouth daily. We Performed the Following METABOLIC PANEL, BASIC [20149 CPT(R)] Introducing [facility]!    
 [employer] introduces [employer] patient portal. Now you can access parts of your medical record, email your doctor's office, and request medication refills online. 1. In your internet browser, go to [email or web address] 2. Click on the First Time User? Click Here link in the Sign In box. You will see the New Member Sign Up page. 3. Enter your [employer] Access Code exactly as it appears below. You will not need to use this code after youve completed the sign-up process. If you do not sign up before the expiration date, you must request a new code. · [employer] Access Code: [email or web address] Expires: 10/22/2018  3:45 PM 
 
4. Enter the last four digits of your Social Security Number (xxxx) and Date of Birth (mm/dd/yyyy) as indicated and click Submit. You will be taken to the next sign-up page. 5. Create a Metaresolver ID. This will be your Meta[employer]olver login ID and cannot be changed, so think of one that is secure and easy to remember. 6. Create a [employer] password. You can change your password at any time. 7. Enter your Password Reset Question and Answer. This can be used at a later time if you forget your password. 8. Enter your e-mail address. You will receive e-mail notification when new information is available in [address] 9. Click Sign Up. You can now view and download portions of your medical record. 10. Click the Download Summary menu link to download a portable copy of your medical information. If you have questions, please visit the Frequently Asked Questions section of the [employer] website. Remember, [employer] is NOT to be used for urgent needs. For medical emergencies, dial 911. Now available from your iPhone and Android! Please provide this summary of care documentation to your next provider. Your primary care clinician is listed as Arlene Almazan. If you have any questions after today's visit, please call [phone].

## 2018-08-04 LAB
BUN SERPL-MCNC: 16 MG/DL (ref 8–27)
BUN/CREAT SERPL: 18 (ref 12–28)
CALCIUM SERPL-MCNC: 9.2 MG/DL (ref 8.7–10.3)
CHLORIDE SERPL-SCNC: 105 MMOL/L (ref 96–106)
CO2 SERPL-SCNC: 25 MMOL/L (ref 20–29)
CREAT SERPL-MCNC: 0.9 MG/DL (ref 0.57–1)
GLUCOSE SERPL-MCNC: 97 MG/DL (ref 65–99)
INTERPRETATION: NORMAL
POTASSIUM SERPL-SCNC: 4.8 MMOL/L (ref 3.5–5.2)
SODIUM SERPL-SCNC: 143 MMOL/L (ref 134–144)

## 2018-08-07 NOTE — PROGRESS NOTES
Notified pt per Dr. Pike Do repeated labs look good. No medication changes needed. Pt verbalized understanding.

## 2018-08-14 ENCOUNTER — TELEPHONE (OUTPATIENT)
Dept: FAMILY MEDICINE CLINIC | Age: 83
End: 2018-08-14

## 2018-08-15 NOTE — TELEPHONE ENCOUNTER
Patient notified Dr. Lilian Og is out of the office today so we will call her as soon as we hear from Dr. Lilian Og

## 2018-08-17 NOTE — TELEPHONE ENCOUNTER
Double vision is a listed side effect to gabapentin. Suggest that she stop taking this medication. Expect improvement shortly after stopping this medication if there is no improvement off medication would need to refer to my DrEnrique.  As to a alternative medication will leave that to  [address] [place] [address] Drive.

## 2018-08-17 NOTE — TELEPHONE ENCOUNTER
She did stop taking the gabapentin and the double vision is gone but she does need something else for pain.  And is frustrated, but understands I can't do anything to fix it until Dr. Cynthia Salas is back

## 2018-08-21 NOTE — TELEPHONE ENCOUNTER
Which pain is bothering her? Pain in her back when she sits up from sleep in the morning or tingling/burning in legs when she lays down at night. Gabapentin was for the night time leg pain. If it's the back pain we can try PT or lidocaine patches and ES tylenol.

## 2018-08-21 NOTE — TELEPHONE ENCOUNTER
Its for her legs at night- burning and tingling. She says within 2 days after stopping gabapentin her double vision went away. She does not want to take something that gives her double vision.

## 2018-08-22 NOTE — TELEPHONE ENCOUNTER
Notified Ms. Franklin Messing that Dr. Reji Stoner said to try tylenol Pm 1 hour before bedtime. She voiced understanding.

## 2018-09-25 ENCOUNTER — TELEPHONE (OUTPATIENT)
Dept: FAMILY MEDICINE CLINIC | Age: 83
End: 2018-09-25

## 2018-09-25 NOTE — TELEPHONE ENCOUNTER
Just calling to let Dr. David Parada know she had an EKG through [facility] and came back with A-Fib. EKG was done 9/21/18 and report was just sent to her today. She feels okay. I told her I would let Dr. David Parada know. She just wants to know what she needs to do next.

## 2018-09-26 ENCOUNTER — OFFICE VISIT (OUTPATIENT)
Dept: FAMILY MEDICINE CLINIC | Age: 83
End: 2018-09-26

## 2018-09-26 VITALS
SYSTOLIC BLOOD PRESSURE: 138 MMHG | DIASTOLIC BLOOD PRESSURE: 61 MMHG | HEIGHT: 65 IN | BODY MASS INDEX: 28.99 KG/M2 | HEART RATE: 76 BPM | TEMPERATURE: 97.8 F | RESPIRATION RATE: 12 BRPM | WEIGHT: 174 LBS | OXYGEN SATURATION: 95 %

## 2018-09-26 DIAGNOSIS — R94.31 ABNORMAL EKG: Primary | ICD-10-CM

## 2018-09-26 NOTE — PROGRESS NOTES
Subjective:     Bishnu Walton is a 80 y.o. female who presents today with the following:  Chief Complaint   Patient presents with    Abnormal EKG     Patient presents today in follow up from [facility] testing which was done last Thursday. Patient states she was called yesterday and told she had atrial fibrillation. No other information was given and pt was told to follow up with primary doctor. Patient does not have a history of atrial fibrillation. Denies chest pain, palpitations. .  Patient notes that her pulse can get \"kind of crazy\" and then calm down. She states this has been ongoing for a long time but never really notices it in particular. Does have some shortness of breath, this is also ongoing. Patient does have a heart doctor, Dr. Holly Dorado. Last saw  March 2017 for echo and stress test which was normal.   Pt was told to follow up in 1 year but never did. ROS:  Gen: denies fever, chills, fatigue, weight loss, weight gain  HEENT:denies blurry vision, nasal congestion, sore throat  Resp: denies dypsnea, cough, wheezing  CV: denies chest pain, palpitations, lower extremity edema  Abd: denies nausea, vomiting, diarrhea, constipation  Neuro: denies numbness/tingling  Endo: denies polyuria, polydipsia, heat/cold intolerance  Heme: no lymphadenopathy    Allergies   Allergen Reactions    Aspirin Other (comments)     Swelling shut of eyelids    Macrobid [Nitrofurantoin Monohyd/M-Cryst] Unknown (comments)    Pcn [Penicillins] Hives         Current Outpatient Prescriptions:     verapamil ER (CALAN-SR) 240 mg CR tablet, TAKE 1 TABLET BY MOUTH DAILY. , Disp: 90 Tab, Rfl: 3    enalapril-hydroCHLOROthiazide (VASERETIC) 5-12.5 mg tablet, TAKE 1 TABLET BY MOUTH DAILY. , Disp: 90 Tab, Rfl: 3    lovastatin (MEVACOR) 40 mg tablet, TAKE 1 TABLET EVERY EVENING TO LOWER CHOLESTEROL, Disp: 90 Tab, Rfl: 3    cyclobenzaprine (FLEXERIL) 10 mg tablet, TAKE 1 TABLET 3 TIMES DAILY AS NEEDED FOR MUSCLE SPASMS. , Disp: 30 Tab, Rfl: 0    omeprazole (PRILOSEC) 20 mg capsule, TAKE 1 CAPSULE DAILY. , Disp: 90 Cap, Rfl: 3    ACCU-CHEK MULTICLIX LANCET misc, AS DIRECTED, Disp: 100 Each, Rfl: PRN    ferrous sulfate 325 mg (65 mg iron) tablet, Take  by mouth two (2) times a day., Disp: , Rfl:     levothyroxine (SYNTHROID) 100 mcg tablet, TAKE 1 TABLET DAILY BEFORE BREAKFAST., Disp: 90 Tab, Rfl: 1    latanoprost (XALATAN) 0.005 % ophthalmic solution, Administer 1 Drop to both eyes nightly., Disp: , Rfl:     DOCUSATE CALCIUM (STOOL SOFTENER PO), Take  by mouth., Disp: , Rfl:     ASCORBIC ACID/VITAMIN E (CRANBERRY CONCENTRATE PO), Take  by mouth., Disp: , Rfl:     ascorbic acid (VITAMIN C) 500 mg tablet, Take  by mouth., Disp: , Rfl:     cholecalciferol, vitamin d3, (VITAMIN D) 1,000 unit tablet, Take  by mouth daily. , Disp: , Rfl:     MULTIVITAMINS (MULTIVITAMIN PO), Take  by mouth., Disp: , Rfl:     Past Medical History:   Diagnosis Date    Arthritis     hands/low back    Diabetes ([facility] [address]     borderline    GERD (gastroesophageal reflux disease)     Glaucoma     Hypercholesterolemia     Hypertension     Ill-defined condition     borderline anemia    Ill-defined condition     bladder infections    Impaired glucose tolerance     Menopause     Thyroid disease     hypothyroidism       Past Surgical History:   Procedure Laterality Date    HX BREAST BIOPSY Bilateral 1990    benign    HX CATARACT REMOVAL      bilateral    HX DILATION AND CURETTAGE      HX ORTHOPAEDIC      carpal tunnel release -bilateral    HX ROTATOR CUFF REPAIR Right        History   Smoking Status    Former Smoker    Packs/day: 1.00    Years: 15.00    Types: Cigarettes    Quit date: 1/8/1995   Smokeless Tobacco    Never Used       Social History     Social History    Marital status:      Spouse name: N/A    Number of children: N/A    Years of education: N/A     Social History Main Topics    Smoking status: Former Smoker Packs/day: 1.00     Years: 15.00     Types: Cigarettes     Quit date: 1995    Smokeless tobacco: Never Used    Alcohol use 0.6 oz/week     1 Glasses of wine, 0 Standard drinks or equivalent per week      Comment: Rare--maybe once a month    Drug use: No    Sexual activity: Not Asked     Other Topics Concern    None     Social History Narrative    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 Lives with . Family History   Problem Relation Age of Onset    Coronary Artery Disease Other      mother  of MI age 80, brother  age 72 of MI, sister has hear problems    Breast Cancer Sister 48    Heart Attack Mother     Other Father      pneumonia    Alzheimer Brother     Cancer Sister     Cancer Sister     Heart Attack Brother     Suicide Brother          Objective:     Visit Vitals    /61 (BP 1 Location: Left arm, BP Patient Position: Sitting)    Pulse 76    Temp 97.8 °F (36.6 °C) (Oral)    Resp 12    Ht 5' 5\" (1.651 m)    Wt 174 lb (78.9 kg)    SpO2 95%    BMI 28.96 kg/m2     Gen: alert, oriented, no acute distress  Head: normocephalic, atraumatic  Eyes:sclera clear, conjunctiva clear  Oral: moist mucus membranes, no oral lesions, no pharyngeal exudate, no pharyngeal erythema  Neck: symmetric normal sized thyroid, no carotid bruits, no JVD  Resp: Normal work of breathing, lungs CTAB, no w/r/r  CV: S1, S2 normal.  No murmurs, rubs, or gallops. Skin: no rash             Extremities: no edema    EKG: NSR. Occasional ectopic beats. Assessment/ Plan:   Diagnoses and all orders for this visit:    1. Abnormal EKG  -     AMB POC EKG ROUTINE W/ 12 LEADS, INTER & REP     Discussed with patient that today's EKG did not show afib, but I do not have the previous [facility] ekg to review so it's possible that she flits in and out of atrial fibrillation. She voiced understanding of this. She will call cardiology herself and schedule follow up appointment to discuss options.   She understands to go to ER if she has any chest pain, shortness of breath, or palpitations/heart racing sensation. Will come back with her  for her flu vaccine. Verbal and written instructions (see AVS) provided.  Patient expresses understanding of diagnosis and treatment plan. Doron Hinojosa.  Deedee Wood MD

## 2018-10-02 ENCOUNTER — CLINICAL SUPPORT (OUTPATIENT)
Dept: FAMILY MEDICINE CLINIC | Age: 83
End: 2018-10-02

## 2018-10-02 VITALS — TEMPERATURE: 97.7 F

## 2018-10-02 DIAGNOSIS — Z23 ENCOUNTER FOR IMMUNIZATION: ICD-10-CM

## 2018-10-02 NOTE — PATIENT INSTRUCTIONS
Vaccine Information Statement    Influenza (Flu) Vaccine (Inactivated or Recombinant): What you need to know    Many Vaccine Information Statements are available in Irish and other languages. See www.immunize.org/vis  Hojas de Información Sobre Vacunas están disponibles en Español y en muchos otros idiomas. Visite www.immunize.org/vis    1. Why get vaccinated? Influenza (flu) is a contagious disease that spreads around the [place] every year, usually between October and May. Flu is caused by influenza viruses, and is spread mainly by coughing, sneezing, and close contact. Anyone can get flu. Flu strikes suddenly and can last several days. Symptoms vary by age, but can include:   fever/chills   sore throat   muscle aches   fatigue   cough   headache    runny or stuffy nose    Flu can also lead to pneumonia and blood infections, and cause diarrhea and seizures in children. If you have a medical condition, such as heart or lung disease, flu can make it worse. Flu is more dangerous for some people. Infants and young children, people 72years of age and older, pregnant women, and people with certain health conditions or a weakened immune system are at greatest risk. Each year thousands of people in the [facility] die from flu, and many more are hospitalized. Flu vaccine can:   keep you from getting flu,   make flu less severe if you do get it, and   keep you from spreading flu to your family and other people. 2. Inactivated and recombinant flu vaccines    A dose of flu vaccine is recommended every flu season. Children 6 months through 6years of age may need two doses during the same flu season. Everyone else needs only one dose each flu season.        Some inactivated flu vaccines contain a very small amount of a mercury-based preservative called thimerosal. Studies have not shown thimerosal in vaccines to be harmful, but flu vaccines that do not contain thimerosal are available. There is no live flu virus in flu shots. They cannot cause the flu. There are many flu viruses, and they are always changing. Each year a new flu vaccine is made to protect against three or four viruses that are likely to cause disease in the upcoming flu season. But even when the vaccine doesnt exactly match these viruses, it may still provide some protection    Flu vaccine cannot prevent:   flu that is caused by a virus not covered by the vaccine, or   illnesses that look like flu but are not. It takes about 2 weeks for protection to develop after vaccination, and protection lasts through the flu season. 3. Some people should not get this vaccine    Tell the person who is giving you the vaccine:     If you have any severe, life-threatening allergies. If you ever had a life-threatening allergic reaction after a dose of flu vaccine, or have a severe allergy to any part of this vaccine, you may be advised not to get vaccinated. Most, but not all, types of flu vaccine contain a small amount of egg protein.  If you ever had Guillain-Barré Syndrome (also called GBS). Some people with a history of GBS should not get this vaccine. This should be discussed with your doctor.  If you are not feeling well. It is usually okay to get flu vaccine when you have a mild illness, but you might be asked to come back when you feel better. 4. Risks of a vaccine reaction    With any medicine, including vaccines, there is a chance of reactions. These are usually mild and go away on their own, but serious reactions are also possible. Most people who get a flu shot do not have any problems with it.      Minor problems following a flu shot include:    soreness, redness, or swelling where the shot was given     hoarseness   sore, red or itchy eyes   cough   fever   aches   headache   itching   fatigue  If these problems occur, they usually begin soon after the shot and last 1 or 2 days. More serious problems following a flu shot can include the following:     There may be a small increased risk of Guillain-Barré Syndrome (GBS) after inactivated flu vaccine. This risk has been estimated at 1 or 2 additional cases per million people vaccinated. This is much lower than the risk of severe complications from flu, which can be prevented by flu vaccine.  Young children who get the flu shot along with pneumococcal vaccine (PCV13) and/or DTaP vaccine at the same time might be slightly more likely to have a seizure caused by fever. Ask your doctor for more information. Tell your doctor if a child who is getting flu vaccine has ever had a seizure. Problems that could happen after any injected vaccine:      People sometimes faint after a medical procedure, including vaccination. Sitting or lying down for about 15 minutes can help prevent fainting, and injuries caused by a fall. Tell your doctor if you feel dizzy, or have vision changes or ringing in the ears.  Some people get severe pain in the shoulder and have difficulty moving the arm where a shot was given. This happens very rarely.  Any medication can cause a severe allergic reaction. Such reactions from a vaccine are very rare, estimated at about 1 in a million doses, and would happen within a few minutes to a few hours after the vaccination. As with any medicine, there is a very remote chance of a vaccine causing a serious injury or death. The safety of vaccines is always being monitored. For more information, visit: www.cdc.gov/vaccinesafety/    5. What if there is a serious reaction? What should I look for?  Look for anything that concerns you, such as signs of a severe allergic reaction, very high fever, or unusual behavior.     Signs of a severe allergic reaction can include hives, swelling of the face and throat, difficulty breathing, a fast heartbeat, dizziness, and weakness - usually within a few minutes to a few hours after the vaccination. What should I do?  If you think it is a severe allergic reaction or other emergency that cant wait, call 9-1-1 and get the person to the nearest hospital. Otherwise, call your doctor.  Reactions should be reported to the Vaccine Adverse Event Reporting System (VAERS). Your doctor should file this report, or you can do it yourself through  the VAERS web site at www.vaers. [facility].gov, or by calling 4-568.201.7660. VAERS does not give medical advice. 6. The National Vaccine Injury Compensation Program    The [facility] Vaccine Injury Compensation Program (VICP) is a federal program that was created to compensate people who may have been injured by certain vaccines. Persons who believe they may have been injured by a vaccine can learn about the program and about filing a claim by calling 4-507.764.8462 or visiting the [employer] website at www.[facility].gov/vaccinecompensation. There is a time limit to file a claim for compensation. 7. How can I learn more?  Ask your healthcare provider. He or she can give you the vaccine package insert or suggest other sources of information.  Call your local or state health department.  Contact the Centers for Disease Control and Prevention (CDC):  - Call 7-736.536.8351 (1-800-CDC-INFO) or  - Visit CDCs website at www.cdc.gov/flu    Vaccine Information Statement   Inactivated Influenza Vaccine   8/7/2015  42 JIGNESH Leone [id number]    Department of Health and Human Services  Centers for Disease Control and Prevention    Office Use Only

## 2018-10-02 NOTE — MR AVS SNAPSHOT
Nikolay Read 
 
 
 [address], [address] [place] [address] [facility] 
[phone] Patient: Karina Alves MRN:  [id number] Visit Information Date & Time Provider Department Dept. Phone Encounter #  
 10/2/2018  3:00 PM [address] [phone] [id number] Your Appointments 10/22/2018  2:45 PM  
ESTABLISHED PATIENT with Bina Reyes MD  
[facility] Appt Note: Dr. Lynne Bell, over a year f/u,jar  
 [address] [facility]  
[phone] [address] [facility]  
  
    
 10/25/2018  2:00 PM  
ROUTINE CARE with MD James Joshi. [address] ([facility] Appt Note: 3 month f/u test bp  
 [address], [address] [facility] [address]  
[phone]  
  
   
 [address], [place] [address] Upcoming Health Maintenance Date Due Shingrix Vaccine Age 50> (1 of 2) 3/5/1982 Influenza Age 5 to Adult 8/1/2018 GLAUCOMA SCREENING Q2Y 1/6/2019 MEDICARE YEARLY EXAM 7/25/2019 DTaP/Tdap/Td series (2 - Td) 2/6/2027 Allergies as of 10/2/2018  Review Complete On: 10/2/2018 By: David Rajput RN Severity Noted Reaction Type Reactions Aspirin  02/03/2014    Other (comments) Swelling shut of eyelids Macrobid [Nitrofurantoin Monohyd/m-cryst]  02/08/2010    Unknown (comments) Pcn [Penicillins]  02/08/2010    Hives Current Immunizations  Reviewed on 10/10/2017 Name Date Influenza High Dose Vaccine PF 10/10/2017, 11/1/2016, 10/14/2015, 9/29/2014, 10/3/2013 Influenza Vaccine (Tri) Adjuvanted 10/2/2018 Influenza Vaccine Split 10/22/2012, 10/11/2011, 10/13/2010 Pneumococcal Conjugate (PCV-13) 10/14/2015 Pneumococcal Vaccine (Unspecified Type) 1/1/2005 Zoster 12/15/2011 Not reviewed this visit You Were Diagnosed With   
  
 Codes Comments Encounter for immunization     ICD-10-CM: B18 ICD-9-CM: V03.89 Vitals Temp OB Status Smoking Status 97.7 °F (36.5 °C) (Oral) Postmenopausal Former Smoker Preferred Pharmacy Pharmacy Name Phone [address], [address] [facility] [place] [phone] Your Updated Medication List  
  
   
This list is accurate as of 10/2/18  3:23 PM.  Always use your most recent med list.  
  
  
  
  
 [address] [facility] Generic drug:  Lancets AS DIRECTED CRANBERRY CONCENTRATE PO Take  by mouth. cyclobenzaprine 10 mg tablet Commonly known as:  FLEXERIL  
TAKE 1 TABLET 3 TIMES DAILY AS NEEDED FOR MUSCLE SPASMS. enalapril-hydroCHLOROthiazide 5-12.5 mg tablet Commonly known as:  VASERETIC  
TAKE 1 TABLET BY MOUTH DAILY. ferrous sulfate 325 mg (65 mg iron) tablet Take  by mouth two (2) times a day. latanoprost 0.005 % ophthalmic solution Commonly known as:  Maksim Nagy Administer 1 Drop to both eyes nightly. levothyroxine 100 mcg tablet Commonly known as:  SYNTHROID  
TAKE 1 TABLET DAILY BEFORE BREAKFAST. lovastatin 40 mg tablet Commonly known as:  MEVACOR  
TAKE 1 TABLET EVERY EVENING TO LOWER CHOLESTEROL MULTIVITAMIN PO Take  by mouth. omeprazole 20 mg capsule Commonly known as:  PRILOSEC  
TAKE 1 CAPSULE DAILY. STOOL SOFTENER PO Take  by mouth.  
  
 verapamil  mg CR tablet Commonly known as:  CALAN-SR  
TAKE 1 TABLET BY MOUTH DAILY. VITAMIN C 500 mg tablet Generic drug:  ascorbic acid (vitamin C) Take  by mouth. VITAMIN D3 1,000 unit tablet Generic drug:  cholecalciferol Take  by mouth daily. We Performed the Following ADMIN INFLUENZA VIRUS VAC [ [facility]] INFLUENZA VACCINE INACTIVATED (IIV), SUBUNIT, ADJUVANTED, IM [id number] CPT(R)] Patient Instructions Vaccine Information Statement Influenza (Flu) Vaccine (Inactivated or Recombinant): What you need to know Many Vaccine Information Statements are available in [place]ish and other languages. See www.immunize.org/vis Hojas de Información Sobre Vacunas están disponibles en Español y en muchos otros idiomas. Visite www.immunize.org/vis 1. Why get vaccinated? Influenza (flu) is a contagious disease that spreads around the [facility] every year, usually between October and May. Flu is caused by influenza viruses, and is spread mainly by coughing, sneezing, and close contact. Anyone can get flu. Flu strikes suddenly and can last several days. Symptoms vary by age, but can include: 
 fever/chills  sore throat  muscle aches  fatigue  cough  headache  runny or stuffy nose Flu can also lead to pneumonia and blood infections, and cause diarrhea and seizures in children. If you have a medical condition, such as heart or lung disease, flu can make it worse. Flu is more dangerous for some people. Infants and young children, people 72years of age and older, pregnant women, and people with certain health conditions or a weakened immune system are at greatest risk. Each year thousands of people in the [facility] die from flu, and many more are hospitalized. Flu vaccine can: 
 keep you from getting flu, 
 make flu less severe if you do get it, and 
 keep you from spreading flu to your family and other people. 2. Inactivated and recombinant flu vaccines A dose of flu vaccine is recommended every flu season. Children 6 months through 6years of age may need two doses during the same flu season. Everyone else needs only one dose each flu season. Some inactivated flu vaccines contain a very small amount of a mercury-based preservative called thimerosal. Studies have not shown thimerosal in vaccines to be harmful, but flu vaccines that do not contain thimerosal are available. There is no live flu virus in flu shots. They cannot cause the flu. There are many flu viruses, and they are always changing. Each year a new flu vaccine is made to protect against three or four viruses that are likely to cause disease in the upcoming flu season. But even when the vaccine doesnt exactly match these viruses, it may still provide some protection Flu vaccine cannot prevent: 
 flu that is caused by a virus not covered by the vaccine, or 
 illnesses that look like flu but are not. It takes about 2 weeks for protection to develop after vaccination, and protection lasts through the flu season. 3. Some people should not get this vaccine Tell the person who is giving you the vaccine:  If you have any severe, life-threatening allergies. If you ever had a life-threatening allergic reaction after a dose of flu vaccine, or have a severe allergy to any part of this vaccine, you may be advised not to get vaccinated. Most, but not all, types of flu vaccine contain a small amount of egg protein.  If you ever had Guillain-Barré Syndrome (also called GBS). Some people with a history of GBS should not get this vaccine. This should be discussed with your doctor.  If you are not feeling well. It is usually okay to get flu vaccine when you have a mild illness, but you might be asked to come back when you feel better. 4. Risks of a vaccine reaction With any medicine, including vaccines, there is a chance of reactions. These are usually mild and go away on their own, but serious reactions are also possible. Most people who get a flu shot do not have any problems with it. Minor problems following a flu shot include:  
 soreness, redness, or swelling where the shot was given  hoarseness  sore, red or itchy eyes  cough  fever  aches  headache  itching  fatigue If these problems occur, they usually begin soon after the shot and last 1 or 2 days. More serious problems following a flu shot can include the following:  There may be a small increased risk of Guillain-Barré Syndrome (GBS) after inactivated flu vaccine. This risk has been estimated at 1 or 2 additional cases per million people vaccinated. This is much lower than the risk of severe complications from flu, which can be prevented by flu vaccine.  Young children who get the flu shot along with pneumococcal vaccine (PCV13) and/or DTaP vaccine at the same time might be slightly more likely to have a seizure caused by fever. Ask your doctor for more information. Tell your doctor if a child who is getting flu vaccine has ever had a seizure. Problems that could happen after any injected vaccine:  People sometimes faint after a medical procedure, including vaccination. Sitting or lying down for about 15 minutes can help prevent fainting, and injuries caused by a fall. Tell your doctor if you feel dizzy, or have vision changes or ringing in the ears.  Some people get severe pain in the shoulder and have difficulty moving the arm where a shot was given. This happens very rarely.  Any medication can cause a severe allergic reaction. Such reactions from a vaccine are very rare, estimated at about 1 in a million doses, and would happen within a few minutes to a few hours after the vaccination. As with any medicine, there is a very remote chance of a vaccine causing a serious injury or death. The safety of vaccines is always being monitored. For more information, visit: www.cdc.gov/vaccinesafety/ 
 
5. What if there is a serious reaction? What should I look for?  Look for anything that concerns you, such as signs of a severe allergic reaction, very high fever, or unusual behavior.  
 
Signs of a severe allergic reaction can include hives, swelling of the face and throat, difficulty breathing, a fast heartbeat, dizziness, and weakness  usually within a few minutes to a few hours after the vaccination. What should I do?  If you think it is a severe allergic reaction or other emergency that cant wait, call 9-1-1 and get the person to the nearest hospital. Otherwise, call your doctor.  Reactions should be reported to the Vaccine Adverse Event Reporting System (VAERS). Your doctor should file this report, or you can do it yourself through  the VAERS web site at www.vaers. [facility].gov, or by calling 3-585.970.1739. VAERS does not give medical advice. 6. The National Vaccine Injury Compensation Program 
 
The [facility] Vaccine Injury Compensation Program (VICP) is a federal program that was created to compensate people who may have been injured by certain vaccines. Persons who believe they may have been injured by a vaccine can learn about the program and about filing a claim by calling 3-619.484.5907 or visiting the [employer] [place] [employer] website at www.[facility].gov/vaccinecompensation. There is a time limit to file a claim for compensation. 7. How can I learn more?  Ask your healthcare provider. He or she can give you the vaccine package insert or suggest other sources of information.  Call your local or state health department.  Contact the Centers for Disease Control and Prevention (CDC): 
- Call 4-247.918.9340 (1-800-CDC-INFO) or 
- Visit CDCs website at www.cdc.gov/flu Vaccine Information Statement Inactivated Influenza Vaccine 8/7/2015 
[address] JIGNESH Henry [id number]-[id number] Department of [facility] and [employer] Centers for Disease Control and Prevention Office Use Only Introducing [facility] HEALTH SERVICES! [facility] introduces [employer] patient portal. Now you can access parts of your medical record, email your doctor's office, and request medication refills online. 1. In your internet browser, go to [email or web address] 2. Click on the First Time User? Click Here link in the Sign In box.  You will see the New Member Sign Up page. 3. Enter your International [employer] Access Code exactly as it appears below. You will not need to use this code after youve completed the sign-up process. If you do not sign up before the expiration date, you must request a new code. · International [employer] Access Code: [email or web address] Expires: 10/22/2018  3:45 PM 
 
4. Enter the last four digits of your Social Security Number (xxxx) and Date of Birth (mm/dd/yyyy) as indicated and click Submit. You will be taken to the next sign-up page. 5. Create a International [employer] ID. This will be your [employer] login ID and cannot be changed, so think of one that is secure and easy to remember. 6. Create a [employer] password. You can change your password at any time. 7. Enter your Password Reset Question and Answer. This can be used at a later time if you forget your password. 8. Enter your e-mail address. You will receive e-mail notification when new information is available in [address] 9. Click Sign Up. You can now view and download portions of your medical record. 10. Click the Download Summary menu link to download a portable copy of your medical information. If you have questions, please visit the Frequently Asked Questions section of the [employer] website. Remember, [employer] is NOT to be used for urgent needs. For medical emergencies, dial 911. Now available from your iPhone and Android! Please provide this summary of care documentation to your next provider. Your primary care clinician is listed as Bernadette Woods. If you have any questions after today's visit, please call [phone].

## 2018-10-02 NOTE — PROGRESS NOTES
Chief Complaint   Patient presents with    Immunization/Injection     flu vaccine     80 y.o.  presents for routine immunization. Denies any symptoms, reactions or allergies that would exclude them from being immunized today. Risks and adverse reactions were discussed and VIS was given to them. All questions were answered.    Visit Vitals    Temp 97.7 °F (36.5 °C) (Oral)

## 2018-10-16 ENCOUNTER — DOCUMENTATION ONLY (OUTPATIENT)
Dept: FAMILY MEDICINE CLINIC | Age: 83
End: 2018-10-16

## 2018-10-16 NOTE — PROGRESS NOTES
Pt called to get appt for today. C/o sob and right scapular pain. She started with sob yesterday that is worse today. Scapular pain began today, no CP. She denies any edema or weight gain. Tried to ask additional questions and pt stated, Abby Horde you going to see me or not. \" I told her that I needed to find out what is going on to see if she needs to go to the ER. She then stated, \"If I go to the emergency room, I'll sit there all day. II'm coming up there and I will sit until someone sees me or I pass out. \" Pt hung up the phone. Tried to call back, but no answer.

## 2018-10-16 NOTE — PROGRESS NOTES
Pt did not show up for apt with Dr Fei Aguillon. I called pt at 10:28 am to see if patient was having trouble coming in or if she was well. Pt explained she was feeling better and if she started to feel worse she would take herself to the ER. Then promptly hung up.  Chasidy Giraldo

## 2018-10-22 ENCOUNTER — OFFICE VISIT (OUTPATIENT)
Dept: CARDIOLOGY CLINIC | Age: 83
End: 2018-10-22

## 2018-10-22 VITALS
WEIGHT: 173.8 LBS | DIASTOLIC BLOOD PRESSURE: 60 MMHG | HEIGHT: 65 IN | HEART RATE: 90 BPM | OXYGEN SATURATION: 98 % | SYSTOLIC BLOOD PRESSURE: 144 MMHG | BODY MASS INDEX: 28.96 KG/M2

## 2018-10-22 DIAGNOSIS — I10 ESSENTIAL HYPERTENSION: ICD-10-CM

## 2018-10-22 DIAGNOSIS — I48.91 ATRIAL FIBRILLATION BY ELECTROCARDIOGRAM (HCC): Primary | ICD-10-CM

## 2018-10-22 DIAGNOSIS — I73.9 PAD (PERIPHERAL ARTERY DISEASE) (HCC): ICD-10-CM

## 2018-10-22 DIAGNOSIS — E78.2 MIXED HYPERLIPIDEMIA: ICD-10-CM

## 2018-10-22 RX ORDER — DILTIAZEM HYDROCHLORIDE 300 MG/1
300 CAPSULE, COATED, EXTENDED RELEASE ORAL DAILY
Qty: 30 CAP | Refills: 3 | Status: SHIPPED | OUTPATIENT
Start: 2018-10-22 | End: 2018-11-14

## 2018-10-22 RX ORDER — MECLIZINE HCL 12.5 MG 12.5 MG/1
TABLET ORAL AS NEEDED
COMMUNITY
Start: 2017-06-08 | End: 2018-10-25

## 2018-10-22 NOTE — PROGRESS NOTES
Yvon Enriquez DNP, ANP-BC  Subjective/HPI:     Adam Morse is a 80 y.o. female is here for Follow-up visit. She reports increasing generalized fatigue weakness and dyspnea on exertion. Discussed with her today new change in EKG showing atrial fibrillation. She brings to the visit a lifeline screen which she had performed September 21, 2018 that also indicated atrial fibrillation. 2017 testing  Nuclear stress test  SPECT images demonstrate a medium, fixed abnormality of moderate degree in the apical region on the stress and rest images with attenuation. Gated SPECT images reveals normal myocardial thickening and wall motion. The left ventricular ejection fraction was calculated to be 83 %. Impression:   Myocardial perfusion imaging is normal. Overall left ventricular systolic function was normal. Compared to the study from 02/19/2014, the current study reveals no significant change. These test results indicate low likelihood for the presence of angiographically significant coronary artery disease.         Echocardiogram:  SUMMARY:  Left ventricle: Systolic function was normal. Ejection fraction was  estimated in the range of 55 % to 60 %. No obvious wall motion  abnormalities identified in the views obtained. There was mild concentric  hypertrophy. Features were consistent with a pseudonormal left ventricular  filling pattern, with concomitant abnormal relaxation and increased  filling pressure (grade 2 diastolic dysfunction). Mitral valve: There was mild annular calcification. There was  mild-moderate thickening. There was a mild prolapse involving the  posterior leaflet. There was mild to moderate regurgitation. Tricuspid valve: There was mild regurgitation. There was mild pulmonary  hypertension.   PCP Provider  Darius Rogers MD  Past Medical History:   Diagnosis Date    Arthritis     hands/low back    Diabetes ([address]     borderline    GERD (gastroesophageal reflux disease)     Glaucoma     Hypercholesterolemia     Hypertension     Ill-defined condition     borderline anemia    Ill-defined condition     bladder infections    Impaired glucose tolerance     Menopause     Thyroid disease     hypothyroidism      Past Surgical History:   Procedure Laterality Date    HX BREAST BIOPSY Bilateral     benign    HX CATARACT REMOVAL      bilateral    HX DILATION AND CURETTAGE      HX ORTHOPAEDIC      carpal tunnel release -bilateral    HX ROTATOR CUFF REPAIR Right      Allergies   Allergen Reactions    Aspirin Other (comments)     Swelling shut of eyelids    Macrobid [Nitrofurantoin Monohyd/M-Cryst] Unknown (comments)    Pcn [Penicillins] Hives      Family History   Problem Relation Age of Onset    Coronary Artery Disease Other         mother  of MI age 80, brother  age 72 of MI, sister has hear problems    Breast Cancer Sister 48    Heart Attack Mother     Other Father         pneumonia    Alzheimer Brother     Cancer Sister     Cancer Sister     Heart Attack Brother     Suicide Brother       Current Outpatient Medications   Medication Sig    meclizine (ANTIVERT) 12.5 mg tablet as needed.  acetaminophen (TYLENOL EXTRA STRENGTH PO) Take  by mouth nightly. 2 TABLETS    apixaban (ELIQUIS) 5 mg tablet Take 1 Tab by mouth two (2) times a day.  dilTIAZem CD (CARDIZEM CD) 300 mg ER capsule Take 1 Cap by mouth daily. Increased 10/22/18 for A fib    lovastatin (MEVACOR) 40 mg tablet TAKE 1 TABLET EVERY EVENING TO LOWER CHOLESTEROL    cyclobenzaprine (FLEXERIL) 10 mg tablet TAKE 1 TABLET 3 TIMES DAILY AS NEEDED FOR MUSCLE SPASMS.  omeprazole (PRILOSEC) 20 mg capsule TAKE 1 CAPSULE DAILY.  ACCU-CHEK MULTICLIX LANCET misc AS DIRECTED    ferrous sulfate 325 mg (65 mg iron) tablet Take  by mouth two (2) times a day.  levothyroxine (SYNTHROID) 100 mcg tablet TAKE 1 TABLET DAILY BEFORE BREAKFAST.     latanoprost (XALATAN) 0.005 % ophthalmic solution Administer 1 Drop to both eyes nightly.  DOCUSATE CALCIUM (STOOL SOFTENER PO) Take  by mouth daily.  ascorbic acid (VITAMIN C) 500 mg tablet Take  by mouth daily.  cholecalciferol, vitamin d3, (VITAMIN D) 1,000 unit tablet Take  by mouth daily.  MULTIVITAMINS (MULTIVITAMIN PO) Take  by mouth.  enalapril-hydroCHLOROthiazide (VASERETIC) 5-12.5 mg tablet TAKE 1 TABLET BY MOUTH DAILY.  ASCORBIC ACID/VITAMIN E (CRANBERRY CONCENTRATE PO) Take  by mouth. No current facility-administered medications for this visit. Vitals:    10/22/18 1443   BP: 144/60   Pulse: 90   SpO2: 98%   Weight: 173 lb 12.8 oz (78.8 kg)   Height: 5' 5\" (1.651 m)     Social History     Socioeconomic History    Marital status:      Spouse name: Not on file    Number of children: Not on file    Years of education: Not on file    Highest education level: Not on file   Social Needs    Financial resource strain: Not on file    Food insecurity - worry: Not on file    Food insecurity - inability: Not on file   [facility] Eli[facility] Borges Urzed[facility] needs - medical: Not on file   [employer] needs - non-medical: Not on file   Occupational History    Not on file   Tobacco Use    Smoking status: Former Smoker     Packs/day: 1.00     Years: 15.00     Pack years: 15.00     Types: Cigarettes     Last attempt to quit: 1995     Years since quittin.8    Smokeless tobacco: Never Used   Substance and Sexual Activity    Alcohol use: Yes     Alcohol/week: 0.6 oz     Types: 1 Glasses of wine per week     Comment: Rare--maybe once a month    Drug use: No    Sexual activity: Not on file   Other Topics Concern    Not on file   Social History Narrative    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 Lives with . I have reviewed the nurses notes, vitals, problem list, allergy list, medical history, family, social history and medications. Review of Symptoms:    General: Pt denies excessive weight gain or loss.  Pt is able to conduct ADL's however experiencing fatigue   HEENT: Denies blurred vision, headaches, epistaxis and difficulty swallowing. Respiratory: Denies shortness of breath, + ROWE, wheezing or stridor. Cardiovascular: Denies precordial pain, palpitations, edema or PND  Gastrointestinal: Denies poor appetite, indigestion, abdominal pain or blood in stool  Musculoskeletal: Denies pain or swelling from muscles or joints  Neurologic: Denies tremor, paresthesias, or sensory motor disturbance  Skin: Denies rash, itching or texture change. Physical Exam:      General: Well developed, in no acute distress, cooperative and alert  HEENT: No carotid bruits, no JVD, trach is midline. Neck Supple, PEERL, EOM intact. Heart: Irregularly irregular.  No murmur  Respiratory: Clear bilaterally x 4, no wheezing or rales  Abdomen:   Soft, non-tender, no masses, bowel sounds are active.   Extremities: Trace dependent ankle nonpitting edema, normal cap refill, no cyanosis, atraumatic. Neuro: A&Ox3, speech clear, gait stable. Skin: Skin color is normal. No rashes or lesions. Non diaphoretic  Vascular: faint bilateral radials     Cardiographics    ECG: A fib   Results for orders placed or performed in visit on 02/03/14   CARDIAC HOLTER MONITOR, 24 HOURS    Narrative    ECG Monitor/24 hours, Complete    Reason for Holter Monitor   PVC's    Heartbeat    Slowest 53  Average 71  Fastest  113    Results:   Underlying Rhythm: Normal sinus rhythm      Atrial Arrhythmias: premature atrial contractions; occasional and  a few short runs of PSVT    AV Conduction: normal    Ventricular Arrhythmias: premature ventricular contractions;  frequent and ventricular couplets and one ventricular triplet    ST Segment Analysis:non-specific changes     Symptom Correlation:  Shortness of breath corresponds to NSR with activity.     Comment:   Normal sinus rhythm with frequent PVC's occasional PAC's, a few  short runs of PSVT     Erwin Shearer MD            Results for orders placed or performed in visit on 12/05/11   AMB POC EKG ROUTINE W/ 12 LEADS, INTER & REP    Impression    Normal sinus rhythm. Nonspecific ST wave changes in the lateral  chest leads. Cardiology Labs:  Lab Results   Component Value Date/Time    Cholesterol, total 193 12/29/2017 12:00 AM    HDL Cholesterol 82 12/29/2017 12:00 AM    LDL, calculated 83 12/29/2017 12:00 AM    Triglyceride 141 12/29/2017 12:00 AM       Lab Results   Component Value Date/Time    Sodium 143 08/03/2018 01:40 PM    Potassium 4.8 08/03/2018 01:40 PM    Chloride 105 08/03/2018 01:40 PM    CO2 25 08/03/2018 01:40 PM    Anion gap 6 11/21/2017 02:22 AM    Glucose 97 08/03/2018 01:40 PM    BUN 16 08/03/2018 01:40 PM    Creatinine 0.90 08/03/2018 01:40 PM    BUN/Creatinine ratio 18 08/03/2018 01:40 PM    GFR est AA 67 08/03/2018 01:40 PM    GFR est non-AA 58 (L) 08/03/2018 01:40 PM    Calcium 9.2 08/03/2018 01:40 PM    Bilirubin, total 0.3 07/24/2018 03:45 PM    AST (SGOT) 16 07/24/2018 03:45 PM    Alk. phosphatase 114 07/24/2018 03:45 PM    Protein, total 6.6 07/24/2018 03:45 PM    Albumin 4.2 07/24/2018 03:45 PM    Globulin 3.7 11/06/2017 02:08 PM    A-G Ratio 1.8 07/24/2018 03:45 PM    ALT (SGPT) 9 07/24/2018 03:45 PM           Assessment:     Assessment:     Diagnoses and all orders for this visit:    1. Atrial fibrillation by electrocardiogram ([facility]  -     THYROID PANEL W/TSH  -     METABOLIC PANEL, COMPREHENSIVE  -     CBC WITH AUTOMATED DIFF  -     MAGNESIUM  -     NIALL DOPPLER; Future  -     CARDIOVERSION, ELECTIVE; Future    2. Essential hypertension  -     AMB POC EKG ROUTINE W/ 12 LEADS, INTER & REP    3. Mixed hyperlipidemia    4. PAD (peripheral artery disease) ([facility]    Other orders  -     apixaban (ELIQUIS) 5 mg tablet; Take 1 Tab by mouth two (2) times a day. -     dilTIAZem CD (CARDIZEM CD) 300 mg ER capsule; Take 1 Cap by mouth daily. Increased 10/22/18 for A fib        ICD-10-CM ICD-9-CM    1.  Atrial fibrillation [facility] electro[facility]ogram ([facility] I48.91 427.31 THYROID PANEL W/TSH      METABOLIC PANEL, COMPREHENSIVE      CBC WITH AUTOMATED DIFF      MAGNESIUM      NIALL DOPPLER      CARDIOVERSION, ELECTIVE   2. Essential hypertension I10 401.9 AMB POC EKG ROUTINE W/ 12 LEADS, INTER & REP   3. Mixed hyperlipidemia E78.2 272.2    4. PAD (peripheral artery disease) ([facility]) I73.9 443.9      Orders Placed This Encounter    THYROID PANEL W/TSH    METABOLIC PANEL, COMPREHENSIVE    CBC WITH AUTOMATED DIFF    MAGNESIUM    CARDIOVERSION, ELECTIVE     Standing Status:   Future     Standing Expiration Date:   4/22/2019     Order Specific Question:   Reason for Exam:     Answer:   a fib    AMB POC EKG ROUTINE W/ 12 LEADS, INTER & REP     Order Specific Question:   Reason for Exam:     Answer:   ROUTINE    NIALL DOPPLER     Standing Status:   Future     Standing Expiration Date:   4/22/2019     Order Specific Question:   Reason for Exam:     Answer:   A fib    meclizine (ANTIVERT) 12.5 mg tablet     Sig: as needed.  acetaminophen (TYLENOL EXTRA STRENGTH PO)     Sig: Take  by mouth nightly. 2 TABLETS    apixaban (ELIQUIS) 5 mg tablet     Sig: Take 1 Tab by mouth two (2) times a day. Dispense:  60 Tab     Refill:  3    dilTIAZem CD (CARDIZEM CD) 300 mg ER capsule     Sig: Take 1 Cap by mouth daily. Increased 10/22/18 for A fib     Dispense:  30 Cap     Refill:  3        Plan:     1. Atrial fibrillation: Newly diagnosed. Increasing calcium channel blocker from verapamil 240 mg to diltiazem 300 mg daily. Starting Eliquis 5 mg twice a day, normal renal function weight greater than 60 kg. We will proceed with NIALL and cardioversion next week. Recheck thyroid panel complete with CBC mag and metabolic panel. Of note, she does have anemia, chronically on iron with black stools. She denies any history of overt GI bleeding or bright red blood per rectum. She denies ever seeing gastroenterology. Follow CBC periodically.   2.  Hypertension: Mildly elevated 144/60 should respond to increasing dose of calcium channel blocker. 3.  Hyperlipidemia: followed by primary care LDL 83 at last check      Atrial Fibrillation CHADSVASC2 Score Stroke Risk:   80 y.o. > 76        +2    female Female +1   CHF HX: No    + 0   HTN HX: Yes    +1   Stroke/TIA/Thromboembolism No    +0   Vascular Disease HX: Yes    +1   Diabetes Mellitus No    + 0   CHADSVASC 2 Score 5      Annual Stroke Risk 7.2% - moderate-high          Allison Amaya MD    This note was created using voice recognition software. Despite editing, there may be syntax errors.

## 2018-10-22 NOTE — PROGRESS NOTES
Chief Complaint   Patient presents with    Other     6 MO. F/U ROWE    Shortness of Breath     C/O SOB ON 10/16/18 F     1. Have you been to the ER, urgent care clinic since your last visit? Hospitalized since your last visit? ED [facility], ON 11/20/17 FOR LT. HIP REPLACEMENT. 2. Have you seen or consulted any other health care providers outside of the [address] [place] [address] since your last visit? Include any pap smears or colon screening.  NO

## 2018-10-23 LAB
ALBUMIN SERPL-MCNC: 4.5 G/DL (ref 3.5–4.7)
ALBUMIN/GLOB SERPL: 2 {RATIO} (ref 1.2–2.2)
ALP SERPL-CCNC: 108 IU/L (ref 39–117)
ALT SERPL-CCNC: 15 IU/L (ref 0–32)
AST SERPL-CCNC: 19 IU/L (ref 0–40)
BASOPHILS # BLD AUTO: 0.1 X10E3/UL (ref 0–0.2)
BASOPHILS NFR BLD AUTO: 2 %
BILIRUB SERPL-MCNC: 0.2 MG/DL (ref 0–1.2)
BUN SERPL-MCNC: 18 MG/DL (ref 8–27)
BUN/CREAT SERPL: 15 (ref 12–28)
CALCIUM SERPL-MCNC: 9.5 MG/DL (ref 8.7–10.3)
CHLORIDE SERPL-SCNC: 103 MMOL/L (ref 96–106)
CO2 SERPL-SCNC: 25 MMOL/L (ref 20–29)
CREAT SERPL-MCNC: 1.21 MG/DL (ref 0.57–1)
EOSINOPHIL # BLD AUTO: 1.2 X10E3/UL (ref 0–0.4)
EOSINOPHIL NFR BLD AUTO: 14 %
ERYTHROCYTE [DISTWIDTH] IN BLOOD BY AUTOMATED COUNT: 13.7 % (ref 12.3–15.4)
FT4I SERPL CALC-MCNC: 2.7 (ref 1.2–4.9)
GLOBULIN SER CALC-MCNC: 2.3 G/DL (ref 1.5–4.5)
GLUCOSE SERPL-MCNC: 156 MG/DL (ref 65–99)
HCT VFR BLD AUTO: 33.5 % (ref 34–46.6)
HGB BLD-MCNC: 10.9 G/DL (ref 11.1–15.9)
IMM GRANULOCYTES # BLD: 0 X10E3/UL (ref 0–0.1)
IMM GRANULOCYTES NFR BLD: 0 %
INTERPRETATION: NORMAL
LYMPHOCYTES # BLD AUTO: 2.3 X10E3/UL (ref 0.7–3.1)
LYMPHOCYTES NFR BLD AUTO: 29 %
MAGNESIUM SERPL-MCNC: 2.1 MG/DL (ref 1.6–2.3)
MCH RBC QN AUTO: 30.3 PG (ref 26.6–33)
MCHC RBC AUTO-ENTMCNC: 32.5 G/DL (ref 31.5–35.7)
MCV RBC AUTO: 93 FL (ref 79–97)
MONOCYTES # BLD AUTO: 0.9 X10E3/UL (ref 0.1–0.9)
MONOCYTES NFR BLD AUTO: 11 %
NEUTROPHILS # BLD AUTO: 3.5 X10E3/UL (ref 1.4–7)
NEUTROPHILS NFR BLD AUTO: 44 %
PLATELET # BLD AUTO: 284 X10E3/UL (ref 150–379)
POTASSIUM SERPL-SCNC: 4.9 MMOL/L (ref 3.5–5.2)
PROT SERPL-MCNC: 6.8 G/DL (ref 6–8.5)
RBC # BLD AUTO: 3.6 X10E6/UL (ref 3.77–5.28)
SODIUM SERPL-SCNC: 144 MMOL/L (ref 134–144)
T3RU NFR SERPL: 30 % (ref 24–39)
T4 SERPL-MCNC: 9.1 UG/DL (ref 4.5–12)
TSH SERPL DL<=0.005 MIU/L-ACNC: 0.3 UIU/ML (ref 0.45–4.5)
WBC # BLD AUTO: 8 X10E3/UL (ref 3.4–10.8)

## 2018-10-25 ENCOUNTER — OFFICE VISIT (OUTPATIENT)
Dept: FAMILY MEDICINE CLINIC | Age: 83
End: 2018-10-25

## 2018-10-25 VITALS
RESPIRATION RATE: 19 BRPM | HEART RATE: 80 BPM | BODY MASS INDEX: 28.49 KG/M2 | HEIGHT: 65 IN | SYSTOLIC BLOOD PRESSURE: 125 MMHG | TEMPERATURE: 98.1 F | OXYGEN SATURATION: 95 % | WEIGHT: 171 LBS | DIASTOLIC BLOOD PRESSURE: 86 MMHG

## 2018-10-25 DIAGNOSIS — I10 ESSENTIAL HYPERTENSION: ICD-10-CM

## 2018-10-25 DIAGNOSIS — I48.0 PAROXYSMAL ATRIAL FIBRILLATION (HCC): Primary | ICD-10-CM

## 2018-10-25 DIAGNOSIS — E03.9 HYPOTHYROIDISM, UNSPECIFIED TYPE: ICD-10-CM

## 2018-10-25 RX ORDER — LEVOTHYROXINE SODIUM 88 UG/1
88 TABLET ORAL
Qty: 90 TAB | Refills: 0 | Status: SHIPPED | OUTPATIENT
Start: 2018-10-25 | End: 2019-01-01 | Stop reason: SDUPTHER

## 2018-10-25 NOTE — PROGRESS NOTES
HPI:  80 y.o.  presents for follow up appointment. Since last visit, diagnosed with paroxysmal atrial fibrillation. She has NIALL scheduled for 10/31 with Dr Diane Infante. She had labs on  10/22 which were good except thyroid slightly oversupplemented. Some dyspnea on exertion. Chronic venous insufficiency. Patient Active Problem List    Diagnosis    Hypercholesterolemia    Osteoarthritis of hip     S/p 1 replacement      PAD (peripheral artery disease) (HCC)    Anemia     Post op after THR      Chronic UTI     Previously on antibiotic prophylaxis      Venous (peripheral) insufficiency     S/p GSV procedure by Dr. Katina Guzman. Not using compression stockings.  Hypothyroid - No hair loss, no constipation, no dry skin or brittle nails, no palpitations. Taking medication each morning on an empty stomach.  Hypertension - No home monitoring. Compliant with medication. No shortness of breath, no chest pain, no vision change, no headache, no lower extremity edema.  Arthritis     Bilateral knees and shoulders      Hyperlipidemia    IGT (impaired glucose tolerance)    Restless leg syndrome         Past Medical History:   Diagnosis Date    Arthritis     hands/low back    Diabetes ([facility] [address]     borderline    GERD (gastroesophageal reflux disease)     Glaucoma     Hypercholesterolemia     Hypertension     Ill-defined condition     borderline anemia    Ill-defined condition     bladder infections    Impaired glucose tolerance     Menopause     Thyroid disease     hypothyroidism       Social History     Tobacco Use    Smoking status: Former Smoker     Packs/day: 1.00     Years: 15.00     Pack years: 15.00     Types: Cigarettes     Last attempt to quit: 1995     Years since quittin.8    Smokeless tobacco: Never Used   Substance Use Topics    Alcohol use:  Yes     Alcohol/week: 0.6 oz     Types: 1 Glasses of wine per week     Comment: Rare--maybe once a month    Drug use: No       Outpatient Medications Marked as Taking for the 10/25/18 encounter (Office Visit) with Mikhail Horton MD   Medication Sig Dispense Refill    levothyroxine (SYNTHROID) 88 mcg tablet Take 1 Tab by mouth Daily (before breakfast). 90 Tab 0    acetaminophen (TYLENOL EXTRA STRENGTH PO) Take  by mouth nightly. 2 TABLETS      apixaban (ELIQUIS) 5 mg tablet Take 1 Tab by mouth two (2) times a day. 60 Tab 3    dilTIAZem CD (CARDIZEM CD) 300 mg ER capsule Take 1 Cap by mouth daily. Increased 10/22/18 for A fib 30 Cap 3    lovastatin (MEVACOR) 40 mg tablet TAKE 1 TABLET EVERY EVENING TO LOWER CHOLESTEROL 90 Tab 3    omeprazole (PRILOSEC) 20 mg capsule TAKE 1 CAPSULE DAILY. 90 Cap 3    ferrous sulfate 325 mg (65 mg iron) tablet Take  by mouth two (2) times a day.  latanoprost (XALATAN) 0.005 % ophthalmic solution Administer 1 Drop to both eyes nightly.  DOCUSATE CALCIUM (STOOL SOFTENER PO) Take  by mouth daily.  ascorbic acid (VITAMIN C) 500 mg tablet Take  by mouth daily.  cholecalciferol, vitamin d3, (VITAMIN D) 1,000 unit tablet Take  by mouth daily.  MULTIVITAMINS (MULTIVITAMIN PO) Take  by mouth. Allergies   Allergen Reactions    Aspirin Other (comments)     Swelling shut of eyelids    Macrobid [Nitrofurantoin Monohyd/M-Cryst] Unknown (comments)    Pcn [Penicillins] Hives       ROS:  ROS negative except as per HPI.       PE:  Visit Vitals  /86 (BP 1 Location: Left arm, BP Patient Position: Sitting)   Pulse 80   Temp 98.1 °F (36.7 °C) (Oral)   Resp 19   Ht 5' 5\" (1.651 m)   Wt 171 lb (77.6 kg)   SpO2 95%   BMI 28.46 kg/m²     Gen: alert, oriented, no acute distress  Ears: external auditory canals clear, TMs without erythema or effusion  Eyes: pupils equal round reactive to light, sclera clear, conjunctiva clear  Oral: moist mucus membranes, no oral lesions, no pharyngeal inflammation or exudate  Resp: no increase work of breathing, lungs clear to ausculation bilaterally, no wheezing, rales or rhonchi  CV: irregular rapid heartrate, palpable pulse is slower. No murmurs, rubs, or gallops. Abd: soft, not tender, not distended. No hepatosplenomegaly. Neuro: cranial nerves intact, normal strength and movement in all extremities, reflexes and sensation intact and symmetric. Skin: no lesion or rash  Extremities: no cyanosis or edema    Results for orders placed or performed in visit on 10/22/18   THYROID PANEL W/TSH   Result Value Ref Range    TSH 0.305 (L) 0.450 - 4.500 uIU/mL    T4, Total 9.1 4.5 - 12.0 ug/dL    T3 Uptake 30 24 - 39 %    Free Thyroxine Index (FTI) 2.7 1.2 - 4.9   METABOLIC PANEL, COMPREHENSIVE   Result Value Ref Range    Glucose 156 (H) 65 - 99 mg/dL    BUN 18 8 - 27 mg/dL    Creatinine 1.21 (H) 0.57 - 1.00 mg/dL    GFR est non-AA 41 (L) >59 mL/min/1.73    GFR est AA 47 (L) >59 mL/min/1.73    BUN/Creatinine ratio 15 12 - 28    Sodium 144 134 - 144 mmol/L    Potassium 4.9 3.5 - 5.2 mmol/L    Chloride 103 96 - 106 mmol/L    CO2 25 20 - 29 mmol/L    Calcium 9.5 8.7 - 10.3 mg/dL    Protein, total 6.8 6.0 - 8.5 g/dL    Albumin 4.5 3.5 - 4.7 g/dL    GLOBULIN, TOTAL 2.3 1.5 - 4.5 g/dL    A-G Ratio 2.0 1.2 - 2.2    Bilirubin, total 0.2 0.0 - 1.2 mg/dL    Alk. phosphatase 108 39 - 117 IU/L    AST (SGOT) 19 0 - 40 IU/L    ALT (SGPT) 15 0 - 32 IU/L   CBC WITH AUTOMATED DIFF   Result Value Ref Range    WBC 8.0 3.4 - 10.8 x10E3/uL    RBC 3.60 (L) 3.77 - 5.28 x10E6/uL    HGB 10.9 (L) 11.1 - 15.9 g/dL    HCT 33.5 (L) 34.0 - 46.6 %    MCV 93 79 - 97 fL    MCH 30.3 26.6 - 33.0 pg    MCHC 32.5 31.5 - 35.7 g/dL    RDW 13.7 12.3 - 15.4 %    PLATELET 519 054 - 736 x10E3/uL    NEUTROPHILS 44 Not Estab. %    Lymphocytes 29 Not Estab. %    MONOCYTES 11 Not Estab. %    EOSINOPHILS 14 Not Estab. %    BASOPHILS 2 Not Estab. %    ABS. NEUTROPHILS 3.5 1.4 - 7.0 x10E3/uL    Abs Lymphocytes 2.3 0.7 - 3.1 x10E3/uL    ABS. MONOCYTES 0.9 0.1 - 0.9 x10E3/uL    ABS.  EOSINOPHILS 1.2 (H) 0.0 - 0.4 x10E3/uL ABS. BASOPHILS 0.1 0.0 - 0.2 x10E3/uL    IMMATURE GRANULOCYTES 0 Not Estab. %    ABS. IMM. GRANS. 0.0 0.0 - 0.1 x10E3/uL   MAGNESIUM   Result Value Ref Range    Magnesium 2.1 1.6 - 2.3 mg/dL   CKD REPORT   Result Value Ref Range    Interpretation Note        Assessment/Plan:    1. Paroxysmal atrial fibrillation (HCC)  Awaiting NIALL, on anticoagulation and rate control    2. Hypothyroidism, unspecified type  Decrease levothyroxine from 100 to 88 since most recent labs show an oversupplementation    3. Essential hypertension  At goal.  Continue current medications. Health Maintenance reviewed - updated. Orders Placed This Encounter    levothyroxine (SYNTHROID) 88 mcg tablet     Sig: Take 1 Tab by mouth Daily (before breakfast). Dispense:  90 Tab     Refill:  0       Medications Discontinued During This Encounter   Medication Reason    enalapril-hydroCHLOROthiazide (VASERETIC) 5-12.5 mg tablet Therapy Completed    cyclobenzaprine (FLEXERIL) 10 mg tablet Not A Current Medication    ASCORBIC ACID/VITAMIN E (CRANBERRY CONCENTRATE PO) Not A Current Medication    meclizine (ANTIVERT) 12.5 mg tablet Not A Current Medication    levothyroxine (SYNTHROID) 100 mcg tablet Dose Adjustment       Current Outpatient Medications   Medication Sig Dispense Refill    levothyroxine (SYNTHROID) 88 mcg tablet Take 1 Tab by mouth Daily (before breakfast). 90 Tab 0    acetaminophen (TYLENOL EXTRA STRENGTH PO) Take  by mouth nightly. 2 TABLETS      apixaban (ELIQUIS) 5 mg tablet Take 1 Tab by mouth two (2) times a day. 60 Tab 3    dilTIAZem CD (CARDIZEM CD) 300 mg ER capsule Take 1 Cap by mouth daily. Increased 10/22/18 for A fib 30 Cap 3    lovastatin (MEVACOR) 40 mg tablet TAKE 1 TABLET EVERY EVENING TO LOWER CHOLESTEROL 90 Tab 3    omeprazole (PRILOSEC) 20 mg capsule TAKE 1 CAPSULE DAILY. 90 Cap 3    ferrous sulfate 325 mg (65 mg iron) tablet Take  by mouth two (2) times a day.       latanoprost (XALATAN) 0.005 % ophthalmic solution Administer 1 Drop to both eyes nightly.  DOCUSATE CALCIUM (STOOL SOFTENER PO) Take  by mouth daily.  ascorbic acid (VITAMIN C) 500 mg tablet Take  by mouth daily.  cholecalciferol, vitamin d3, (VITAMIN D) 1,000 unit tablet Take  by mouth daily.  MULTIVITAMINS (MULTIVITAMIN PO) Take  by mouth.  ACCU-CHEK MULTICLIX LANCET misc AS DIRECTED 100 Each PRN       Verbal and written instructions (see AVS) provided. Patient expresses understanding of diagnosis and treatment plan.

## 2018-10-25 NOTE — PROGRESS NOTES
Chief Complaint   Patient presents with    Hypertension     3 month follow-up     Health Maintenance reviewed     1. Have you been to the ER, urgent care clinic since your last visit? Hospitalized since your last visit? no    2. Have you seen or consulted any other health care providers outside of the [address] [place] [address] since your last visit? Include any pap smears or colon screening.  no

## 2018-10-25 NOTE — PATIENT INSTRUCTIONS
Constipation: Care Instructions  Your Care Instructions    Constipation means that you have a hard time passing stools (bowel movements). People pass stools from 3 times a day to once every 3 days. What is normal for you may be different. Constipation may occur with pain in the rectum and cramping. The pain may get worse when you try to pass stools. Sometimes there are small amounts of bright red blood on toilet paper or the surface of stools. This is because of enlarged veins near the rectum (hemorrhoids). A few changes in your diet and lifestyle may help you avoid ongoing constipation. Your doctor may also prescribe medicine to help loosen your stool. Some medicines can cause constipation. These include pain medicines and antidepressants. Tell your doctor about all the medicines you take. Your doctor may want to make a medicine change to ease your symptoms. Follow-up care is a key part of your treatment and safety. Be sure to make and go to all appointments, and call your doctor if you are having problems. It's also a good idea to know your test results and keep a list of the medicines you take. How can you care for yourself at home? · Drink plenty of fluids, enough so that your urine is light yellow or clear like water. If you have kidney, heart, or liver disease and have to limit fluids, talk with your doctor before you increase the amount of fluids you drink. · Include high-fiber foods in your diet each day. These include fruits, vegetables, beans, and whole grains. · Get at least 30 minutes of exercise on most days of the week. Walking is a good choice. You also may want to do other activities, such as running, swimming, cycling, or playing tennis or team sports. · Take a fiber supplement, such as Citrucel or Metamucil, every day. Read and follow all instructions on the label. · Schedule time each day for a bowel movement. A daily routine may help.  Take your time having your bowel movement. · Support your feet with a small step stool when you sit on the toilet. This helps flex your hips and places your pelvis in a squatting position. · Your doctor may recommend an over-the-counter laxative to relieve your constipation. Examples are Milk of Magnesia and MiraLax. Read and follow all instructions on the label. Do not use laxatives on a long-term basis. When should you call for help? Call your doctor now or seek immediate medical care if:    · You have new or worse belly pain.     · You have new or worse nausea or vomiting.     · You have blood in your stools.    Watch closely for changes in your health, and be sure to contact your doctor if:    · Your constipation is getting worse.     · You do not get better as expected. Where can you learn more? Go to http://catracho-reynaldo.info/. Enter 21 [phone] in the search box to learn more about \"Constipation: Care Instructions. \"  Current as of: November 20, 2017  Content Version: 11.8  © 4379-6995 Healthwise, Incorporated. Care instructions adapted under license by [employer] (which disclaims liability or warranty for this information). If you have questions about a medical condition or this instruction, always ask your healthcare professional. [address] any warranty or liability for your use of this information.

## 2018-10-31 ENCOUNTER — HOSPITAL ENCOUNTER (OUTPATIENT)
Dept: NON INVASIVE DIAGNOSTICS | Age: 83
Discharge: HOME | End: 2018-10-31
Payer: MEDICARE

## 2018-10-31 VITALS
WEIGHT: 170 LBS | HEIGHT: 65 IN | BODY MASS INDEX: 28.32 KG/M2 | SYSTOLIC BLOOD PRESSURE: 135 MMHG | DIASTOLIC BLOOD PRESSURE: 88 MMHG | RESPIRATION RATE: 18 BRPM | OXYGEN SATURATION: 96 % | HEART RATE: 74 BPM

## 2018-10-31 DIAGNOSIS — I34.0 NON-RHEUMATIC MITRAL REGURGITATION: ICD-10-CM

## 2018-10-31 DIAGNOSIS — I48.91 ATRIAL FIBRILLATION BY ELECTROCARDIOGRAM (HCC): ICD-10-CM

## 2018-10-31 DIAGNOSIS — I48.91 ATRIAL FIBRILLATION (HCC): ICD-10-CM

## 2018-10-31 LAB
ATRIAL RATE: 76 BPM
CALCULATED P AXIS, ECG09: 88 DEGREES
CALCULATED R AXIS, ECG10: 79 DEGREES
CALCULATED T AXIS, ECG11: 69 DEGREES
DIAGNOSIS, 93000: NORMAL
P-R INTERVAL, ECG05: 172 MS
Q-T INTERVAL, ECG07: 420 MS
QRS DURATION, ECG06: 82 MS
QTC CALCULATION (BEZET), ECG08: 472 MS
VENTRICULAR RATE, ECG03: 76 BPM

## 2018-10-31 PROCEDURE — 92960 CARDIOVERSION ELECTRIC EXT: CPT

## 2018-10-31 PROCEDURE — 93312 ECHO TRANSESOPHAGEAL: CPT

## 2018-10-31 PROCEDURE — 99153 MOD SED SAME PHYS/QHP EA: CPT

## 2018-10-31 PROCEDURE — 74011000250 HC RX REV CODE- 250: Performed by: INTERNAL MEDICINE

## 2018-10-31 PROCEDURE — 74011000250 HC RX REV CODE- 250

## 2018-10-31 PROCEDURE — 93005 ELECTROCARDIOGRAM TRACING: CPT

## 2018-10-31 PROCEDURE — 99152 MOD SED SAME PHYS/QHP 5/>YRS: CPT

## 2018-10-31 PROCEDURE — 74011250636 HC RX REV CODE- 250/636

## 2018-10-31 PROCEDURE — 77030018729 HC ELECTRD DEFIB PAD CARD -B

## 2018-10-31 RX ORDER — FENTANYL CITRATE 50 UG/ML
INJECTION, SOLUTION INTRAMUSCULAR; INTRAVENOUS
Status: COMPLETED
Start: 2018-10-31 | End: 2018-10-31

## 2018-10-31 RX ORDER — LIDOCAINE HYDROCHLORIDE 20 MG/ML
SOLUTION OROPHARYNGEAL
Status: COMPLETED
Start: 2018-10-31 | End: 2018-10-31

## 2018-10-31 RX ORDER — FENTANYL CITRATE 50 UG/ML
25-50 INJECTION, SOLUTION INTRAMUSCULAR; INTRAVENOUS
Status: DISCONTINUED | OUTPATIENT
Start: 2018-10-31 | End: 2018-10-31

## 2018-10-31 RX ORDER — MIDAZOLAM HYDROCHLORIDE 1 MG/ML
.5-2 INJECTION, SOLUTION INTRAMUSCULAR; INTRAVENOUS
Status: DISCONTINUED | OUTPATIENT
Start: 2018-10-31 | End: 2018-10-31

## 2018-10-31 RX ORDER — AMIODARONE HYDROCHLORIDE 200 MG/1
200 TABLET ORAL DAILY
Qty: 90 TAB | Refills: 3 | Status: SHIPPED | OUTPATIENT
Start: 2018-10-31 | End: 2018-11-28

## 2018-10-31 RX ORDER — MIDAZOLAM HYDROCHLORIDE 1 MG/ML
INJECTION, SOLUTION INTRAMUSCULAR; INTRAVENOUS
Status: COMPLETED
Start: 2018-10-31 | End: 2018-10-31

## 2018-10-31 RX ORDER — LIDOCAINE HYDROCHLORIDE 20 MG/ML
15 SOLUTION OROPHARYNGEAL ONCE
Status: COMPLETED | OUTPATIENT
Start: 2018-10-31 | End: 2018-10-31

## 2018-10-31 RX ADMIN — BENZOCAINE, BUTAMBEN, AND TETRACAINE HYDROCHLORIDE 1 SPRAY: .028; .004; .004 AEROSOL, SPRAY TOPICAL at 08:26

## 2018-10-31 RX ADMIN — MIDAZOLAM HYDROCHLORIDE 1 MG: 1 INJECTION, SOLUTION INTRAMUSCULAR; INTRAVENOUS at 08:27

## 2018-10-31 RX ADMIN — FENTANYL CITRATE 25 MCG: 50 INJECTION, SOLUTION INTRAMUSCULAR; INTRAVENOUS at 08:28

## 2018-10-31 RX ADMIN — FENTANYL CITRATE 25 MCG: 50 INJECTION, SOLUTION INTRAMUSCULAR; INTRAVENOUS at 09:10

## 2018-10-31 RX ADMIN — MIDAZOLAM HYDROCHLORIDE 1 MG: 1 INJECTION, SOLUTION INTRAMUSCULAR; INTRAVENOUS at 08:51

## 2018-10-31 RX ADMIN — MIDAZOLAM HYDROCHLORIDE 1 MG: 1 INJECTION, SOLUTION INTRAMUSCULAR; INTRAVENOUS at 09:11

## 2018-10-31 RX ADMIN — MIDAZOLAM HYDROCHLORIDE 2 MG: 1 INJECTION, SOLUTION INTRAMUSCULAR; INTRAVENOUS at 09:06

## 2018-10-31 RX ADMIN — LIDOCAINE HYDROCHLORIDE 15 ML: 20 SOLUTION OROPHARYNGEAL at 08:26

## 2018-10-31 RX ADMIN — MIDAZOLAM HYDROCHLORIDE 1 MG: 1 INJECTION, SOLUTION INTRAMUSCULAR; INTRAVENOUS at 08:31

## 2018-10-31 RX ADMIN — LIDOCAINE HYDROCHLORIDE 15 ML: 20 SOLUTION ORAL; TOPICAL at 08:26

## 2018-10-31 NOTE — PROGRESS NOTES
Patient arrived to Non-Invasive Cardiology Lab for Out Patient NIALL/ Cardioversion Procedure. Staff introduced to patient. Patient identifiers verified with Name and Date of Birth. Procedure verified with patient. Consent forms reviewed and signed by patient or authorized representative and verified. Allergies verified. Patient informed of procedure and plan of care. Questions answered with review. Patient on cardiac monitor, non-invasive blood pressure, SPO2 monitor. On room air. Patient is A&Ox3. Patient reports no complaints. Patient on stretcher, in low position, with side rails up. Patient instructed to call for assistance as needed. Family in waiting room.

## 2018-10-31 NOTE — PROCEDURES
Procedure:  Synchronized direct-current cardioversion    Indication:  Atrial Fibrillation    Sedation:  Moderate    Complications:  None    After informed consent was obtained and patient verified to be compliant with Eliquis and after NIALL confirmed the absence of intracardiac thrombus, cardioversion pads were placed in an anteroposterior fashion and the patient was adequately sedated. 360 J of synchronized biphasic electricity were delivered across the precordium with successful restoration of normal sinus rhythm. After the procedure and recovery, the patient was alert and oriented times 3 with cranial nerves, motor and speech within normal limits. Conclusions:  Successful cardioversion of atrial fibrillation to NSR. Recommendations:    Add amiodarone 200 mg PO BID for 2 weeks, then 200 mg daily, otherwise continue current care and f/u with Dr. Carmelo Marshall in 1-2 weeks. Advised to call 911 if any numbness, weakness, tingling, or slurred speech and to call MD if any other concerns.

## 2018-10-31 NOTE — DISCHARGE INSTRUCTIONS
[address] [address]  [phone]        Patient ID:  Shayla Garcia  [id number]  89 y.o.  3/5/1932    Admit Date: 10/31/2018    Discharge Date: 10/31/2018     Admitting Physician: No admitting provider for patient encounter. Discharge Physician: Vanessa Zhou MD    Admission Diagnoses:   Atrial fibrillation by electrocardiogram [facility]) [I48.91]  Atrial fibrillation [facility]) [I48.91]    Discharge Diagnoses: Active Problems:    Hypertension (6/11/2012)      Hyperlipidemia (6/11/2012)      Paroxysmal atrial fibrillation ([facility] [address] (10/25/2018)      Overview: Started 10/18, on eliquis, diltiazem, NIALL pending        Discharge Condition: Good    Cardiology Procedures this Admission:  NIALL/Cardioversion    Disposition: home    Reference discharge instructions provided by nursing for diet and activity. DISCHARGE SUMMARY       The following personal items collected during your admission are returned to you:   Vision:  glasses  Clothing:  Shirt and sweater      You had a transesophageal echocardiogram, your throat was numbed for the procedure, so start with sips of water and advance diet as swallowing returns to normal. Avoid any scalding hot beverages for the next 2 hours. The cardioversion procedure can cause redness to the skin where the patches were placed. You may treat this with Aloe or Cortisone cream over the counter lotion. If the skin appears very reddened or blistered contact your physician      Call to make an appointment with Dr. Sugar Thomas in 2 week(s). What to do at Home:  Recommended activity: No driving today      The discharge information has been reviewed with the PATIENT . The PATIENT  verbalized understanding.                Signed:  Vanessa Zhou MD  10/31/2018  9:21 AM           Electrical Cardioversion: What to Expect at Home  Your Recovery    Electrical cardioversion is a treatment for an abnormal heartbeat, such as atrial fibrillation, supraventricular tachycardia, or ventricular tachycardia (VT). It uses a brief electrical shock to reset your heart's rhythm. After cardioversion, you may have redness, like a sunburn, where the patches were. The medicines you got to make you sleepy may make you feel drowsy for the rest of the day. Your doctor may have you take medicines to help the heart beat normally and to prevent blood clots. This care sheet gives you a general idea about how long it will take for you to recover. But each person recovers at a different pace. Follow the steps below to feel better as quickly as possible. How can you care for yourself at home? Medicines    · Be safe with medicines. Take your medicines exactly as prescribed. Call your doctor if you think you are having a problem with your medicine. You may take one or more of the following medicines:  ? Rate-control medicines to slow the heart rate. These include beta-blockers, calcium channel blockers, and digoxin. ? Rhythm control medicines that help the heart keep a normal rhythm. ? Blood thinners, also called anticoagulants, which help prevent blood clots. You will get more details on the specific medicines your doctor prescribes. Be sure you know how to take your medicines safely.     · Do not take any vitamins, over-the-counter medicines, or herbal products without talking to your doctor first.   Exercise    · Start light exercise if your doctor says that it is okay. Even a small amount will help you get stronger, have more energy, and manage your stress. Walking is an easy way to get exercise. Start out by walking a little more than you did in the hospital. Bit by bit, increase the amount you walk.     · When you exercise, watch for signs that your heart is working too hard. You are pushing too hard if you cannot talk while you are exercising. If you become short of breath or dizzy or have chest pain, sit down and rest right away.     · Check your pulse regularly. Place two fingers on the artery at the palm side of your wrist in line with your thumb. If your heartbeat seems uneven or fast, talk to your doctor. Other instructions    · Ask your doctor when you can drive again.     · Do not smoke. If you need help quitting, talk to your doctor about stop-smoking programs and medicines. These can increase your chances of quitting for good.     · Limit alcohol. Follow-up care is a key part of your treatment and safety. Be sure to make and go to all appointments, and call your doctor if you are having problems. It's also a good idea to know your test results and keep a list of the medicines you take. When should you call for help? Call 911 anytime you think you may need emergency care. For example, call if:    · You passed out (lost consciousness).     · You have chest pain or pressure. This may occur with:  ? Sweating. ? Shortness of breath. ? Nausea or vomiting. ? Pain that spreads from the chest to the neck, jaw, or one or both shoulders or arms. ? A fast or uneven pulse. After calling 911, the  may tell you to chew 1 adult-strength or 2 to 4 low-dose aspirin. Wait for an ambulance. Do not try to drive yourself.     · You have symptoms of a stroke. These may include:  ? Sudden numbness, tingling, weakness, or loss of movement in your face, arm, or leg, especially on only one side of your body. ? Sudden vision changes. ? Sudden trouble speaking. ? Sudden confusion or trouble understanding simple statements. ? Sudden problems with walking or balance. ? A sudden, severe headache that is different from past headaches.    Call your doctor now or seek immediate medical care if:    · You feel dizzy or lightheaded, or you feel like you may faint.     · You have a fast or irregular heartbeat.    Watch closely for any changes in your health, and be sure to contact your doctor if you have any problems. Where can you learn more?   Go to http://catracho-reynaldo.info/. Enter A617 in the search box to learn more about \"Electrical Cardioversion: What to Expect at Home. \"  Current as of: December 6, 2017  Content Version: 11.8  © 9883-0726 Healthwise, Incorporated. Care instructions adapted under license by [employer] (which disclaims liability or warranty for this information). If you have questions about a medical condition or this instruction, always ask your healthcare professional. [address] any warranty or liability for your use of this information.

## 2018-10-31 NOTE — PROGRESS NOTES
ASA 3 and Mallampati 3 per Dr. Randa Newton    Pt sedated with 3mg Versed and 25mcg Fentanyl for NIALL (monitored sedation from 0827 to 0906)    Pt sedated with an additional 3mg Versed and 25mcg Fentanyl, given 1 synchronized shock(s) at 360 Joules, Afib converted to NSR.(monitored sedation from 0906 to 0917)    EKG obtained

## 2018-11-02 ENCOUNTER — TELEPHONE (OUTPATIENT)
Dept: FAMILY MEDICINE CLINIC | Age: 83
End: 2018-11-02

## 2018-11-05 ENCOUNTER — TELEPHONE (OUTPATIENT)
Dept: CARDIOLOGY CLINIC | Age: 83
End: 2018-11-05

## 2018-11-05 RX ORDER — METOPROLOL TARTRATE 25 MG/1
25 TABLET, FILM COATED ORAL 2 TIMES DAILY
Qty: 180 TAB | Refills: 3 | Status: ON HOLD | OUTPATIENT
Start: 2018-11-05 | End: 2018-11-28 | Stop reason: SDUPTHER

## 2018-11-05 NOTE — TELEPHONE ENCOUNTER
Patient informed will call with BP on Thursday 11-8-18 to update on BP and pulse . She will  Metoprolol and start today as directed.

## 2018-11-05 NOTE — TELEPHONE ENCOUNTER
Verified patient with two identifiers. Pt called having elevated HR over the weekend, called on call doctor was told to take 1 1/2 cap of diltiazem Sat and Sun then call on Monday. Her HR today is 125 has not gone lower than 101 all weekend, please advise.

## 2018-11-05 NOTE — TELEPHONE ENCOUNTER
As we discussed, the patient's systolic blood pressure was 160, and she is not feeling that bad with heart rates up to 120s. I will add metoprolol tartrate 25 mg p.o. every 12 hours-called into [email or web address]. Camelia-you have notified her that if she feels bad with her fast heart rate, she can go to the emergency room and we can admit for IV amiodarone loading and repeat attempt at cardioversion.

## 2018-11-14 ENCOUNTER — OFFICE VISIT (OUTPATIENT)
Dept: CARDIOLOGY CLINIC | Age: 83
End: 2018-11-14

## 2018-11-14 VITALS
RESPIRATION RATE: 16 BRPM | SYSTOLIC BLOOD PRESSURE: 140 MMHG | DIASTOLIC BLOOD PRESSURE: 90 MMHG | HEART RATE: 90 BPM | OXYGEN SATURATION: 99 % | HEIGHT: 65 IN | BODY MASS INDEX: 28.84 KG/M2 | WEIGHT: 173.1 LBS

## 2018-11-14 DIAGNOSIS — E78.2 MIXED HYPERLIPIDEMIA: ICD-10-CM

## 2018-11-14 DIAGNOSIS — I10 ESSENTIAL HYPERTENSION: ICD-10-CM

## 2018-11-14 DIAGNOSIS — E78.00 HYPERCHOLESTEROLEMIA: Primary | ICD-10-CM

## 2018-11-14 DIAGNOSIS — I34.0 NON-RHEUMATIC MITRAL REGURGITATION: ICD-10-CM

## 2018-11-14 DIAGNOSIS — I48.0 PAROXYSMAL ATRIAL FIBRILLATION (HCC): ICD-10-CM

## 2018-11-14 RX ORDER — ASCORBATE CALCIUM 500 MG
TABLET ORAL
COMMUNITY
End: 2018-11-20 | Stop reason: ALTCHOICE

## 2018-11-14 RX ORDER — DILTIAZEM HYDROCHLORIDE 240 MG/1
240 CAPSULE, COATED, EXTENDED RELEASE ORAL DAILY
Qty: 90 CAP | Refills: 4 | Status: SHIPPED | OUTPATIENT
Start: 2018-11-14 | End: 2018-12-19 | Stop reason: ALTCHOICE

## 2018-11-14 RX ORDER — CYCLOBENZAPRINE HCL 10 MG
TABLET ORAL
COMMUNITY
End: 2018-11-28

## 2018-11-14 RX ORDER — CEPHALEXIN 500 MG/1
CAPSULE ORAL
COMMUNITY
Start: 2018-01-15 | End: 2018-11-28

## 2018-11-14 NOTE — PROGRESS NOTES
[address] [place] [address]  [phone]     Subjective:      Rodrigo Elmore is a 80 y.o. female is here for post hospital f/u where she underwent successful cardioversion 10/31/18. Reports feeling tired, ordoñez. The patient denies chest pain, orthopnea, PND, LE edema, palpitations, syncope, or presyncope.        Patient Active Problem List    Diagnosis Date Noted    Mitral regurgitation 10/31/2018    Paroxysmal atrial fibrillation ([address] 10/25/2018    Hypercholesterolemia     Osteoarthritis of hip 2017    PAD (peripheral artery disease) ([facility] [address] 2017    Anemia 2017    Chronic UTI 2017    Venous (peripheral) insufficiency 2015    Hypothyroid 2012    Hypertension 2012    Arthritis 2012    Hyperlipidemia 2012    IGT (impaired glucose tolerance) 2011    Restless leg syndrome 2011      Patricia Sheppard MD  Past Medical History:   Diagnosis Date    Arthritis     hands/low back    Diabetes ([facility] [address]     borderline    GERD (gastroesophageal reflux disease)     Glaucoma     Hypercholesterolemia     Hypertension     Ill-defined condition     borderline anemia    Ill-defined condition     bladder infections    Impaired glucose tolerance     Menopause     Thyroid disease     hypothyroidism      Past Surgical History:   Procedure Laterality Date    CARDIOVERSION, ELECTIVE  10/31/2018         HX BREAST BIOPSY Bilateral     benign    HX CATARACT REMOVAL      bilateral    HX DILATION AND CURETTAGE      HX ORTHOPAEDIC      carpal tunnel release -bilateral    HX ROTATOR CUFF REPAIR Right      Allergies   Allergen Reactions    Aspirin Other (comments)     Swelling shut of eyelids    Macrobid [Nitrofurantoin Monohyd/M-Cryst] Unknown (comments)    Pcn [Penicillins] Hives      Family History   Problem Relation Age of Onset    Coronary Artery Disease Other         mother  of MI age 80, brother  age 72 of MI, sister has hear problems    Breast Cancer Sister 48    Heart Attack Mother     Other Father         pneumonia    Alzheimer Brother     Cancer Sister     Cancer Sister     Heart Attack Brother     Suicide Brother       Social History     Socioeconomic History    Marital status:      Spouse name: Not on file    Number of children: Not on file    Years of education: Not on file    Highest education level: Not on file   Social Needs    Financial resource strain: Not on file    Food insecurity - worry: Not on file    Food insecurity - inability: Not on file   [employer] needs - medical: Not on file   [employer] needs - non-medical: Not on file   Occupational History    Not on file   Tobacco Use    Smoking status: Former Smoker     Packs/day: 1.00     Years: 15.00     Pack years: 15.00     Types: Cigarettes     Last attempt to quit: 1995     Years since quittin.8    Smokeless tobacco: Never Used   Substance and Sexual Activity    Alcohol use: Yes     Alcohol/week: 0.6 oz     Types: 1 Glasses of wine per week     Comment: Rare--maybe once a month    Drug use: No    Sexual activity: Not on file   Other Topics Concern    Not on file   Social History Narrative    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 Lives with . Current Outpatient Medications   Medication Sig    acetaminophen/diphenhydramine (TYLENOL PM PO) Take 2 Tabs by mouth as needed.  cephALEXin (KEFLEX) 500 mg capsule Take 2 capsules by mouth 30 min prior to dental work for prophylaxis    cyclobenzaprine (FLEXERIL) 10 mg tablet Take  by mouth three (3) times daily as needed for Muscle Spasm(s).  apixaban (ELIQUIS) 5 mg tablet Take 1 Tab by mouth two (2) times a day.  dilTIAZem CD (CARDIZEM CD) 240 mg ER capsule Take 1 Cap by mouth daily. Decreased dose on 2018    metoprolol tartrate (LOPRESSOR) 25 mg tablet Take 1 Tab by mouth two (2) times a day.  In addition to your other medications for atrial fibrillation    amiodarone (CORDARONE) 200 mg tablet Take 1 Tab by mouth daily. Take 1 tab BID with meals for 2 weeks, then decrease to 1 tab daily    levothyroxine (SYNTHROID) 88 mcg tablet Take 1 Tab by mouth Daily (before breakfast).  lovastatin (MEVACOR) 40 mg tablet TAKE 1 TABLET EVERY EVENING TO LOWER CHOLESTEROL    omeprazole (PRILOSEC) 20 mg capsule TAKE 1 CAPSULE DAILY.  ACCU-CHEK MULTICLIX LANCET misc AS DIRECTED    ferrous sulfate 325 mg (65 mg iron) tablet Take  by mouth two (2) times a day.  latanoprost (XALATAN) 0.005 % ophthalmic solution Administer 1 Drop to both eyes nightly.  DOCUSATE CALCIUM (STOOL SOFTENER PO) Take  by mouth daily.  ascorbic acid (VITAMIN C) 500 mg tablet Take  by mouth daily.  cholecalciferol, vitamin d3, (VITAMIN D) 1,000 unit tablet Take  by mouth daily.  MULTIVITAMINS (MULTIVITAMIN PO) Take  by mouth.  cranberry fruit extract (CRANBERRY CONCENTRATE PO) Take  by mouth.  ascorbate calcium 500 mg tab Take  by mouth. No current facility-administered medications for this visit. Review of Symptoms:  11 systems reviewed, negative other than as stated in the HPI    Physical ExamPhysical Exam:    Vitals:    11/14/18 1308   BP: 140/90   Pulse: 90   Resp: 16   SpO2: 99%   Weight: 173 lb 1.6 oz (78.5 kg)   Height: 5' 5\" (1.651 m)     Body mass index is 28.81 kg/m². General PE   Gen:  NAD  Mental Status - Alert. General Appearance - Not in acute distress. Chest and Lung Exam   Inspection: Accessory muscles - No use of accessory muscles in breathing. Auscultation:   Breath sounds: - Normal.   Cardiovascular   Inspection: Jugular vein - Bilateral - Inspection Normal.   Palpation/Percussion:   Apical Impulse: - Normal.   Auscultation: Rhythm - Regular. Heart Sounds - S1 WNL and S2 WNL. No S3 or S4. Murmurs & Other Heart Sounds: Auscultation of the heart reveals - No Murmurs.    Peripheral Vascular   Upper Extremity: Inspection - Bilateral - No Cyanotic nailbeds or Digital clubbing. Lower Extremity:   Palpation: Edema - Bilateral - No edema. Abdomen:   Soft, non-tender, bowel sounds are active. Neuro: A&O times 3, CN and motor grossly WNL    Labs:   Lab Results   Component Value Date/Time    Cholesterol, total 193 12/29/2017 12:00 AM    Cholesterol, total 190 02/06/2017 08:36 AM    Cholesterol, total 219 (H) 01/18/2016 09:29 AM    Cholesterol, total 209 (H) 01/30/2014 09:15 AM    Cholesterol, total 250 (H) 06/17/2013 10:55 AM    HDL Cholesterol 82 12/29/2017 12:00 AM    HDL Cholesterol 96 02/06/2017 08:36 AM    HDL Cholesterol 89 01/18/2016 09:29 AM    HDL Cholesterol 78 01/30/2014 09:15 AM    HDL Cholesterol 78 06/17/2013 10:55 AM    LDL, calculated 83 12/29/2017 12:00 AM    LDL, calculated 67 02/06/2017 08:36 AM    LDL, calculated 100 (H) 01/18/2016 09:29 AM    LDL, calculated 101 (H) 01/30/2014 09:15 AM    LDL, calculated 138 (H) 06/17/2013 10:55 AM    Triglyceride 141 12/29/2017 12:00 AM    Triglyceride 137 02/06/2017 08:36 AM    Triglyceride 152 (H) 01/18/2016 09:29 AM    Triglyceride 148 01/30/2014 09:15 AM    Triglyceride 168 (H) 06/17/2013 10:55 AM     No results found for: CPK, CPKX, CPX  Lab Results   Component Value Date/Time    Sodium 144 10/22/2018 04:32 PM    Potassium 4.9 10/22/2018 04:32 PM    Chloride 103 10/22/2018 04:32 PM    CO2 25 10/22/2018 04:32 PM    Anion gap 6 11/21/2017 02:22 AM    Glucose 156 (H) 10/22/2018 04:32 PM    BUN 18 10/22/2018 04:32 PM    Creatinine 1.21 (H) 10/22/2018 04:32 PM    BUN/Creatinine ratio 15 10/22/2018 04:32 PM    GFR est AA 47 (L) 10/22/2018 04:32 PM    GFR est non-AA 41 (L) 10/22/2018 04:32 PM    Calcium 9.5 10/22/2018 04:32 PM    Bilirubin, total 0.2 10/22/2018 04:32 PM    AST (SGOT) 19 10/22/2018 04:32 PM    Alk.  phosphatase 108 10/22/2018 04:32 PM    Protein, total 6.8 10/22/2018 04:32 PM    Albumin 4.5 10/22/2018 04:32 PM    Globulin 3.7 11/06/2017 02:08 PM    A-G Ratio 2.0 10/22/2018 04:32 PM    ALT (SGPT) 15 10/22/2018 04:32 PM       EKG:  AF     Assessment:     Assessment:      1. Hypercholesterolemia    2. Paroxysmal atrial fibrillation (HCC)    3. Essential hypertension    4. Mixed hyperlipidemia    5. Non-rheumatic mitral regurgitation        Orders Placed This Encounter    AMB POC EKG ROUTINE W/ 12 LEADS, INTER & REP     Order Specific Question:   Reason for Exam:     Answer:   routine    acetaminophen/diphenhydramine (TYLENOL PM PO)     Sig: Take 2 Tabs by mouth as needed.  cranberry fruit extract (CRANBERRY CONCENTRATE PO)     Sig: Take  by mouth.  ascorbate calcium 500 mg tab     Sig: Take  by mouth.  cephALEXin (KEFLEX) 500 mg capsule     Sig: Take 2 capsules by mouth 30 min prior to dental work for prophylaxis    cyclobenzaprine (FLEXERIL) 10 mg tablet     Sig: Take  by mouth three (3) times daily as needed for Muscle Spasm(s).  apixaban (ELIQUIS) 5 mg tablet     Sig: Take 1 Tab by mouth two (2) times a day. Dispense:  180 Tab     Refill:  4    dilTIAZem CD (CARDIZEM CD) 240 mg ER capsule     Sig: Take 1 Cap by mouth daily. Decreased dose on 11/14/2018     Dispense:  90 Cap     Refill:  4        Plan:     Pt presents for post hospital f/u where she underwent successful cardioversion 10/31/18. States feeling labile HR (slow-fast) 2 days post procedure, was initiated on BB. She then dc her Diltiazem. Reports feeling tired, ordoñez. Atrial fibrillation post successful cardioversion 10/18  Normal EF, moderate MR per NIALL 10/18  Back in AF. She dc her Diltiazem upon initiation of BB. On Eliquis for [address] [place] [address] [facility] app with no overt bleeding  Restart Diltiazem at a lower dose. Continue metoprolol and amiodarone. Follow-up in 1 month.   At that time we will decide between rate control and anticoagulation, repeat attempt at cardioversion due to her advanced age, or we can discuss the most aggressive option of EP evaluation for possible AF ablation versus AV monica ablation and pacemaker. Negative nuclear stress test in 03/17    Moderate MR per NIALL in 10/18    Hypertension  Controlled. Hyperlipidemia:   12/17 LDL 83 at last check. On statin. Lipids and labs followed by PCP. Continue current care and f/u in 1 month, sooner as needed.     Flor Richardson MD

## 2018-11-14 NOTE — PROGRESS NOTES
Nanette Alcantar ............1. Have you been to the ER, urgent care clinic since your last visit? Hospitalized since your last visit? Yes When: October 2018 ED [facility] Cardioversion    2. Have you seen or consulted any other health care providers outside of the [facility] since your last visit? Include any pap smears or colon screening. No    Pt  c/o increased heart rate, dyspnea and throat discomfort with swallowing medications since her procedure. Pt also c/o bloating. Pt has been monitoring her blood pressures; 193 -089 DCEMVLMP/ dystolic. Reported home pulse rates . Pt stated when she started Metoprolol, she discontinued her Diltiazem. Please advise if to resume.

## 2018-11-20 ENCOUNTER — OFFICE VISIT (OUTPATIENT)
Dept: FAMILY MEDICINE CLINIC | Age: 83
End: 2018-11-20

## 2018-11-20 VITALS
BODY MASS INDEX: 28.99 KG/M2 | HEIGHT: 65 IN | SYSTOLIC BLOOD PRESSURE: 152 MMHG | RESPIRATION RATE: 19 BRPM | DIASTOLIC BLOOD PRESSURE: 67 MMHG | TEMPERATURE: 98.1 F | WEIGHT: 174 LBS | HEART RATE: 50 BPM

## 2018-11-20 DIAGNOSIS — I49.9 CARDIAC ARRHYTHMIA, UNSPECIFIED CARDIAC ARRHYTHMIA TYPE: ICD-10-CM

## 2018-11-20 DIAGNOSIS — K57.30 SIGMOID DIVERTICULOSIS: ICD-10-CM

## 2018-11-20 DIAGNOSIS — D64.9 ANEMIA, UNSPECIFIED TYPE: ICD-10-CM

## 2018-11-20 DIAGNOSIS — K57.92 DIVERTICULITIS: Primary | ICD-10-CM

## 2018-11-20 RX ORDER — SULFAMETHOXAZOLE AND TRIMETHOPRIM 800; 160 MG/1; MG/1
1 TABLET ORAL 2 TIMES DAILY
Qty: 20 TAB | Refills: 0 | Status: SHIPPED | OUTPATIENT
Start: 2018-11-20 | End: 2018-11-28

## 2018-11-20 RX ORDER — CIPROFLOXACIN 500 MG/1
500 TABLET ORAL 2 TIMES DAILY
Qty: 20 TAB | Refills: 0 | Status: SHIPPED | OUTPATIENT
Start: 2018-11-20 | End: 2018-11-20 | Stop reason: ALTCHOICE

## 2018-11-20 RX ORDER — METRONIDAZOLE 500 MG/1
500 TABLET ORAL 3 TIMES DAILY
Qty: 30 TAB | Refills: 0 | Status: SHIPPED | OUTPATIENT
Start: 2018-11-20 | End: 2018-11-30

## 2018-11-20 NOTE — PATIENT INSTRUCTIONS

## 2018-11-20 NOTE — PROGRESS NOTES
Chief Complaint   Patient presents with    Abdominal Pain    Shortness of Breath    Constipation     Health Maintenance reviewed     1. Have you been to the ER, urgent care clinic since your last visit? Hospitalized since your last visit? No     2. Have you seen or consulted any other health care providers outside of the [address] [place] [address] since your last visit? Include any pap smears or colon screening.  No

## 2018-11-20 NOTE — PROGRESS NOTES
Subjective:      Patient : This patient is a 80 y.o. female that presents today with a chief complaint of abdominal pain. She wakes up in the morning with minimal pain, has a bowel movement in the morning - drinking prune juice, having soft thin caliber stool. Dark which she blames on iron, no bright red blood. Some nausea in the morning. After breakfast and with each subsequent meal she gets abdominal distention and tightness that worsens throughout the day. Sometimes has another bowel movement but not always. Feels tight and distended - not able to button pants for a couple of weeks. Was seen in July and had normal Abd US for similar problem. Things got better and then worsened again over last 3 weeks. Started worsening after a fib ablation. No recent colonoscopy or endoscopy. One night had some elevated temperature    Afib cardioversion was only temporarily successful. Now rate controlled and anticoagulated. Feels short of breath with any exertion. Denies light headed or dizzy spells. Denies cough. Denies orthopnea or PND. Was hyperthyroid, we have now decrease the dosage of levothyroxine for 4 weeks. Together reviewed imaging of barium enema from 2016 that showed severe sigmoid diverticulosis with narrowing of colon lumen. Unable to advance pediatric scope during colonoscopy 2016    Past Medical History:   Diagnosis Date    Arthritis     hands/low back    Diabetes ([address]     borderline    GERD (gastroesophageal reflux disease)     Glaucoma     Hypercholesterolemia     Hypertension     Ill-defined condition     borderline anemia    Ill-defined condition     bladder infections    Impaired glucose tolerance     Menopause     Thyroid disease     hypothyroidism     Current Outpatient Medications   Medication Sig    metroNIDAZOLE (FLAGYL) 500 mg tablet Take 1 Tab by mouth three (3) times daily for 10 days.     trimethoprim-sulfamethoxazole (BACTRIM DS, SEPTRA DS) 160-800 mg per tablet Take 1 Tab by mouth two (2) times a day for 10 days.  acetaminophen/diphenhydramine (TYLENOL PM PO) Take 2 Tabs by mouth as needed.  cranberry fruit extract (CRANBERRY CONCENTRATE PO) Take  by mouth.  cephALEXin (KEFLEX) 500 mg capsule Take 2 capsules by mouth 30 min prior to dental work for prophylaxis    cyclobenzaprine (FLEXERIL) 10 mg tablet Take  by mouth three (3) times daily as needed for Muscle Spasm(s).  apixaban (ELIQUIS) 5 mg tablet Take 1 Tab by mouth two (2) times a day.  dilTIAZem CD (CARDIZEM CD) 240 mg ER capsule Take 1 Cap by mouth daily. Decreased dose on 11/14/2018    metoprolol tartrate (LOPRESSOR) 25 mg tablet Take 1 Tab by mouth two (2) times a day. In addition to your other medications for atrial fibrillation    amiodarone (CORDARONE) 200 mg tablet Take 1 Tab by mouth daily. Take 1 tab BID with meals for 2 weeks, then decrease to 1 tab daily    levothyroxine (SYNTHROID) 88 mcg tablet Take 1 Tab by mouth Daily (before breakfast).  lovastatin (MEVACOR) 40 mg tablet TAKE 1 TABLET EVERY EVENING TO LOWER CHOLESTEROL    omeprazole (PRILOSEC) 20 mg capsule TAKE 1 CAPSULE DAILY.  ACCU-CHEK MULTICLIX LANCET misc AS DIRECTED    latanoprost (XALATAN) 0.005 % ophthalmic solution Administer 1 Drop to both eyes nightly.  DOCUSATE CALCIUM (STOOL SOFTENER PO) Take  by mouth daily.  ascorbic acid (VITAMIN C) 500 mg tablet Take  by mouth daily.  cholecalciferol, vitamin d3, (VITAMIN D) 1,000 unit tablet Take  by mouth daily.  MULTIVITAMINS (MULTIVITAMIN PO) Take  by mouth. No current facility-administered medications for this visit.       Allergies   Allergen Reactions    Aspirin Other (comments)     Swelling shut of eyelids    Macrobid [Nitrofurantoin Monohyd/M-Cryst] Unknown (comments)    Pcn [Penicillins] Hives     Social History     Tobacco Use    Smoking status: Former Smoker     Packs/day: 1.00     Years: 15.00     Pack years: 15.00     Types: Cigarettes     Last attempt to quit: 1995     Years since quittin.8    Smokeless tobacco: Never Used   Substance Use Topics    Alcohol use: Yes     Alcohol/week: 0.6 oz     Types: 1 Glasses of wine per week     Comment: Rare--maybe once a month    Drug use: No       ROS per HPI. Objective:     Visit Vitals  /67 (BP 1 Location: Left arm, BP Patient Position: Sitting)   Pulse (!) 50   Temp 98.1 °F (36.7 °C) (Oral)   Resp 19   Ht 5' 5\" (1.651 m)   Wt 174 lb (78.9 kg)   BMI 28.96 kg/m²       Skin: no pallor, normal turgor  HEENT:  Normocephalic, no conjunctival inflammation, pupils equal round and reactive to light, MMM, no oral lesions  Heart: irregular rhythm, no murmurs, rubs or gallops  Lungs: no increased respiratory effort, clear to ausculation bilaterally  Abdomen: LLQ pain  Hyperactive bowel sounds. No rebound or guarding. +distention  Extremities:no edema or cyanosis  Neuro awake and oriented      Assessment:       ICD-10-CM ICD-9-CM    1. Diverticulitis K57.92 562.11 CBC WITH AUTOMATED DIFF      IRON PROFILE      FERRITIN      METABOLIC PANEL, COMPREHENSIVE   2. Sigmoid diverticulosis K57.30 562.10 CBC WITH AUTOMATED DIFF      IRON PROFILE      FERRITIN      METABOLIC PANEL, COMPREHENSIVE   3. Anemia, unspecified type D64.9 285.9 CBC WITH AUTOMATED DIFF      IRON PROFILE      FERRITIN      METABOLIC PANEL, COMPREHENSIVE   4. Cardiac arrhythmia, unspecified cardiac arrhythmia type I49.9 427.9      Start bactrim and flagyl for diverticulitis. Follow up Friday with me. Low threshold for ER if worsens in the interim. Forwarded note to cardiology for labile HR - concerned for sick sinus. Plan:     Orders Placed This Encounter    CBC WITH AUTOMATED DIFF    IRON PROFILE    FERRITIN    METABOLIC PANEL, COMPREHENSIVE    metroNIDAZOLE (FLAGYL) 500 mg tablet     Sig: Take 1 Tab by mouth three (3) times daily for 10 days.      Dispense:  30 Tab     Refill:  0    DISCONTD: ciprofloxacin HCl (CIPRO) 500 mg tablet     Sig: Take 1 Tab by mouth two (2) times a day for 10 days. Dispense:  20 Tab     Refill:  0    trimethoprim-sulfamethoxazole (BACTRIM DS, SEPTRA DS) 160-800 mg per tablet     Sig: Take 1 Tab by mouth two (2) times a day for 10 days. Dispense:  20 Tab     Refill:  0       Verbal and written instructions (see AVS) provided. Patient expresses understanding of diagnosis and treatment plan.

## 2018-11-21 LAB
ALBUMIN SERPL-MCNC: 4 G/DL (ref 3.5–4.7)
ALBUMIN/GLOB SERPL: 1.6 {RATIO} (ref 1.2–2.2)
ALP SERPL-CCNC: 116 IU/L (ref 39–117)
ALT SERPL-CCNC: 65 IU/L (ref 0–32)
AST SERPL-CCNC: 57 IU/L (ref 0–40)
BASOPHILS # BLD AUTO: 0.1 X10E3/UL (ref 0–0.2)
BASOPHILS NFR BLD AUTO: 1 %
BILIRUB SERPL-MCNC: 0.4 MG/DL (ref 0–1.2)
BUN SERPL-MCNC: 20 MG/DL (ref 8–27)
BUN/CREAT SERPL: 18 (ref 12–28)
CALCIUM SERPL-MCNC: 8.9 MG/DL (ref 8.7–10.3)
CHLORIDE SERPL-SCNC: 101 MMOL/L (ref 96–106)
CO2 SERPL-SCNC: 21 MMOL/L (ref 20–29)
CREAT SERPL-MCNC: 1.09 MG/DL (ref 0.57–1)
EOSINOPHIL # BLD AUTO: 0.4 X10E3/UL (ref 0–0.4)
EOSINOPHIL NFR BLD AUTO: 4 %
ERYTHROCYTE [DISTWIDTH] IN BLOOD BY AUTOMATED COUNT: 14.5 % (ref 12.3–15.4)
FERRITIN SERPL-MCNC: 959 NG/ML (ref 15–150)
GLOBULIN SER CALC-MCNC: 2.5 G/DL (ref 1.5–4.5)
GLUCOSE SERPL-MCNC: 168 MG/DL (ref 65–99)
HCT VFR BLD AUTO: 34.4 % (ref 34–46.6)
HGB BLD-MCNC: 10.9 G/DL (ref 11.1–15.9)
IMM GRANULOCYTES # BLD: 0 X10E3/UL (ref 0–0.1)
IMM GRANULOCYTES NFR BLD: 0 %
INTERPRETATION: NORMAL
IRON SATN MFR SERPL: 21 % (ref 15–55)
IRON SERPL-MCNC: 52 UG/DL (ref 27–139)
LYMPHOCYTES # BLD AUTO: 1.4 X10E3/UL (ref 0.7–3.1)
LYMPHOCYTES NFR BLD AUTO: 16 %
MCH RBC QN AUTO: 29.9 PG (ref 26.6–33)
MCHC RBC AUTO-ENTMCNC: 31.7 G/DL (ref 31.5–35.7)
MCV RBC AUTO: 95 FL (ref 79–97)
MONOCYTES # BLD AUTO: 0.7 X10E3/UL (ref 0.1–0.9)
MONOCYTES NFR BLD AUTO: 8 %
NEUTROPHILS # BLD AUTO: 5.8 X10E3/UL (ref 1.4–7)
NEUTROPHILS NFR BLD AUTO: 71 %
PLATELET # BLD AUTO: 310 X10E3/UL (ref 150–379)
POTASSIUM SERPL-SCNC: 5 MMOL/L (ref 3.5–5.2)
PROT SERPL-MCNC: 6.5 G/DL (ref 6–8.5)
RBC # BLD AUTO: 3.64 X10E6/UL (ref 3.77–5.28)
SODIUM SERPL-SCNC: 140 MMOL/L (ref 134–144)
TIBC SERPL-MCNC: 252 UG/DL (ref 250–450)
UIBC SERPL-MCNC: 200 UG/DL (ref 118–369)
WBC # BLD AUTO: 8.3 X10E3/UL (ref 3.4–10.8)

## 2018-11-23 ENCOUNTER — APPOINTMENT (OUTPATIENT)
Dept: GENERAL RADIOLOGY | Age: 83
DRG: 292 | End: 2018-11-23
Attending: EMERGENCY MEDICINE
Payer: MEDICARE

## 2018-11-23 ENCOUNTER — HOSPITAL ENCOUNTER (INPATIENT)
Age: 83
LOS: 5 days | Discharge: HOME | DRG: 292 | End: 2018-11-28
Attending: EMERGENCY MEDICINE | Admitting: INTERNAL MEDICINE
Payer: MEDICARE

## 2018-11-23 DIAGNOSIS — J96.01 ACUTE RESPIRATORY FAILURE WITH HYPOXIA (HCC): Primary | ICD-10-CM

## 2018-11-23 DIAGNOSIS — J81.0 ACUTE PULMONARY EDEMA (HCC): ICD-10-CM

## 2018-11-23 PROBLEM — J96.91 RESPIRATORY FAILURE WITH HYPOXIA (HCC): Status: ACTIVE | Noted: 2018-11-23

## 2018-11-23 LAB
ALBUMIN SERPL-MCNC: 3.2 G/DL (ref 3.5–5)
ALBUMIN/GLOB SERPL: 0.9 {RATIO} (ref 1.1–2.2)
ALP SERPL-CCNC: 128 U/L (ref 45–117)
ALT SERPL-CCNC: 83 U/L (ref 12–78)
ANION GAP SERPL CALC-SCNC: 8 MMOL/L (ref 5–15)
AST SERPL-CCNC: 98 U/L (ref 15–37)
ATRIAL RATE: 49 BPM
BILIRUB SERPL-MCNC: 0.5 MG/DL (ref 0.2–1)
BNP SERPL-MCNC: 1953 PG/ML (ref 0–450)
BUN SERPL-MCNC: 33 MG/DL (ref 6–20)
BUN/CREAT SERPL: 19 (ref 12–20)
CALCIUM SERPL-MCNC: 7.9 MG/DL (ref 8.5–10.1)
CALCULATED P AXIS, ECG09: 80 DEGREES
CALCULATED R AXIS, ECG10: 77 DEGREES
CALCULATED T AXIS, ECG11: 60 DEGREES
CHLORIDE SERPL-SCNC: 103 MMOL/L (ref 97–108)
CO2 SERPL-SCNC: 25 MMOL/L (ref 21–32)
CREAT SERPL-MCNC: 1.78 MG/DL (ref 0.55–1.02)
DIAGNOSIS, 93000: NORMAL
ERYTHROCYTE [DISTWIDTH] IN BLOOD BY AUTOMATED COUNT: 14.5 % (ref 11.5–14.5)
GLOBULIN SER CALC-MCNC: 3.6 G/DL (ref 2–4)
GLUCOSE SERPL-MCNC: 184 MG/DL (ref 65–100)
HCT VFR BLD AUTO: 32.6 % (ref 35–47)
HGB BLD-MCNC: 10.6 G/DL (ref 11.5–16)
MCH RBC QN AUTO: 30.7 PG (ref 26–34)
MCHC RBC AUTO-ENTMCNC: 32.5 G/DL (ref 30–36.5)
MCV RBC AUTO: 94.5 FL (ref 80–99)
NRBC # BLD: 0 K/UL (ref 0–0.01)
NRBC BLD-RTO: 0 PER 100 WBC
P-R INTERVAL, ECG05: 168 MS
PLATELET # BLD AUTO: 246 K/UL (ref 150–400)
PMV BLD AUTO: 10.4 FL (ref 8.9–12.9)
POTASSIUM SERPL-SCNC: 4.2 MMOL/L (ref 3.5–5.1)
PROT SERPL-MCNC: 6.8 G/DL (ref 6.4–8.2)
Q-T INTERVAL, ECG07: 586 MS
QRS DURATION, ECG06: 84 MS
QTC CALCULATION (BEZET), ECG08: 529 MS
RBC # BLD AUTO: 3.45 M/UL (ref 3.8–5.2)
SODIUM SERPL-SCNC: 136 MMOL/L (ref 136–145)
TROPONIN I SERPL-MCNC: <0.05 NG/ML
TSH SERPL DL<=0.05 MIU/L-ACNC: 5.56 UIU/ML (ref 0.36–3.74)
VENTRICULAR RATE, ECG03: 49 BPM
WBC # BLD AUTO: 8.9 K/UL (ref 3.6–11)

## 2018-11-23 PROCEDURE — 96374 THER/PROPH/DIAG INJ IV PUSH: CPT

## 2018-11-23 PROCEDURE — 84484 ASSAY OF TROPONIN QUANT: CPT

## 2018-11-23 PROCEDURE — 85027 COMPLETE CBC AUTOMATED: CPT

## 2018-11-23 PROCEDURE — 71045 X-RAY EXAM CHEST 1 VIEW: CPT

## 2018-11-23 PROCEDURE — 94761 N-INVAS EAR/PLS OXIMETRY MLT: CPT

## 2018-11-23 PROCEDURE — 83880 ASSAY OF NATRIURETIC PEPTIDE: CPT

## 2018-11-23 PROCEDURE — 84443 ASSAY THYROID STIM HORMONE: CPT

## 2018-11-23 PROCEDURE — 36415 COLL VENOUS BLD VENIPUNCTURE: CPT

## 2018-11-23 PROCEDURE — 93005 ELECTROCARDIOGRAM TRACING: CPT

## 2018-11-23 PROCEDURE — 80053 COMPREHEN METABOLIC PANEL: CPT

## 2018-11-23 PROCEDURE — 65660000000 HC RM CCU STEPDOWN

## 2018-11-23 PROCEDURE — 99285 EMERGENCY DEPT VISIT HI MDM: CPT

## 2018-11-23 PROCEDURE — 74011250637 HC RX REV CODE- 250/637: Performed by: INTERNAL MEDICINE

## 2018-11-23 PROCEDURE — 74011250636 HC RX REV CODE- 250/636: Performed by: EMERGENCY MEDICINE

## 2018-11-23 RX ORDER — PRAVASTATIN SODIUM 40 MG/1
40 TABLET ORAL
Status: DISCONTINUED | OUTPATIENT
Start: 2018-11-23 | End: 2018-11-28 | Stop reason: HOSPADM

## 2018-11-23 RX ORDER — DILTIAZEM HYDROCHLORIDE 240 MG/1
240 CAPSULE, COATED, EXTENDED RELEASE ORAL DAILY
Status: DISCONTINUED | OUTPATIENT
Start: 2018-11-23 | End: 2018-11-23

## 2018-11-23 RX ORDER — SODIUM CHLORIDE 0.9 % (FLUSH) 0.9 %
5-10 SYRINGE (ML) INJECTION EVERY 8 HOURS
Status: DISCONTINUED | OUTPATIENT
Start: 2018-11-23 | End: 2018-11-28 | Stop reason: HOSPADM

## 2018-11-23 RX ORDER — ACETAMINOPHEN 325 MG/1
650 TABLET ORAL
Status: DISCONTINUED | OUTPATIENT
Start: 2018-11-23 | End: 2018-11-28 | Stop reason: HOSPADM

## 2018-11-23 RX ORDER — SODIUM CHLORIDE 0.9 % (FLUSH) 0.9 %
5-10 SYRINGE (ML) INJECTION AS NEEDED
Status: DISCONTINUED | OUTPATIENT
Start: 2018-11-23 | End: 2018-11-28 | Stop reason: HOSPADM

## 2018-11-23 RX ORDER — PANTOPRAZOLE SODIUM 40 MG/1
40 TABLET, DELAYED RELEASE ORAL
Status: DISCONTINUED | OUTPATIENT
Start: 2018-11-23 | End: 2018-11-28 | Stop reason: HOSPADM

## 2018-11-23 RX ORDER — METRONIDAZOLE 250 MG/1
500 TABLET ORAL 3 TIMES DAILY
Status: DISCONTINUED | OUTPATIENT
Start: 2018-11-23 | End: 2018-11-24

## 2018-11-23 RX ORDER — ONDANSETRON 2 MG/ML
4 INJECTION INTRAMUSCULAR; INTRAVENOUS
Status: DISCONTINUED | OUTPATIENT
Start: 2018-11-23 | End: 2018-11-28 | Stop reason: HOSPADM

## 2018-11-23 RX ORDER — LEVOTHYROXINE SODIUM 88 UG/1
88 TABLET ORAL
Status: DISCONTINUED | OUTPATIENT
Start: 2018-11-23 | End: 2018-11-28 | Stop reason: HOSPADM

## 2018-11-23 RX ORDER — DOCUSATE SODIUM 100 MG/1
100 CAPSULE, LIQUID FILLED ORAL DAILY
Status: DISCONTINUED | OUTPATIENT
Start: 2018-11-23 | End: 2018-11-28 | Stop reason: HOSPADM

## 2018-11-23 RX ORDER — SULFAMETHOXAZOLE AND TRIMETHOPRIM 800; 160 MG/1; MG/1
1 TABLET ORAL 2 TIMES DAILY
Status: DISCONTINUED | OUTPATIENT
Start: 2018-11-23 | End: 2018-11-23

## 2018-11-23 RX ORDER — FUROSEMIDE 10 MG/ML
60 INJECTION INTRAMUSCULAR; INTRAVENOUS
Status: COMPLETED | OUTPATIENT
Start: 2018-11-23 | End: 2018-11-23

## 2018-11-23 RX ORDER — AMIODARONE HYDROCHLORIDE 200 MG/1
200 TABLET ORAL DAILY
Status: DISCONTINUED | OUTPATIENT
Start: 2018-11-23 | End: 2018-11-24

## 2018-11-23 RX ORDER — METOPROLOL TARTRATE 25 MG/1
25 TABLET, FILM COATED ORAL 2 TIMES DAILY
Status: DISCONTINUED | OUTPATIENT
Start: 2018-11-23 | End: 2018-11-23

## 2018-11-23 RX ADMIN — SULFAMETHOXAZOLE AND TRIMETHOPRIM 1 TABLET: 800; 160 TABLET ORAL at 09:40

## 2018-11-23 RX ADMIN — Medication 10 ML: at 18:03

## 2018-11-23 RX ADMIN — PRAVASTATIN SODIUM 40 MG: 40 TABLET ORAL at 20:55

## 2018-11-23 RX ADMIN — FUROSEMIDE 60 MG: 10 INJECTION, SOLUTION INTRAMUSCULAR; INTRAVENOUS at 01:41

## 2018-11-23 RX ADMIN — DOCUSATE SODIUM 100 MG: 100 CAPSULE, LIQUID FILLED ORAL at 09:40

## 2018-11-23 RX ADMIN — Medication 10 ML: at 20:55

## 2018-11-23 RX ADMIN — APIXABAN 2.5 MG: 2.5 TABLET, FILM COATED ORAL at 09:40

## 2018-11-23 RX ADMIN — METRONIDAZOLE 500 MG: 250 TABLET ORAL at 18:03

## 2018-11-23 RX ADMIN — METRONIDAZOLE 500 MG: 250 TABLET ORAL at 20:55

## 2018-11-23 RX ADMIN — LEVOTHYROXINE SODIUM 88 MCG: 88 TABLET ORAL at 08:17

## 2018-11-23 RX ADMIN — APIXABAN 2.5 MG: 2.5 TABLET, FILM COATED ORAL at 18:03

## 2018-11-23 RX ADMIN — PANTOPRAZOLE SODIUM 40 MG: 40 TABLET, DELAYED RELEASE ORAL at 08:17

## 2018-11-23 RX ADMIN — AMIODARONE HYDROCHLORIDE 200 MG: 200 TABLET ORAL at 09:39

## 2018-11-23 RX ADMIN — METRONIDAZOLE 500 MG: 250 TABLET ORAL at 09:40

## 2018-11-23 RX ADMIN — SODIUM CHLORIDE 1000 ML: 900 INJECTION, SOLUTION INTRAVENOUS at 01:10

## 2018-11-23 NOTE — ED NOTES
Pt ambulated down hallway, sat's slowly decreased from 97% to 88% on RA. Pt reports feeling lightheaded and SOB with ambulation.

## 2018-11-23 NOTE — PROGRESS NOTES
TRANSFER - IN REPORT:  Verbal report received from ER RN(name) on Joe Somers  being received from ED(unit) for routine progression of care    Report consisted of patients Situation, Background, Assessment and   Recommendations(SBAR). Information from the following report(s) SBAR, Kardex, ED Summary, Procedure Summary, Intake/Output, MAR and Recent Results was reviewed with the receiving nurse. Opportunity for questions and clarification was provided. Assessment completed upon patients arrival to unit and care assumed. Primary Nurse Pooja Preston RN and Anthony Veronica RN performed a dual skin assessment on this patient No impairment noted  Reji score is 22.

## 2018-11-23 NOTE — ROUTINE PROCESS
TRANSFER - OUT REPORT:    Verbal report given to Love(name) on Bharat Galeano  being transferred to [facility](unit) for routine progression of care       Report consisted of patients Situation, Background, Assessment and   Recommendations(SBAR). Information from the following report(s) SBAR was reviewed with the receiving nurse. Lines:       Opportunity for questions and clarification was provided.       Patient transported with:   [email or web address]

## 2018-11-23 NOTE — H&P
Hospitalist Admission Note    NAME: Tomasz Roberts   :  3/5/1932   MRN:  [id number]     Date/Time:  2018 4:49 AM    Patient PCP: Estefani Ortiz MD  ______________________________________________________________________  Given the patient's current clinical presentation, I have a high level of concern for decompensation if discharged from the emergency department. Complex decision making was performed, which includes reviewing the patient's available past medical records, laboratory results, and x-ray films. My assessment of this patient's clinical condition and my plan of care is as follows. Assessment / Plan:  Acute hypoxic resp failure  -mild pulm edema on CXR  -AF with recent ablation but AF persisted and patient now on [facility] with eliquis and rated controlled. -NC O2 to maintain sats > 90  -single dose of Lasix in ED with some symp improvement    Afib  -cont eliquis  -bradycardia into high 40s, hold BB and dilt for now, defer to cardio on rate control  -cont amio  -recent ablation with ongoing SOB  -s/p lasix in ED, still desaturating to high 80s in ED    Bradycardia   -hold BB and dilt as above  -sick sinus?  -cardio consulted    Diverticulitis  -fevers at home, some loose stool vs diarrhea  -dark stools but unchanged which patient associates with her iron supplementation  -will cont flagy & bactrim started by her pcp    Hypothyroid  -cont synthroid  -check TSH    DAHLIA  -Cr 1.78. Recent baseline 0.9-1.2  -will hold off on IVF with pulm edema and hypoxia  -monitor bmp  -consider nephro consult if Cr not improving    Code Status: Full  Surrogate Decision Maker:  eDb Zamora     DVT Prophylaxis: On eliquis  GI Prophylaxis: not indicated    Baseline: Functional, independent with ADLs      Subjective:   CHIEF COMPLAINT: SOB    HISTORY OF PRESENT ILLNESS:     Jessica Adames is a 80 y.o. with PMHx of Afib, hypothyroidism, GERD, HLD who presents to the ED with SOB.  Patient notes that she underwent cardioversion late last month and has been back in Afib. She has recently had adjustments made to her rate control regimen as her heart rate had been persistently high. Pt admits to SOB, which has been ongoing for the last few weeks. CXR in the ED reveals mild pulmonary edema and EKG shows sinus bradycardia at a rate of 49. The patient denies any cough, fevers, or chills. She is also denying any palpitations, syncope or near-syncope, or dizziness. We were asked to admit for work up and evaluation of the above problems. Past Medical History:   Diagnosis Date    Arthritis     hands/low back    Diabetes [address] [address]     borderline    GERD (gastroesophageal reflux disease)     Glaucoma     Hypercholesterolemia     Hypertension     Ill-defined condition     borderline anemia    Ill-defined condition     bladder infections    Impaired glucose tolerance     Menopause     Thyroid disease     hypothyroidism        Past Surgical History:   Procedure Laterality Date    CARDIOVERSION, ELECTIVE  10/31/2018         HX BREAST BIOPSY Bilateral 1990    benign    HX CATARACT REMOVAL      bilateral    HX DILATION AND CURETTAGE      HX ORTHOPAEDIC      carpal tunnel release -bilateral    HX ROTATOR CUFF REPAIR Right        Social History     Tobacco Use    Smoking status: Former Smoker     Packs/day: 1.00     Years: 15.00     Pack years: 15.00     Types: Cigarettes     Last attempt to quit: 1995     Years since quittin.8    Smokeless tobacco: Never Used   Substance Use Topics    Alcohol use:  Yes     Alcohol/week: 0.6 oz     Types: 1 Glasses of wine per week     Comment: Rare--maybe once a month        Family History   Problem Relation Age of Onset    Coronary Artery Disease Other         mother  of MI age 80, brother  age 72 of MI, sister has hear problems    Breast Cancer Sister 48    Heart Attack Mother     Other Father         pneumonia    Alzheimer Brother    Stacy Chou Cancer Sister     Cancer Sister     Heart Attack Brother     Suicide Brother      Allergies   Allergen Reactions    Aspirin Other (comments)     Swelling shut of eyelids    Macrobid [Nitrofurantoin Monohyd/M-Cryst] Unknown (comments)    Pcn [Penicillins] Hives        Prior to Admission medications    Medication Sig Start Date End Date Taking? Authorizing Provider   metroNIDAZOLE (FLAGYL) 500 mg tablet Take 1 Tab by mouth three (3) times daily for 10 days. 11/20/18 11/30/18  Hodan Wallace MD   trimethoprim-sulfamethoxazole (BACTRIM DS, SEPTRA DS) 160-800 mg per tablet Take 1 Tab by mouth two (2) times a day for 10 days. 11/20/18 11/30/18  Hodan Wallace MD   acetaminophen/diphenhydramine (TYLENOL PM PO) Take 2 Tabs by mouth as needed. Provider, Historical   cranberry fruit extract (CRANBERRY CONCENTRATE PO) Take  by mouth. Provider, Historical   cephALEXin (KEFLEX) 500 mg capsule Take 2 capsules by mouth 30 min prior to dental work for prophylaxis 1/15/18   Provider, Historical   cyclobenzaprine (FLEXERIL) 10 mg tablet Take  by mouth three (3) times daily as needed for Muscle Spasm(s). Provider, Historical   apixaban (ELIQUIS) 5 mg tablet Take 1 Tab by mouth two (2) times a day. 11/14/18   Alan Bridges MD   dilTIAZem CD (CARDIZEM CD) 240 mg ER capsule Take 1 Cap by mouth daily. Decreased dose on 11/14/2018 11/14/18   Ariana Pena MD   metoprolol tartrate (LOPRESSOR) 25 mg tablet Take 1 Tab by mouth two (2) times a day. In addition to your other medications for atrial fibrillation 11/5/18   Ariana Pena MD   amiodarone (CORDARONE) 200 mg tablet Take 1 Tab by mouth daily. Take 1 tab BID with meals for 2 weeks, then decrease to 1 tab daily 10/31/18   Ariana Pena MD   levothyroxine (SYNTHROID) 88 mcg tablet Take 1 Tab by mouth Daily (before breakfast).  10/25/18   Hodan Wallace MD   lovastatin (MEVACOR) 40 mg tablet TAKE 1 TABLET EVERY EVENING TO LOWER CHOLESTEROL 7/9/18   Mary Leung MD   omeprazole (PRILOSEC) 20 mg capsule TAKE 1 CAPSULE DAILY. 1/12/18   Mary Leung MD   ACCU-CHEK MULTICLIX LANCET [facility] [place] AS DIRECTED 10/19/17   Mary Leung MD   latanoprost (XALATAN) 0.005 % ophthalmic solution Administer 1 Drop to both eyes nightly. Provider, Historical   DOCUSATE CALCIUM (STOOL SOFTENER PO) Take  by mouth daily. Provider, Historical   ascorbic acid (VITAMIN C) 500 mg tablet Take  by mouth daily. Provider, Historical   cholecalciferol, vitamin d3, (VITAMIN D) 1,000 unit tablet Take  by mouth daily. Provider, Historical   MULTIVITAMINS (MULTIVITAMIN PO) Take  by mouth. Provider, Historical       REVIEW OF SYSTEMS:     I am not able to complete the review of systems because:    The patient is intubated and sedated    The patient has altered mental status due to his acute medical problems    The patient has baseline aphasia from prior stroke(s)    The patient has baseline dementia and is not reliable historian    The patient is in acute medical distress and unable to provide information           Total of 12 systems reviewed as follows:       POSITIVE= underlined text  Negative = text not underlined  General:  fever, chills, sweats, generalized weakness, weight loss/gain,      loss of appetite   Eyes:    blurred vision, eye pain, loss of vision, double vision  ENT:    rhinorrhea, pharyngitis   Respiratory:   cough, sputum production, SOB, ROWE, wheezing, pleuritic pain   Cardiology:   chest pain, palpitations, orthopnea, PND, edema, syncope   Gastrointestinal:  abdominal pain , N/V, diarrhea, dysphagia, constipation, bleeding   Genitourinary:  frequency, urgency, dysuria, hematuria, incontinence   Muskuloskeletal :  arthralgia, myalgia, back pain  Hematology:  easy bruising, nose or gum bleeding, lymphadenopathy   Dermatological: rash, ulceration, pruritis, color change / jaundice  Endocrine:   hot flashes or polydipsia   Neurological:  headache, dizziness, confusion, focal weakness, paresthesia,     Speech difficulties, memory loss, gait difficulty  Psychological: Feelings of anxiety, depression, agitation    Objective:   VITALS:    Visit Vitals  BP (!) 134/96 (BP 1 Location: Right arm, BP Patient Position: At rest)   Pulse (!) 47   Temp 97.6 °F (36.4 °C)   Resp 17   Ht 5' 5\" (1.651 m)   Wt 77.1 kg (170 lb)   SpO2 98%   BMI 28.29 kg/m²       PHYSICAL EXAM:    General:    Alert, cooperative, no distress, appears stated age. HEENT: Atraumatic, anicteric sclerae, pink conjunctivae     No oral ulcers, mucosa moist, throat clear, dentition fair  Neck:  Supple, symmetrical,  thyroid: non tender  Lungs:   Clear to auscultation bilaterally. No Wheezing or Rhonchi. No rales. Chest wall:  No tenderness  No Accessory muscle use. Heart:   Regular  rhythm,  No  murmur   No edema  Abdomen:   Soft, non-tender. Not distended. Bowel sounds normal  Extremities: No cyanosis. No clubbing,      Skin turgor normal, Capillary refill normal, Radial dial pulse 2+  Skin:     Not pale. Not Jaundiced  No rashes   Psych:  Good insight. Not depressed. Not anxious or agitated. Neurologic: EOMs intact. No facial asymmetry. No aphasia or slurred speech. Symmetrical strength, Sensation grossly intact.  Alert and oriented X 4.     _______________________________________________________________________  Care Plan discussed with:    Comments   Patient x    Family      RN x    Care Manager                    Consultant:      _______________________________________________________________________  Expected  Disposition:   Home with Family x   HH/PT/OT/RN x   SNF/LTC    WANDA    ________________________________________________________________________  TOTAL TIME: 61 Minutes    Critical Care Provided     Minutes non procedure based      Comments    x Reviewed previous records   >50% of visit spent in counseling and coordination of care x Discussion with patient and/or family and questions answered       ________________________________________________________________________  Signed: Sheree Rosenbaum DO    Procedures: see electronic medical records for all procedures/Xrays and details which were not copied into this note but were reviewed prior to creation of Plan. LAB DATA REVIEWED:    Recent Results (from the past 24 hour(s))   CBC W/O DIFF    Collection Time: 11/23/18 12:44 AM   Result Value Ref Range    WBC 8.9 3.6 - 11.0 K/uL    RBC 3.45 (L) 3.80 - 5.20 M/uL    HGB 10.6 (L) 11.5 - 16.0 g/dL    HCT 32.6 (L) 35.0 - 47.0 %    MCV 94.5 80.0 - 99.0 FL    MCH 30.7 26.0 - 34.0 PG    MCHC 32.5 30.0 - 36.5 g/dL    RDW 14.5 11.5 - 14.5 %    PLATELET 578 643 - 371 K/uL    MPV 10.4 8.9 - 12.9 FL    NRBC 0.0 0  WBC    ABSOLUTE NRBC 0.00 0.00 - 0.21 K/uL   METABOLIC PANEL, COMPREHENSIVE    Collection Time: 11/23/18 12:44 AM   Result Value Ref Range    Sodium 136 136 - 145 mmol/L    Potassium 4.2 3.5 - 5.1 mmol/L    Chloride 103 97 - 108 mmol/L    CO2 25 21 - 32 mmol/L    Anion gap 8 5 - 15 mmol/L    Glucose 184 (H) 65 - 100 mg/dL    BUN 33 (H) 6 - 20 MG/DL    Creatinine 1.78 (H) 0.55 - 1.02 MG/DL    BUN/Creatinine ratio 19 12 - 20      GFR est AA 33 (L) >60 ml/min/1.73m2    GFR est non-AA 27 (L) >60 ml/min/1.73m2    Calcium 7.9 (L) 8.5 - 10.1 MG/DL    Bilirubin, total 0.5 0.2 - 1.0 MG/DL    ALT (SGPT) 83 (H) 12 - 78 U/L    AST (SGOT) 98 (H) 15 - 37 U/L    Alk.  phosphatase 128 (H) 45 - 117 U/L    Protein, total 6.8 6.4 - 8.2 g/dL    Albumin 3.2 (L) 3.5 - 5.0 g/dL    Globulin 3.6 2.0 - 4.0 g/dL    A-G Ratio 0.9 (L) 1.1 - 2.2     TROPONIN I    Collection Time: 11/23/18 12:44 AM   Result Value Ref Range    Troponin-I, Qt. <0.05 <0.05 ng/mL   NT-PRO BNP    Collection Time: 11/23/18 12:44 AM   Result Value Ref Range    NT pro-BNP 1,953 (H) 0 - 450 PG/ML

## 2018-11-23 NOTE — ED PROVIDER NOTES
EMERGENCY DEPARTMENT HISTORY AND PHYSICAL EXAM      Date: 11/23/2018  Patient Name: Gautam Huerta    History of Presenting Illness     Chief Complaint   Patient presents with    Shortness of Breath       History Provided By: Patient    HPI: Gautam Huerta, 80 y.o. female with PMHx significant for HTN, GERD, thyroid disease, hypothyroidism, hypercholesterolemia, glaucoma, menopause, A-Fib, DM, arthritis, presents ambulatory to the ED with cc of worsening, moderate, constant SOB that started 2 weeks ago. Pt reports She had an ablasion 2 weeks ago and has since had worsening SOB. Pt states she had abd pain 5 days ago and wa Dxed with diverticulitis 2 days ago and was prescribed two separate antibiotics. Pt states her SOB worsening 5 days ago when her GI Sx began. She states she has had some spitting up of clear mucous, but states she has not been vomiting food. She specifically denies numbness, weakness, palpitations, CP, fever, chills, cough, LE swelling, lightheadedness. There are no other complaints, changes, or physical findings at this time. Medical History:  HTN, GERD, thyroid disease, hypothyroidism, hypercholesterolemia, glaucoma, menopause, A-Fib, DM, arthritis  Surgical History: cataract removal, D&C, breast biopsy, carpal tunnel release bilaterally, cardioversion  Social History: -tobacco (former smoker), +EtOH (1 glass of wine per week), -Illicit Drugs    PCP: Keenan Epstein MD   Cardiology: Dr. Kacey Florez. MD Yuliana    Current Outpatient Medications   Medication Sig Dispense Refill    metroNIDAZOLE (FLAGYL) 500 mg tablet Take 1 Tab by mouth three (3) times daily for 10 days. 30 Tab 0    trimethoprim-sulfamethoxazole (BACTRIM DS, SEPTRA DS) 160-800 mg per tablet Take 1 Tab by mouth two (2) times a day for 10 days. 20 Tab 0    acetaminophen/diphenhydramine (TYLENOL PM PO) Take 2 Tabs by mouth as needed.  cranberry fruit extract (CRANBERRY CONCENTRATE PO) Take  by mouth.       cephALEXin (KEFLEX) 500 mg capsule Take 2 capsules by mouth 30 min prior to dental work for prophylaxis      cyclobenzaprine (FLEXERIL) 10 mg tablet Take  by mouth three (3) times daily as needed for Muscle Spasm(s).  apixaban (ELIQUIS) 5 mg tablet Take 1 Tab by mouth two (2) times a day. 180 Tab 4    dilTIAZem CD (CARDIZEM CD) 240 mg ER capsule Take 1 Cap by mouth daily. Decreased dose on 11/14/2018 90 Cap 4    metoprolol tartrate (LOPRESSOR) 25 mg tablet Take 1 Tab by mouth two (2) times a day. In addition to your other medications for atrial fibrillation 180 Tab 3    amiodarone (CORDARONE) 200 mg tablet Take 1 Tab by mouth daily. Take 1 tab BID with meals for 2 weeks, then decrease to 1 tab daily 90 Tab 3    levothyroxine (SYNTHROID) 88 mcg tablet Take 1 Tab by mouth Daily (before breakfast). 90 Tab 0    lovastatin (MEVACOR) 40 mg tablet TAKE 1 TABLET EVERY EVENING TO LOWER CHOLESTEROL 90 Tab 3    omeprazole (PRILOSEC) 20 mg capsule TAKE 1 CAPSULE DAILY. 90 Cap 3    ACCU-CHEK MULTICLIX LANCET misc AS DIRECTED 100 Each PRN    latanoprost (XALATAN) 0.005 % ophthalmic solution Administer 1 Drop to both eyes nightly.  DOCUSATE CALCIUM (STOOL SOFTENER PO) Take  by mouth daily.  ascorbic acid (VITAMIN C) 500 mg tablet Take  by mouth daily.  cholecalciferol, vitamin d3, (VITAMIN D) 1,000 unit tablet Take  by mouth daily.  MULTIVITAMINS (MULTIVITAMIN PO) Take  by mouth.          Past History     Past Medical History:  Past Medical History:   Diagnosis Date    Arthritis     hands/low back    Diabetes ([address]     borderline    GERD (gastroesophageal reflux disease)     Glaucoma     Hypercholesterolemia     Hypertension     Ill-defined condition     borderline anemia    Ill-defined condition     bladder infections    Impaired glucose tolerance     Menopause     Thyroid disease     hypothyroidism       Past Surgical History:  Past Surgical History:   Procedure Laterality Date    CARDIOVERSION, ELECTIVE  10/31/2018         HX BREAST BIOPSY Bilateral     benign    HX CATARACT REMOVAL      bilateral    HX DILATION AND CURETTAGE      HX ORTHOPAEDIC      carpal tunnel release -bilateral    HX ROTATOR CUFF REPAIR Right        Family History:  Family History   Problem Relation Age of Onset    Coronary Artery Disease Other         mother  of MI age 80, brother  age 72 of MI, sister has hear problems    Breast Cancer Sister 48    Heart Attack Mother     Other Father         pneumonia    Alzheimer Brother     Cancer Sister     Cancer Sister     Heart Attack Brother     Suicide Brother        Social History:  Social History     Tobacco Use    Smoking status: Former Smoker     Packs/day: 1.00     Years: 15.00     Pack years: 15.00     Types: Cigarettes     Last attempt to quit: 1995     Years since quittin.8    Smokeless tobacco: Never Used   Substance Use Topics    Alcohol use: Yes     Alcohol/week: 0.6 oz     Types: 1 Glasses of wine per week     Comment: Rare--maybe once a month    Drug use: No       Allergies: Allergies   Allergen Reactions    Aspirin Other (comments)     Swelling shut of eyelids    Macrobid [Nitrofurantoin Monohyd/M-Cryst] Unknown (comments)    Pcn [Penicillins] Hives         Review of Systems   Review of Systems   Constitutional: Negative for chills and fever. Respiratory: Positive for shortness of breath. Negative for cough. Cardiovascular: Negative for chest pain, palpitations and leg swelling. Gastrointestinal: Positive for vomiting (spitting up mucous). Negative for constipation, diarrhea and nausea. Neurological: Negative for weakness, light-headedness and numbness. All other systems reviewed and are negative. Physical Exam   Physical Exam   Constitutional: She is oriented to person, place, and time. She appears well-developed and well-nourished. HENT:   Head: Normocephalic and atraumatic.    Eyes: Conjunctivae and EOM are normal.   Neck: Normal range of motion. Neck supple. Cardiovascular: Regular rhythm. Bradycardia present. Pulmonary/Chest: Effort normal and breath sounds normal. Tachypnea noted. No respiratory distress. No labored breathing   Abdominal: Soft. She exhibits no distension. There is no tenderness. Musculoskeletal: Normal range of motion. Neurological: She is alert and oriented to person, place, and time. Skin: Skin is warm and dry. Psychiatric: She has a normal mood and affect. Nursing note and vitals reviewed. Diagnostic Study Results     Labs -     Recent Results (from the past 12 hour(s))   CBC W/O DIFF    Collection Time: 11/23/18 12:44 AM   Result Value Ref Range    WBC 8.9 3.6 - 11.0 K/uL    RBC 3.45 (L) 3.80 - 5.20 M/uL    HGB 10.6 (L) 11.5 - 16.0 g/dL    HCT 32.6 (L) 35.0 - 47.0 %    MCV 94.5 80.0 - 99.0 FL    MCH 30.7 26.0 - 34.0 PG    MCHC 32.5 30.0 - 36.5 g/dL    RDW 14.5 11.5 - 14.5 %    PLATELET 154 142 - 259 K/uL    MPV 10.4 8.9 - 12.9 FL    NRBC 0.0 0  WBC    ABSOLUTE NRBC 0.00 0.00 - 4.33 K/uL   METABOLIC PANEL, COMPREHENSIVE    Collection Time: 11/23/18 12:44 AM   Result Value Ref Range    Sodium 136 136 - 145 mmol/L    Potassium 4.2 3.5 - 5.1 mmol/L    Chloride 103 97 - 108 mmol/L    CO2 25 21 - 32 mmol/L    Anion gap 8 5 - 15 mmol/L    Glucose 184 (H) 65 - 100 mg/dL    BUN 33 (H) 6 - 20 MG/DL    Creatinine 1.78 (H) 0.55 - 1.02 MG/DL    BUN/Creatinine ratio 19 12 - 20      GFR est AA 33 (L) >60 ml/min/1.73m2    GFR est non-AA 27 (L) >60 ml/min/1.73m2    Calcium 7.9 (L) 8.5 - 10.1 MG/DL    Bilirubin, total 0.5 0.2 - 1.0 MG/DL    ALT (SGPT) 83 (H) 12 - 78 U/L    AST (SGOT) 98 (H) 15 - 37 U/L    Alk.  phosphatase 128 (H) 45 - 117 U/L    Protein, total 6.8 6.4 - 8.2 g/dL    Albumin 3.2 (L) 3.5 - 5.0 g/dL    Globulin 3.6 2.0 - 4.0 g/dL    A-G Ratio 0.9 (L) 1.1 - 2.2     TROPONIN I    Collection Time: 11/23/18 12:44 AM   Result Value Ref Range    Troponin-I, Qt. <0.05 <0.05 ng/mL   NT-PRO BNP    Collection Time: 11/23/18 12:44 AM   Result Value Ref Range    NT pro-BNP 1,953 (H) 0 - 450 PG/ML       Radiologic Studies -   XR CHEST PORT   Final Result        CXR Results  (Last 48 hours)               11/23/18 0114  XR CHEST PORT Final result    Impression:  IMPRESSION: Mild pulmonary edema. Narrative:  EXAM:  CR chest portable       INDICATION:  Shortness of breath       COMPARISON: 2/15/2017. TECHNIQUE: Portable AP upright chest view at 0103 hours       FINDINGS: Cardiac monitoring wires overlie the thorax. The cardiomediastinal   contours are stable. There is central pulmonary vascular congestion and mild   diffuse interstitial opacities. There is no pleural effusion or pneumothorax. The bones and upper abdomen are stable. Medical Decision Making   I am the first provider for this patient. I reviewed the vital signs, available nursing notes, past medical history, past surgical history, family history and social history. Vital Signs-Reviewed the patient's vital signs. Patient Vitals for the past 12 hrs:   Temp Pulse Resp BP SpO2   11/23/18 0053 97.6 °F (36.4 °C) (!) 47 17 (!) 134/96 98 %       Pulse Oximetry Analysis - 98% on RA    Cardiac Monitor:   Rate: 47 bpm  Rhythm: Sinus bradycardia    EKG interpretation: (Preliminary) 0036  Rhythm: sinus bradycardia; and regular . Rate (approx.): 49; Axis: normal; ND interval: normal; QRS interval: normal ; ST/T wave: normal; Other findings: normal.    Records Reviewed: Nursing Notes, Old Medical Records, Previous Radiology Studies and Previous Laboratory Studies    Provider Notes (Medical Decision Making):   Patient presents with SOB. DDx: COPD/Asthma exacerbation, Bronchitis, CHF exacerbation, ACS, PE, PNA, PTX, Anxiety. Will get labs and cxr and ekg. ED Course:   Initial assessment performed.  The patients presenting problems have been discussed, and they are in agreement with the care plan formulated and outlined with them. I have encouraged them to ask questions as they arise throughout their visit. Medications   sodium chloride 0.9 % bolus infusion 1,000 mL (0 mL IntraVENous IV Completed 11/23/18 0123)   furosemide (LASIX) injection 60 mg (60 mg IntraVENous Given 11/23/18 0141)     Progress Note:  2:24 AM  Pt ambulated and desaturated to 88%. At this time she had lightheadedness, and felt like would pass out. She also had tachypnea. Will likely admit to the hospitalist.     Consult Note:  2:28 Luann Myers MD spoke with Dr. Tahmina Zapata DO,  Specialty: hospitalist  Discussed pt's hx, disposition, and available diagnostic and imaging results. Reviewed care plans. Consultant agrees with plans as outlined. Dr. Tahmina Zapata DO will accept this patient. Progress Note:  3:11 AM  Pt ambulated at 88%. Critical Care Time:   CRITICAL CARE NOTE :    2:29 AM    IMPENDING DETERIORATION -Airway and Respiratory  ASSOCIATED RISK FACTORS - Hypoxia  MANAGEMENT- Bedside Assessment and Supervision of Care  INTERPRETATION -  Xrays, ECG, Blood Pressure and Cardiac Output Measures   INTERVENTIONS - vent mngmt  CASE REVIEW - Hospitalist, Nursing and Family  TREATMENT RESPONSE -Improved  PERFORMED BY - Self    NOTES   :    I have spent 35 minutes of critical care time involved in lab review, consultations with specialist, family decision- making, bedside attention and documentation. During this entire length of time I was immediately available to the patient . Disposition:  Admit Note:  2:29 AM  Pt is being admitted by Dr. Tahmina Zapata DO. The results of their tests and reason(s) for their admission have been discussed with pt and/or available family. They convey agreement and understanding for the need to be admitted and for admission diagnosis. PLAN:  1. Current Discharge Medication List        2.    Follow-up Information    None       Return to ED if worse     Diagnosis Clinical Impression:   1. Acute respiratory failure with hypoxia ([address]    2. Acute pulmonary edema (HCC)        Attestations: This note is prepared by Henna Rocha. Alphonso Blanco, acting as Scribe for Sofie Ballard MD.    Sofie Ballard MD: The scribe's documentation has been prepared under my direction and personally reviewed by me in its entirety. I confirm that the note above accurately reflects all work, treatment, procedures, and medical decision making performed by me. This note will not be viewable in [address]

## 2018-11-23 NOTE — CONSULTS
Subjective:      Date of  Admission: 11/23/2018 12:24 AM     Admission type:Emergency    Darcie Leroy is a 80 y.o. female presented with worsening SOB, lower abdominal discomfort. Given IV lasix and since then feeling better. Also being treated for possible diverticulitis by PCP. Feels better this morning. SOB improved. She denies chest pain, chest pressure/discomfort, palpitations, irregular heart beats, near-syncope, syncope, fatigue, orthopnea, paroxysmal nocturnal dyspnea, exertional chest pressure/discomfort, claudication, lower extremity edema. Recently seen in office and CCB was added. Underwent DCCV last month.      Patient Active Problem List    Diagnosis Date Noted    Respiratory failure with hypoxia ([facility] 11/23/2018    Sigmoid diverticulosis 11/20/2018    Mitral regurgitation 10/31/2018    Paroxysmal atrial fibrillation ([address] 10/25/2018    Hypercholesterolemia     Osteoarthritis of hip 11/20/2017    PAD (peripheral artery disease) ([facility] 04/04/2017    Anemia 02/06/2017    Chronic UTI 02/06/2017    Venous (peripheral) insufficiency 01/08/2015    Hypothyroid 06/11/2012    Hypertension 06/11/2012    Arthritis 06/11/2012    Hyperlipidemia 06/11/2012    IGT (impaired glucose tolerance) 06/30/2011    Restless leg syndrome 06/30/2011      Natalie Pelaez MD  Past Medical History:   Diagnosis Date    Arthritis     hands/low back    Diabetes ([address]     borderline    GERD (gastroesophageal reflux disease)     Glaucoma     Hypercholesterolemia     Hypertension     Ill-defined condition     borderline anemia    Ill-defined condition     bladder infections    Impaired glucose tolerance     Menopause     Thyroid disease     hypothyroidism      Past Surgical History:   Procedure Laterality Date    CARDIOVERSION, ELECTIVE  10/31/2018         HX BREAST BIOPSY Bilateral 1990    benign    HX CATARACT REMOVAL      bilateral    HX DILATION AND CURETTAGE      HX ORTHOPAEDIC      carpal tunnel release -bilateral    HX ROTATOR CUFF REPAIR Right      Allergies   Allergen Reactions    Aspirin Other (comments)     Swelling shut of eyelids    Macrobid [Nitrofurantoin Monohyd/M-Cryst] Unknown (comments)    Pcn [Penicillins] Hives      Family History   Problem Relation Age of Onset    Coronary Artery Disease Other         mother  of MI age 80, brother  age 72 of MI, sister has hear problems    Breast Cancer Sister 48    Heart Attack Mother     Other Father         pneumonia    Alzheimer Brother     Cancer Sister     Cancer Sister     Heart Attack Brother     Suicide Brother       Current Facility-Administered Medications   Medication Dose Route Frequency    sodium chloride (NS) flush 5-10 mL  5-10 mL IntraVENous Q8H    sodium chloride (NS) flush 5-10 mL  5-10 mL IntraVENous PRN    acetaminophen (TYLENOL) tablet 650 mg  650 mg Oral Q4H PRN    ondansetron (ZOFRAN) injection 4 mg  4 mg IntraVENous Q4H PRN    amiodarone (CORDARONE) tablet 200 mg  200 mg Oral DAILY    docusate sodium (COLACE) capsule 100 mg  100 mg Oral DAILY    pravastatin (PRAVACHOL) tablet 40 mg  40 mg Oral QHS    pantoprazole (PROTONIX) tablet 40 mg  40 mg Oral ACB    apixaban (ELIQUIS) tablet 2.5 mg  2.5 mg Oral BID    levothyroxine (SYNTHROID) tablet 88 mcg  88 mcg Oral ACB    metroNIDAZOLE (FLAGYL) tablet 500 mg  500 mg Oral TID         Review of Symptoms:  Constitutional: negative  Eyes: negative  Ears, nose, mouth, throat, and face: negative  Respiratory: No orthopnea, PND, cough, hemoptysis, URI. Cardiovascular: No CP, palpitations, sweating, lightheadedness, dizziness, syncope, presyncope, lower extremity swelling. Gastrointestinal: No nausea, vomiting, diarrhea, constipation, hematemesis, melena, hematochezia  Genitourinary:No urinary complaints.   Musculoskeletal:negative  Neurological: negative  Behvioral/Psych: negative  Endocrine: negative     Subjective:      Visit Vitals  /48 (BP 1 Location: Right arm, BP Patient Position: Sitting)   Pulse (!) 51   Temp 97.7 °F (36.5 °C)   Resp 20   Ht 5' 5\" (1.651 m)   Wt 170 lb (77.1 kg)   SpO2 99%   Breastfeeding? No   BMI 28.29 kg/m²       Physical Exam  Abdomen: soft, non-tender. Bowel sounds normal.   Extremities: no cyanosis or edema  Heart: regular rate and rhythm, S1, S2 normal, no murmur, click, rub or gallop  Lungs: clear to auscultation bilaterally  Neck: supple, no carotid bruit and no JVD  Neurologic: Grossly normal  Pulses: 2+       Cardiographics    Telemetry: SB    ECG: sinus bradycardia    Echocardiogram: Not done    Labs:   Recent Results (from the past 24 hour(s))   CBC W/O DIFF    Collection Time: 11/23/18 12:44 AM   Result Value Ref Range    WBC 8.9 3.6 - 11.0 K/uL    RBC 3.45 (L) 3.80 - 5.20 M/uL    HGB 10.6 (L) 11.5 - 16.0 g/dL    HCT 32.6 (L) 35.0 - 47.0 %    MCV 94.5 80.0 - 99.0 FL    MCH 30.7 26.0 - 34.0 PG    MCHC 32.5 30.0 - 36.5 g/dL    RDW 14.5 11.5 - 14.5 %    PLATELET 482 526 - 710 K/uL    MPV 10.4 8.9 - 12.9 FL    NRBC 0.0 0  WBC    ABSOLUTE NRBC 0.00 0.00 - 3.41 K/uL   METABOLIC PANEL, COMPREHENSIVE    Collection Time: 11/23/18 12:44 AM   Result Value Ref Range    Sodium 136 136 - 145 mmol/L    Potassium 4.2 3.5 - 5.1 mmol/L    Chloride 103 97 - 108 mmol/L    CO2 25 21 - 32 mmol/L    Anion gap 8 5 - 15 mmol/L    Glucose 184 (H) 65 - 100 mg/dL    BUN 33 (H) 6 - 20 MG/DL    Creatinine 1.78 (H) 0.55 - 1.02 MG/DL    BUN/Creatinine ratio 19 12 - 20      GFR est AA 33 (L) >60 ml/min/1.73m2    GFR est non-AA 27 (L) >60 ml/min/1.73m2    Calcium 7.9 (L) 8.5 - 10.1 MG/DL    Bilirubin, total 0.5 0.2 - 1.0 MG/DL    ALT (SGPT) 83 (H) 12 - 78 U/L    AST (SGOT) 98 (H) 15 - 37 U/L    Alk.  phosphatase 128 (H) 45 - 117 U/L    Protein, total 6.8 6.4 - 8.2 g/dL    Albumin 3.2 (L) 3.5 - 5.0 g/dL    Globulin 3.6 2.0 - 4.0 g/dL    A-G Ratio 0.9 (L) 1.1 - 2.2     TROPONIN I    Collection Time: 11/23/18 12:44 AM Result Value Ref Range    Troponin-I, Qt. <0.05 <0.05 ng/mL   NT-PRO BNP    Collection Time: 11/23/18 12:44 AM   Result Value Ref Range    NT pro-BNP 1,953 (H) 0 - 450 PG/ML   TSH 3RD GENERATION    Collection Time: 11/23/18 12:44 AM   Result Value Ref Range    TSH 5.56 (H) 0.36 - 3.74 uIU/mL        Assessment:     Assessment:       Active Problems:    Respiratory failure with hypoxia ([facility] [address] [address] (11/23/2018)         Plan:     1. PAF, sinus camila with heart rate in 40's: hold BB and CCB for now. Continue amio and DOAC. Hemodynamically stable. 2. Renal insuff: follow BUN and Cr. Hold off any further lasix. 3. Moderate MR on recent NIALL. Stable. 4. BP stable.

## 2018-11-23 NOTE — PROGRESS NOTES
Report received from [place] , [address] [place] [address]. SBAR were discussed. Shaneka Yost RN   3:36 pm patient has been up in chair. Requires mild assistance, stand by, to get to bathroom. Friends in to visit.

## 2018-11-23 NOTE — PROGRESS NOTES
I have personally evaluated patient. She has h/o afib - s/p cardioversion,hypothyroidism,admitted for acute resp failure after she presented to ED c/o sob and was noted to have pulm edema on cxr. She received lasix. She is feeling little better now. Cardiology has been consulted. P/E:Lungs few crackles at the bases. Heart s1s2 nl. Patient was admitted today,see H/P by Yadira. Will continue current management.

## 2018-11-24 ENCOUNTER — APPOINTMENT (OUTPATIENT)
Dept: GENERAL RADIOLOGY | Age: 83
DRG: 292 | End: 2018-11-24
Attending: INTERNAL MEDICINE
Payer: MEDICARE

## 2018-11-24 LAB
ALBUMIN SERPL-MCNC: 3.1 G/DL (ref 3.5–5)
ALBUMIN/GLOB SERPL: 0.9 {RATIO} (ref 1.1–2.2)
ALP SERPL-CCNC: 120 U/L (ref 45–117)
ALT SERPL-CCNC: 63 U/L (ref 12–78)
ANION GAP SERPL CALC-SCNC: 8 MMOL/L (ref 5–15)
AST SERPL-CCNC: 51 U/L (ref 15–37)
BASOPHILS # BLD: 0.2 K/UL (ref 0–0.1)
BASOPHILS NFR BLD: 2 % (ref 0–1)
BILIRUB SERPL-MCNC: 0.4 MG/DL (ref 0.2–1)
BUN SERPL-MCNC: 26 MG/DL (ref 6–20)
BUN/CREAT SERPL: 17 (ref 12–20)
CALCIUM SERPL-MCNC: 8.3 MG/DL (ref 8.5–10.1)
CHLORIDE SERPL-SCNC: 106 MMOL/L (ref 97–108)
CO2 SERPL-SCNC: 25 MMOL/L (ref 21–32)
CREAT SERPL-MCNC: 1.53 MG/DL (ref 0.55–1.02)
DIFFERENTIAL METHOD BLD: ABNORMAL
EOSINOPHIL # BLD: 0.6 K/UL (ref 0–0.4)
EOSINOPHIL NFR BLD: 9 % (ref 0–7)
ERYTHROCYTE [DISTWIDTH] IN BLOOD BY AUTOMATED COUNT: 14.8 % (ref 11.5–14.5)
GLOBULIN SER CALC-MCNC: 3.3 G/DL (ref 2–4)
GLUCOSE SERPL-MCNC: 109 MG/DL (ref 65–100)
HCT VFR BLD AUTO: 34.3 % (ref 35–47)
HGB BLD-MCNC: 11.1 G/DL (ref 11.5–16)
IMM GRANULOCYTES # BLD: 0 K/UL (ref 0–0.04)
IMM GRANULOCYTES NFR BLD AUTO: 0 % (ref 0–0.5)
LYMPHOCYTES # BLD: 1.7 K/UL (ref 0.8–3.5)
LYMPHOCYTES NFR BLD: 24 % (ref 12–49)
MCH RBC QN AUTO: 30.2 PG (ref 26–34)
MCHC RBC AUTO-ENTMCNC: 32.4 G/DL (ref 30–36.5)
MCV RBC AUTO: 93.2 FL (ref 80–99)
MONOCYTES # BLD: 0.7 K/UL (ref 0–1)
MONOCYTES NFR BLD: 9 % (ref 5–13)
NEUTS SEG # BLD: 3.9 K/UL (ref 1.8–8)
NEUTS SEG NFR BLD: 55 % (ref 32–75)
NRBC # BLD: 0 K/UL (ref 0–0.01)
NRBC BLD-RTO: 0 PER 100 WBC
PLATELET # BLD AUTO: 255 K/UL (ref 150–400)
PMV BLD AUTO: 10.2 FL (ref 8.9–12.9)
POTASSIUM SERPL-SCNC: 3.8 MMOL/L (ref 3.5–5.1)
PROT SERPL-MCNC: 6.4 G/DL (ref 6.4–8.2)
RBC # BLD AUTO: 3.68 M/UL (ref 3.8–5.2)
SODIUM SERPL-SCNC: 139 MMOL/L (ref 136–145)
WBC # BLD AUTO: 7 K/UL (ref 3.6–11)

## 2018-11-24 PROCEDURE — 85025 COMPLETE CBC W/AUTO DIFF WBC: CPT

## 2018-11-24 PROCEDURE — 74011250637 HC RX REV CODE- 250/637: Performed by: INTERNAL MEDICINE

## 2018-11-24 PROCEDURE — 74018 RADEX ABDOMEN 1 VIEW: CPT

## 2018-11-24 PROCEDURE — 65660000000 HC RM CCU STEPDOWN

## 2018-11-24 PROCEDURE — 74011000250 HC RX REV CODE- 250: Performed by: INTERNAL MEDICINE

## 2018-11-24 PROCEDURE — 36415 COLL VENOUS BLD VENIPUNCTURE: CPT

## 2018-11-24 PROCEDURE — 80053 COMPREHEN METABOLIC PANEL: CPT

## 2018-11-24 RX ORDER — METRONIDAZOLE 250 MG/1
500 TABLET ORAL EVERY 12 HOURS
Status: DISCONTINUED | OUTPATIENT
Start: 2018-11-24 | End: 2018-11-28 | Stop reason: HOSPADM

## 2018-11-24 RX ORDER — POLYETHYLENE GLYCOL 3350 17 G/17G
17 POWDER, FOR SOLUTION ORAL DAILY
Status: DISCONTINUED | OUTPATIENT
Start: 2018-11-24 | End: 2018-11-28 | Stop reason: HOSPADM

## 2018-11-24 RX ORDER — AMIODARONE HYDROCHLORIDE 200 MG/1
200 TABLET ORAL ONCE
Status: COMPLETED | OUTPATIENT
Start: 2018-11-24 | End: 2018-11-24

## 2018-11-24 RX ORDER — LATANOPROST 50 UG/ML
1 SOLUTION/ DROPS OPHTHALMIC EVERY EVENING
Status: DISCONTINUED | OUTPATIENT
Start: 2018-11-24 | End: 2018-11-28 | Stop reason: HOSPADM

## 2018-11-24 RX ORDER — AMIODARONE HYDROCHLORIDE 200 MG/1
400 TABLET ORAL DAILY
Status: DISCONTINUED | OUTPATIENT
Start: 2018-11-25 | End: 2018-11-27

## 2018-11-24 RX ORDER — AMIODARONE HYDROCHLORIDE 200 MG/1
400 TABLET ORAL DAILY
Status: DISCONTINUED | OUTPATIENT
Start: 2018-11-24 | End: 2018-11-24

## 2018-11-24 RX ADMIN — METRONIDAZOLE 500 MG: 250 TABLET ORAL at 21:10

## 2018-11-24 RX ADMIN — Medication 10 ML: at 14:33

## 2018-11-24 RX ADMIN — AMIODARONE HYDROCHLORIDE 200 MG: 200 TABLET ORAL at 12:44

## 2018-11-24 RX ADMIN — LATANOPROST 1 DROP: 50 SOLUTION OPHTHALMIC at 21:10

## 2018-11-24 RX ADMIN — LATANOPROST 1 DROP: 50 SOLUTION OPHTHALMIC at 12:40

## 2018-11-24 RX ADMIN — DOCUSATE SODIUM 100 MG: 100 CAPSULE, LIQUID FILLED ORAL at 08:56

## 2018-11-24 RX ADMIN — PANTOPRAZOLE SODIUM 40 MG: 40 TABLET, DELAYED RELEASE ORAL at 08:56

## 2018-11-24 RX ADMIN — PRAVASTATIN SODIUM 40 MG: 40 TABLET ORAL at 21:10

## 2018-11-24 RX ADMIN — APIXABAN 2.5 MG: 2.5 TABLET, FILM COATED ORAL at 08:56

## 2018-11-24 RX ADMIN — APIXABAN 2.5 MG: 2.5 TABLET, FILM COATED ORAL at 17:41

## 2018-11-24 RX ADMIN — AMIODARONE HYDROCHLORIDE 200 MG: 200 TABLET ORAL at 08:56

## 2018-11-24 RX ADMIN — Medication 10 ML: at 06:18

## 2018-11-24 RX ADMIN — Medication 10 ML: at 21:11

## 2018-11-24 RX ADMIN — METRONIDAZOLE 500 MG: 250 TABLET ORAL at 08:56

## 2018-11-24 RX ADMIN — LEVOTHYROXINE SODIUM 88 MCG: 88 TABLET ORAL at 08:56

## 2018-11-24 RX ADMIN — POLYETHYLENE GLYCOL 3350 17 G: 17 POWDER, FOR SOLUTION ORAL at 19:40

## 2018-11-24 NOTE — PROGRESS NOTES
Spiritual Care Partner Volunteer visited patient in [address] [place] [address] on November 24, 2018.   Documented by:  GIACOMO Monge, [address] [facility],   LLOYD PEREZ [facility] Paging Service  [phone]

## 2018-11-24 NOTE — PROGRESS NOTES
Report received from BRENDA, Pending sale to [facility]. Introduced myself as primary RN. Assumed care of patient. Call bell within reach. 9:54 AM Noted order for Abd XR. Patient has already eaten breakfast. Instructed patient not to eat or drink until XR is performed. Pt verbalized understanding. XR tech informed, XR to place for transport within the next 2 hours. 2:58 PM Pt off fl for testing. 3:15 PM Pt back on fl. Bedside and Verbal shift change report given to BRENDA (oncoming nurse) by Preston Lindsay (offgoing nurse). Report included the following information SBAR, Kardex, MAR, Recent Results, Med Rec Status and Cardiac Rhythm Afib/SA.

## 2018-11-24 NOTE — PROGRESS NOTES
Pharmacy Automatic Renal Dosing Protocol - Antimicrobials    Indication for Antimicrobials: Diverticulitis (POA)     Current Regimen of Each Antimicrobial:  Metronidazole 500 mg PO three times daily (Start Date , Day 5/10)    Previous Antimicrobial Therapy:  Bactrim    Significant Cultures:   None    Paralysis, amputations, malnutrition: None    Labs:  Recent Labs     18  0338 18  0044   CREA 1.53* 1.78*   BUN 26* 33*   WBC 7.0 8.9     Temp (24hrs), Av.2 °F (36.8 °C), Min:97.8 °F (36.6 °C), Max:98.7 °F (37.1 °C)    Creatinine Clearance (mL/min) or Dialysis: 24 mL/min     Impression/Plan:   · Will change to 500 mg every 12 hours for indication per protocol  · Antimicrobial stop date pending. Originally prescribed for 10 days please consider adding the stop date. Pharmacy will follow daily and adjust medications as appropriate for renal function and/or serum levels. Thank you,  GERARD HernandezD    Recommended duration of therapy  [email or web address]. [email or web address]    Renal Dosing  [email or web address]/Pharmacy/[email or web address]/Renal%[email or web address]. pdf

## 2018-11-24 NOTE — PROGRESS NOTES
11/24/2018 9:42 AM    Admit Date: 11/23/2018    Admit Diagnosis: Respiratory failure with hypoxia (HCC)    Subjective:     Wang Porras denies chest pain, chest pressure/discomfort, dyspnea, palpitations, irregular heart beats, near-syncope, syncope, fatigue, orthopnea, paroxysmal nocturnal dyspnea, exertional chest pressure/discomfort, claudication, lower extremity edema. C/o low abd discomfort. Visit Vitals  /71 (BP 1 Location: Right arm, BP Patient Position: At rest)   Pulse 96   Temp 97.8 °F (36.6 °C)   Resp 16   Ht 5' 5\" (1.651 m)   Wt 168 lb (76.2 kg)   SpO2 98%   Breastfeeding? No   BMI 27.96 kg/m²     Current Facility-Administered Medications   Medication Dose Route Frequency    polyethylene glycol (MIRALAX) packet 17 g  17 g Oral DAILY    latanoprost (XALATAN) 0.005 % ophthalmic solution 1 Drop  1 Drop Both Eyes QPM    [START ON 11/25/2018] amiodarone (CORDARONE) tablet 400 mg  400 mg Oral DAILY    amiodarone (CORDARONE) tablet 200 mg  200 mg Oral ONCE    sodium chloride (NS) flush 5-10 mL  5-10 mL IntraVENous Q8H    sodium chloride (NS) flush 5-10 mL  5-10 mL IntraVENous PRN    acetaminophen (TYLENOL) tablet 650 mg  650 mg Oral Q4H PRN    ondansetron (ZOFRAN) injection 4 mg  4 mg IntraVENous Q4H PRN    docusate sodium (COLACE) capsule 100 mg  100 mg Oral DAILY    pravastatin (PRAVACHOL) tablet 40 mg  40 mg Oral QHS    pantoprazole (PROTONIX) tablet 40 mg  40 mg Oral ACB    apixaban (ELIQUIS) tablet 2.5 mg  2.5 mg Oral BID    levothyroxine (SYNTHROID) tablet 88 mcg  88 mcg Oral ACB    metroNIDAZOLE (FLAGYL) tablet 500 mg  500 mg Oral TID         Objective:      Visit Vitals  /71 (BP 1 Location: Right arm, BP Patient Position: At rest)   Pulse 96   Temp 97.8 °F (36.6 °C)   Resp 16   Ht 5' 5\" (1.651 m)   Wt 168 lb (76.2 kg)   SpO2 98%   Breastfeeding? No   BMI 27.96 kg/m²       Physical Exam:  Abdomen: soft, non-tender.  Bowel sounds normal.   Extremities: no cyanosis or edema  Heart: regular rate and rhythm, S1, S2 normal, no murmur, click, rub or gallop  Lungs: clear to auscultation bilaterally  Neurologic: Grossly normal    Data Review:   Labs:    Recent Results (from the past 24 hour(s))   CBC WITH AUTOMATED DIFF    Collection Time: 11/24/18  3:38 AM   Result Value Ref Range    WBC 7.0 3.6 - 11.0 K/uL    RBC 3.68 (L) 3.80 - 5.20 M/uL    HGB 11.1 (L) 11.5 - 16.0 g/dL    HCT 34.3 (L) 35.0 - 47.0 %    MCV 93.2 80.0 - 99.0 FL    MCH 30.2 26.0 - 34.0 PG    MCHC 32.4 30.0 - 36.5 g/dL    RDW 14.8 (H) 11.5 - 14.5 %    PLATELET 619 199 - 231 K/uL    MPV 10.2 8.9 - 12.9 FL    NRBC 0.0 0  WBC    ABSOLUTE NRBC 0.00 0.00 - 0.01 K/uL    NEUTROPHILS 55 32 - 75 %    LYMPHOCYTES 24 12 - 49 %    MONOCYTES 9 5 - 13 %    EOSINOPHILS 9 (H) 0 - 7 %    BASOPHILS 2 (H) 0 - 1 %    IMMATURE GRANULOCYTES 0 0.0 - 0.5 %    ABS. NEUTROPHILS 3.9 1.8 - 8.0 K/UL    ABS. LYMPHOCYTES 1.7 0.8 - 3.5 K/UL    ABS. MONOCYTES 0.7 0.0 - 1.0 K/UL    ABS. EOSINOPHILS 0.6 (H) 0.0 - 0.4 K/UL    ABS. BASOPHILS 0.2 (H) 0.0 - 0.1 K/UL    ABS. IMM. GRANS. 0.0 0.00 - 0.04 K/UL    DF AUTOMATED     METABOLIC PANEL, COMPREHENSIVE    Collection Time: 11/24/18  3:38 AM   Result Value Ref Range    Sodium 139 136 - 145 mmol/L    Potassium 3.8 3.5 - 5.1 mmol/L    Chloride 106 97 - 108 mmol/L    CO2 25 21 - 32 mmol/L    Anion gap 8 5 - 15 mmol/L    Glucose 109 (H) 65 - 100 mg/dL    BUN 26 (H) 6 - 20 MG/DL    Creatinine 1.53 (H) 0.55 - 1.02 MG/DL    BUN/Creatinine ratio 17 12 - 20      GFR est AA 39 (L) >60 ml/min/1.73m2    GFR est non-AA 32 (L) >60 ml/min/1.73m2    Calcium 8.3 (L) 8.5 - 10.1 MG/DL    Bilirubin, total 0.4 0.2 - 1.0 MG/DL    ALT (SGPT) 63 12 - 78 U/L    AST (SGOT) 51 (H) 15 - 37 U/L    Alk.  phosphatase 120 (H) 45 - 117 U/L    Protein, total 6.4 6.4 - 8.2 g/dL    Albumin 3.1 (L) 3.5 - 5.0 g/dL    Globulin 3.3 2.0 - 4.0 g/dL    A-G Ratio 0.9 (L) 1.1 - 2.2         Telemetry: normal sinus rhythm, PAF      Assessment:     Active Problems:    Respiratory failure with hypoxia ([facility] [address] (11/23/2018)        Plan:     1. PAF, sinus camila with heart rate in 40's: held BB and CCB. Now in a. Fib. Heart rate slightly up. Increase amio to 400mg. On DOAC. Monitor. 2. Renal insuff: follow BUN and Cr. Improving. Continue to hold lasix for now. 3. Moderate MR on recent NIALL. Stable. 4. BP stable.

## 2018-11-24 NOTE — PROGRESS NOTES
Hospitalist Progress Note    NAME: Edmar Jones   :  3/5/1932   MRN:  [id number]       Assessment / Plan:  Acute hypoxic resp failure due to pulm edema.  -mild pulm edema on CXR  -AF with recent ablation but AF persisted and patient now on [facility] with eliquis and rated controlled. -NC O2 to maintain sats > 90  -In ED her O2sat was still in the 80's  -single dose of Lasix in ED with symp improvement  -lasix now on hold due to elevated creatinine and pt remains stable  -Her O2sat now 96% on RA    Afib with rvr  -cont eliquis  -due to bradycardia into high 40s,BB and dilt were held and today ,heart rate up. Cardiology has adjusted Amiodarone to 400 mg po daily instead of 200 mg  -recent ablation with ongoing SOB  -patient remains admitted because of her heart rate is now up     Bradycardia   -hold BB and dilt as above  -sick sinus?  -cardio consulted     Diverticulitis  -fevers at home, some loose stool vs diarrhea  -dark stools but unchanged which patient associates with her iron supplementation  -will cont flagy & bactrim started by her pcp     Hypothyroid  -cont synthroid  -check TSH     DAHLIA  -Cr 1.78. Recent baseline 0.9-1.2  -will hold off on IVF with pulm edema and hypoxia  -monitor bmp  -improving,creatinine 1.53 today     Code Status: Full  Surrogate Decision Maker:  Aramis Mckeon   DVT Prophylaxis: On eliquis  GI Prophylaxis: not indicated  Baseline: Functional, independent with ADLs  Body mass index is 27.96 kg/m². Recommended Disposition:tbd     Subjective:     Chief Complaint / Reason for Physician Visit  She has h/o afib - s/p cardioversion,hypothyroidism,admitted for acute resp failure after she presented to ED c/o sob and was noted to have pulm edema on cxr. She received lasix and symptoms improved. She was noted with bradycardia and BB/CCB were put on hold. Cardiology consulted. Pt feeling better now however her heart rate is up and cardiology has adjusted the amiodarone.    Discussed with RN events overnight. Review of Systems:  Symptom Y/N Comments  Symptom Y/N Comments   Fever/Chills n   Chest Pain n    Poor Appetite n   Edema n    Cough n   Abdominal Pain     Sputum n   Joint Pain     SOB/ROWE n   Pruritis/Rash     Nausea/vomit n   Tolerating PT/OT     Diarrhea n   Tolerating Diet     Constipation    Other       Could NOT obtain due to:      Objective:     VITALS:   Last 24hrs VS reviewed since prior progress note. Most recent are:  Patient Vitals for the past 24 hrs:   Temp Pulse Resp BP SpO2   11/24/18 1452 98.1 °F (36.7 °C) 99 18 149/81 96 %   11/24/18 1100 98.2 °F (36.8 °C) 96 18 123/59 95 %   11/24/18 0729 97.8 °F (36.6 °C) 96 16 147/71 98 %   11/24/18 0330 98.4 °F (36.9 °C) 88 16 126/62 96 %   11/23/18 2342 97.9 °F (36.6 °C) (!) 55 24 151/56 98 %   11/23/18 1933 98.7 °F (37.1 °C) (!) 53 22 126/62 97 %       Intake/Output Summary (Last 24 hours) at 11/24/2018 1709  Last data filed at 11/24/2018 1245  Gross per 24 hour   Intake --   Output 1800 ml   Net -1800 ml        PHYSICAL EXAM:  General: WD, WN. Alert, cooperative, no acute distress    EENT:  EOMI. Anicteric sclerae. MMM  Resp:  CTA bilaterally, no wheezing or rales. No accessory muscle use  CV:  Regular  rhythm,  No edema  GI:  Soft, Non distended, Non tender.  +Bowel sounds  Neurologic:  Alert and oriented X 3, normal speech,   Psych:   Good insight. Not anxious nor agitated  Skin:  No rashes.   No jaundice    Reviewed most current lab test results and cultures  YES  Reviewed most current radiology test results   YES  Review and summation of old records today    NO  Reviewed patient's current orders and MAR    YES  PMH/SH reviewed - no change compared to H&P  ________________________________________________________________________  Care Plan discussed with:    Comments   Patient y    Family      RN y    Care Manager     Consultant  y                      Multidiciplinary team rounds were held today with , nursing, pharmacist and clinical coordinator. Patient's plan of care was discussed; medications were reviewed and discharge planning was addressed. ________________________________________________________________________  Total NON critical care TIME:  30   Minutes    Total CRITICAL CARE TIME Spent:   Minutes non procedure based      Comments   >50% of visit spent in counseling and coordination of care y    ________________________________________________________________________  Veronica Preciado MD     Procedures: see electronic medical records for all procedures/Xrays and details which were not copied into this note but were reviewed prior to creation of Plan. LABS:  I reviewed today's most current labs and imaging studies.   Pertinent labs include:  Recent Labs     11/24/18 0338 11/23/18 0044   WBC 7.0 8.9   HGB 11.1* 10.6*   HCT 34.3* 32.6*    246     Recent Labs     11/24/18 0338 11/23/18 0044    136   K 3.8 4.2    103   CO2 25 25   * 184*   BUN 26* 33*   CREA 1.53* 1.78*   CA 8.3* 7.9*   ALB 3.1* 3.2*   TBILI 0.4 0.5   SGOT 51* 98*   ALT 63 83*       Signed: Veronica Preciado MD

## 2018-11-25 LAB
ALBUMIN SERPL-MCNC: 3.1 G/DL (ref 3.5–5)
ALBUMIN/GLOB SERPL: 1 {RATIO} (ref 1.1–2.2)
ALP SERPL-CCNC: 126 U/L (ref 45–117)
ALT SERPL-CCNC: 52 U/L (ref 12–78)
ANION GAP SERPL CALC-SCNC: 6 MMOL/L (ref 5–15)
AST SERPL-CCNC: 39 U/L (ref 15–37)
BASOPHILS # BLD: 0.2 K/UL (ref 0–0.1)
BASOPHILS NFR BLD: 2 % (ref 0–1)
BILIRUB SERPL-MCNC: 0.5 MG/DL (ref 0.2–1)
BUN SERPL-MCNC: 20 MG/DL (ref 6–20)
BUN/CREAT SERPL: 16 (ref 12–20)
CALCIUM SERPL-MCNC: 8.3 MG/DL (ref 8.5–10.1)
CHLORIDE SERPL-SCNC: 107 MMOL/L (ref 97–108)
CO2 SERPL-SCNC: 27 MMOL/L (ref 21–32)
CREAT SERPL-MCNC: 1.24 MG/DL (ref 0.55–1.02)
DIFFERENTIAL METHOD BLD: ABNORMAL
EOSINOPHIL # BLD: 0.6 K/UL (ref 0–0.4)
EOSINOPHIL NFR BLD: 8 % (ref 0–7)
ERYTHROCYTE [DISTWIDTH] IN BLOOD BY AUTOMATED COUNT: 14.9 % (ref 11.5–14.5)
GLOBULIN SER CALC-MCNC: 3.2 G/DL (ref 2–4)
GLUCOSE SERPL-MCNC: 125 MG/DL (ref 65–100)
HCT VFR BLD AUTO: 35.7 % (ref 35–47)
HGB BLD-MCNC: 11.3 G/DL (ref 11.5–16)
IMM GRANULOCYTES # BLD: 0 K/UL (ref 0–0.04)
IMM GRANULOCYTES NFR BLD AUTO: 1 % (ref 0–0.5)
LYMPHOCYTES # BLD: 1.7 K/UL (ref 0.8–3.5)
LYMPHOCYTES NFR BLD: 23 % (ref 12–49)
MCH RBC QN AUTO: 29.9 PG (ref 26–34)
MCHC RBC AUTO-ENTMCNC: 31.7 G/DL (ref 30–36.5)
MCV RBC AUTO: 94.4 FL (ref 80–99)
MONOCYTES # BLD: 0.8 K/UL (ref 0–1)
MONOCYTES NFR BLD: 10 % (ref 5–13)
NEUTS SEG # BLD: 4.2 K/UL (ref 1.8–8)
NEUTS SEG NFR BLD: 56 % (ref 32–75)
NRBC # BLD: 0 K/UL (ref 0–0.01)
NRBC BLD-RTO: 0 PER 100 WBC
PLATELET # BLD AUTO: 267 K/UL (ref 150–400)
PMV BLD AUTO: 9.9 FL (ref 8.9–12.9)
POTASSIUM SERPL-SCNC: 3.9 MMOL/L (ref 3.5–5.1)
PROT SERPL-MCNC: 6.3 G/DL (ref 6.4–8.2)
RBC # BLD AUTO: 3.78 M/UL (ref 3.8–5.2)
SODIUM SERPL-SCNC: 140 MMOL/L (ref 136–145)
WBC # BLD AUTO: 7.5 K/UL (ref 3.6–11)

## 2018-11-25 PROCEDURE — 74011250637 HC RX REV CODE- 250/637: Performed by: INTERNAL MEDICINE

## 2018-11-25 PROCEDURE — 36415 COLL VENOUS BLD VENIPUNCTURE: CPT

## 2018-11-25 PROCEDURE — 85025 COMPLETE CBC W/AUTO DIFF WBC: CPT

## 2018-11-25 PROCEDURE — 74011000250 HC RX REV CODE- 250: Performed by: INTERNAL MEDICINE

## 2018-11-25 PROCEDURE — 80053 COMPREHEN METABOLIC PANEL: CPT

## 2018-11-25 PROCEDURE — 65660000000 HC RM CCU STEPDOWN

## 2018-11-25 RX ORDER — FUROSEMIDE 40 MG/1
40 TABLET ORAL DAILY
Status: DISCONTINUED | OUTPATIENT
Start: 2018-11-25 | End: 2018-11-28 | Stop reason: HOSPADM

## 2018-11-25 RX ADMIN — POLYETHYLENE GLYCOL 3350 17 G: 17 POWDER, FOR SOLUTION ORAL at 08:39

## 2018-11-25 RX ADMIN — Medication 10 ML: at 21:30

## 2018-11-25 RX ADMIN — METRONIDAZOLE 500 MG: 250 TABLET ORAL at 21:29

## 2018-11-25 RX ADMIN — METRONIDAZOLE 500 MG: 250 TABLET ORAL at 08:40

## 2018-11-25 RX ADMIN — Medication 10 ML: at 14:06

## 2018-11-25 RX ADMIN — PANTOPRAZOLE SODIUM 40 MG: 40 TABLET, DELAYED RELEASE ORAL at 08:40

## 2018-11-25 RX ADMIN — FUROSEMIDE 40 MG: 40 TABLET ORAL at 10:27

## 2018-11-25 RX ADMIN — Medication 10 ML: at 02:39

## 2018-11-25 RX ADMIN — PRAVASTATIN SODIUM 40 MG: 40 TABLET ORAL at 21:29

## 2018-11-25 RX ADMIN — AMIODARONE HYDROCHLORIDE 400 MG: 200 TABLET ORAL at 08:40

## 2018-11-25 RX ADMIN — APIXABAN 2.5 MG: 2.5 TABLET, FILM COATED ORAL at 08:40

## 2018-11-25 RX ADMIN — APIXABAN 2.5 MG: 2.5 TABLET, FILM COATED ORAL at 17:53

## 2018-11-25 RX ADMIN — DOCUSATE SODIUM 100 MG: 100 CAPSULE, LIQUID FILLED ORAL at 08:40

## 2018-11-25 RX ADMIN — LEVOTHYROXINE SODIUM 88 MCG: 88 TABLET ORAL at 08:40

## 2018-11-25 RX ADMIN — LATANOPROST 1 DROP: 50 SOLUTION OPHTHALMIC at 21:51

## 2018-11-25 NOTE — PROGRESS NOTES
0700: Bedside report received from Hannah Fleming, [facility].    1900:   Bedside shift change report given to Pamela Mcbride RN (oncoming nurse). Report included the following information SBAR, Kardex, Intake/Output, MAR, Recent Results and Cardiac Rhythm Afib/sinus tach. SHIFT SUMMARY:            [facility] NURSING NOTE   Admission Date 11/23/2018   Admission Diagnosis Respiratory failure with hypoxia ([address]   Consults IP CONSULT TO CARDIOLOGY      Cardiac Monitoring [x] Yes [] No      Purposeful Hourly Rounding [x] Yes    Angelita Score Total Score: 1   Angelita score 3 or > [] Bed Alarm [] Avasys [] 1:1 sitter [] Patient refused (Signed refusal form in chart)   Reji Score Reji Score: 22   Reji score 14 or < [] PMT consult [] Wound Care consult    []  Specialty bed  [] Nutrition consult      Influenza Vaccine Received Flu Vaccine for Current Season (usually Sept-March): Yes           Oxygen needs? [x] Room air Oxygen @  []1L    []2L    []3L   []4L    []5L   []6L via  NC   Chronic home O2 use? [] Yes [] No  Perform O2 challenge test and document in progress note using smartphrase (.Homeoxygen)      Last bowel movement Last Bowel Movement Date: 11/24/18      Urinary Catheter             LDAs               Peripheral IV Anterior; Left Antecubital (Active)   Site Assessment Clean, dry, & intact 11/25/2018  3:43 PM   Phlebitis Assessment 0 11/25/2018  3:43 PM   Infiltration Assessment 0 11/25/2018  3:43 PM   Dressing Status Clean, dry, & intact 11/25/2018  3:43 PM   Dressing Type Transparent 11/25/2018  3:43 PM   Hub Color/Line Status Pink;Flushed 11/25/2018  3:43 PM                         Readmission Risk Assessment Tool Score Medium Risk            15       Total Score        3 Has Seen PCP in Last 6 Months (Yes=3, No=0)    2 . Living with Significant Other. Assisted Living. LTAC. SNF. or   Rehab    5 Pt.  Coverage (Medicare=5 , Medicaid, or Self-Pay=4)    5 Charlson Comorbidity Score (Age + Comorbid Conditions)        Criteria that do not apply:    Patient Length of Stay (>5 days = 3)    IP Visits Last 12 Months (1-3=4, 4=9, >4=11)       Expected Length of Stay 3d 19h   Actual Length of Stay 2

## 2018-11-25 NOTE — PROGRESS NOTES
Bedside shift change report given to Leo Yanes RN (oncoming nurse). Report included the following information SBAR, Kardex, Intake/Output, MAR and Recent Results. SHIFT SUMMARY:            [facility] NURSING NOTE   Admission Date 11/23/2018   Admission Diagnosis Respiratory failure with hypoxia ([address]   Consults IP CONSULT TO CARDIOLOGY      Cardiac Monitoring [x] Yes [] No      Purposeful Hourly Rounding [x] Yes    Angelita Score Total Score: 1   Angelita score 3 or > [] Bed Alarm [] Avasys [] 1:1 sitter [] Patient refused (Signed refusal form in chart)   Reji Score Reji Score: 22   Reji score 14 or < [] PMT consult [] Wound Care consult    []  Specialty bed  [] Nutrition consult      Influenza Vaccine Received Flu Vaccine for Current Season (usually Sept-March): Yes           Oxygen needs? [x] Room air Oxygen @  []1L    []2L    []3L   []4L    []5L   []6L via  NC   Chronic home O2 use? [] Yes [x] No  Perform O2 challenge test and document in progress note using smartphrase (.Homeoxygen)      Last bowel movement Last Bowel Movement Date: 11/22/18      Urinary Catheter             LDAs               Peripheral IV Anterior; Left Antecubital (Active)   Site Assessment Clean, dry, & intact 11/24/2018  7:53 PM   Phlebitis Assessment 0 11/24/2018  7:53 PM   Infiltration Assessment 0 11/24/2018  7:53 PM   Dressing Status Clean, dry, & intact 11/24/2018  7:53 PM   Dressing Type Transparent 11/24/2018  7:53 PM   Hub Color/Line Status Pink;Capped 11/24/2018  7:53 PM                         Readmission Risk Assessment Tool Score Medium Risk            15       Total Score        3 Has Seen PCP in Last 6 Months (Yes=3, No=0)    2 . Living with Significant Other. Assisted Living. LTAC. SNF. or   Rehab    5 Pt.  Coverage (Medicare=5 , Medicaid, or Self-Pay=4)    5 Charlson Comorbidity Score (Age + Comorbid Conditions)        Criteria that do not apply:    Patient Length of Stay (>5 days = 3)    IP Visits Last 12 Months (1-3=4, 4=9, >4=11)       Expected Length of Stay 3d 19h   Actual Length of Stay 1

## 2018-11-25 NOTE — PROGRESS NOTES
11/25/2018 8:55 AM    Admit Date: 11/23/2018    Admit Diagnosis: Respiratory failure with hypoxia (HCC)    Subjective:     Deborah Camacho   denies chest pain, chest pressure/discomfort, dyspnea, palpitations, irregular heart beats, near-syncope, syncope, fatigue, orthopnea, paroxysmal nocturnal dyspnea. Visit Vitals  /68 (BP 1 Location: Right arm, BP Patient Position: Sitting)   Pulse (!) 107   Temp 98.4 °F (36.9 °C)   Resp 22   Ht 5' 5\" (1.651 m)   Wt 166 lb 2 oz (75.4 kg)   SpO2 95%   Breastfeeding? No   BMI 27.64 kg/m²     Current Facility-Administered Medications   Medication Dose Route Frequency    furosemide (LASIX) tablet 40 mg  40 mg Oral DAILY    polyethylene glycol (MIRALAX) packet 17 g  17 g Oral DAILY    latanoprost (XALATAN) 0.005 % ophthalmic solution 1 Drop  1 Drop Both Eyes QPM    amiodarone (CORDARONE) tablet 400 mg  400 mg Oral DAILY    metroNIDAZOLE (FLAGYL) tablet 500 mg  500 mg Oral Q12H    sodium chloride (NS) flush 5-10 mL  5-10 mL IntraVENous Q8H    sodium chloride (NS) flush 5-10 mL  5-10 mL IntraVENous PRN    acetaminophen (TYLENOL) tablet 650 mg  650 mg Oral Q4H PRN    ondansetron (ZOFRAN) injection 4 mg  4 mg IntraVENous Q4H PRN    docusate sodium (COLACE) capsule 100 mg  100 mg Oral DAILY    pravastatin (PRAVACHOL) tablet 40 mg  40 mg Oral QHS    pantoprazole (PROTONIX) tablet 40 mg  40 mg Oral ACB    apixaban (ELIQUIS) tablet 2.5 mg  2.5 mg Oral BID    levothyroxine (SYNTHROID) tablet 88 mcg  88 mcg Oral ACB         Objective:      Visit Vitals  /68 (BP 1 Location: Right arm, BP Patient Position: Sitting)   Pulse (!) 107   Temp 98.4 °F (36.9 °C)   Resp 22   Ht 5' 5\" (1.651 m)   Wt 166 lb 2 oz (75.4 kg)   SpO2 95%   Breastfeeding? No   BMI 27.64 kg/m²       Physical Exam:  Abdomen: soft, non-tender.  Bowel sounds normal.   Extremities: no cyanosis or edema  Heart: Irregular rate and rhythm, S1, S2 normal, no murmur, click, rub or gallop  Lungs: clear to auscultation bilaterally  Neurologic: Grossly normal    Data Review:   Labs:    Recent Results (from the past 24 hour(s))   CBC WITH AUTOMATED DIFF    Collection Time: 11/25/18  3:38 AM   Result Value Ref Range    WBC 7.5 3.6 - 11.0 K/uL    RBC 3.78 (L) 3.80 - 5.20 M/uL    HGB 11.3 (L) 11.5 - 16.0 g/dL    HCT 35.7 35.0 - 47.0 %    MCV 94.4 80.0 - 99.0 FL    MCH 29.9 26.0 - 34.0 PG    MCHC 31.7 30.0 - 36.5 g/dL    RDW 14.9 (H) 11.5 - 14.5 %    PLATELET 709 608 - 996 K/uL    MPV 9.9 8.9 - 12.9 FL    NRBC 0.0 0  WBC    ABSOLUTE NRBC 0.00 0.00 - 0.01 K/uL    NEUTROPHILS 56 32 - 75 %    LYMPHOCYTES 23 12 - 49 %    MONOCYTES 10 5 - 13 %    EOSINOPHILS 8 (H) 0 - 7 %    BASOPHILS 2 (H) 0 - 1 %    IMMATURE GRANULOCYTES 1 (H) 0.0 - 0.5 %    ABS. NEUTROPHILS 4.2 1.8 - 8.0 K/UL    ABS. LYMPHOCYTES 1.7 0.8 - 3.5 K/UL    ABS. MONOCYTES 0.8 0.0 - 1.0 K/UL    ABS. EOSINOPHILS 0.6 (H) 0.0 - 0.4 K/UL    ABS. BASOPHILS 0.2 (H) 0.0 - 0.1 K/UL    ABS. IMM. GRANS. 0.0 0.00 - 0.04 K/UL    DF AUTOMATED     METABOLIC PANEL, COMPREHENSIVE    Collection Time: 11/25/18  3:38 AM   Result Value Ref Range    Sodium 140 136 - 145 mmol/L    Potassium 3.9 3.5 - 5.1 mmol/L    Chloride 107 97 - 108 mmol/L    CO2 27 21 - 32 mmol/L    Anion gap 6 5 - 15 mmol/L    Glucose 125 (H) 65 - 100 mg/dL    BUN 20 6 - 20 MG/DL    Creatinine 1.24 (H) 0.55 - 1.02 MG/DL    BUN/Creatinine ratio 16 12 - 20      GFR est AA 50 (L) >60 ml/min/1.73m2    GFR est non-AA 41 (L) >60 ml/min/1.73m2    Calcium 8.3 (L) 8.5 - 10.1 MG/DL    Bilirubin, total 0.5 0.2 - 1.0 MG/DL    ALT (SGPT) 52 12 - 78 U/L    AST (SGOT) 39 (H) 15 - 37 U/L    Alk. phosphatase 126 (H) 45 - 117 U/L    Protein, total 6.3 (L) 6.4 - 8.2 g/dL    Albumin 3.1 (L) 3.5 - 5.0 g/dL    Globulin 3.2 2.0 - 4.0 g/dL    A-G Ratio 1.0 (L) 1.1 - 2.2         Telemetry: AFIB      Assessment:     Active Problems:    [facility] (11/23/2018)        Plan:     1.  PAF, sinus camila with heart rate in 40's: held BB and CCB. Increased amio to 400mg yesterday. On DOAC. Monitor. NPO after midnight. Dr. Kenna Mendoza to f/u in AM.   2. Renal insuff: BUN and Cr. improving. Add lasix today. 3. Moderate MR on recent NIALL. Stable.    4. BP stable.

## 2018-11-25 NOTE — PROGRESS NOTES
11/25/18 0742   Vital Signs   Temp 98.4 °F (36.9 °C)   Temp Source Oral   Pulse (Heart Rate) (!) 107   Heart Rate Source Monitor   Resp Rate 22   O2 Sat (%) 95 %   Level of Consciousness Alert   /68   MAP (Calculated) 85   BP 1 Method Automatic   BP 1 Location Right arm   BP Patient Position Sitting   MEWS Score 3   Pain 1   Pain Scale 1 Numeric (0 - 10)   Pain Intensity 1 0   Patient Stated Pain Goal 0   Oxygen Therapy   O2 Device Room air   MEWS 3 due to elevated HR. Md aware. Charge Nurse notified. Morning medications given. Will continue to monitor.

## 2018-11-25 NOTE — PROGRESS NOTES
Hospitalist Progress Note    NAME: Liana Ruiz   :  3/5/1932   MRN:  [id number]       Assessment / Plan:  Acute hypoxic resp failure due to pulm edema.  -mild pulm edema on CXR  -AF with recent ablation but AF persisted and patient now on [facility] with eliquis and rate still up  -NC O2 to maintain sats > 90  -In ED her O2sat was still in the 80's  -single dose of Lasix in ED with symp improvement  -lasix now on hold due to elevated creatinine and pt remains stable  -Her O2sat now 96% on RA    Afib with rvr  -cont eliquis  -due to bradycardia into high 40s,BB and dilt were held and today ,heart rate up. Cardiology has adjusted Amiodarone to 400 mg po daily on 18 instead of 200 mg  -recent ablation with ongoing SOB  -patient remains admitted because of her heart rate is still up and cardiology will determine tomorrow if patient needs cardioversion or not.  -patient will be NPO after midnight.     Bradycardia   -hold BB and dilt as above  -sick sinus?  -cardio consulted     Diverticulitis  -fevers at home, some loose stool vs diarrhea  -dark stools but unchanged which patient associates with her iron supplementation  -will cont flagy & bactrim started by her pcp     Hypothyroid  -cont synthroid  -check TSH     DAHLIA  -Cr 1.78. Recent baseline 0.9-1.2  -will hold off on IVF with pulm edema and hypoxia  -monitor bmp  -improving,creatinine 1.24 today     Code Status: Full  Surrogate Decision Maker:  Reyna Rodarte   DVT Prophylaxis: On eliquis  GI Prophylaxis: not indicated  Baseline: Functional, independent with ADLs  Body mass index is 27.96 kg/m². Recommended Disposition:tbd     Subjective:     Chief Complaint / Reason for Physician Visit  She has h/o afib - s/p cardioversion,hypothyroidism,admitted for acute resp failure after she presented to ED c/o sob and was noted to have pulm edema on cxr. She received lasix and symptoms improved. She was noted with bradycardia and BB/CCB were put on hold.Cardiology consulted. Pt feeling better now however her heart rate is up and cardiology has adjusted the amiodarone. Cardiology will decide if patient needs cardioversion. Discussed with RN events overnight. Review of Systems:  Symptom Y/N Comments  Symptom Y/N Comments   Fever/Chills n   Chest Pain n    Poor Appetite n   Edema n    Cough n   Abdominal Pain     Sputum n   Joint Pain     SOB/ROWE n   Pruritis/Rash     Nausea/vomit n   Tolerating PT/OT     Diarrhea n   Tolerating Diet     Constipation    Other       Could NOT obtain due to:      Objective:     VITALS:   Last 24hrs VS reviewed since prior progress note. Most recent are:  Patient Vitals for the past 24 hrs:   Temp Pulse Resp BP SpO2   11/25/18 1115 98.2 °F (36.8 °C) (!) 104 20 148/72 97 %   11/25/18 0742 98.4 °F (36.9 °C) (!) 107 22 119/68 95 %   11/25/18 0320 98.2 °F (36.8 °C) 92 20 148/66 96 %   11/24/18 2306 98.1 °F (36.7 °C) 96 20 131/60 97 %   11/24/18 1947 97.6 °F (36.4 °C) 90 18 136/56 98 %   11/24/18 1452 98.1 °F (36.7 °C) 99 18 149/81 96 %       Intake/Output Summary (Last 24 hours) at 11/25/2018 1309  Last data filed at 11/25/2018 0958  Gross per 24 hour   Intake 920 ml   Output 1000 ml   Net -80 ml        PHYSICAL EXAM:  General: WD, WN. Alert, cooperative, no acute distress    EENT:  EOMI. Anicteric sclerae. MMM  Resp:  CTA bilaterally, no wheezing or rales. No accessory muscle use  CV:  Regular  rhythm,  No edema  GI:  Soft, Non distended, Non tender.  +Bowel sounds  Neurologic:  Alert and oriented X 3, normal speech,   Psych:   Good insight. Not anxious nor agitated  Skin:  No rashes.   No jaundice    Reviewed most current lab test results and cultures  YES  Reviewed most current radiology test results   YES  Review and summation of old records today    NO  Reviewed patient's current orders and MAR    YES  PMH/SH reviewed - no change compared to H&P  ________________________________________________________________________  Care Plan discussed with:    Comments   Patient y    Family      RN y    Care Manager     Consultant  y                      Multidiciplinary team rounds were held today with , nursing, pharmacist and clinical coordinator. Patient's plan of care was discussed; medications were reviewed and discharge planning was addressed. ________________________________________________________________________  Total NON critical care TIME:  30   Minutes    Total CRITICAL CARE TIME Spent:   Minutes non procedure based      Comments   >50% of visit spent in counseling and coordination of care y    ________________________________________________________________________  Argelia Orozco MD     Procedures: see electronic medical records for all procedures/Xrays and details which were not copied into this note but were reviewed prior to creation of Plan. LABS:  I reviewed today's most current labs and imaging studies.   Pertinent labs include:  Recent Labs     11/25/18 0338 11/24/18 0338 11/23/18  0044   WBC 7.5 7.0 8.9   HGB 11.3* 11.1* 10.6*   HCT 35.7 34.3* 32.6*    255 246     Recent Labs     11/25/18 0338 11/24/18 0338 11/23/18  0044    139 136   K 3.9 3.8 4.2    106 103   CO2 27 25 25   * 109* 184*   BUN 20 26* 33*   CREA 1.24* 1.53* 1.78*   CA 8.3* 8.3* 7.9*   ALB 3.1* 3.1* 3.2*   TBILI 0.5 0.4 0.5   SGOT 39* 51* 98*   ALT 52 63 83*       Signed: Argelia Orozco MD

## 2018-11-26 PROBLEM — I50.31 ACUTE DIASTOLIC CHF (CONGESTIVE HEART FAILURE) (HCC): Status: ACTIVE | Noted: 2018-11-26

## 2018-11-26 LAB
ALBUMIN SERPL-MCNC: 3.1 G/DL (ref 3.5–5)
ALBUMIN/GLOB SERPL: 0.9 {RATIO} (ref 1.1–2.2)
ALP SERPL-CCNC: 139 U/L (ref 45–117)
ALT SERPL-CCNC: 45 U/L (ref 12–78)
ANION GAP SERPL CALC-SCNC: 7 MMOL/L (ref 5–15)
AST SERPL-CCNC: 32 U/L (ref 15–37)
BASOPHILS # BLD: 0.2 K/UL (ref 0–0.1)
BASOPHILS NFR BLD: 3 % (ref 0–1)
BILIRUB SERPL-MCNC: 0.4 MG/DL (ref 0.2–1)
BUN SERPL-MCNC: 21 MG/DL (ref 6–20)
BUN/CREAT SERPL: 17 (ref 12–20)
CALCIUM SERPL-MCNC: 8.8 MG/DL (ref 8.5–10.1)
CHLORIDE SERPL-SCNC: 107 MMOL/L (ref 97–108)
CO2 SERPL-SCNC: 27 MMOL/L (ref 21–32)
CREAT SERPL-MCNC: 1.26 MG/DL (ref 0.55–1.02)
DIFFERENTIAL METHOD BLD: ABNORMAL
EOSINOPHIL # BLD: 0.7 K/UL (ref 0–0.4)
EOSINOPHIL NFR BLD: 11 % (ref 0–7)
ERYTHROCYTE [DISTWIDTH] IN BLOOD BY AUTOMATED COUNT: 15 % (ref 11.5–14.5)
GLOBULIN SER CALC-MCNC: 3.4 G/DL (ref 2–4)
GLUCOSE SERPL-MCNC: 143 MG/DL (ref 65–100)
HCT VFR BLD AUTO: 37 % (ref 35–47)
HGB BLD-MCNC: 11.9 G/DL (ref 11.5–16)
IMM GRANULOCYTES # BLD: 0 K/UL (ref 0–0.04)
IMM GRANULOCYTES NFR BLD AUTO: 1 % (ref 0–0.5)
LYMPHOCYTES # BLD: 1.9 K/UL (ref 0.8–3.5)
LYMPHOCYTES NFR BLD: 29 % (ref 12–49)
MCH RBC QN AUTO: 30.6 PG (ref 26–34)
MCHC RBC AUTO-ENTMCNC: 32.2 G/DL (ref 30–36.5)
MCV RBC AUTO: 95.1 FL (ref 80–99)
MONOCYTES # BLD: 0.6 K/UL (ref 0–1)
MONOCYTES NFR BLD: 9 % (ref 5–13)
NEUTS SEG # BLD: 3.2 K/UL (ref 1.8–8)
NEUTS SEG NFR BLD: 49 % (ref 32–75)
NRBC # BLD: 0 K/UL (ref 0–0.01)
NRBC BLD-RTO: 0 PER 100 WBC
PLATELET # BLD AUTO: 277 K/UL (ref 150–400)
PMV BLD AUTO: 10 FL (ref 8.9–12.9)
POTASSIUM SERPL-SCNC: 4.1 MMOL/L (ref 3.5–5.1)
PROT SERPL-MCNC: 6.5 G/DL (ref 6.4–8.2)
RBC # BLD AUTO: 3.89 M/UL (ref 3.8–5.2)
SODIUM SERPL-SCNC: 141 MMOL/L (ref 136–145)
WBC # BLD AUTO: 6.5 K/UL (ref 3.6–11)

## 2018-11-26 PROCEDURE — 74011250637 HC RX REV CODE- 250/637: Performed by: NURSE PRACTITIONER

## 2018-11-26 PROCEDURE — 74011250637 HC RX REV CODE- 250/637: Performed by: INTERNAL MEDICINE

## 2018-11-26 PROCEDURE — 65660000000 HC RM CCU STEPDOWN

## 2018-11-26 PROCEDURE — 36415 COLL VENOUS BLD VENIPUNCTURE: CPT

## 2018-11-26 PROCEDURE — 80053 COMPREHEN METABOLIC PANEL: CPT

## 2018-11-26 PROCEDURE — 94760 N-INVAS EAR/PLS OXIMETRY 1: CPT

## 2018-11-26 PROCEDURE — 85025 COMPLETE CBC W/AUTO DIFF WBC: CPT

## 2018-11-26 RX ORDER — DILTIAZEM HYDROCHLORIDE 30 MG/1
60 TABLET, FILM COATED ORAL
Status: DISCONTINUED | OUTPATIENT
Start: 2018-11-26 | End: 2018-11-28

## 2018-11-26 RX ORDER — DILTIAZEM HYDROCHLORIDE 30 MG/1
30 TABLET, FILM COATED ORAL
Status: DISCONTINUED | OUTPATIENT
Start: 2018-11-26 | End: 2018-11-26

## 2018-11-26 RX ADMIN — PANTOPRAZOLE SODIUM 40 MG: 40 TABLET, DELAYED RELEASE ORAL at 08:33

## 2018-11-26 RX ADMIN — AMIODARONE HYDROCHLORIDE 400 MG: 200 TABLET ORAL at 08:33

## 2018-11-26 RX ADMIN — Medication 10 ML: at 21:11

## 2018-11-26 RX ADMIN — FUROSEMIDE 40 MG: 40 TABLET ORAL at 08:33

## 2018-11-26 RX ADMIN — DILTIAZEM HYDROCHLORIDE 60 MG: 30 TABLET, FILM COATED ORAL at 21:11

## 2018-11-26 RX ADMIN — Medication 10 ML: at 16:01

## 2018-11-26 RX ADMIN — DOCUSATE SODIUM 100 MG: 100 CAPSULE, LIQUID FILLED ORAL at 08:33

## 2018-11-26 RX ADMIN — DILTIAZEM HYDROCHLORIDE 60 MG: 30 TABLET, FILM COATED ORAL at 16:01

## 2018-11-26 RX ADMIN — APIXABAN 2.5 MG: 2.5 TABLET, FILM COATED ORAL at 17:12

## 2018-11-26 RX ADMIN — PRAVASTATIN SODIUM 40 MG: 40 TABLET ORAL at 21:11

## 2018-11-26 RX ADMIN — DILTIAZEM HYDROCHLORIDE 30 MG: 30 TABLET, FILM COATED ORAL at 10:18

## 2018-11-26 RX ADMIN — METRONIDAZOLE 500 MG: 250 TABLET ORAL at 21:11

## 2018-11-26 RX ADMIN — APIXABAN 2.5 MG: 2.5 TABLET, FILM COATED ORAL at 08:33

## 2018-11-26 RX ADMIN — METRONIDAZOLE 500 MG: 250 TABLET ORAL at 08:33

## 2018-11-26 RX ADMIN — LEVOTHYROXINE SODIUM 88 MCG: 88 TABLET ORAL at 08:33

## 2018-11-26 RX ADMIN — LATANOPROST 1 DROP: 50 SOLUTION OPHTHALMIC at 21:11

## 2018-11-26 NOTE — PROGRESS NOTES
11/26/18 0734   Vital Signs   Temp 98.3 °F (36.8 °C)   Temp Source Oral   Pulse (Heart Rate) (!) 114   Heart Rate Source Monitor   Resp Rate 18   O2 Sat (%) 93 %   Level of Consciousness Alert   /83   MAP (Calculated) 106   BP 1 Method Automatic   BP 1 Location Right arm   BP Patient Position At rest   MEWS Score 3   Pain 1   Pain Scale 1 Numeric (0 - 10)   Pain Intensity 1 0   Patient Stated Pain Goal 0   Oxygen Therapy   O2 Device Room air   Patient Observation   Repositioned Head of bed elevated (degrees)   Activity In bed;Watching t.v.   MEWS 3 due to elevated HR. Cardiology aware. Charge nurse notified. Morning medications given. Will continue to monitor.

## 2018-11-26 NOTE — PROGRESS NOTES
[address] [place] [address]  [phone]      Cardiology Progress Note      11/26/2018 10:41 AM    Admit Date: 11/23/2018    Admit Diagnosis:   Respiratory failure with hypoxia (HCC)    Subjective:     Christine Perez is a 80 y.o. female who was admitted with respiratory failure. Overnight events:  -tachy  -creat steady  -weight same  -Ms. Lawrence Dodd is feeling well today. She states her breathing has improved quite a bit. No chest pain or palpitations. Visit Vitals  /56 (BP 1 Location: Right arm, BP Patient Position: Sitting)   Pulse (!) 109   Temp 98.3 °F (36.8 °C)   Resp 18   Ht 5' 5\" (1.651 m)   Wt 166 lb (75.3 kg)   SpO2 98%   Breastfeeding?  No   BMI 27.62 kg/m²       Current Facility-Administered Medications   Medication Dose Route Frequency    dilTIAZem (CARDIZEM) IR tablet 60 mg  60 mg Oral AC&HS    furosemide (LASIX) tablet 40 mg  40 mg Oral DAILY    polyethylene glycol (MIRALAX) packet 17 g  17 g Oral DAILY    latanoprost (XALATAN) 0.005 % ophthalmic solution 1 Drop  1 Drop Both Eyes QPM    amiodarone (CORDARONE) tablet 400 mg  400 mg Oral DAILY    metroNIDAZOLE (FLAGYL) tablet 500 mg  500 mg Oral Q12H    sodium chloride (NS) flush 5-10 mL  5-10 mL IntraVENous Q8H    sodium chloride (NS) flush 5-10 mL  5-10 mL IntraVENous PRN    acetaminophen (TYLENOL) tablet 650 mg  650 mg Oral Q4H PRN    ondansetron (ZOFRAN) injection 4 mg  4 mg IntraVENous Q4H PRN    docusate sodium (COLACE) capsule 100 mg  100 mg Oral DAILY    pravastatin (PRAVACHOL) tablet 40 mg  40 mg Oral QHS    pantoprazole (PROTONIX) tablet 40 mg  40 mg Oral ACB    apixaban (ELIQUIS) tablet 2.5 mg  2.5 mg Oral BID    levothyroxine (SYNTHROID) tablet 88 mcg  88 mcg Oral ACB       Objective:      Physical Exam:  General Appearance:  pleasant elderly  female sitting in chair in NAD  Chest:   Clear  Cardiovascular:  irregular rate and rhythm, no murmur.   Abdomen:   Soft, non-tender, bowel sounds are active.   Extremities: palpable distal pulses; no edema  Skin:  Warm and dry.     Data Review:   Recent Labs     11/26/18  0353 11/25/18 0338 11/24/18 0338   WBC 6.5 7.5 7.0   HGB 11.9 11.3* 11.1*   HCT 37.0 35.7 34.3*    267 255     Recent Labs     11/26/18  0353 11/25/18 0338 11/24/18 0338    140 139   K 4.1 3.9 3.8    107 106   CO2 27 27 25   * 125* 109*   BUN 21* 20 26*   CREA 1.26* 1.24* 1.53*   CA 8.8 8.3* 8.3*   ALB 3.1* 3.1* 3.1*   TBILI 0.4 0.5 0.4   SGOT 32 39* 51*   ALT 45 52 63       No results for input(s): TROIQ, CPK, CKMB in the last 72 hours. Intake/Output Summary (Last 24 hours) at 11/26/2018 1957  Last data filed at 11/26/2018 1936  Gross per 24 hour   Intake 560 ml   Output 2450 ml   Net -1890 ml        Telemetry: a fib 110s   EKG: SB  Cxray: \" mild pulmonary edema\"      Assessment:     Active Problems:    Hypertension (6/11/2012)      Hyperlipidemia (6/11/2012)      Paroxysmal atrial fibrillation ([facility] (10/25/2018)      Overview: Started 10/18, on eliquis, diltiazem, NIALL pending      Mitral regurgitation (10/31/2018)      Overview: Moderate with posterior valve prolapse 10/31/18      Respiratory failure with hypoxia ([facility] (11/23/2018)      Acute diastolic CHF (congestive heart failure) ([facility] (11/26/2018)        Plan:       PAF:  Rate elevated today. Asymptomatic. S/p cardioversion 10/31/18. BB and CCB held over weekend due to bradycardia. Has moderate mitral regurg. · Add cardizem back due to increased heart rate and stable BP  · Continue amiodarone and eliquis  · If bradycardia returns and is concerning, may need input from from EP       Acute respiratory failure with pulmonary edema:  Improved. Likely acute diastolic heart failure, possibly influenced by PAF with RVR and MR.     · Currently tolerating PO lasix      HTN:  Slightly elevated  · Adding cardizem back today      Roma Terrazas NP  DNP, RN, [facility]    Patient seen and examined by me with nurse practitioner Carlene Reyes. I personally performed all components of the history, physical, and medical decision making and agree with the assessment and plan with minor modifications as noted. Patient reverted to atrial fibrillation after beta-blocker and diltiazem were stopped. Will add back diltiazem today. Continue Lasix for newly diagnosed acute diastolic heart failure. Reassess as outpatient to see if can tolerate beta-blocker and/or if needs cardioversion or further EP evaluation and treatment. If feeling well and heart rate controlled tomorrow, could likely go home from a cardiac standpoint.   Discussed with Dr. Georges Brownlee.

## 2018-11-26 NOTE — PROGRESS NOTES
Hospitalist Progress Note    NAME: Monster Horne   :  3/5/1932   MRN:  [id number]       Assessment / Plan:  Acute hypoxic resp failure due to pulm edema  -mild pulm edema on CXR  -AF with recent ablation but AF persisted and patient now on [facility] with eliquis and rate still up  -NC O2 to maintain sats > 90   -In ED her O2sat was still in the 80's  -single dose of Lasix in ED with symp improvement  -lasix now on hold due to elevated creatinine and pt remains stable  -Her O2sat now 96% on RA    Afib with rvr  -s/p recent ablation with on going SOB  -BB held  -diltiazem held due to bradycardia  -cont' amiodarone, eliquis  -start cardiac diet, no plan for [address]    Diverticulitis  -fevers at home, some loose stool vs diarrhea  -dark stools but unchanged which patient associates with her iron supplementation  -will cont flagy & bactrim started by her pcp     Hypothyroidism  -cont synthroid     DAHLIA  -cr improving. Recent baseline 0.9-1. 2     Code Status: Full  Surrogate Decision Maker:  Ricky Hebert   DVT Prophylaxis: On eliquis  GI Prophylaxis: not indicated  Baseline: Functional, independent with ADLs  Body mass index is 27.96 kg/m². Recommended Disposition:tbd     Subjective:     Chief Complaint / Reason for Physician Visit  Pt seen in up in chair, back in afib rvr this morning. Denies SOB, palpitations. Wants to eat. Review of Systems:  Symptom Y/N Comments  Symptom Y/N Comments   Fever/Chills n   Chest Pain n    Poor Appetite n   Edema n    Cough n   Abdominal Pain     Sputum n   Joint Pain     SOB/ROWE n   Pruritis/Rash     Nausea/vomit n   Tolerating PT/OT     Diarrhea n   Tolerating Diet     Constipation    Other       Could NOT obtain due to:      Objective:     VITALS:   Last 24hrs VS reviewed since prior progress note.  Most recent are:  Patient Vitals for the past 24 hrs:   Temp Pulse Resp BP SpO2   18 0734 98.3 °F (36.8 °C) (!) 114 18 153/83 93 %   18 0349 97.8 °F (36.6 °C) (!) 106 18 155/89 98 %   11/25/18 2300 97.9 °F (36.6 °C) (!) 107 20 146/73 98 %   11/25/18 2032 -- -- -- -- 98 %   11/25/18 1932 98 °F (36.7 °C) 96 18 140/83 97 %   11/25/18 1600 98.2 °F (36.8 °C) 91 18 140/67 97 %   11/25/18 1115 98.2 °F (36.8 °C) (!) 104 20 148/72 97 %       Intake/Output Summary (Last 24 hours) at 11/26/2018 1038  Last data filed at 11/26/2018 0956  Gross per 24 hour   Intake 680 ml   Output 2150 ml   Net -1470 ml        PHYSICAL EXAM:  General: WD, WN. Alert, cooperative, no acute distress    EENT:  EOMI. Anicteric sclerae. MMM  Resp:  CTA bilaterally, no wheezing or rales. No accessory muscle use  CV:  irregular  Rhythm, tachycardic.  No edema  GI:  Soft, Non distended, Non tender.  +Bowel sounds  Neurologic:  Alert and oriented X 3, normal speech,   Psych:   Good insight. Not anxious nor agitated  Skin:  No rashes. No jaundice    Reviewed most current lab test results and cultures  YES  Reviewed most current radiology test results   YES  Review and summation of old records today    NO  Reviewed patient's current orders and MAR    YES  PMH/SH reviewed - no change compared to H&P  ________________________________________________________________________  Care Plan discussed with:    Comments   Patient y    Family      RN y    Care Manager     Consultant  y Dr Kanika Brown team rounds were held today with , nursing, pharmacist and clinical coordinator. Patient's plan of care was discussed; medications were reviewed and discharge planning was addressed.      ________________________________________________________________________  Total NON critical care TIME:  35   Minutes    Total CRITICAL CARE TIME Spent:   Minutes non procedure based      Comments   >50% of visit spent in counseling and coordination of care y    ________________________________________________________________________  Bijal De Souza MD     Procedures: see electronic medical records for all procedures/Xrays and details which were not copied into this note but were reviewed prior to creation of Plan. LABS:  I reviewed today's most current labs and imaging studies.   Pertinent labs include:  Recent Labs     11/26/18 0353 11/25/18 0338 11/24/18 0338   WBC 6.5 7.5 7.0   HGB 11.9 11.3* 11.1*   HCT 37.0 35.7 34.3*    267 255     Recent Labs     11/26/18 0353 11/25/18 0338 11/24/18 0338    140 139   K 4.1 3.9 3.8    107 106   CO2 27 27 25   * 125* 109*   BUN 21* 20 26*   CREA 1.26* 1.24* 1.53*   CA 8.8 8.3* 8.3*   ALB 3.1* 3.1* 3.1*   TBILI 0.4 0.5 0.4   SGOT 32 39* 51*   ALT 45 52 63       Signed: Rony Veras MD

## 2018-11-26 NOTE — PROGRESS NOTES
Reason for Admission:   Respiratory failure with hypoxia                  RRAT Score:     15             Do you (patient/family) have any concerns for transition/discharge? None identified              Plan for utilizing home health:     Patient is not homebound. No qualifying dx for [facility]    Likelihood of readmission? Mod to high            Transition of Care Plan:     Discharge home with follow up appointments. CM met with patient to discuss discharge planning. Pt name and  confirmed as pt identifiers. Demographics confirmed. Physical address:  [address] [place] [address] Rd, [address] [facility]  Patient is  and lives with  in a split level home. ADL's/IADL's - independent prior to admission to include driving. Patient has a stairlift for access to all levels in the home. DME - [facility] - uses it in the community  Preferred Rx - [address] in [facility]- yes  SNF - no    Care Management Interventions  PCP Verified by CM: Yes  Mode of Transport at Discharge: Other (see comment)(family)  Transition of Care Consult (CM Consult): Discharge Planning  Physical Therapy Consult: No  Occupational Therapy Consult: No  Current Support Network: Lives with Spouse(split level home with stair lift to each floor)  Confirm Follow Up Transport: Self  Plan discussed with Pt/Family/Caregiver: Yes  Discharge Location  Discharge Placement: Home    Patient plans to discharge home with family assistance. Patient is up ad rasheed in the room. Not homebound and has no qualifying dx for [facility].     Herman Ratliff RN CM  Ext [phone]

## 2018-11-26 NOTE — PROGRESS NOTES
0700: Bedside report received from [address] [place] [address], [address] [place] [address].    1040: Verbal orders received from Dr. Nadiya Rader to resume cardiac diet. 1900:   Bedside shift change report given to Jada Bueno RN (oncoming nurse). Report included the following information SBAR, Kardex, Intake/Output, MAR, Recent Results and Cardiac Rhythm Afib. SHIFT SUMMARY:            [facility] NURSING NOTE   Admission Date 11/23/2018   Admission Diagnosis Respiratory failure with hypoxia ([address]   Consults IP CONSULT TO CARDIOLOGY      Cardiac Monitoring [x] Yes [] No      Purposeful Hourly Rounding [x] Yes    Angelita Score Total Score: 1   Angelita score 3 or > [] Bed Alarm [] Avasys [] 1:1 sitter [] Patient refused (Signed refusal form in chart)   Reji Score Reji Score: 21   Reji score 14 or < [] PMT consult [] Wound Care consult    []  Specialty bed  [] Nutrition consult      Influenza Vaccine Received Flu Vaccine for Current Season (usually Sept-March): Yes           Oxygen needs? [x] Room air Oxygen @  []1L    []2L    []3L   []4L    []5L   []6L via  NC   Chronic home O2 use? [] Yes [] No  Perform O2 challenge test and document in progress note using smartphrase (.Homeoxygen)      Last bowel movement Last Bowel Movement Date: 11/26/18      Urinary Catheter             LDAs               Peripheral IV 11/26/18 Left; Inner Arm (Active)   Site Assessment Clean;Dry;Ecchymotic (bruised) 11/26/2018  6:09 PM   Phlebitis Assessment 0 11/26/2018  6:09 PM   Infiltration Assessment 0 11/26/2018  6:09 PM   Dressing Status New;Clean, dry, & intact 11/26/2018  6:09 PM   Dressing Type Transparent;Tape 11/26/2018  6:09 PM   Hub Color/Line Status Blue;Flushed;Patent 11/26/2018  6:09 PM   Alcohol Cap Used No 11/26/2018  6:09 PM                         Readmission Risk Assessment Tool Score Medium Risk            15       Total Score        3 Has Seen PCP in Last 6 Months (Yes=3, No=0)    2 . Living with Significant Other. Assisted Living. LTAC. SNF. or   Rehab    5 Pt. Coverage (Medicare=5 , Medicaid, or Self-Pay=4)    5 Charlson Comorbidity Score (Age + Comorbid Conditions)        Criteria that do not apply:    Patient Length of Stay (>5 days = 3)    IP Visits Last 12 Months (1-3=4, 4=9, >4=11)       Expected Length of Stay 3d 19h   Actual Length of Stay 3

## 2018-11-26 NOTE — PROGRESS NOTES
11/26/18 1118   Vital Signs   Temp 97.9 °F (36.6 °C)   Temp Source Oral   Pulse (Heart Rate) (!) 115   Heart Rate Source Monitor   Resp Rate 18   O2 Sat (%) 97 %   Level of Consciousness Alert   /89   MAP (Calculated) 108   BP 1 Method Automatic   BP 1 Location Right arm   BP Patient Position At rest   MEWS Score 3   Pain 1   Pain Scale 1 Numeric (0 - 10)   Pain Intensity 1 0   Patient Stated Pain Goal 0   Oxygen Therapy   O2 Device Room air   Patient Observation   Repositioned Up in chair   Activity In chair;Family/Visitors present   MEWS 3 due to elevated HR. Cardiology aware. Charge nurse notified. Will continue to monitor.

## 2018-11-26 NOTE — PROGRESS NOTES
Cardiopulmonary Care Interdisciplinary Rounds were held today to discuss patient's plan of care and outcomes. The following members were present: NP/Physician, Pharmacy, Nursing and Case Management.     Expected Length of Stay:  3d 19h    Plan of Care: Continue current treatment plan

## 2018-11-26 NOTE — PROGRESS NOTES
11/26/18 1515   Vital Signs   Temp 97.8 °F (36.6 °C)   Temp Source Oral   Pulse (Heart Rate) (!) 111   Heart Rate Source Monitor   Resp Rate 18   O2 Sat (%) 97 %   Level of Consciousness Alert   /71   MAP (Calculated) 90   BP 1 Method Automatic   BP 1 Location Right arm   BP Patient Position At rest   MEWS Score 3   Pain 1   Pain Scale 1 Numeric (0 - 10)   Pain Intensity 1 0   Patient Stated Pain Goal 0   Oxygen Therapy   O2 Device Room air   Patient Observation   Repositioned Up in chair   Activity In bed;Family/Visitors present   MEWS 3 due to elevated HR. Cardiology aware. Charge nurse notified. Will continue to monitor.

## 2018-11-27 LAB
ANION GAP SERPL CALC-SCNC: 6 MMOL/L (ref 5–15)
BUN SERPL-MCNC: 20 MG/DL (ref 6–20)
BUN/CREAT SERPL: 16 (ref 12–20)
CALCIUM SERPL-MCNC: 8.8 MG/DL (ref 8.5–10.1)
CHLORIDE SERPL-SCNC: 104 MMOL/L (ref 97–108)
CO2 SERPL-SCNC: 30 MMOL/L (ref 21–32)
CREAT SERPL-MCNC: 1.27 MG/DL (ref 0.55–1.02)
GLUCOSE SERPL-MCNC: 116 MG/DL (ref 65–100)
POTASSIUM SERPL-SCNC: 3.9 MMOL/L (ref 3.5–5.1)
SODIUM SERPL-SCNC: 140 MMOL/L (ref 136–145)

## 2018-11-27 PROCEDURE — 36415 COLL VENOUS BLD VENIPUNCTURE: CPT

## 2018-11-27 PROCEDURE — 74011250637 HC RX REV CODE- 250/637: Performed by: INTERNAL MEDICINE

## 2018-11-27 PROCEDURE — 80048 BASIC METABOLIC PNL TOTAL CA: CPT

## 2018-11-27 PROCEDURE — 65660000000 HC RM CCU STEPDOWN

## 2018-11-27 PROCEDURE — 74011000250 HC RX REV CODE- 250: Performed by: INTERNAL MEDICINE

## 2018-11-27 PROCEDURE — 74011250637 HC RX REV CODE- 250/637: Performed by: NURSE PRACTITIONER

## 2018-11-27 PROCEDURE — 94760 N-INVAS EAR/PLS OXIMETRY 1: CPT

## 2018-11-27 RX ORDER — AMIODARONE HYDROCHLORIDE 200 MG/1
400 TABLET ORAL 3 TIMES DAILY
Status: DISCONTINUED | OUTPATIENT
Start: 2018-11-27 | End: 2018-11-28

## 2018-11-27 RX ORDER — METOPROLOL TARTRATE 25 MG/1
12.5 TABLET, FILM COATED ORAL EVERY 12 HOURS
Status: DISCONTINUED | OUTPATIENT
Start: 2018-11-27 | End: 2018-11-28 | Stop reason: HOSPADM

## 2018-11-27 RX ORDER — AMIODARONE HYDROCHLORIDE 200 MG/1
400 TABLET ORAL DAILY
Status: DISCONTINUED | OUTPATIENT
Start: 2018-12-01 | End: 2018-11-28

## 2018-11-27 RX ADMIN — LEVOTHYROXINE SODIUM 88 MCG: 88 TABLET ORAL at 08:11

## 2018-11-27 RX ADMIN — AMIODARONE HYDROCHLORIDE 400 MG: 200 TABLET ORAL at 16:47

## 2018-11-27 RX ADMIN — AMIODARONE HYDROCHLORIDE 400 MG: 200 TABLET ORAL at 22:18

## 2018-11-27 RX ADMIN — FUROSEMIDE 40 MG: 40 TABLET ORAL at 08:11

## 2018-11-27 RX ADMIN — METOPROLOL TARTRATE 12.5 MG: 25 TABLET ORAL at 20:18

## 2018-11-27 RX ADMIN — METRONIDAZOLE 500 MG: 250 TABLET ORAL at 20:18

## 2018-11-27 RX ADMIN — METRONIDAZOLE 500 MG: 250 TABLET ORAL at 08:11

## 2018-11-27 RX ADMIN — DILTIAZEM HYDROCHLORIDE 60 MG: 30 TABLET, FILM COATED ORAL at 16:47

## 2018-11-27 RX ADMIN — APIXABAN 2.5 MG: 2.5 TABLET, FILM COATED ORAL at 16:48

## 2018-11-27 RX ADMIN — Medication 10 ML: at 02:58

## 2018-11-27 RX ADMIN — LATANOPROST 1 DROP: 50 SOLUTION OPHTHALMIC at 20:18

## 2018-11-27 RX ADMIN — DILTIAZEM HYDROCHLORIDE 60 MG: 30 TABLET, FILM COATED ORAL at 08:11

## 2018-11-27 RX ADMIN — Medication 10 ML: at 22:20

## 2018-11-27 RX ADMIN — METOPROLOL TARTRATE 12.5 MG: 25 TABLET ORAL at 09:59

## 2018-11-27 RX ADMIN — DILTIAZEM HYDROCHLORIDE 60 MG: 30 TABLET, FILM COATED ORAL at 22:18

## 2018-11-27 RX ADMIN — AMIODARONE HYDROCHLORIDE 400 MG: 200 TABLET ORAL at 08:11

## 2018-11-27 RX ADMIN — PRAVASTATIN SODIUM 40 MG: 40 TABLET ORAL at 22:18

## 2018-11-27 RX ADMIN — Medication 10 ML: at 13:39

## 2018-11-27 RX ADMIN — DILTIAZEM HYDROCHLORIDE 60 MG: 30 TABLET, FILM COATED ORAL at 10:48

## 2018-11-27 RX ADMIN — PANTOPRAZOLE SODIUM 40 MG: 40 TABLET, DELAYED RELEASE ORAL at 08:11

## 2018-11-27 RX ADMIN — POLYETHYLENE GLYCOL 3350 17 G: 17 POWDER, FOR SOLUTION ORAL at 08:11

## 2018-11-27 RX ADMIN — APIXABAN 2.5 MG: 2.5 TABLET, FILM COATED ORAL at 08:11

## 2018-11-27 RX ADMIN — DOCUSATE SODIUM 100 MG: 100 CAPSULE, LIQUID FILLED ORAL at 08:11

## 2018-11-27 NOTE — PROGRESS NOTES
Bedside shift change report given to Laine Lieberman RN (oncoming nurse). Report included the following information SBAR, Kardex, Intake/Output, MAR and Recent Results. SHIFT SUMMARY:            [facility] NURSING NOTE   Admission Date 11/23/2018   Admission Diagnosis Respiratory failure with hypoxia ([address]   Consults IP CONSULT TO CARDIOLOGY      Cardiac Monitoring [x] Yes [] No      Purposeful Hourly Rounding [x] Yes    Angelita Score Total Score: 1   Angelita score 3 or > [] Bed Alarm [] Avasys [] 1:1 sitter [] Patient refused (Signed refusal form in chart)   Reji Score Reji Score: 21   Reji score 14 or < [] PMT consult [] Wound Care consult    []  Specialty bed  [] Nutrition consult      Influenza Vaccine Received Flu Vaccine for Current Season (usually Sept-March): Yes           Oxygen needs? [x] Room air Oxygen @  []1L    []2L    []3L   []4L    []5L   []6L via  NC   Chronic home O2 use? [] Yes [x] No  Perform O2 challenge test and document in progress note using smartphrase (.Homeoxygen)      Last bowel movement Last Bowel Movement Date: 11/26/18      Urinary Catheter             LDAs               Peripheral IV 11/26/18 Left; Inner Arm (Active)   Site Assessment Clean, dry, & intact 11/26/2018  7:36 PM   Phlebitis Assessment 0 11/26/2018  7:36 PM   Infiltration Assessment 0 11/26/2018  7:36 PM   Dressing Status Clean, dry, & intact 11/26/2018  7:36 PM   Dressing Type Transparent 11/26/2018  7:36 PM   Hub Color/Line Status Blue;Capped 11/26/2018  7:36 PM   Alcohol Cap Used No 11/26/2018  6:09 PM                         Readmission Risk Assessment Tool Score Medium Risk            15       Total Score        3 Has Seen PCP in Last 6 Months (Yes=3, No=0)    2 . Living with Significant Other. Assisted Living. LTAC. SNF. or   Rehab    5 Pt.  Coverage (Medicare=5 , Medicaid, or Self-Pay=4)    5 Charlson Comorbidity Score (Age + Comorbid Conditions)        Criteria that do not apply:    Patient Length of Stay (>5 days = 3)    IP Visits Last 12 Months (1-3=4, 4=9, >4=11)       Expected Length of Stay 3d 19h   Actual Length of Stay 3

## 2018-11-27 NOTE — PROGRESS NOTES
Problem: Falls - Risk of  Goal: *Absence of Falls  Document Angelita Fall Risk and appropriate interventions in the flowsheet. Outcome: Progressing Towards Goal  Fall Risk Interventions:  Mobility Interventions: OT consult for ADLs, Patient to call before getting OOB, PT Consult for mobility concerns, Strengthening exercises (ROM-active/passive), Utilize walker, cane, or other assistive device         Medication Interventions: Bed/chair exit alarm, Patient to call before getting OOB, Teach patient to arise slowly                  Problem: Pressure Injury - Risk of  Goal: *Prevention of pressure injury  Document Reji Scale and appropriate interventions in the flowsheet. Outcome: Progressing Towards Goal  Pressure Injury Interventions:  Sensory Interventions: Assess changes in LOC, Assess need for specialty bed, Avoid rigorous massage over bony prominences, Turn and reposition approx. every two hours (pillows and wedges if needed)         Activity Interventions: Assess need for specialty bed, Chair cushion, Increase time out of bed    Mobility Interventions: Assess need for specialty bed, Chair cushion, Turn and reposition approx.  every two hours(pillow and wedges)    Nutrition Interventions: Document food/fluid/supplement intake, Offer support with meals,snacks and hydration    Friction and Shear Interventions: Apply protective barrier, creams and emollients, HOB 30 degrees or less, Minimize layers

## 2018-11-27 NOTE — PROGRESS NOTES
[address] [place] [address], [address] [facility]  [phone]      Cardiology Progress Note      11/27/2018 1220PM    Admit Date: 11/23/2018    Admit Diagnosis:   Respiratory failure with hypoxia (HCC)    Subjective:     Etter Rinne is a 80 y.o. female who was admitted with respiratory failure. Overnight events:  -rate improved this afternoon  -labs steady  -weight down 3#  -Ms. Lizzie Valdes is feeling well today. She denies complaints. No SOB, chest pain, or palpitations. Visit Vitals  /61   Pulse (!) 116   Temp 97.9 °F (36.6 °C)   Resp 18   Ht 5' 5\" (1.651 m)   Wt 74.1 kg (163 lb 6 oz)   SpO2 97%   Breastfeeding?  No   BMI 27.19 kg/m²       Current Facility-Administered Medications   Medication Dose Route Frequency    metoprolol tartrate (LOPRESSOR) tablet 12.5 mg  12.5 mg Oral Q12H    amiodarone (CORDARONE) tablet 400 mg  400 mg Oral TID    [START ON 12/1/2018] amiodarone (CORDARONE) tablet 400 mg  400 mg Oral DAILY    dilTIAZem (CARDIZEM) IR tablet 60 mg  60 mg Oral AC&HS    furosemide (LASIX) tablet 40 mg  40 mg Oral DAILY    polyethylene glycol (MIRALAX) packet 17 g  17 g Oral DAILY    latanoprost (XALATAN) 0.005 % ophthalmic solution 1 Drop  1 Drop Both Eyes QPM    metroNIDAZOLE (FLAGYL) tablet 500 mg  500 mg Oral Q12H    sodium chloride (NS) flush 5-10 mL  5-10 mL IntraVENous Q8H    sodium chloride (NS) flush 5-10 mL  5-10 mL IntraVENous PRN    acetaminophen (TYLENOL) tablet 650 mg  650 mg Oral Q4H PRN    ondansetron (ZOFRAN) injection 4 mg  4 mg IntraVENous Q4H PRN    docusate sodium (COLACE) capsule 100 mg  100 mg Oral DAILY    pravastatin (PRAVACHOL) tablet 40 mg  40 mg Oral QHS    pantoprazole (PROTONIX) tablet 40 mg  40 mg Oral ACB    apixaban (ELIQUIS) tablet 2.5 mg  2.5 mg Oral BID    levothyroxine (SYNTHROID) tablet 88 mcg  88 mcg Oral ACB       Objective:      Physical Exam:  General Appearance:  pleasant elderly  female sitting in chair in NAD  Chest:   Clear  Cardiovascular:  irregular rate and rhythm, no murmur.   Abdomen:   Soft, non-tender, bowel sounds are active.   Extremities: palpable distal pulses; no edema  Skin:  Warm and dry.     Data Review:   Recent Labs     11/26/18  0353 11/25/18 0338   WBC 6.5 7.5   HGB 11.9 11.3*   HCT 37.0 35.7    267     Recent Labs     11/27/18  0254 11/26/18  0353 11/25/18 0338    141 140   K 3.9 4.1 3.9    107 107   CO2 30 27 27   * 143* 125*   BUN 20 21* 20   CREA 1.27* 1.26* 1.24*   CA 8.8 8.8 8.3*   ALB  --  3.1* 3.1*   TBILI  --  0.4 0.5   SGOT  --  32 39*   ALT  --  45 52       No results for input(s): TROIQ, CPK, CKMB in the last 72 hours. Intake/Output Summary (Last 24 hours) at 11/27/2018 1311  Last data filed at 11/27/2018 1000  Gross per 24 hour   Intake 1200 ml   Output 1800 ml   Net -600 ml        Telemetry: a fib 70s  EKG: SB  Cxray: \" mild pulmonary edema\"      Assessment:     Active Problems:    Hypertension (6/11/2012)      Hyperlipidemia (6/11/2012)      Paroxysmal atrial fibrillation ([facility] (10/25/2018)      Overview: Started 10/18, on eliquis, diltiazem, NIALL pending      Mitral regurgitation (10/31/2018)      Overview: Moderate with posterior valve prolapse 10/31/18      Respiratory failure with hypoxia ([address] (11/23/2018)      Acute diastolic CHF (congestive heart failure) ([facility] (11/26/2018)        Plan:       PAF:  Rate elevated this morning, but improving this afternoon. Asymptomatic. S/p cardioversion 10/31/18. BB and CCB held over weekend due to bradycardia. Has moderate mitral regurg. · Continue cardizem, amiodarone, and eliquis. · Add lower dose of home BB  · Increase amio to loading dose  · Plan for cardioversion tomorrow. Keep NPO after midnight  · EP to see regarding long-term options for atrial fibrillation that has broken through on amiodarone       Acute respiratory failure with pulmonary edema:  Improved.   Likely acute diastolic heart failure, possibly influenced by PAF with RVR and MR. · Currently tolerating PO lasix      HTN:  stable  · Adding small dose of BB back today. · Continue cardizem          Fabby Rios, NP  DNP, RN, [facility]    Patient seen and examined by me with nurse practitioner Fabby Rios. I personally performed all components of the history, physical, and medical decision making and agree with the assessment and plan with minor modifications as noted. Heart rate control still suboptimal, increasing amiodarone for the short. Adding back low-dose beta-blocker. Cardioversion tomorrow. I discussed the risks and benefits with the patient who expressed understanding and wishes to proceed. Dr. Liliya Oh will see for long-term options. I discussed with him.

## 2018-11-27 NOTE — PROGRESS NOTES
Hospitalist Progress Note    NAME: Christina Jenkins   :  3/5/1932   MRN:  [id number]       Interim Hospital Summary: 80 y.o. female whom presented on 2018 with      Assessment / Plan:    Acute hypoxic resp failure due to pulm edema  Acute on chronic diastolitc heart failure  - mild pulm edema on CXR  - NT pro-BNP   - most recent Echo (3/2017): EF 18-52%, grade 2 diastolic dysfunction, mild tricuspid regurgitation, mild pulmonary hypertension  - appreciate cardiology input; continue with Lasix. Daily weight; 77.1kg (on admission) -> 74.1kg  - RA O2 sat 97%     Paroxysmal Atrial Fibrillation  - cardiology following  - s/p ablation (10/31/2018). BB was on hold due to episode of bradycardia over the weekend, but pt back on BB  - Amio loading dose started today  - continue with cardizem, metoprolol, and eliquis     Diverticulitis  - fevers at home, some loose stool vs diarrhea. NO further loose stools over night  - dark stools but unchanged which patient associates with her iron supplementation  - Pt was on flagy & bactrim prior to admission. Bactrim has been on hold due to DAHLIA. Continue with Flagyl.     Hypothyroidism  - cont synthroid     DAHLIA  - Creat trending down; 1.27 (was 1.78 on admission)     Code Status: Full  Surrogate Decision Maker:  Susana Garvey  DVT Prophylaxis: On eliquis  GI Prophylaxis: not indicated  Baseline: Functional, independent with ADLs  Body mass index is 27.96 kg/m². Recommended Disposition:home with family      Subjective:     Chief Complaint / Reason for Physician Visit  \"I know my heart beat is fast, but I am feeling fine\". Discussed with RN events overnight.      Review of Systems:  Symptom Y/N Comments  Symptom Y/N Comments   Fever/Chills n   Chest Pain n    Poor Appetite    Edema     Cough n   Abdominal Pain     Sputum    Joint Pain     SOB/ROWE n   Pruritis/Rash     Nausea/vomit n   Tolerating PT/OT     Diarrhea    Tolerating Diet     Constipation    Other Could NOT obtain due to:      Objective:     VITALS:   Last 24hrs VS reviewed since prior progress note. Most recent are:  Patient Vitals for the past 24 hrs:   Temp Pulse Resp BP SpO2   11/27/18 0959 -- (!) 117 -- 137/73 --   11/27/18 0807 96 °F (35.6 °C) (!) 111 17 151/73 96 %   11/27/18 0246 97.5 °F (36.4 °C) (!) 103 16 128/72 97 %   11/26/18 2257 97.9 °F (36.6 °C) (!) 106 18 142/74 100 %   11/26/18 1905 98.3 °F (36.8 °C) (!) 109 18 126/56 98 %   11/26/18 1515 97.8 °F (36.6 °C) (!) 111 18 128/71 97 %   11/26/18 1118 97.9 °F (36.6 °C) (!) 115 18 146/89 97 %       Intake/Output Summary (Last 24 hours) at 11/27/2018 1046  Last data filed at 11/27/2018 1000  Gross per 24 hour   Intake 1200 ml   Output 1800 ml   Net -600 ml        PHYSICAL EXAM:  General: WD, WN. Alert, cooperative, no acute distress    EENT:  EOMI. Anicteric sclerae. MMM  Resp:  CTA bilaterally, no wheezing or rales. No accessory muscle use  CV:  Irregular, irregular, regular  rhythm,  No edema  GI:  Soft, Non distended, Non tender.  +Bowel sounds  Neurologic:  Alert and oriented X 3, normal speech,   Psych:   Good insight. Not anxious nor agitated  Skin:  No rashes. No jaundice    Reviewed most current lab test results and cultures  YES  Reviewed most current radiology test results   YES  Review and summation of old records today    NO  Reviewed patient's current orders and MAR    YES  PMH/SH reviewed - no change compared to H&P  ________________________________________________________________________  Care Plan discussed with:    Comments   Patient y    Family      RN y    Care Manager y    Consultant  y Dr. Steve Dunlap                    y [address] team rounds were held today with , nursing, pharmacist and clinical coordinator. Patient's plan of care was discussed; medications were reviewed and discharge planning was addressed.      ________________________________________________________________________  Total NON critical care TIME:  30  Minutes    Total CRITICAL CARE TIME Spent:   Minutes non procedure based      Comments   >50% of visit spent in counseling and coordination of care     ________________________________________________________________________  Rashida Vail NP     Procedures: see electronic medical records for all procedures/Xrays and details which were not copied into this note but were reviewed prior to creation of Plan. LABS:  I reviewed today's most current labs and imaging studies.   Pertinent labs include:  Recent Labs     11/26/18 0353 11/25/18  0338   WBC 6.5 7.5   HGB 11.9 11.3*   HCT 37.0 35.7    267     Recent Labs     11/27/18  0254 11/26/18 0353 11/25/18  0338    141 140   K 3.9 4.1 3.9    107 107   CO2 30 27 27   * 143* 125*   BUN 20 21* 20   CREA 1.27* 1.26* 1.24*   CA 8.8 8.8 8.3*   ALB  --  3.1* 3.1*   TBILI  --  0.4 0.5   SGOT  --  32 39*   ALT  --  45 52       Signed: )Ash Lang NP

## 2018-11-27 NOTE — INTERDISCIPLINARY ROUNDS
Cardiopulmonary Care Interdisciplinary Rounds were held today to discuss patient's plan of care and outcomes. The following members were present: NP/Physician, Pharmacy, Nursing and Case Management.     Expected Length of Stay:  3d 19h    Plan of Care: Continue current treatment plan  D/c dennise once HR is controlled

## 2018-11-27 NOTE — PROGRESS NOTES
0715 bedside shift report received from [facility]. Writer assumed care of patient. 1430 patient ambulated approx 150 feet with cane and steady gait.  HR max noted was 94.     1515 bedside shift report given to [employer]

## 2018-11-27 NOTE — PROGRESS NOTES
Problem: Falls - Risk of  Goal: *Absence of Falls  Document Angelita Fall Risk and appropriate interventions in the flowsheet. Outcome: Progressing Towards Goal  Fall Risk Interventions:  Mobility Interventions: OT consult for ADLs, Patient to call before getting OOB, PT Consult for mobility concerns, Strengthening exercises (ROM-active/passive), Utilize walker, cane, or other assistive device         Medication Interventions: Bed/chair exit alarm, Evaluate medications/consider consulting pharmacy, Patient to call before getting OOB, Teach patient to arise slowly                  Problem: Pressure Injury - Risk of  Goal: *Prevention of pressure injury  Document Reji Scale and appropriate interventions in the flowsheet.   Outcome: Progressing Towards Goal  Pressure Injury Interventions:  Sensory Interventions: Assess changes in LOC, Assess need for specialty bed, Chair cushion, Discuss PT/OT consult with provider, Float heels, Keep linens dry and wrinkle-free, Maintain/enhance activity level         Activity Interventions: Pressure redistribution bed/mattress(bed type), PT/OT evaluation    Mobility Interventions: Float heels, HOB 30 degrees or less, Pressure redistribution bed/mattress (bed type), PT/OT evaluation    Nutrition Interventions: Document food/fluid/supplement intake, Discuss nutritional consult with provider, Offer support with meals,snacks and hydration

## 2018-11-27 NOTE — PROGRESS NOTES
CM acknowledged consult for [facility]. Referral submitted to [facility]. 56 -   Luann RN Liaison from [facility] informed this CM [facility] can accept for service.     Karlee Lopez RN CM  Ext [phone]

## 2018-11-27 NOTE — PROGRESS NOTES
11/27/18 0807   Vital Signs   Temp 96 °F (35.6 °C)   Temp Source Axillary   Pulse (Heart Rate) (!) 111   Heart Rate Source Monitor   Resp Rate 17   O2 Sat (%) 96 %   Level of Consciousness Alert   /73   MAP (Calculated) 99   MEWS Score 3     NP aware regarding patient HR.  Scheduled medications given

## 2018-11-28 ENCOUNTER — HOME HEALTH ADMISSION (OUTPATIENT)
Dept: HOME HEALTH SERVICES | Facility: HOME HEALTH | Age: 83
End: 2018-11-28

## 2018-11-28 VITALS
WEIGHT: 164.02 LBS | OXYGEN SATURATION: 96 % | BODY MASS INDEX: 27.33 KG/M2 | SYSTOLIC BLOOD PRESSURE: 122 MMHG | RESPIRATION RATE: 17 BRPM | HEIGHT: 65 IN | HEART RATE: 55 BPM | TEMPERATURE: 97.7 F | DIASTOLIC BLOOD PRESSURE: 45 MMHG

## 2018-11-28 LAB
ATRIAL RATE: 54 BPM
CALCULATED P AXIS, ECG09: 73 DEGREES
CALCULATED R AXIS, ECG10: 37 DEGREES
CALCULATED T AXIS, ECG11: 109 DEGREES
DIAGNOSIS, 93000: NORMAL
P-R INTERVAL, ECG05: 192 MS
Q-T INTERVAL, ECG07: 516 MS
QRS DURATION, ECG06: 84 MS
QTC CALCULATION (BEZET), ECG08: 489 MS
VENTRICULAR RATE, ECG03: 54 BPM

## 2018-11-28 PROCEDURE — 5A2204Z RESTORATION OF CARDIAC RHYTHM, SINGLE: ICD-10-PCS | Performed by: INTERNAL MEDICINE

## 2018-11-28 PROCEDURE — 74011250637 HC RX REV CODE- 250/637: Performed by: INTERNAL MEDICINE

## 2018-11-28 PROCEDURE — 74011250636 HC RX REV CODE- 250/636

## 2018-11-28 PROCEDURE — 93005 ELECTROCARDIOGRAM TRACING: CPT

## 2018-11-28 PROCEDURE — 92960 CARDIOVERSION ELECTRIC EXT: CPT

## 2018-11-28 PROCEDURE — 74011250637 HC RX REV CODE- 250/637: Performed by: NURSE PRACTITIONER

## 2018-11-28 PROCEDURE — 74011000250 HC RX REV CODE- 250: Performed by: INTERNAL MEDICINE

## 2018-11-28 RX ORDER — AMIODARONE HYDROCHLORIDE 200 MG/1
200 TABLET ORAL DAILY
Status: DISCONTINUED | OUTPATIENT
Start: 2018-12-13 | End: 2018-11-28 | Stop reason: HOSPADM

## 2018-11-28 RX ORDER — DILTIAZEM HYDROCHLORIDE 240 MG/1
240 CAPSULE, COATED, EXTENDED RELEASE ORAL DAILY
Status: DISCONTINUED | OUTPATIENT
Start: 2018-11-29 | End: 2018-11-28 | Stop reason: HOSPADM

## 2018-11-28 RX ORDER — MIDAZOLAM HYDROCHLORIDE 1 MG/ML
INJECTION, SOLUTION INTRAMUSCULAR; INTRAVENOUS
Status: COMPLETED
Start: 2018-11-28 | End: 2018-11-28

## 2018-11-28 RX ORDER — METOPROLOL TARTRATE 25 MG/1
12.5 TABLET, FILM COATED ORAL 2 TIMES DAILY
Qty: 15 TAB | Refills: 0 | Status: SHIPPED | COMMUNITY
Start: 2018-11-28 | End: 2018-12-05

## 2018-11-28 RX ORDER — AMIODARONE HYDROCHLORIDE 200 MG/1
200 TABLET ORAL 2 TIMES DAILY
Qty: 42 TAB | Refills: 0 | Status: SHIPPED | OUTPATIENT
Start: 2018-11-28 | End: 2018-12-05

## 2018-11-28 RX ORDER — MIDAZOLAM HYDROCHLORIDE 1 MG/ML
.5-2 INJECTION, SOLUTION INTRAMUSCULAR; INTRAVENOUS
Status: DISCONTINUED | OUTPATIENT
Start: 2018-11-28 | End: 2018-11-28

## 2018-11-28 RX ORDER — MIDAZOLAM HYDROCHLORIDE 1 MG/ML
1 INJECTION, SOLUTION INTRAMUSCULAR; INTRAVENOUS ONCE
Status: COMPLETED | OUTPATIENT
Start: 2018-11-28 | End: 2018-11-28

## 2018-11-28 RX ORDER — FUROSEMIDE 40 MG/1
40 TABLET ORAL DAILY
Qty: 30 TAB | Refills: 0 | Status: SHIPPED | OUTPATIENT
Start: 2018-11-28 | End: 2018-12-19 | Stop reason: SDUPTHER

## 2018-11-28 RX ORDER — FENTANYL CITRATE 50 UG/ML
INJECTION, SOLUTION INTRAMUSCULAR; INTRAVENOUS
Status: COMPLETED
Start: 2018-11-28 | End: 2018-11-28

## 2018-11-28 RX ORDER — FENTANYL CITRATE 50 UG/ML
25 INJECTION, SOLUTION INTRAMUSCULAR; INTRAVENOUS ONCE
Status: COMPLETED | OUTPATIENT
Start: 2018-11-28 | End: 2018-11-28

## 2018-11-28 RX ORDER — AMIODARONE HYDROCHLORIDE 200 MG/1
200 TABLET ORAL 2 TIMES DAILY
Status: DISCONTINUED | OUTPATIENT
Start: 2018-11-28 | End: 2018-11-28 | Stop reason: HOSPADM

## 2018-11-28 RX ORDER — FENTANYL CITRATE 50 UG/ML
25-50 INJECTION, SOLUTION INTRAMUSCULAR; INTRAVENOUS
Status: DISCONTINUED | OUTPATIENT
Start: 2018-11-28 | End: 2018-11-28

## 2018-11-28 RX ADMIN — PANTOPRAZOLE SODIUM 40 MG: 40 TABLET, DELAYED RELEASE ORAL at 09:00

## 2018-11-28 RX ADMIN — FENTANYL CITRATE 25 MCG: 50 INJECTION, SOLUTION INTRAMUSCULAR; INTRAVENOUS at 10:10

## 2018-11-28 RX ADMIN — METOPROLOL TARTRATE 12.5 MG: 25 TABLET ORAL at 08:59

## 2018-11-28 RX ADMIN — MIDAZOLAM HYDROCHLORIDE 1 MG: 1 INJECTION, SOLUTION INTRAMUSCULAR; INTRAVENOUS at 10:10

## 2018-11-28 RX ADMIN — Medication 10 ML: at 17:29

## 2018-11-28 RX ADMIN — FUROSEMIDE 40 MG: 40 TABLET ORAL at 09:00

## 2018-11-28 RX ADMIN — MIDAZOLAM HYDROCHLORIDE 1 MG: 1 INJECTION, SOLUTION INTRAMUSCULAR; INTRAVENOUS at 10:14

## 2018-11-28 RX ADMIN — METRONIDAZOLE 500 MG: 250 TABLET ORAL at 08:59

## 2018-11-28 RX ADMIN — Medication 10 ML: at 02:50

## 2018-11-28 RX ADMIN — APIXABAN 2.5 MG: 2.5 TABLET, FILM COATED ORAL at 08:59

## 2018-11-28 RX ADMIN — LEVOTHYROXINE SODIUM 88 MCG: 88 TABLET ORAL at 09:00

## 2018-11-28 RX ADMIN — DOCUSATE SODIUM 100 MG: 100 CAPSULE, LIQUID FILLED ORAL at 08:59

## 2018-11-28 RX ADMIN — FENTANYL CITRATE 25 MCG: 50 INJECTION, SOLUTION INTRAMUSCULAR; INTRAVENOUS at 10:14

## 2018-11-28 RX ADMIN — AMIODARONE HYDROCHLORIDE 400 MG: 200 TABLET ORAL at 08:59

## 2018-11-28 RX ADMIN — APIXABAN 2.5 MG: 2.5 TABLET, FILM COATED ORAL at 17:28

## 2018-11-28 RX ADMIN — DILTIAZEM HYDROCHLORIDE 60 MG: 30 TABLET, FILM COATED ORAL at 09:05

## 2018-11-28 RX ADMIN — AMIODARONE HYDROCHLORIDE 200 MG: 200 TABLET ORAL at 17:28

## 2018-11-28 NOTE — DISCHARGE SUMMARY
Hospitalist Discharge Summary     Patient ID:  Renate Essex  [id number]  80 y.o.  3/5/1932    PCP on record: Nan Calderón MD    Admit date: 11/23/2018  Discharge date and time: 11/28/2018      DISCHARGE DIAGNOSIS:    Acute hypoxic resp failure due to pulm edema  Acute on chronic diastolitc heart failure  Paroxysmal Atrial Fibrillation  Diverticulitis  Hypothyroidism  DAHLIA    CONSULTATIONS:  IP CONSULT TO CARDIOLOGY    Excerpted HPI from H&P of Alberta Mathias DO:  César Wesley is a 80 y.o. with PMHx of Afib, hypothyroidism, GERD, HLD who presents to the ED with SOB. Patient notes that she underwent cardioversion late last month and has been back in Afib. She has recently had adjustments made to her rate control regimen as her heart rate had been persistently high. Pt admits to SOB, which has been ongoing for the last few weeks. CXR in the ED reveals mild pulmonary edema and EKG shows sinus bradycardia at a rate of 49. The patient denies any cough, fevers, or chills. She is also denying any palpitations, syncope or near-syncope, or dizziness.      We were asked to admit for work up and evaluation of the above problems. ______________________________________________________________________  DISCHARGE SUMMARY/HOSPITAL COURSE:  for full details see H&P, daily progress notes, labs, consult notes. Acute hypoxic resp failure due to pulm edema  Acute on chronic diastolitc heart failure  - mild pulm edema on CXR  - NT pro-BNP 1953  - most recent Echo (3/2017): EF 12-53%, grade 2 diastolic dysfunction, mild tricuspid regurgitation, mild pulmonary hypertension  - continue with Lasix. Daily weight; 77.1kg (on admission)      Daily Weights: Notify MD for a weight gain of 3 pounds or greater in one day or 5 pounds in one week. Weight on discharge 164lbs (74.4kg)  - RA O2 sat 97%     Paroxysmal Atrial Fibrillation  - s/p cardioversion (11/28/2018); currently NSR.      Amio 200mg BID until 12/12/2918    Amio 200mg daily starting 12/13/2018    Continue with cardizem CD 240mg daily and metoprolol 12.5mg BID per Dr. Holly Marc    Follow Dr. Holly Marc in 2 weeks. I reviewed with Dr. Holly Marc on discharge medication, Dr. Holly Marc is aware that her heart rate is currently in mid 50's upon discharge. - pt will follow up with Dr. Sil Sawyer for possible pacemaker placement as outpatient  - s/p ablation (10/31/2018). BB was on hold due to episode of bradycardia over the weekend, but pt back on BB  - continue with eliquis     Diverticulitis  - fevers at home, some loose stool vs diarrhea. NO further loose stools over night  - dark stools but unchanged which patient associates with her iron supplementation  - Pt was on flagy & bactrim prior to admission. Bactrim has been on hold due to DAHLIA. Complete Flagyl as directed and follow up with PCP in one week     Hypothyroidism  - cont synthroid     DAHLIA  - Creat trending down; 1.27 (was 1.78 on admission)                 _______________________________________________________________________  Patient seen and examined by me on discharge day. Pertinent Findings:  Gen:    Not in distress  Chest: Clear lungs  CVS:   Regular rhythm. No edema  Abd:  Soft, not distended, not tender  Neuro:  Alert, orient x 4  _______________________________________________________________________  DISCHARGE MEDICATIONS:   Current Discharge Medication List      START taking these medications    Details   furosemide (LASIX) 40 mg tablet Take 1 Tab by mouth daily. Qty: 30 Tab, Refills: 0         CONTINUE these medications which have CHANGED    Details   amiodarone (CORDARONE) 200 mg tablet Take 1 Tab by mouth two (2) times a day. Take 1 tablet twice a day until 12/12/2018. Take 1 tablet once a day starting 12/13/2018  Qty: 42 Tab, Refills: 0      apixaban (ELIQUIS) 2.5 mg tablet Take 1 Tab by mouth two (2) times a day.   Qty: 60 Tab, Refills: 0      metoprolol tartrate (LOPRESSOR) 25 mg tablet Take 0.5 Tabs by mouth two (2) times a day. In addition to your other medications for atrial fibrillation  Qty: 15 Tab, Refills: 0         CONTINUE these medications which have NOT CHANGED    Details   metroNIDAZOLE (FLAGYL) 500 mg tablet Take 1 Tab by mouth three (3) times daily for 10 days. Qty: 30 Tab, Refills: 0      acetaminophen/diphenhydramine (TYLENOL PM PO) Take 2 Tabs by mouth as needed. cranberry fruit extract (CRANBERRY CONCENTRATE PO) Take  by mouth. dilTIAZem CD (CARDIZEM CD) 240 mg ER capsule Take 1 Cap by mouth daily. Decreased dose on 11/14/2018  Qty: 90 Cap, Refills: 4      levothyroxine (SYNTHROID) 88 mcg tablet Take 1 Tab by mouth Daily (before breakfast). Qty: 90 Tab, Refills: 0      lovastatin (MEVACOR) 40 mg tablet TAKE 1 TABLET EVERY EVENING TO LOWER CHOLESTEROL  Qty: 90 Tab, Refills: 3      omeprazole (PRILOSEC) 20 mg capsule TAKE 1 CAPSULE DAILY. Qty: 90 Cap, Refills: 3      ACCU-CHEK MULTICLIX LANCET misc AS DIRECTED  Qty: 100 Each, Refills: PRN      latanoprost (XALATAN) 0.005 % ophthalmic solution Administer 1 Drop to both eyes nightly. DOCUSATE CALCIUM (STOOL SOFTENER PO) Take  by mouth daily. ascorbic acid (VITAMIN C) 500 mg tablet Take  by mouth daily. Associated Diagnoses: Hyperlipidemia      cholecalciferol, vitamin d3, (VITAMIN D) 1,000 unit tablet Take  by mouth daily. Associated Diagnoses: Menopause      MULTIVITAMINS (MULTIVITAMIN PO) Take  by mouth.     Associated Diagnoses: Menopause         STOP taking these medications       trimethoprim-sulfamethoxazole (BACTRIM DS, SEPTRA DS) 160-800 mg per tablet Comments:   Reason for Stopping:         cephALEXin (KEFLEX) 500 mg capsule Comments:   Reason for Stopping:         cyclobenzaprine (FLEXERIL) 10 mg tablet Comments:   Reason for Stopping:               My Recommended Diet, Activity, Wound Care, and follow-up labs are listed in the patient's Discharge Insturctions which I have personally completed and reviewed. _______________________________________________________________________  DISPOSITION:    Home with Family: y   Home with HH/PT/OT/RN:    SNF/LTC:    WANDA:    OTHER:        Condition at Discharge:  Stable  _______________________________________________________________________  Follow up with:   PCP : Natalie Pelaez MD  Follow-up Information     Follow up With Specialties Details [facility] [facility]  They will provide your hospital to home visit.   [facility] [address] [facility]    Natalie Pelaez MD Pediatrics  PCP - follow up to be scheduled 1-2 weeks [address]  [address]      Sofie Garrett MD Cardiology, [address] [facility] Vascular Surgery, Internal Medicine Schedule an appointment as soon as possible for a visit in 2 weeks  [address]  [address]       Marcial Mary MD Cardiology, [address] [facility] Vascular Surgery, Internal Medicine Schedule an appointment as soon as possible for a visit  [address]  [address] [address] [phone]                Total time in minutes spent coordinating this discharge (includes going over instructions, follow-up, prescriptions, and preparing report for sign off to her PCP) :  30 minutes    Signed:  Ash Quinonez NP

## 2018-11-28 NOTE — PROGRESS NOTES
PCP JOVANI appt scheduled with Dr. German Molina on 11/30/2018 at 10:00am. Appt added to AVS. Teresa Vance CM Specialist

## 2018-11-28 NOTE — PROGRESS NOTES
0700  Bedside shift change report given to me (oncoming nurse) by Hussain Tirado, RN (offgoing nurse). Report included the following information SBAR, Kardex, ED Summary, MAR, Accordion, Recent Results, Med Rec Status and Cardiac Rhythm AFIB.       0945  Pt left floor for cardioversion    1245  Informed pt she was able to call down and order some lunch and after she ate we would try and go for a walk to see how she felt and to monitor how her HR and rhythm. 1430  Pt went for walk down hallway to the windows by the main elevator and back to the room. She said she felt a little winded when I asked her how she was feeling. Pt stayed in SR, HR at 66 and once at rest back to SB HR between 56 and 58.    1455  Pt received discharge orders.

## 2018-11-28 NOTE — PROGRESS NOTES
Bedside shift change report given to Jeancarlos Alarcon RN (oncoming nurse). Report included the following information SBAR, Kardex, Intake/Output, MAR and Recent Results. SHIFT SUMMARY:            [facility] NURSING NOTE   Admission Date 11/23/2018   Admission Diagnosis Respiratory failure with hypoxia ([address]   Consults IP CONSULT TO CARDIOLOGY      Cardiac Monitoring [x] Yes [] No      Purposeful Hourly Rounding [x] Yes    Angelita Score Total Score: 1   Angelita score 3 or > [] Bed Alarm [] [employer] [] 1:1 sitter [] Patient refused (Signed refusal form in chart)   Reji Score Reji Score: 19   Reji score 14 or < [] PMT consult [] Wound Care consult    []  Specialty bed  [] Nutrition consult      Influenza Vaccine Received Flu Vaccine for Current Season (usually Sept-March): Yes           Oxygen needs? [x] Room air Oxygen @  []1L    []2L    []3L   []4L    []5L   []6L via  NC   Chronic home O2 use? [] Yes [x] No  Perform O2 challenge test and document in progress note using smartphrase (.Homeoxygen)      Last bowel movement Last Bowel Movement Date: 11/26/18      Urinary Catheter             LDAs               Peripheral IV 11/26/18 Left; Inner Arm (Active)   Site Assessment Clean, dry, & intact 11/27/2018  7:34 PM   Phlebitis Assessment 0 11/27/2018  7:34 PM   Infiltration Assessment 0 11/27/2018  7:34 PM   Dressing Status Clean, dry, & intact; Clean 11/27/2018  7:34 PM   Dressing Type Transparent 11/27/2018  7:34 PM   Hub Color/Line Status Blue;Flushed 11/27/2018  7:34 PM   Alcohol Cap Used No 11/26/2018  6:09 PM                         Readmission Risk Assessment Tool Score Medium Risk            20       Total Score        3 Has Seen PCP in Last 6 Months (Yes=3, No=0)    2 . Living with Significant Other. Assisted Living. LTAC. SNF. or   Rehab    5 Pt.  Coverage (Medicare=5 , Medicaid, or Self-Pay=4)    10 Charlson Comorbidity Score (Age + Comorbid Conditions)        Criteria that do not apply:    Patient Length of Stay (>5 days = 3)    IP Visits Last 12 Months (1-3=4, 4=9, >4=11)       Expected Length of Stay 3d 19h   Actual Length of Stay 4

## 2018-11-28 NOTE — PROGRESS NOTES
CM notified of DC.  BS H2H visit scheduled    Family to transport around 5 PM today. CM emailed CM Specialist to make PCP appt. No further MC needs. Darion Reno CM    Care Management Interventions  PCP Verified by CM: Yes  Mode of Transport at Discharge:  Other (see comment)(family)  Transition of Care Consult (CM Consult): Discharge Planning  Physical Therapy Consult: No  Occupational Therapy Consult: No  Current Support Network: Lives with Spouse(split level home with stair lift to each floor)  Confirm Follow Up Transport: Self  Plan discussed with Pt/Family/Caregiver: Yes  Discharge Location  Discharge Placement: Home

## 2018-11-28 NOTE — PROCEDURES
Procedure:  Synchronized direct-current cardioversion    Indication:  Atrial Fibrillation    Sedation:  Moderate    Complications:  None    After informed consent was obtained and patient compliant with Eliquis uninterrupted since NIALL confirmed the absence of intracardiac thrombus, cardioversion pads were placed in an anteroposterior fashion and the patient was adequately sedated. 360 J of synchronized biphasic electricity were delivered across the precordium with successful restoration of normal sinus rhythm. After the procedure and recovery, the patient was alert and oriented times 3 with cranial nerves, motor and speech within normal limits. Conclusions:  Successful cardioversion of atrial fibrillation to NSR. Recommendations:    Continue current care and f/u with Dr. Corie Mckeon in 1-2 weeks. Recommend Advised to call 911 if any numbness, weakness, tingling, or slurred speech and to call MD if any other concerns.

## 2018-11-28 NOTE — H&P (VIEW-ONLY)
[address] [place] [address] [facility]  [phone] Date of  Admission: 11/23/2018 12:24 AM  
 
Admission type:Emergency Consult for: Atrial fib Consult by: Dr Guadalupe Caballero NP Subjective:  
 
Abhishek Nieves is a 80 y.o. female admitted for Respiratory failure with hypoxia ([facility] She has hx of PAF s/p cardioversion 10/31/18, back in atrial fib,  awaiting cardioversion this morning. Patient denies  chest pain, chest pressure/discomfort, dyspnea, palpitations, lower extremity edema. She has some reservations about AF ablation as her 79year old brother did not do well after his procedure. However, she is aware that she needs better management of her arrhythmia. Patient Active Problem List  
 Diagnosis Date Noted  Acute diastolic CHF (congestive heart failure) ([facility] 11/26/2018  [facility] 11/23/2018  Sigmoid diverticulosis 11/20/2018  Mitral regurgitation 10/31/2018  Paroxysmal atrial fibrillation ([facility] 10/25/2018  Hypercholesterolemia  Osteoarthritis of hip 11/20/2017  PAD (peripheral artery disease) ([facility] 04/04/2017  Anemia 02/06/2017  Chronic UTI 02/06/2017  Venous (peripheral) insufficiency 01/08/2015  Hypothyroid 06/11/2012  Hypertension 06/11/2012  Arthritis 06/11/2012  Hyperlipidemia 06/11/2012  IGT (impaired glucose tolerance) 06/30/2011  Restless leg syndrome 06/30/2011 Claven Hamman, MD 
Past Medical History:  
Diagnosis Date  Arthritis   
 hands/low back  Diabetes ([address] borderline  GERD (gastroesophageal reflux disease)  Glaucoma  Hypercholesterolemia  Hypertension  Ill-defined condition   
 borderline anemia  Ill-defined condition   
 bladder infections  Impaired glucose tolerance  Menopause  Thyroid disease   
 hypothyroidism Social History Socioeconomic History  Marital status:   
 Spouse name: Not on file  Number of children: Not on file  Years of education: Not on file  Highest education level: Not on file Tobacco Use  Smoking status: Former Smoker Packs/day: 1.00 Years: 15.00 Pack years: 15.00 Types: Cigarettes Last attempt to quit: 1995 Years since quittin.9  Smokeless tobacco: Never Used Substance and Sexual Activity  Alcohol use: Yes Alcohol/week: 0.6 oz Types: 1 Glasses of wine per week Comment: Rare--maybe once a month  Drug use: No  
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 Lives with . Allergies Allergen Reactions  Aspirin Other (comments) Swelling shut of eyelids  Macrobid [Nitrofurantoin Monohyd/M-Cryst] Unknown (comments)  Pcn [Penicillins] Hives Family History Problem Relation Age of Onset  Coronary Artery Disease Other   
     mother  of MI age 80, brother  age 72 of MI, sister has hear problems  Breast Cancer Sister 48  
 Heart Attack Mother  Other Father   
     pneumonia  Alzheimer Brother  Cancer Sister  Cancer Sister  Heart Attack Brother  Suicide Brother Current Facility-Administered Medications Medication Dose Route Frequency  metoprolol tartrate (LOPRESSOR) tablet 12.5 mg  12.5 mg Oral Q12H  
 amiodarone (CORDARONE) tablet 400 mg  400 mg Oral TID  [START ON 2018] amiodarone (CORDARONE) tablet 400 mg  400 mg Oral DAILY  dilTIAZem (CARDIZEM) IR tablet 60 mg  60 mg Oral AC&HS  furosemide (LASIX) tablet 40 mg  40 mg Oral DAILY  polyethylene glycol (MIRALAX) packet 17 g  17 g Oral DAILY  latanoprost (XALATAN) 0.005 % ophthalmic solution 1 Drop  1 Drop Both Eyes QPM  
 metroNIDAZOLE (FLAGYL) tablet 500 mg  500 mg Oral Q12H  
 sodium chloride (NS) flush 5-10 mL  5-10 mL IntraVENous Q8H  
 sodium chloride (NS) flush 5-10 mL  5-10 mL IntraVENous PRN  
  acetaminophen (TYLENOL) tablet 650 mg  650 mg Oral Q4H PRN  
 ondansetron (ZOFRAN) injection 4 mg  4 mg IntraVENous Q4H PRN  
 docusate sodium (COLACE) capsule 100 mg  100 mg Oral DAILY  pravastatin (PRAVACHOL) tablet 40 mg  40 mg Oral QHS  pantoprazole (PROTONIX) tablet 40 mg  40 mg Oral ACB  apixaban (ELIQUIS) tablet 2.5 mg  2.5 mg Oral BID  levothyroxine (SYNTHROID) tablet 88 mcg  88 mcg Oral ACB Review of Symptoms: 
Constitutional: negative Eyes: negative Ears, nose, mouth, throat, and face: negative Respiratory: negative Cardiovascular: negative Gastrointestinal: negative Genitourinary:negative Musculoskeletal:negative Neurological: negative Endocrine: negative Subjective:  
  
Visit Vitals /61 (BP 1 Location: Right arm, BP Patient Position: At rest;Sitting) Pulse 81 Temp 97.6 °F (36.4 °C) Resp 20 Ht 5' 5\" (1.651 m) Wt 164 lb 0.4 oz (74.4 kg) SpO2 96% Breastfeeding? No  
BMI 27.29 kg/m² Physical: 
Heart: irregularly irregular, S1 WNL and S2 WNL. No S3 or S4, no m/S3/JVD, no carotid bruits Lungs: exp wheezing posterior Abdomen: Soft, +BS, NTND Extremities: LE gonzalo +DP/PT, tr edema Neurologic: Grossly normal 
 
Data Review:  
Recent Labs  
  11/26/18 
0353 WBC 6.5 HGB 11.9 HCT 37.0  Recent Labs  
  11/27/18 
0254 11/26/18 
0353  141  
K 3.9 4.1  107 CO2 30 27 * 143* BUN 20 21* CREA 1.27* 1.26* CA 8.8 8.8 ALB  --  3.1* TBILI  --  0.4 SGOT  --  32 ALT  --  45 No results for input(s): TROIQ, CPK, CKMB in the last 72 hours. Intake/Output Summary (Last 24 hours) at 11/28/2018 7504 Last data filed at 11/28/2018 4100 Gross per 24 hour Intake 600 ml Output 1700 ml Net -1100 ml Cardiographics Telemetry: atrial fibrillation, ventricular rate 80s. Echocardiogram: 10/31/18-EF 55-60%, LA dilated Assessment: 
 
    
  
 Active Problems: Hypertension (6/11/2012) Hyperlipidemia (6/11/2012) Paroxysmal atrial fibrillation ([facility] (10/25/2018) Overview: Started 10/18, on eliquis, diltiazem, NIALL pending Mitral regurgitation (10/31/2018) Overview: Moderate with posterior valve prolapse 10/31/18 Respiratory failure with hypoxia ([facility] (11/23/2018) Acute diastolic CHF (congestive heart failure) ([facility] (11/26/2018) Plan:  
 
Tammy Wood is a pleasant 80year old female with PAF. She is scheduled for cardioversion today. Discussed option of AF ablation with patient. She is willing to consider. Recommend she continue [address] and amiodarone and we will see her as outpatient to discuss further. Thank you this interesting consult. Eliza Graham ANP Patient seen and examined by me with nurse practitioner. I personally performed all components of the history, physical, and medical decision making and agree with the assessment and plan with minor modifications as noted. Had extensive conversation with here. Discussed AF abl versus pacemaker/av node ablation. Pt understands and would like to proceed with av node ablation/pacemaker. I discussed the risks/benefits/alternatives of the procedure with the patient. Risks include (but are not limited to) bleeding, heart block, infection, cva/mi/tamponade/death. The patient understands and agrees to proceed. She is ok for dc post cardioversion. We will schedule as outpatient next week. Thank you for this interesting consultation.  
 
 
Chintan Kent MD, Anibal Hayward

## 2018-11-28 NOTE — CONSULTS
[address]  [phone]        Date of  Admission: 11/23/2018 12:24 AM     Admission type:Emergency    Consult for: Atrial fib  Consult by: Dr Ava Estrada NP     Subjective:     Etter Rinne is a 80 y.o. female admitted for Respiratory failure with [facility] She has hx of PAF s/p cardioversion 10/31/18, back in atrial fib,  awaiting cardioversion this morning. Patient denies  chest pain, chest pressure/discomfort, dyspnea, palpitations, lower extremity edema. She has some reservations about AF ablation as her 79year old brother did not do well after his procedure. However, she is aware that she needs better management of her arrhythmia.      Patient Active Problem List    Diagnosis Date Noted    Acute diastolic CHF (congestive heart failure) ([facility] 11/26/2018    Respiratory failure with hypoxia [facility] 11/23/2018    Sigmoid diverticulosis 11/20/2018    Mitral regurgitation 10/31/2018    Paroxysmal atrial fibrillation ([facility] 10/25/2018    Hypercholesterolemia     Osteoarthritis of hip 11/20/2017    PAD (peripheral artery disease) ([facility] 04/04/2017    Anemia 02/06/2017    Chronic UTI 02/06/2017    Venous (peripheral) insufficiency 01/08/2015    Hypothyroid 06/11/2012    Hypertension 06/11/2012    Arthritis 06/11/2012    Hyperlipidemia 06/11/2012    IGT (impaired glucose tolerance) 06/30/2011    Restless leg syndrome 06/30/2011      Ghazal Benavides MD  Past Medical History:   Diagnosis Date    Arthritis     hands/low back    Diabetes ([facility]     borderline    GERD (gastroesophageal reflux disease)     Glaucoma     Hypercholesterolemia     Hypertension     Ill-defined condition     borderline anemia    Ill-defined condition     bladder infections    Impaired glucose tolerance     Menopause     Thyroid disease     hypothyroidism      Social History     Socioeconomic History    Marital status:      Spouse name: Not on file    Number of children: Not on file    Years of education: Not on file    Highest education level: Not on file   Tobacco Use    Smoking status: Former Smoker     Packs/day: 1.00     Years: 15.00     Pack years: 15.00     Types: Cigarettes     Last attempt to quit: 1995     Years since quittin.9    Smokeless tobacco: Never Used   Substance and Sexual Activity    Alcohol use: Yes     Alcohol/week: 0.6 oz     Types: 1 Glasses of wine per week     Comment: Rare--maybe once a month    Drug use: No   Social History Narrative    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 Lives with .      Allergies   Allergen Reactions    Aspirin Other (comments)     Swelling shut of eyelids    Macrobid [Nitrofurantoin Monohyd/M-Cryst] Unknown (comments)    Pcn [Penicillins] Hives      Family History   Problem Relation Age of Onset    Coronary Artery Disease Other         mother  of MI age 80, brother  age 72 of MI, sister has hear problems    Breast Cancer Sister 48    Heart Attack Mother     Other Father         pneumonia    Alzheimer Brother     Cancer Sister     Cancer Sister     Heart Attack Brother     Suicide Brother       Current Facility-Administered Medications   Medication Dose Route Frequency    metoprolol tartrate (LOPRESSOR) tablet 12.5 mg  12.5 mg Oral Q12H    amiodarone (CORDARONE) tablet 400 mg  400 mg Oral TID    [START ON 2018] amiodarone (CORDARONE) tablet 400 mg  400 mg Oral DAILY    dilTIAZem (CARDIZEM) IR tablet 60 mg  60 mg Oral AC&HS    furosemide (LASIX) tablet 40 mg  40 mg Oral DAILY    polyethylene glycol (MIRALAX) packet 17 g  17 g Oral DAILY    latanoprost (XALATAN) 0.005 % ophthalmic solution 1 Drop  1 Drop Both Eyes QPM    metroNIDAZOLE (FLAGYL) tablet 500 mg  500 mg Oral Q12H    sodium chloride (NS) flush 5-10 mL  5-10 mL IntraVENous Q8H    sodium chloride (NS) flush 5-10 mL  5-10 mL IntraVENous PRN    acetaminophen (TYLENOL) tablet 650 mg  650 mg Oral Q4H PRN    ondansetron (ZOFRAN) injection 4 mg  4 mg IntraVENous Q4H PRN    docusate sodium (COLACE) capsule 100 mg  100 mg Oral DAILY    pravastatin (PRAVACHOL) tablet 40 mg  40 mg Oral QHS    pantoprazole (PROTONIX) tablet 40 mg  40 mg Oral ACB    apixaban (ELIQUIS) tablet 2.5 mg  2.5 mg Oral BID    levothyroxine (SYNTHROID) tablet 88 mcg  88 mcg Oral ACB         Review of Symptoms:  Constitutional: negative  Eyes: negative  Ears, nose, mouth, throat, and face: negative  Respiratory: negative  Cardiovascular: negative  Gastrointestinal: negative  Genitourinary:negative  Musculoskeletal:negative  Neurological: negative  Endocrine: negative     Subjective:      Visit Vitals  /61 (BP 1 Location: Right arm, BP Patient Position: At rest;Sitting)   Pulse 81   Temp 97.6 °F (36.4 °C)   Resp 20   Ht 5' 5\" (1.651 m)   Wt 164 lb 0.4 oz (74.4 kg)   SpO2 96%   Breastfeeding? No   BMI 27.29 kg/m²       Physical:  Heart: irregularly irregular, S1 WNL and S2 WNL. No S3 or S4, no m/S3/JVD, no carotid bruits   Lungs: exp wheezing posterior   Abdomen: Soft, +BS, NTND   Extremities: LE gonzalo +DP/PT, tr edema   Neurologic: Grossly normal    Data Review:   Recent Labs     11/26/18  0353   WBC 6.5   HGB 11.9   HCT 37.0        Recent Labs     11/27/18  0254 11/26/18  0353    141   K 3.9 4.1    107   CO2 30 27   * 143*   BUN 20 21*   CREA 1.27* 1.26*   CA 8.8 8.8   ALB  --  3.1*   TBILI  --  0.4   SGOT  --  32   ALT  --  45       No results for input(s): TROIQ, CPK, CKMB in the last 72 hours. Intake/Output Summary (Last 24 hours) at 11/28/2018 0934  Last data filed at 11/28/2018 0307  Gross per 24 hour   Intake 600 ml   Output 1700 ml   Net -1100 ml        Cardiographics    Telemetry: atrial fibrillation, ventricular rate 80s.       Echocardiogram: 10/31/18-EF 55-60%, LA dilated     Assessment:             Active Problems:    Hypertension (6/11/2012)      Hyperlipidemia (6/11/2012) Paroxysmal atrial fibrillation ([facility] (10/25/2018)      Overview: Started 10/18, on eliquis, diltiazem, NIALL pending      Mitral regurgitation (10/31/2018)      Overview: Moderate with posterior valve prolapse 10/31/18      Respiratory failure with hypoxia ([address] (11/23/2018)      Acute diastolic CHF (congestive heart failure) ([facility] (11/26/2018)         Plan:     Rodrigo Elmore is a pleasant 80year old female with PAF. She is scheduled for cardioversion today. Discussed option of AF ablation with patient. She is willing to consider. Recommend she continue [facility] and amiodarone and we will see her as outpatient to discuss further. Thank you this interesting consult. Eliza Quintanilla ANP    Patient seen and examined by me with nurse practitioner. I personally performed all components of the history, physical, and medical decision making and agree with the assessment and plan with minor modifications as noted. Had extensive conversation with here. Discussed AF abl versus pacemaker/av node ablation. Pt understands and would like to proceed with av node ablation/pacemaker. I discussed the risks/benefits/alternatives of the procedure with the patient. Risks include (but are not limited to) bleeding, heart block, infection, cva/mi/tamponade/death. The patient understands and agrees to proceed. She is ok for dc post cardioversion. We will schedule as outpatient next week. Thank you for this interesting consultation.       Odell Luque MD, Carmelita Orta

## 2018-11-28 NOTE — PROGRESS NOTES
11/28/2018 10:32 AM    Admit Date: 11/23/2018    Admit Diagnosis:   Respiratory failure with hypoxia (HCC)    Subjective:     Bharat Galeano denies chest pain or shortness of breath. Current Facility-Administered Medications   Medication Dose Route Frequency    metoprolol tartrate (LOPRESSOR) tablet 12.5 mg  12.5 mg Oral Q12H    amiodarone (CORDARONE) tablet 400 mg  400 mg Oral TID    [START ON 12/1/2018] amiodarone (CORDARONE) tablet 400 mg  400 mg Oral DAILY    dilTIAZem (CARDIZEM) IR tablet 60 mg  60 mg Oral AC&HS    furosemide (LASIX) tablet 40 mg  40 mg Oral DAILY    polyethylene glycol (MIRALAX) packet 17 g  17 g Oral DAILY    latanoprost (XALATAN) 0.005 % ophthalmic solution 1 Drop  1 Drop Both Eyes QPM    metroNIDAZOLE (FLAGYL) tablet 500 mg  500 mg Oral Q12H    sodium chloride (NS) flush 5-10 mL  5-10 mL IntraVENous Q8H    sodium chloride (NS) flush 5-10 mL  5-10 mL IntraVENous PRN    acetaminophen (TYLENOL) tablet 650 mg  650 mg Oral Q4H PRN    ondansetron (ZOFRAN) injection 4 mg  4 mg IntraVENous Q4H PRN    docusate sodium (COLACE) capsule 100 mg  100 mg Oral DAILY    pravastatin (PRAVACHOL) tablet 40 mg  40 mg Oral QHS    pantoprazole (PROTONIX) tablet 40 mg  40 mg Oral ACB    apixaban (ELIQUIS) tablet 2.5 mg  2.5 mg Oral BID    levothyroxine (SYNTHROID) tablet 88 mcg  88 mcg Oral ACB         Objective:      Physical Exam:    Visit Vitals  /53 (BP 1 Location: Right arm, BP Patient Position: At rest)   Pulse (!) 54   Temp 97.6 °F (36.4 °C)   Resp 15   Ht 5' 5\" (1.651 m)   Wt 164 lb 0.4 oz (74.4 kg)   SpO2 95%   Breastfeeding? No   BMI 27.29 kg/m²     Gen:  NAD  Mental Status - Alert. General Appearance - Not in acute distress. Chest and Lung Exam   Inspection: Accessory muscles - No use of accessory muscles in breathing.    Auscultation:   Breath sounds: - Normal.   Cardiovascular   Inspection: Jugular vein - Bilateral - Inspection Normal.   Palpation/Percussion: Apical Impulse: - Normal.   Auscultation: Rhythm - irregular. Heart Sounds - S1 WNL and S2 WNL. No S3 or S4. Murmurs & Other Heart Sounds: Auscultation of the heart reveals - No Murmurs. Peripheral Vascular   Upper Extremity: Inspection - Bilateral - No Cyanotic nailbeds or Digital clubbing. Lower Extremity:   Palpation: Edema - Bilateral - No edema. Abdomen:   Soft, non-tender, bowel sounds are active. Neuro: A&O times 3, CN and motor grossly WNL    Data Review:   Recent Labs     11/26/18  0353   WBC 6.5   HGB 11.9   HCT 37.0        Recent Labs     11/27/18  0254 11/26/18  0353    141   K 3.9 4.1    107   CO2 30 27   * 143*   BUN 20 21*   CREA 1.27* 1.26*   CA 8.8 8.8   ALB  --  3.1*   TBILI  --  0.4   SGOT  --  32   ALT  --  45       No results for input(s): TROIQ, CPK, CKMB in the last 72 hours. Intake/Output Summary (Last 24 hours) at 11/28/2018 1032  Last data filed at 11/28/2018 0307  Gross per 24 hour   Intake 480 ml   Output 1700 ml   Net -1220 ml        Telemetry: AFIB, rate controlled    Assessment:     Active Problems:    Hypertension (6/11/2012)      Hyperlipidemia (6/11/2012)      Paroxysmal atrial fibrillation ([facility] (10/25/2018)      Overview: Started 10/18, on eliquis, diltiazem, NIALL pending      Mitral regurgitation (10/31/2018)      Overview: Moderate with posterior valve prolapse 10/31/18      Respiratory failure with hypoxia ([address] (11/23/2018)      Acute diastolic CHF (congestive heart failure) ([facility] (11/26/2018)        Plan:       PAF:  Rate elevated this morning, but improving this afternoon. Asymptomatic. S/p cardioversion 10/31/18. BB and CCB held over weekend due to bradycardia. Has moderate mitral regurg. · Successful [facility] 11/28/18  · At DC, continue cardizem 240 mg XR, amiodarone 200 BID for 2 weeks, then 200 daily, metoprolol 12.5 BID, and eliquis.     · Add lower dose of home BB  · Increase amio to loading dose  · Plan for cardioversion tomorrow. Keep NPO after midnight  · EP to see as outpatient to consider AF ablation.        Acute respiratory failure with pulmonary edema:  Improved. Likely acute diastolic heart failure, possibly influenced by PAF with RVR and MR. · Currently tolerating PO lasix- continue at DC        HTN:  stable    Thanks for the consult. I will sign off for now. Please call if any questions or concerns.   F/u with me and Dr. Taurus Parekh at [facility].

## 2018-11-28 NOTE — PROGRESS NOTES
Problem: Falls - Risk of  Goal: *Absence of Falls  Document Angelita Fall Risk and appropriate interventions in the flowsheet.   Outcome: Progressing Towards Goal  Fall Risk Interventions:  Mobility Interventions: OT consult for ADLs, Patient to call before getting OOB, PT Consult for mobility concerns, Strengthening exercises (ROM-active/passive), Utilize walker, cane, or other assistive device         Medication Interventions: Bed/chair exit alarm, Evaluate medications/consider consulting pharmacy, Patient to call before getting OOB, Teach patient to arise slowly

## 2018-11-28 NOTE — DISCHARGE INSTRUCTIONS
Patient Discharge Instructions     Pt Name  Darby Rocha   Date of Birth 3/5/1932   Age  80 y.o. Medical Record Number  [id number]   PCP Mojgan Saunders MD    Admit date:  11/23/2018 @    [place] [address]    Room Number  2216/01   Date of Discharge 11/28/2018     Admission Diagnoses:     Paroxysmal atrial fibrillation (HCC)          Allergies   Allergen Reactions    Aspirin Other (comments)     Swelling shut of eyelids    Macrobid [Nitrofurantoin Monohyd/M-Cryst] Unknown (comments)    Pcn [Penicillins] Hives        You were admitted to [address] for  Paroxysmal atrial fibrillation ([facility]    [place] (BUT NOT LIMITED TO ):  Present on Admission:   Respiratory failure with hypoxia ([facility]   Paroxysmal atrial fibrillation (HCC)   Mitral regurgitation   Hypertension   Hyperlipidemia   Acute diastolic CHF (congestive heart failure) (HCC)      DIET:  Cardiac Diet       Recommended activity: Activity as tolerated  Follow up : Follow-up Information     Follow up With Specialties Details Why [address] [place] [facility]  They will provide your hospital to home visit. [facility] [address] [facility]    Mojgan Saunders MD Pediatrics  PCP - follow up to be scheduled 1-2 weeks [address]  [address]      Avril Sotelo MD Cardiology, [address] [facility] Vascular Surgery, Internal Medicine Schedule an appointment as soon as possible for a visit in 2 weeks  [address]  [address] [facility] Melania Heredia MD Cardiology, [address] [facility] Vascular Surgery, Internal Medicine Schedule an appointment as soon as possible for a visit  [facility]  [phone]          Daily weights: Notify MD for a weight gain of 3 pounds or greater in one day or 5 pounds in one week.  Your weight on discharge date 164lbs    Take Amiodarone 200mg 1 tablet twice a day until 12/12/2018. Take Amiodarone  200mg 1 tablet once a day starting 12/13/2018. · It is important that you take the medication exactly as they are prescribed. · Keep your medication in the bottles provided by the pharmacist and keep a list of the medication names, dosages, and times to be taken in your wallet. · Do not take other medications without consulting your doctor. ADDITIONAL INFORMATION: If you experience any of the following symptoms or have any health problem not listed below, then please call your primary care physician or return to the emergency room if you cannot get hold of your doctor: Fever, chills, nausea, vomiting, diarrhea, change in mentation, falling, bleeding, shortness of breath. I understand that if any problems occur once I am discharged, I am supposed to call my Primary care physician for further care or seek help in the Emergency Department at the nearest Healthcare facility. I have had an opportunity to discuss my clinical issues with my doctor and nursing staff. I understand and acknowledge receipt of the above instructions.                                                                                                                                            Physician's or R.N.'s Signature                                                            Date/Time                                                                                                                                              Patient or Representative Signature                                                 Date/Time

## 2018-11-29 ENCOUNTER — PATIENT OUTREACH (OUTPATIENT)
Dept: FAMILY MEDICINE CLINIC | Age: 83
End: 2018-11-29

## 2018-11-29 ENCOUNTER — HOME CARE VISIT (OUTPATIENT)
Dept: HOME HEALTH SERVICES | Facility: HOME HEALTH | Age: 83
End: 2018-11-29

## 2018-11-29 NOTE — PROGRESS NOTES
Hospital Discharge Follow-Up      Date/Time:  2018 3:29 PM    Patient was admitted to [facility] on 18 and discharged on 18 for:    DISCHARGE DIAGNOSIS:  Acute hypoxic resp failure due to pulm edema  Acute on chronic diastolitc heart failure  Paroxysmal Atrial Fibrillation  Diverticulitis  Hypothyroidism  DAHLIA    The physician discharge summary was available at the time of outreach. Patient was contacted within two business days of discharge. Challenges reviewed with the provider:   - Patient scheduled for pacemaker placement on 18. - [facility] is providing [facility] Hospital to Home visit scheduled for 12-3-18. - Patient reports feeling tired/fatigued. - Patient reported home weight today is 166 pounds. Method of communication with provider :chart routing    Inpatient RRAT score: 21  Was this a readmission? no   Patient stated reason for the readmission: not applicable    Nurse Navigator (NN) contacted the patient by telephone to perform post hospital discharge assessment. Verified name and  with patient as identifiers. Provided introduction to self, and explanation of the Nurse Navigator role. Reviewed discharge instructions and red flags with patient who verbalized understanding. Patient given an opportunity to ask questions and does not have any further questions or concerns at this time. The patient agrees to contact the PCP office for questions related to their healthcare. NN provided contact information for future reference. Disease Specific:   N/A to this admission; however patient has a history of diastolic heart failure. Summary of patient's problems:  1. [facility] Shen Hernandez is providing one Hospital to Home visit scheduled for 12-3-18. 3. Patient reports feeling tired/fatigued since prior to this admission.   4. Patient reported home weight today is 166 pounds. Home Health orders at discharge: One Hospital to Home visit provided by [address], scheduled for 12-3-18. [address]: [address] [place] [address]   Date of initial visit: [facility] scheduled for 12-3-189. Durable Medical Equipment ordered/company: None upon discharge. Durable Medical Equipment received: None upon discharge. Barriers to care? ineffective coping, lack of knowledge about disease, transportation. Family members and friend/neighbor currently providing all transportation. Advance Care Planning:   Does patient have an Advance Directive:  reviewed and current. Patient has Advance Medical Directive, dated 7-2-2002, scanned into [facility]. Medications per After Visit Summary, dated 11-28-18, from [facility]:   DISCHARGE MEDICATIONS:         Current Discharge Medication List             START taking these medications     Details   furosemide (LASIX) 40 mg tablet Take 1 Tab by mouth daily. Qty: 30 Tab, Refills: 0                 CONTINUE these medications which have CHANGED     Details   amiodarone (CORDARONE) 200 mg tablet Take 1 Tab by mouth two (2) times a day. Take 1 tablet twice a day until 12/12/2018. Take 1 tablet once a day starting 12/13/2018  Qty: 42 Tab, Refills: 0       apixaban (ELIQUIS) 2.5 mg tablet Take 1 Tab by mouth two (2) times a day. Qty: 60 Tab, Refills: 0       metoprolol tartrate (LOPRESSOR) 25 mg tablet Take 0.5 Tabs by mouth two (2) times a day. In addition to your other medications for atrial fibrillation  Qty: 15 Tab, Refills: 0                 CONTINUE these medications which have NOT CHANGED     Details   metroNIDAZOLE (FLAGYL) 500 mg tablet Take 1 Tab by mouth three (3) times daily for 10 days.   Qty: 30 Tab, Refills: 0       acetaminophen/diphenhydramine (TYLENOL PM PO) Take 2 Tabs by mouth as needed.       cranberry fruit extract (CRANBERRY CONCENTRATE PO) Take  by mouth.       dilTIAZem CD (CARDIZEM CD) 240 mg ER capsule Take 1 Cap by mouth daily. Decreased dose on 11/14/2018  Qty: 90 Cap, Refills: 4       levothyroxine (SYNTHROID) 88 mcg tablet Take 1 Tab by mouth Daily (before breakfast). Qty: 90 Tab, Refills: 0       lovastatin (MEVACOR) 40 mg tablet TAKE 1 TABLET EVERY EVENING TO LOWER CHOLESTEROL  Qty: 90 Tab, Refills: 3       omeprazole (PRILOSEC) 20 mg capsule TAKE 1 CAPSULE DAILY. Qty: 90 Cap, Refills: 3       ACCU-CHEK MULTICLIX LANCET misc AS DIRECTED  Qty: 100 Each, Refills: PRN       latanoprost (XALATAN) 0.005 % ophthalmic solution Administer 1 Drop to both eyes nightly.       DOCUSATE CALCIUM (STOOL SOFTENER PO) Take  by mouth daily.       ascorbic acid (VITAMIN C) 500 mg tablet Take  by mouth daily.     Associated Diagnoses: Hyperlipidemia       cholecalciferol, vitamin d3, (VITAMIN D) 1,000 unit tablet Take  by mouth daily.     Associated Diagnoses: Menopause       MULTIVITAMINS (MULTIVITAMIN PO) Take  by mouth.     Associated Diagnoses: Menopause                STOP taking these medications         trimethoprim-sulfamethoxazole (BACTRIM DS, SEPTRA DS) 160-800 mg per tablet Comments:   Reason for Stopping:            cephALEXin (KEFLEX) 500 mg capsule Comments:   Reason for Stopping:            cyclobenzaprine (FLEXERIL) 10 mg tablet Comments:   Reason for Stopping:            Patient declined medication reconciliation during this phone conversation. NN encouraged patient to bring pill bottles and/or medication list with her to [facility] visit scheduled for tomorrow, patient states she will do so. Patient states she is taking all medications as ordered and there were no barriers to obtaining medications identified at this time. Referral to Pharm D needed: no     Current Outpatient Medications   Medication Sig    amiodarone (CORDARONE) 200 mg tablet Take 1 Tab by mouth two (2) times a day. Take 1 tablet twice a day until 12/12/2018.   Take 1 tablet once a day starting 12/13/2018    apixaban (ELIQUIS) 2.5 mg tablet Take 1 Tab by mouth two (2) times a day.  metoprolol tartrate (LOPRESSOR) 25 mg tablet Take 0.5 Tabs by mouth two (2) times a day. In addition to your other medications for atrial fibrillation    furosemide (LASIX) 40 mg tablet Take 1 Tab by mouth daily.  metroNIDAZOLE (FLAGYL) 500 mg tablet Take 1 Tab by mouth three (3) times daily for 10 days.  acetaminophen/diphenhydramine (TYLENOL PM PO) Take 2 Tabs by mouth as needed.  cranberry fruit extract (CRANBERRY CONCENTRATE PO) Take  by mouth.  dilTIAZem CD (CARDIZEM CD) 240 mg ER capsule Take 1 Cap by mouth daily. Decreased dose on 11/14/2018    levothyroxine (SYNTHROID) 88 mcg tablet Take 1 Tab by mouth Daily (before breakfast).  lovastatin (MEVACOR) 40 mg tablet TAKE 1 TABLET EVERY EVENING TO LOWER CHOLESTEROL    omeprazole (PRILOSEC) 20 mg capsule TAKE 1 CAPSULE DAILY.  ACCU-CHEK MULTICLIX LANCET misc AS DIRECTED    latanoprost (XALATAN) 0.005 % ophthalmic solution Administer 1 Drop to both eyes nightly.  DOCUSATE CALCIUM (STOOL SOFTENER PO) Take  by mouth daily.  ascorbic acid (VITAMIN C) 500 mg tablet Take  by mouth daily.  cholecalciferol, vitamin d3, (VITAMIN D) 1,000 unit tablet Take  by mouth daily.  MULTIVITAMINS (MULTIVITAMIN PO) Take  by mouth. No current facility-administered medications for this visit. [facility] follow up appointment(s):   Future Appointments   Date Time Provider Jaylen Webb   11/30/2018 10:00 AM Robert Newman NP [facility] SCHED   12/5/2018  8:00 AM CATH  ROOM [facility]   12/19/2018  1:00 PM Susana Bridges MD [place] [id number] [facility] [address]   4/25/2019  8:30 AM Leora Newman  [facility]      Non-[facility] follow up appointment(s): None noted at this time. [facility]:  information provided as a resource     Goals      Attends follow-up appointments as directed. 11-29-18: [facility]  Patient is scheduled for pacemaker placement at [facility] on 12-5-18. Patient has cardio follow-up with Dr. Zoraida Bruno on 12-19-18. SILVER       Returns to baseline activity level. 11-29-18: Patient reports she has not returned to her baseline activity. [address] resources in place to maintain patient in the community (ie. Home Health, DME equipment, refer to, medication assistant plan, etc.)      11-29-18: Patient reports having supportive  and good friend/neighbor next door Rayray Phlegm).  Filomena Concepcion

## 2018-11-30 ENCOUNTER — OFFICE VISIT (OUTPATIENT)
Dept: FAMILY MEDICINE CLINIC | Age: 83
End: 2018-11-30

## 2018-11-30 VITALS
DIASTOLIC BLOOD PRESSURE: 69 MMHG | TEMPERATURE: 97.9 F | HEART RATE: 54 BPM | SYSTOLIC BLOOD PRESSURE: 153 MMHG | OXYGEN SATURATION: 94 % | HEIGHT: 65 IN | RESPIRATION RATE: 17 BRPM | WEIGHT: 166 LBS | BODY MASS INDEX: 27.66 KG/M2

## 2018-11-30 DIAGNOSIS — R00.1 BRADYCARDIA: ICD-10-CM

## 2018-11-30 DIAGNOSIS — N17.9 ACUTE RENAL FAILURE, UNSPECIFIED ACUTE RENAL FAILURE TYPE (HCC): ICD-10-CM

## 2018-11-30 DIAGNOSIS — I50.31 ACUTE DIASTOLIC CHF (CONGESTIVE HEART FAILURE) (HCC): ICD-10-CM

## 2018-11-30 DIAGNOSIS — K57.92 DIVERTICULITIS: ICD-10-CM

## 2018-11-30 DIAGNOSIS — I48.0 PAROXYSMAL ATRIAL FIBRILLATION (HCC): Primary | ICD-10-CM

## 2018-11-30 NOTE — PROGRESS NOTES
Chief Complaint   Patient presents with   [facility] Follow Up     Health Maintenance reviewed     1. Have you been to the ER, urgent care clinic since your last visit? Hospitalized since your last visit? Yes, on file    2. Have you seen or consulted any other health care providers outside of the [address] [place] [address] since your last visit? Include any pap smears or colon screening.    No

## 2018-11-30 NOTE — PROGRESS NOTES
Ms. Checo Mcnair is a 80y.o. year old female, she is seen today for Transition of Care services following a hospital discharge for respiratory failure due to recurrent afib and diverticulitis on 11/28/18. Our office Nurse Navigator performed an outreach to Ms. Kg Calvert on 11/29/18 (within 2 business days of discharge) to complete medication reconciliation and a telephonic assessment of her condition. Per review of the hospital record, while she was an inpatient: cardioversion 11/28, started amiodarone loading dose. No imaging of abdomen, but completing course of flagyl for recent diverticulitis - held bactrim due to DAHLIA. Started lasix daily due to fluid overload, on eliquis. Has pacemaker placement scheduled for 12/5/18. Stool normalized during hospital stay. she was discharged on the following medications:  Current Outpatient Medications   Medication Sig Dispense Refill    amiodarone (CORDARONE) 200 mg tablet Take 1 Tab by mouth two (2) times a day. Take 1 tablet twice a day until 12/12/2018. Take 1 tablet once a day starting 12/13/2018 42 Tab 0    apixaban (ELIQUIS) 2.5 mg tablet Take 1 Tab by mouth two (2) times a day. 60 Tab 0    metoprolol tartrate (LOPRESSOR) 25 mg tablet Take 0.5 Tabs by mouth two (2) times a day. In addition to your other medications for atrial fibrillation 15 Tab 0    furosemide (LASIX) 40 mg tablet Take 1 Tab by mouth daily. 30 Tab 0    metroNIDAZOLE (FLAGYL) 500 mg tablet Take 1 Tab by mouth three (3) times daily for 10 days. 30 Tab 0    acetaminophen/diphenhydramine (TYLENOL PM PO) Take 2 Tabs by mouth as needed.  cranberry fruit extract (CRANBERRY CONCENTRATE PO) Take  by mouth.  dilTIAZem CD (CARDIZEM CD) 240 mg ER capsule Take 1 Cap by mouth daily. Decreased dose on 11/14/2018 90 Cap 4    levothyroxine (SYNTHROID) 88 mcg tablet Take 1 Tab by mouth Daily (before breakfast).  90 Tab 0    lovastatin (MEVACOR) 40 mg tablet TAKE 1 TABLET EVERY EVENING TO LOWER CHOLESTEROL 90 Tab 3    omeprazole (PRILOSEC) 20 mg capsule TAKE 1 CAPSULE DAILY. 90 Cap 3    ACCU-CHEK MULTICLIX LANCET misc AS DIRECTED 100 Each PRN    latanoprost (XALATAN) 0.005 % ophthalmic solution Administer 1 Drop to both eyes nightly.  DOCUSATE CALCIUM (STOOL SOFTENER PO) Take  by mouth daily.  ascorbic acid (VITAMIN C) 500 mg tablet Take  by mouth daily.  cholecalciferol, vitamin d3, (VITAMIN D) 1,000 unit tablet Take  by mouth daily.  MULTIVITAMINS (MULTIVITAMIN PO) Take  by mouth. Home health services [facility] visit scheduled 12/3/18    Since discharge: feeling ok, no upset stomach, no more diarrhea. ROS negative except as per HPI. Visit Vitals  /69 (BP 1 Location: Left arm, BP Patient Position: Sitting)   Pulse (!) 54   Temp 97.9 °F (36.6 °C) (Oral)   Resp 17   Ht 5' 5\" (1.651 m)   Wt 166 lb (75.3 kg)   SpO2 94%   BMI 27.62 kg/m²     Gen: alert, oriented, no acute distress  Ears: external auditory canals clear, TMs without erythema or effusion  Eyes: pupils equal round reactive to light, sclera clear, conjunctiva clear  Oral: moist mucus membranes, no oral lesions, no pharyngeal inflammation or exudate  Neck: supple, no lymphadenopathy, no jugular vein distention  Resp: no increase work of breathing, lungs clear to ausculation bilaterally, no wheezing, rales or rhonchi  CV: S1, S2 normal. No murmurs, rubs, or gallops. Abd: soft, not tender, not distended. Normal bowel sounds. Neuro: cranial nerves intact, normal strength and movement in all extremities, sensation intact and symmetric.   Skin: no lesion or rash  Extremities: no cyanosis or edema    Lab Results   Component Value Date/Time    Sodium 140 11/27/2018 02:54 AM    Potassium 3.9 11/27/2018 02:54 AM    Chloride 104 11/27/2018 02:54 AM    CO2 30 11/27/2018 02:54 AM    Anion gap 6 11/27/2018 02:54 AM    Glucose 116 (H) 11/27/2018 02:54 AM    BUN 20 11/27/2018 02:54 AM    Creatinine 1.27 (H) 11/27/2018 02:54 AM    BUN/Creatinine ratio 16 11/27/2018 02:54 AM    GFR est AA 48 (L) 11/27/2018 02:54 AM    GFR est non-AA 40 (L) 11/27/2018 02:54 AM    Calcium 8.8 11/27/2018 02:54 AM     Lab Results   Component Value Date/Time    WBC 6.5 11/26/2018 03:53 AM    HGB 11.9 11/26/2018 03:53 AM    HCT 37.0 11/26/2018 03:53 AM    PLATELET 922 83/09/7282 03:53 AM    MCV 95.1 11/26/2018 03:53 AM       Assessment/Plan:    CHF exacerbation secondary to afib and infection has resolved with addition of lasix. Still some episodes of symptomatic bradycardia - looking forward to pacemaker insertion next week. GI complaints/diverticulitis has resolved. DAHLIA resolved by discharge - now at baseline creatinine around 1.27        ICD-10-CM ICD-9-CM    1. Paroxysmal atrial fibrillation ([facility]) I48.0 427.31    2. Bradycardia R00.1 427.89    3. Diverticulitis K57.92 562.11    4. Acute diastolic CHF (congestive heart failure) ([facility]) I50.31 428.31      428.0    5. Acute renal failure, unspecified acute renal failure type ([facility] N17.9 584.9        No orders of the defined types were placed in this encounter. There are no discontinued medications. Current Outpatient Medications   Medication Sig Dispense Refill    amiodarone (CORDARONE) 200 mg tablet Take 1 Tab by mouth two (2) times a day. Take 1 tablet twice a day until 12/12/2018. Take 1 tablet once a day starting 12/13/2018 42 Tab 0    apixaban (ELIQUIS) 2.5 mg tablet Take 1 Tab by mouth two (2) times a day. 60 Tab 0    metoprolol tartrate (LOPRESSOR) 25 mg tablet Take 0.5 Tabs by mouth two (2) times a day. In addition to your other medications for atrial fibrillation 15 Tab 0    furosemide (LASIX) 40 mg tablet Take 1 Tab by mouth daily. 30 Tab 0    metroNIDAZOLE (FLAGYL) 500 mg tablet Take 1 Tab by mouth three (3) times daily for 10 days. 30 Tab 0    acetaminophen/diphenhydramine (TYLENOL PM PO) Take 2 Tabs by mouth as needed.       cranberry fruit extract (CRANBERRY CONCENTRATE PO) Take  by mouth.  dilTIAZem CD (CARDIZEM CD) 240 mg ER capsule Take 1 Cap by mouth daily. Decreased dose on 11/14/2018 90 Cap 4    levothyroxine (SYNTHROID) 88 mcg tablet Take 1 Tab by mouth Daily (before breakfast). 90 Tab 0    lovastatin (MEVACOR) 40 mg tablet TAKE 1 TABLET EVERY EVENING TO LOWER CHOLESTEROL 90 Tab 3    omeprazole (PRILOSEC) 20 mg capsule TAKE 1 CAPSULE DAILY. 90 Cap 3    ACCU-CHEK MULTICLIX LANCET misc AS DIRECTED 100 Each PRN    latanoprost (XALATAN) 0.005 % ophthalmic solution Administer 1 Drop to both eyes nightly.  DOCUSATE CALCIUM (STOOL SOFTENER PO) Take  by mouth daily.  ascorbic acid (VITAMIN C) 500 mg tablet Take  by mouth daily.  cholecalciferol, vitamin d3, (VITAMIN D) 1,000 unit tablet Take  by mouth daily.  MULTIVITAMINS (MULTIVITAMIN PO) Take  by mouth. Verbal and written instructions (see AVS) provided. Patient expresses understanding of diagnosis and treatment plan.

## 2018-12-03 ENCOUNTER — HOME CARE VISIT (OUTPATIENT)
Dept: SCHEDULING | Facility: HOME HEALTH | Age: 83
End: 2018-12-03

## 2018-12-03 PROCEDURE — G0299 HHS/HOSPICE OF RN EA 15 MIN: HCPCS

## 2018-12-05 ENCOUNTER — HOSPITAL ENCOUNTER (OUTPATIENT)
Dept: NON INVASIVE DIAGNOSTICS | Age: 83
Discharge: HOME | End: 2018-12-05
Attending: INTERNAL MEDICINE | Admitting: INTERNAL MEDICINE
Payer: MEDICARE

## 2018-12-05 ENCOUNTER — APPOINTMENT (OUTPATIENT)
Dept: GENERAL RADIOLOGY | Age: 83
End: 2018-12-05
Attending: INTERNAL MEDICINE
Payer: MEDICARE

## 2018-12-05 VITALS
SYSTOLIC BLOOD PRESSURE: 119 MMHG | HEART RATE: 75 BPM | TEMPERATURE: 97.6 F | BODY MASS INDEX: 27.49 KG/M2 | WEIGHT: 165 LBS | DIASTOLIC BLOOD PRESSURE: 49 MMHG | OXYGEN SATURATION: 95 % | HEIGHT: 65 IN | RESPIRATION RATE: 24 BRPM

## 2018-12-05 PROBLEM — I48.91 ATRIAL FIBRILLATION (HCC): Status: ACTIVE | Noted: 2018-12-05

## 2018-12-05 PROCEDURE — C1893 INTRO/SHEATH, FIXED,NON-PEEL: HCPCS

## 2018-12-05 PROCEDURE — 77030016894 HC CBL EP DX CATH3 STJU -B

## 2018-12-05 PROCEDURE — C1733 CATH, EP, OTHR THAN COOL-TIP: HCPCS

## 2018-12-05 PROCEDURE — C1894 INTRO/SHEATH, NON-LASER: HCPCS

## 2018-12-05 PROCEDURE — 74011250636 HC RX REV CODE- 250/636: Performed by: INTERNAL MEDICINE

## 2018-12-05 PROCEDURE — 71045 X-RAY EXAM CHEST 1 VIEW: CPT

## 2018-12-05 PROCEDURE — 77030018729 HC ELECTRD DEFIB PAD CARD -B

## 2018-12-05 PROCEDURE — 77030029065 HC DRSG HEMO QCLOT ZMED -B

## 2018-12-05 PROCEDURE — 33208 INSRT HEART PM ATRIAL & VENT: CPT

## 2018-12-05 PROCEDURE — 74011250636 HC RX REV CODE- 250/636

## 2018-12-05 PROCEDURE — 74011000250 HC RX REV CODE- 250

## 2018-12-05 PROCEDURE — 77030018836 HC SOL IRR NACL ICUM -A

## 2018-12-05 PROCEDURE — 77030030806 HC PTCH ENSIT NAVX STJU -G

## 2018-12-05 PROCEDURE — 77030031139 HC SUT VCRL2 J&J -A

## 2018-12-05 PROCEDURE — C1898 LEAD, PMKR, OTHER THAN TRANS: HCPCS

## 2018-12-05 PROCEDURE — C1730 CATH, EP, 19 OR FEW ELECT: HCPCS

## 2018-12-05 PROCEDURE — C1785 PMKR, DUAL, RATE-RESP: HCPCS

## 2018-12-05 PROCEDURE — A4565 SLINGS: HCPCS

## 2018-12-05 PROCEDURE — 77030037713 HC CLOSR DEV INCIS ZIP STRY -B

## 2018-12-05 PROCEDURE — 77030002996 HC SUT SLK J&J -A

## 2018-12-05 PROCEDURE — 77030018673

## 2018-12-05 RX ORDER — VANCOMYCIN HYDROCHLORIDE 1 G/20ML
INJECTION, POWDER, LYOPHILIZED, FOR SOLUTION INTRAVENOUS
Status: DISCONTINUED
Start: 2018-12-05 | End: 2018-12-05 | Stop reason: HOSPADM

## 2018-12-05 RX ORDER — MIDAZOLAM HYDROCHLORIDE 1 MG/ML
INJECTION, SOLUTION INTRAMUSCULAR; INTRAVENOUS
Status: COMPLETED
Start: 2018-12-05 | End: 2018-12-05

## 2018-12-05 RX ORDER — NALOXONE HYDROCHLORIDE 0.4 MG/ML
0.4 INJECTION, SOLUTION INTRAMUSCULAR; INTRAVENOUS; SUBCUTANEOUS AS NEEDED
Status: DISCONTINUED | OUTPATIENT
Start: 2018-12-05 | End: 2018-12-05 | Stop reason: HOSPADM

## 2018-12-05 RX ORDER — DOBUTAMINE HYDROCHLORIDE 200 MG/100ML
INJECTION INTRAVENOUS
Status: COMPLETED
Start: 2018-12-05 | End: 2018-12-05

## 2018-12-05 RX ORDER — BACITRACIN 50000 [IU]/1
50000 INJECTION, POWDER, FOR SOLUTION INTRAMUSCULAR ONCE
Status: COMPLETED | OUTPATIENT
Start: 2018-12-05 | End: 2018-12-05

## 2018-12-05 RX ORDER — FENTANYL CITRATE 50 UG/ML
12.5-5 INJECTION, SOLUTION INTRAMUSCULAR; INTRAVENOUS
Status: DISCONTINUED | OUTPATIENT
Start: 2018-12-05 | End: 2018-12-05 | Stop reason: HOSPADM

## 2018-12-05 RX ORDER — SODIUM CHLORIDE 900 MG/100ML
INJECTION INTRAVENOUS
Status: DISCONTINUED
Start: 2018-12-05 | End: 2018-12-05 | Stop reason: HOSPADM

## 2018-12-05 RX ORDER — LIDOCAINE HYDROCHLORIDE 10 MG/ML
INJECTION, SOLUTION EPIDURAL; INFILTRATION; INTRACAUDAL; PERINEURAL
Status: COMPLETED
Start: 2018-12-05 | End: 2018-12-05

## 2018-12-05 RX ORDER — SODIUM CHLORIDE 0.9 % (FLUSH) 0.9 %
5-10 SYRINGE (ML) INJECTION EVERY 8 HOURS
Status: DISCONTINUED | OUTPATIENT
Start: 2018-12-05 | End: 2018-12-05 | Stop reason: HOSPADM

## 2018-12-05 RX ORDER — SODIUM CHLORIDE 9 MG/ML
INJECTION, SOLUTION INTRAVENOUS
Status: DISCONTINUED
Start: 2018-12-05 | End: 2018-12-05 | Stop reason: HOSPADM

## 2018-12-05 RX ORDER — LIDOCAINE HYDROCHLORIDE 10 MG/ML
INJECTION INFILTRATION; PERINEURAL
Status: COMPLETED
Start: 2018-12-05 | End: 2018-12-05

## 2018-12-05 RX ORDER — FENTANYL CITRATE 50 UG/ML
INJECTION, SOLUTION INTRAMUSCULAR; INTRAVENOUS
Status: COMPLETED
Start: 2018-12-05 | End: 2018-12-05

## 2018-12-05 RX ORDER — MIDAZOLAM HYDROCHLORIDE 1 MG/ML
1-5 INJECTION, SOLUTION INTRAMUSCULAR; INTRAVENOUS
Status: DISCONTINUED | OUTPATIENT
Start: 2018-12-05 | End: 2018-12-05 | Stop reason: HOSPADM

## 2018-12-05 RX ORDER — SODIUM CHLORIDE 0.9 % (FLUSH) 0.9 %
5-10 SYRINGE (ML) INJECTION AS NEEDED
Status: DISCONTINUED | OUTPATIENT
Start: 2018-12-05 | End: 2018-12-05 | Stop reason: HOSPADM

## 2018-12-05 RX ORDER — BACITRACIN 50000 [IU]/1
INJECTION, POWDER, FOR SOLUTION INTRAMUSCULAR
Status: COMPLETED
Start: 2018-12-05 | End: 2018-12-05

## 2018-12-05 RX ORDER — LIDOCAINE HYDROCHLORIDE 10 MG/ML
1-40 INJECTION INFILTRATION; PERINEURAL
Status: DISCONTINUED | OUTPATIENT
Start: 2018-12-05 | End: 2018-12-05 | Stop reason: HOSPADM

## 2018-12-05 RX ORDER — LIDOCAINE HYDROCHLORIDE 10 MG/ML
INJECTION, SOLUTION EPIDURAL; INFILTRATION; INTRACAUDAL; PERINEURAL
Status: DISCONTINUED
Start: 2018-12-05 | End: 2018-12-05 | Stop reason: HOSPADM

## 2018-12-05 RX ORDER — HEPARIN SODIUM 200 [USP'U]/100ML
2000 INJECTION, SOLUTION INTRAVENOUS ONCE
Status: COMPLETED | OUTPATIENT
Start: 2018-12-05 | End: 2018-12-05

## 2018-12-05 RX ADMIN — MIDAZOLAM HYDROCHLORIDE 2 MG: 1 INJECTION, SOLUTION INTRAMUSCULAR; INTRAVENOUS at 08:00

## 2018-12-05 RX ADMIN — LIDOCAINE HYDROCHLORIDE 10 ML: 10 INJECTION, SOLUTION EPIDURAL; INFILTRATION; INTRACAUDAL; PERINEURAL at 08:50

## 2018-12-05 RX ADMIN — BACITRACIN 50000 UNITS: 50000 INJECTION, POWDER, FOR SOLUTION INTRAMUSCULAR at 08:35

## 2018-12-05 RX ADMIN — LIDOCAINE HYDROCHLORIDE 30 ML: 10 INJECTION, SOLUTION INFILTRATION; PERINEURAL at 08:14

## 2018-12-05 RX ADMIN — MIDAZOLAM HYDROCHLORIDE 1 MG: 1 INJECTION, SOLUTION INTRAMUSCULAR; INTRAVENOUS at 08:15

## 2018-12-05 RX ADMIN — LIDOCAINE HYDROCHLORIDE 30 ML: 10 INJECTION INFILTRATION; PERINEURAL at 08:14

## 2018-12-05 RX ADMIN — DOBUTAMINE IN DEXTROSE 5 MCG/KG/MIN: 200 INJECTION, SOLUTION INTRAVENOUS at 08:58

## 2018-12-05 RX ADMIN — HEPARIN SODIUM 2000 UNITS: 200 INJECTION, SOLUTION INTRAVENOUS at 08:00

## 2018-12-05 RX ADMIN — MIDAZOLAM HYDROCHLORIDE 2 MG: 1 INJECTION, SOLUTION INTRAMUSCULAR; INTRAVENOUS at 08:05

## 2018-12-05 RX ADMIN — VANCOMYCIN HYDROCHLORIDE 1000 MG: 1 INJECTION, POWDER, LYOPHILIZED, FOR SOLUTION INTRAVENOUS at 07:55

## 2018-12-05 RX ADMIN — FENTANYL CITRATE 50 MCG: 50 INJECTION, SOLUTION INTRAMUSCULAR; INTRAVENOUS at 08:00

## 2018-12-05 RX ADMIN — FENTANYL CITRATE 50 MCG: 50 INJECTION, SOLUTION INTRAMUSCULAR; INTRAVENOUS at 08:15

## 2018-12-05 RX ADMIN — MIDAZOLAM HYDROCHLORIDE 2 MG: 1 INJECTION, SOLUTION INTRAMUSCULAR; INTRAVENOUS at 08:45

## 2018-12-05 NOTE — DISCHARGE INSTRUCTIONS
[address] [place] [address]  [phone]        NEW PACEMAKER IMPLANT DISCHARGE INSTRUCTIONS    Patient ID:  Scott Paredes  [id number]  89 y.o.  3/5/1932    Admit Date: 12/5/2018    Discharge Date: 12/5/2018     Admitting Physician: Megan Sanz MD     Discharge Physician: Megan Sanz MD    Admission Diagnoses:   pacemaker    Discharge Diagnoses: Active Problems:    * No active hospital problems. *      Discharge Condition: Good    Cardiology Procedures this Admission:  Pacemaker insertion. Disposition: home    Reference discharge instructions provided by nursing for diet and activity. Follow-up with device clinic in two weeks. Call [phone] to make an appointment. Signed:  MAO Hernandez  12/5/2018  8:49 AM      DISCHARGE INSTRUCTIONS FOR PATIENTS WITH PACEMAKERS    1. Remember to call for an appointment for 2 weeks [phone] to check healing and implant programming. 2. Medic Alert Bracelets are available from your pharmacist to wear at all times if you choose to wear one. 3. Carry your ID card for pacemaker with you at all times. This card will be given to you in the hospital or mailed to you. 4. The pacemaker will bulge slightly under your skin. The bulge will decrease in size over the next few weeks. Please notify the doctor's office if you notice any of the following around your site:   A.  A bruise that does not go away. B.  Soreness or yellow, green, or brown drainage from the site. C. Any swelling from the site. D. If you have a fever of 100 degrees or higher that lasts for a few days. INCISION CARE       1.  Leave dressing over your site until it starts to fall off, usually in a few weeks. 2.  You may shower after 3 days as long as your incision isnt submerged or directly sprayed upon until well healed. 3.  For comfort, wear loose fitting clothing.   4.  Report any signs of infection, fever, pain, swelling, redness, oozing, or heat at site especially if these symptoms increase after the first 3 to 4 days. ACTIVITY PRECAUTIONS     1. Avoid rough contact with the implant site. 2. No driving for 14 days. 3. Avoid lifting your arm over your head, carrying anything on the affected side, or lifting over 10 pounds for 30 days. For the first 2 days only bend your arm at the elbow. 4. Any extreme activity such as golf, weight lifting or exercise biking should be restricted for 60 days. 5. Do not carry objects by holding them against your implant site. 6.  No shooting rifles or any type of gun with the affected shoulder permanently. SPECIAL PRECAUTIONS     1. You should avoid all strong magnetic fields, such as arc welding, large transformers, large motors. 2.  You may or may not (depending on your device) have an MRI which uses a strong magnet to take pictures. 3.  Treatments or surgery that requires diathermy or electrocautery should be discussed with your doctor before scheduled. 4. Avoid radio frequency transmitters, including radar. 5. Advise dentist or other medical personnel you see that you have a pacemaker. 6.  Cell phones and microwave oven use is okay. 7.  If you plan to move or take a trip to a new area, the doctor's office will give you a name of a doctor to contact for any problems. ANTIBIOTIC THERAPY    During the first 8 weeks after your pacemaker insertion, you may need antibiotics before any dental work or certain tests or operations. Let the dentist or doctor who is caring for you know that you have had an implanted device. S/P AV NODE ABLATION DISCHARGE INSTRUCTIONS    It is normal to feel tired the first couple days. Take it easy and follow the physicians instructions. CHECK THE CATHETER INSERTION SITE DAILY:  You may shower 24 hours after the procedure, remove the bandage during showering. Wash with soap and water and pat dry.   Gentle cleaning of the site with soap and water is sufficient, cover with a dry clean dressing or bandage. Do not apply creams or powders to the area. Do not sit in a bathtub or pool of water for 7 days or until wound has completely healed. Temporary bruising and discomfort is normal and may last a few weeks. You may have a  formation of a small lump at the site which may last up to 6 weeks. CALL THE PHYSICIAN:  If the site becomes red, swollen or feels warm to the touch  If there is bleeding or drainage or if there is unusual pain at the groin or down the leg. If there is any bleeding, lie down, apply pressure or have someone apply pressure with a clean cloth until the bleeding stops. If the bleeding continues, call 911 to be transported to the hospital.  DO NOT DRIVE YOURSELF, Lily [phone]. Activity:      For the first 24-48 hours or as instructed by the physician:  No lifting, pushing or pulling over 5 pounds and no straining the insertion site. Do not life grocery bags or the garbage can, do not run the vacuum  or  for 7 days. Start with short walks as in the hospital and gradually increase as tolerated each day. It is recommended to walk 30 minutes 5-7 days per week. Follow your physicians instructions on activity. Avoid walking outside in extremes of heat or cold. Walk inside when it is cold and windy or hot and humid. Things to keep in mind:  No driving for at least 5 days, or as designated by your physician. Limit the number of times you go up and down the stairs  Take rests and pace yourself with activity. Be careful and do not strain with bowel movements. Medications:     Take all medications as prescribed  Call your physician if you have any questions  Keep an updated list of your medications with you at all times and give a list to your physician and pharmacist    Signs and Symptoms:  Be cautious of symptoms of angina or recurrent symptoms such as chest discomfort, unusual shortness of breath or fatigue, palpitations. After Care: Follow up with your physician as instructed. Follow a heart healthy diet with proper portion control, daily stress management, daily exercise, blood pressure and cholesterol control , and smoking cessation.

## 2018-12-05 NOTE — PROCEDURES
[address] [place] [address], [address] [facility]  [phone]    Indications and Pre-Procedure Diagnosis:  Valentin Lockett is a 80 y.o. female with AF with tachy-camila syndrome is referred for electr-physiologic evaluation and intervention. Post Procedure Diagnosis    AF  Tachy-camila syndrome    AV Jatin Ablation Procedure  After informed consent was obtained, the patient was brought back to the EP lab in fasting condition. The presenting rhythm was atrial fibrillation. The patient received Versed and Fentanyl for conscious sedation per nursing personnel. Venous sheaths were placed in the right femoral vein using sterile Seldinger technique. Mapping was performed using standard catheter-based techniques and 3-D electro-anatomic mapping. A quadrapolar catheter was placed in the His position for mapping. An Blazer 8F/10mm Large Curve ablation catheter was advanced to the AV junction and 1 RF lesions totaling 2 minutes were applied resulting in complete heart block. Dobutamine at 5 mcg/min was started and no increased ventricular rates were noted. A slow ventricular escape rhythm of 40 bpm was present. Rapid atrial pacing to 200 ms, on and off dobutamine, demonstrated antegrade AV block. Rapid ventricular pacing to 600 ms, on and off dobutamine, demonstrated retrograde VA block. At the end of the procedure, catheters and sheaths were removed and manual compression held for hemostasis. Fluoroscopy and total procedure times were 1 and 15 minutes respectively. Estimated blood loss: < 10 ml. Sharp counts: correct. Specimen (s) collected: none. The following procedure related complication occurred: none. The following problems were encountered: none. Conduction Intervals (ms)  H-V QRS Q-T R-R   [id number] [phone]     AV Jatin Conduction    VA Block when pacing at 600 ms    AV Wenckebach when pacing at 200 ms      Findings and Summary: This study demonstrates:  1.  Successful AV node ablation    Recommendations:    1. [address]  2. VVIR 75 bpm      Thank you for allowing me to participate in this patients care.     Uri Angel MD, [facility]

## 2018-12-05 NOTE — INTERVAL H&P NOTE
H&P Update:  Zane Matta was seen and examined. History and physical has been reviewed. The patient has been examined.  There have been no significant clinical changes since the completion of the originally dated History and Physical.    Signed By: Yuan Eaton MD     December 5, 2018 9:04 AM

## 2018-12-05 NOTE — PROCEDURES
[address]  [phone]    Indications and Pre-Procedure Diagnosis:  Valentin Lockett is a 80 y.o. female with AF with tachy-camila syndrome is referred for dual chamber pacemaker. Post Procedure Diagnosis:    Tachycardia  Atrial fibrillation    Pacemaker Implant Procedure and Findings:  Informed consent was obtained and the patient was premedicated with cefazolin. The procedure was performed under local anesthesia. Continuous pulse oximetry and cuff pressure were monitored. During the procedure, the patient received Versed and Fentanyl for sedation per nursing personnel. The left deltopectoral area was prepped and draped in the usual sterile fashion and was liberally infiltrated with 1% lidocaine. An incision was made over the left subpectoral area and a generator pocket was manually dissected. Access was achieved in the left axillary vein under fluoroscopic guidance and using the seldinger technique. Through the left axillary vein, pacing leads were positioned in appropriate regions in the right heart chambers where satisfactory pacing and sensing parameters were measured. Stability of the leads was assessed with deep breathing and there was no diaphragmatic pacing at 10V output. The leads were anchored using the sleeves and a pulse generator pocket fashioned using blunt dissection. The leads were then connected to the pulse generator. The pulse generator pocket was then liberally infiltrated with bacitracin solution, and the device implanted with a single silk fixation suture in the header to prevent migration. The wound was closed in layers using intermittent 2-0 Vicryl and Zipline suture sleeve. A bio-occlusive dressing were applied to the skin. Fluoroscopy and total procedure times were 3 and 30 minutes respectively. Estimated blood loss <10 ml. Sharp count: correct. Specimen(s) collected: none. The following procedure related complication occurred: none.  The following problems were encountered: none. Findings: successful pacemaker placement. Device Data Measurements:  Lead Sensing (mV) Threshold (V)Pulse Width (ms) Impedance (Ohms)  RA 2.1  AFL  0.5   600  RV 11.6  1  0.5   1328      Final Programmed Parameters  Bradycardia pacing rate  75 bpm  Pacing Mode    VVIR  Pacing Output    3.5 V@ 0.5 ms    Supplies Summary available in the chart  [employer]    Thank you for allowing me to participate in this patients care.     Yang Mendoza MD, Khadra Lynne

## 2018-12-05 NOTE — PROGRESS NOTES
TRANSFER - IN REPORT:    Verbal report received from Ashlie Chapin on Arnold Suarez  being received from EP for routine progression of care. Report consisted of patients Situation, Background, Assessment and Recommendations(SBAR). Information from the following report(s) Procedure Summary was reviewed with the receiving clinician. Opportunity for questions and clarification was provided. Assessment completed upon patients arrival to [address] [place] [address] care [facility]. Cardiac Cath Lab Recovery Arrival Note:    Arnold Suarez arrived to [facility] recovery area. Patient procedure= pacer insertion and avnode ablation. Patient on cardiac monitor, non-invasive blood pressure, SPO2 monitor. On O2 @ 2 lpm via nc. IV  of vancomycin on pump at 125 ml/hr. Patient status doing well without problems. Patient is A&Ox 3. Patient reports no complaints. PROCEDURE SITE CHECK:    Procedure site:without any bleeding and no complaints pain/discomfort reported at procedure site. No change in patient status. Continue to monitor patient and status.

## 2018-12-05 NOTE — PROGRESS NOTES
Pt denies any complaints. VSS. Left groin site dry and intact. No bleeding or hematoma noted. Pt ambulated in mae with minimal assist.  Discharge instructions given to patient and family. Patient discharged via wheelchair to private vehicle.

## 2018-12-07 ENCOUNTER — OFFICE VISIT (OUTPATIENT)
Dept: FAMILY MEDICINE CLINIC | Age: 83
End: 2018-12-07

## 2018-12-07 VITALS
BODY MASS INDEX: 27.62 KG/M2 | HEART RATE: 75 BPM | DIASTOLIC BLOOD PRESSURE: 77 MMHG | SYSTOLIC BLOOD PRESSURE: 150 MMHG | WEIGHT: 166 LBS | OXYGEN SATURATION: 94 % | RESPIRATION RATE: 18 BRPM | TEMPERATURE: 98 F

## 2018-12-07 DIAGNOSIS — Z95.0 PACEMAKER: Primary | ICD-10-CM

## 2018-12-07 NOTE — PROGRESS NOTES
Subjective:     Adam Morse is a 80 y.o. female who presents for follow up after return to hospital for outpatient pacemaker placement with Dr Anita Noel two days ago. Feeling well, still sensitive over chest insertion site - managing pain with tylenol and tylenol PM    Past Medical History:   Diagnosis Date    Arthritis     hands/low back    Diabetes ([address]     borderline    GERD (gastroesophageal reflux disease)     Glaucoma     Hypercholesterolemia     Hypertension     Ill-defined condition     borderline anemia    Ill-defined condition     bladder infections    Impaired glucose tolerance     Menopause     Thyroid disease     hypothyroidism     Social History     Tobacco Use    Smoking status: Former Smoker     Packs/day: 1.00     Years: 15.00     Pack years: 15.00     Types: Cigarettes     Last attempt to quit: 1995     Years since quittin.9    Smokeless tobacco: Never Used   Substance Use Topics    Alcohol use: Yes     Alcohol/week: 0.6 oz     Types: 1 Glasses of wine per week     Comment: Rare--maybe once a month     family history includes Alzheimer in her brother; Breast Cancer (age of onset: 48) in her sister; Cancer in her sister and sister; Coronary Artery Disease in an other family member; Heart Attack in her brother and mother; Other in her father; Suicide in her brother. Outpatient Medications Marked as Taking for the 18 encounter (Office Visit) with Darius Rogers MD   Medication Sig Dispense Refill    apixaban (ELIQUIS) 2.5 mg tablet Take 1 Tab by mouth two (2) times a day. 60 Tab 0    furosemide (LASIX) 40 mg tablet Take 1 Tab by mouth daily. 30 Tab 0    acetaminophen/diphenhydramine (TYLENOL PM PO) Take 2 Tabs by mouth as needed.  cranberry fruit extract (CRANBERRY CONCENTRATE PO) Take  by mouth.  dilTIAZem CD (CARDIZEM CD) 240 mg ER capsule Take 1 Cap by mouth daily.  Decreased dose on 2018 90 Cap 4    levothyroxine (SYNTHROID) 88 mcg tablet Take 1 Tab by mouth Daily (before breakfast). 90 Tab 0    lovastatin (MEVACOR) 40 mg tablet TAKE 1 TABLET EVERY EVENING TO LOWER CHOLESTEROL 90 Tab 3    omeprazole (PRILOSEC) 20 mg capsule TAKE 1 CAPSULE DAILY. 90 Cap 3    ACCU-CHEK MULTICLIX LANCET misc AS DIRECTED 100 Each PRN    latanoprost (XALATAN) 0.005 % ophthalmic solution Administer 1 Drop to both eyes nightly.  DOCUSATE CALCIUM (STOOL SOFTENER PO) Take  by mouth daily.  ascorbic acid (VITAMIN C) 500 mg tablet Take  by mouth daily.  cholecalciferol, vitamin d3, (VITAMIN D) 1,000 unit tablet Take  by mouth daily.  MULTIVITAMINS (MULTIVITAMIN PO) Take  by mouth. Allergies   Allergen Reactions    Aspirin Other (comments)     Swelling shut of eyelids    Macrobid [Nitrofurantoin Monohyd/M-Cryst] Unknown (comments)    Pcn [Penicillins] Hives       Review of Systems:  GEN: no weight loss, weight gain, fatigue or night sweats  CV: no PND, orthopnea, or palpitations  Resp: no wheeze, no cough  Abd: no nausea, vomiting or diarrhea  Endocrine: no hair loss, excessive thirst or polyuria    Objective:     Visit Vitals  /77 (BP 1 Location: Right arm, BP Patient Position: Sitting)   Pulse 75   Temp 98 °F (36.7 °C) (Oral)   Resp 18   Wt 166 lb (75.3 kg)   SpO2 94%   BMI 27.62 kg/m²     General:   alert, cooperative and no distress       Chest wall Left chest with dressing in place, CDI               Heart: S1 and S2 normal,no murmurs noted    Lungs: Clear to auscultation bilaterally, no increased work of breathing   Abdomen: Soft, nontender. Normal bowel sounds   Extremities: Left arm in sling           Assessment/Plan:       1. Pacemaker      Discussed case with Malu Cai NP cardiology who states patient should have restarted eliquis the day following insertion. Reviewed instructions with patient. She has follow up with cardiology planned 12/19. Verbal and written instructions (see AVS) provided.   Patient expresses understanding of diagnosis and treatment plan.

## 2018-12-10 ENCOUNTER — TELEPHONE (OUTPATIENT)
Dept: CARDIOLOGY CLINIC | Age: 83
End: 2018-12-10

## 2018-12-10 NOTE — TELEPHONE ENCOUNTER
Patient would like a call back in regards to amiodarone 200mg medication. She states that she thinks she is suppose to restart the medication on 12/13 but is unsure. Please call to advise.     Thanks,    [employer]

## 2018-12-18 ENCOUNTER — DOCUMENTATION ONLY (OUTPATIENT)
Dept: INTERNAL MEDICINE CLINIC | Age: 83
End: 2018-12-18

## 2018-12-19 ENCOUNTER — OFFICE VISIT (OUTPATIENT)
Dept: CARDIOLOGY CLINIC | Age: 83
End: 2018-12-19

## 2018-12-19 VITALS
HEART RATE: 76 BPM | DIASTOLIC BLOOD PRESSURE: 72 MMHG | HEIGHT: 65 IN | WEIGHT: 166.2 LBS | RESPIRATION RATE: 18 BRPM | SYSTOLIC BLOOD PRESSURE: 140 MMHG | BODY MASS INDEX: 27.69 KG/M2 | OXYGEN SATURATION: 97 %

## 2018-12-19 DIAGNOSIS — I73.9 PAD (PERIPHERAL ARTERY DISEASE) (HCC): ICD-10-CM

## 2018-12-19 DIAGNOSIS — I34.0 NON-RHEUMATIC MITRAL REGURGITATION: ICD-10-CM

## 2018-12-19 DIAGNOSIS — Z95.0 CARDIAC PACEMAKER IN SITU: ICD-10-CM

## 2018-12-19 DIAGNOSIS — I48.0 PAROXYSMAL ATRIAL FIBRILLATION (HCC): ICD-10-CM

## 2018-12-19 DIAGNOSIS — I48.21 PERMANENT ATRIAL FIBRILLATION (HCC): Primary | ICD-10-CM

## 2018-12-19 DIAGNOSIS — E78.2 MIXED HYPERLIPIDEMIA: ICD-10-CM

## 2018-12-19 DIAGNOSIS — I10 ESSENTIAL HYPERTENSION: ICD-10-CM

## 2018-12-19 RX ORDER — FUROSEMIDE 40 MG/1
40 TABLET ORAL DAILY
Qty: 90 TAB | Refills: 2 | Status: SHIPPED | OUTPATIENT
Start: 2018-12-19 | End: 2018-12-19 | Stop reason: SDUPTHER

## 2018-12-19 NOTE — PROGRESS NOTES
1. Have you been to the ER, urgent care clinic since your last visit? Hospitalized since your last visit? Had ablation/pacemaker 12/5/18 with Sybil.    2. Have you seen or consulted any other health care providers outside of the [address] [place] [address] since your last visit? Include any pap smears or colon screening.   No.       Chief Complaint   Patient presents with    Irregular Heart Beat     1 month f/u-pt denies any cardiac symptoms-soreness at the site

## 2018-12-19 NOTE — PROGRESS NOTES
[address], [address] [place] [address], [address] [facility]  [phone]     Subjective:      Valentin Lockett is a 80 y.o. female is here for routine f/u. The patient denies chest pain/ shortness of breath, orthopnea, PND, LE edema, palpitations, syncope, or presyncope.        Patient Active Problem List    Diagnosis Date Noted    Atrial fibrillation ([address] 12/05/2018    Acute diastolic CHF (congestive heart failure) ([address] 11/26/2018    Respiratory failure with hypoxia ([address] 11/23/2018    Sigmoid diverticulosis 11/20/2018    Mitral regurgitation 10/31/2018    Paroxysmal atrial fibrillation ([address] 10/25/2018    Hypercholesterolemia     Osteoarthritis of hip 11/20/2017    PAD (peripheral artery disease) ([address] 04/04/2017    Anemia 02/06/2017    Chronic UTI 02/06/2017    Venous (peripheral) insufficiency 01/08/2015    Hypothyroid 06/11/2012    Hypertension 06/11/2012    Arthritis 06/11/2012    Hyperlipidemia 06/11/2012    IGT (impaired glucose tolerance) 06/30/2011    Restless leg syndrome 06/30/2011      Marian Quinn MD  Past Medical History:   Diagnosis Date    Arthritis     hands/low back    Diabetes ([address]     borderline    GERD (gastroesophageal reflux disease)     Glaucoma     Hypercholesterolemia     Hypertension     Ill-defined condition     borderline anemia    Ill-defined condition     bladder infections    Impaired glucose tolerance     Menopause     Thyroid disease     hypothyroidism      Past Surgical History:   Procedure Laterality Date    CARDIOVERSION, ELECTIVE  10/31/2018         CARDIOVERSION, ELECTIVE  11/28/2018         HX BREAST BIOPSY Bilateral 1990    benign    HX CATARACT REMOVAL      bilateral    HX DILATION AND CURETTAGE      HX ORTHOPAEDIC      carpal tunnel release -bilateral    HX ROTATOR CUFF REPAIR Right      Allergies   Allergen Reactions    Aspirin Other (comments)     Swelling shut of eyelids    Macrobid [Nitrofurantoin Monohyd/M-Cryst] Unknown (comments)    Pcn [Penicillins] Hives      Family History   Problem Relation Age of Onset    Coronary Artery Disease Other         mother  of MI age 80, brother  age 72 of MI, sister has hear problems    Breast Cancer Sister 48    Heart Attack Mother     Other Father         pneumonia    Alzheimer Brother     Cancer Sister     Cancer Sister     Heart Attack Brother     Suicide Brother       Social History     Socioeconomic History    Marital status:      Spouse name: Not on file    Number of children: Not on file    Years of education: Not on file    Highest education level: Not on file   Social Needs    Financial resource strain: Not on file    Food insecurity - worry: Not on file    Food insecurity - inability: Not on file   Metaconomy needs - medical: Not on file   Metaconomy needs - non-medical: Not on file   Occupational History    Not on file   Tobacco Use    Smoking status: Former Smoker     Packs/day: 1.00     Years: 15.00     Pack years: 15.00     Types: Cigarettes     Last attempt to quit: 1995     Years since quittin.9    Smokeless tobacco: Never Used   Substance and Sexual Activity    Alcohol use: Yes     Alcohol/week: 0.6 oz     Types: 1 Glasses of wine per week     Comment: Rare--maybe once a month    Drug use: No    Sexual activity: Not on file   Other Topics Concern    Not on file   Social History Narrative    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 Lives with . Current Outpatient Medications   Medication Sig    furosemide (LASIX) 40 mg tablet Take 1 Tab by mouth daily.  apixaban (ELIQUIS) 2.5 mg tablet Take 1 Tab by mouth two (2) times a day.  acetaminophen/diphenhydramine (TYLENOL PM PO) Take 2 Tabs by mouth as needed.  cranberry fruit extract (CRANBERRY CONCENTRATE PO) Take  by mouth.  levothyroxine (SYNTHROID) 88 mcg tablet Take 1 Tab by mouth Daily (before breakfast).     lovastatin (MEVACOR) 40 mg tablet TAKE 1 TABLET EVERY EVENING TO LOWER CHOLESTEROL    omeprazole (PRILOSEC) 20 mg capsule TAKE 1 CAPSULE DAILY.  ACCU-CHEK MULTICLIX LANCET misc AS DIRECTED    latanoprost (XALATAN) 0.005 % ophthalmic solution Administer 1 Drop to both eyes nightly.  DOCUSATE CALCIUM (STOOL SOFTENER PO) Take  by mouth daily.  ascorbic acid (VITAMIN C) 500 mg tablet Take  by mouth daily.  cholecalciferol, vitamin d3, (VITAMIN D) 1,000 unit tablet Take  by mouth daily.  MULTIVITAMINS (MULTIVITAMIN PO) Take  by mouth. No current facility-administered medications for this visit. Review of Symptoms:  11 systems reviewed, negative other than as stated in the HPI    Physical ExamPhysical Exam:    Vitals:    12/19/18 1303   BP: 140/72   Pulse: 76   Resp: 18   SpO2: 97%   Weight: 166 lb 3.2 oz (75.4 kg)   Height: 5' 5\" (1.651 m)     Body mass index is 27.66 kg/m². General PE   Gen:  NAD  Mental Status - Alert. General Appearance - Not in acute distress. Chest and Lung Exam   Inspection: Accessory muscles - No use of accessory muscles in breathing. Auscultation:   Breath sounds: - Normal.   Cardiovascular   Inspection: Jugular vein - Bilateral - Inspection Normal.   Palpation/Percussion:   Apical Impulse: - Normal.   Auscultation: Rhythm - Regular. Heart Sounds - S1 WNL and S2 WNL. No S3 or S4. Murmurs & Other Heart Sounds: Auscultation of the heart reveals - No Murmurs. Peripheral Vascular   Upper Extremity: Inspection - Bilateral - No Cyanotic nailbeds or Digital clubbing. Lower Extremity:   Palpation: Edema - Bilateral - No edema. Abdomen:   Soft, non-tender, bowel sounds are active.   Neuro: A&O times 3, CN and motor grossly WNL    Labs:   Lab Results   Component Value Date/Time    Cholesterol, total 193 12/29/2017 12:00 AM    Cholesterol, total 190 02/06/2017 08:36 AM    Cholesterol, total 219 (H) 01/18/2016 09:29 AM    Cholesterol, total 209 (H) 01/30/2014 09:15 AM    Cholesterol, total 250 (H) 06/17/2013 10:55 AM    HDL Cholesterol 82 12/29/2017 12:00 AM    HDL Cholesterol 96 02/06/2017 08:36 AM    HDL Cholesterol 89 01/18/2016 09:29 AM    HDL Cholesterol 78 01/30/2014 09:15 AM    HDL Cholesterol 78 06/17/2013 10:55 AM    LDL, calculated 83 12/29/2017 12:00 AM    LDL, calculated 67 02/06/2017 08:36 AM    LDL, calculated 100 (H) 01/18/2016 09:29 AM    LDL, calculated 101 (H) 01/30/2014 09:15 AM    LDL, calculated 138 (H) 06/17/2013 10:55 AM    Triglyceride 141 12/29/2017 12:00 AM    Triglyceride 137 02/06/2017 08:36 AM    Triglyceride 152 (H) 01/18/2016 09:29 AM    Triglyceride 148 01/30/2014 09:15 AM    Triglyceride 168 (H) 06/17/2013 10:55 AM     No results found for: CPK, CPKX, CPX  Lab Results   Component Value Date/Time    Sodium 140 11/27/2018 02:54 AM    Potassium 3.9 11/27/2018 02:54 AM    Chloride 104 11/27/2018 02:54 AM    CO2 30 11/27/2018 02:54 AM    Anion gap 6 11/27/2018 02:54 AM    Glucose 116 (H) 11/27/2018 02:54 AM    BUN 20 11/27/2018 02:54 AM    Creatinine 1.27 (H) 11/27/2018 02:54 AM    BUN/Creatinine ratio 16 11/27/2018 02:54 AM    GFR est AA 48 (L) 11/27/2018 02:54 AM    GFR est non-AA 40 (L) 11/27/2018 02:54 AM    Calcium 8.8 11/27/2018 02:54 AM    Bilirubin, total 0.4 11/26/2018 03:53 AM    AST (SGOT) 32 11/26/2018 03:53 AM    Alk. phosphatase 139 (H) 11/26/2018 03:53 AM    Protein, total 6.5 11/26/2018 03:53 AM    Albumin 3.1 (L) 11/26/2018 03:53 AM    Globulin 3.4 11/26/2018 03:53 AM    A-G Ratio 0.9 (L) 11/26/2018 03:53 AM    ALT (SGPT) 45 11/26/2018 03:53 AM       EKG:paced     Assessment:      1. Permanent atrial fibrillation (HCC)    2. Paroxysmal atrial fibrillation ([facility]    3. Essential hypertension    4. Mixed hyperlipidemia    5. PAD (peripheral artery disease) ([facility]    6. Non-rheumatic mitral regurgitation    7.  Cardiac pacemaker in situ        Orders Placed This Encounter    MAGNESIUM    METABOLIC PANEL, BASIC    AMB POC EKG ROUTINE W/ 12 LEADS, INTER & REP Order Specific Question:   Reason for Exam:     Answer:   routine    furosemide (LASIX) 40 mg tablet     Sig: Take 1 Tab by mouth daily. Dispense:  90 Tab     Refill:  2        Plan:     PAF:   · Now determined to be in permanent atrial fibrillation status post AV monica ablation and pacemaker December 2018. · Continue Eliquis. Diltiazem discontinued. NIALL with normal LV systolic function, EF 57-87%, no significant valvular pathology in November 2018     Acute respiratory failure with pulmonary edema to acute diastolic heart failure November 2018:  Continue Lasix  Check labs today     HTN:  stable    Lipidemia:  On statin, followed by PCP. Counseled on diet and exercise- eventual goal of 30-60 minutes 5-7 times a week as per AHA guidelines. Follow up in 6 months, sooner PRN.        Roxana Mi MD

## 2018-12-20 LAB
BUN SERPL-MCNC: 16 MG/DL (ref 8–27)
BUN/CREAT SERPL: 11 (ref 12–28)
CALCIUM SERPL-MCNC: 9.5 MG/DL (ref 8.7–10.3)
CHLORIDE SERPL-SCNC: 100 MMOL/L (ref 96–106)
CO2 SERPL-SCNC: 29 MMOL/L (ref 20–29)
CREAT SERPL-MCNC: 1.42 MG/DL (ref 0.57–1)
GLUCOSE SERPL-MCNC: 136 MG/DL (ref 65–99)
INTERPRETATION: NORMAL
MAGNESIUM SERPL-MCNC: 2.2 MG/DL (ref 1.6–2.3)
POTASSIUM SERPL-SCNC: 4 MMOL/L (ref 3.5–5.2)
SODIUM SERPL-SCNC: 143 MMOL/L (ref 134–144)

## 2018-12-20 RX ORDER — FUROSEMIDE 40 MG/1
40 TABLET ORAL DAILY
Qty: 90 TAB | Refills: 2 | Status: SHIPPED | OUTPATIENT
Start: 2018-12-20 | End: 2019-09-11 | Stop reason: SDUPTHER

## 2018-12-26 ENCOUNTER — OFFICE VISIT (OUTPATIENT)
Dept: FAMILY MEDICINE CLINIC | Age: 83
End: 2018-12-26

## 2018-12-26 VITALS
HEIGHT: 65 IN | SYSTOLIC BLOOD PRESSURE: 173 MMHG | TEMPERATURE: 97.9 F | DIASTOLIC BLOOD PRESSURE: 73 MMHG | HEART RATE: 87 BPM | OXYGEN SATURATION: 95 % | RESPIRATION RATE: 18 BRPM | BODY MASS INDEX: 27.66 KG/M2 | WEIGHT: 166 LBS

## 2018-12-26 DIAGNOSIS — N63.15 BREAST LUMP ON RIGHT SIDE AT 12 O'CLOCK POSITION: Primary | ICD-10-CM

## 2018-12-26 NOTE — PROGRESS NOTES
Subjective:     Richard Good is a 80 y.o. female who presents today with the following:  Chief Complaint   Patient presents with    Breast Mass     right side      Patient presents today for evaluation of R breast lump that she noticed last week when performing self exam.  Had a breast biopsy on this breast several years ago but never noticed scar tissue. States lump feels like a cord with a small bump on the end. Denies skin changes, nipple abnormalities or discharge. Normal L side. Of note, had pacemaker placed on L on 12/5/18. ROS:  Gen: denies fever, chills, fatigue, weight loss, weight gain  HEENT:denies blurry vision, nasal congestion, sore throat  Resp: denies dypsnea, cough, wheezing  CV: denies chest pain, palpitations, lower extremity edema  Abd: denies nausea, vomiting, diarrhea, constipation  Neuro: denies numbness/tingling      Allergies   Allergen Reactions    Aspirin Other (comments)     Swelling shut of eyelids    Macrobid [Nitrofurantoin Monohyd/M-Cryst] Unknown (comments)    Pcn [Penicillins] Hives         Current Outpatient Medications:     furosemide (LASIX) 40 mg tablet, Take 1 Tab by mouth daily. , Disp: 90 Tab, Rfl: 2    apixaban (ELIQUIS) 2.5 mg tablet, Take 1 Tab by mouth two (2) times a day., Disp: 60 Tab, Rfl: 0    acetaminophen/diphenhydramine (TYLENOL PM PO), Take 2 Tabs by mouth as needed. , Disp: , Rfl:     cranberry fruit extract (CRANBERRY CONCENTRATE PO), Take  by mouth., Disp: , Rfl:     levothyroxine (SYNTHROID) 88 mcg tablet, Take 1 Tab by mouth Daily (before breakfast). , Disp: 90 Tab, Rfl: 0    lovastatin (MEVACOR) 40 mg tablet, TAKE 1 TABLET EVERY EVENING TO LOWER CHOLESTEROL, Disp: 90 Tab, Rfl: 3    omeprazole (PRILOSEC) 20 mg capsule, TAKE 1 CAPSULE DAILY. , Disp: 90 Cap, Rfl: 3    ACCU-CHEK MULTICLIX LANCET misc, AS DIRECTED, Disp: 100 Each, Rfl: PRN    latanoprost (XALATAN) 0.005 % ophthalmic solution, Administer 1 Drop to both eyes nightly., Disp: , Rfl:     DOCUSATE CALCIUM (STOOL SOFTENER PO), Take  by mouth daily. , Disp: , Rfl:     ascorbic acid (VITAMIN C) 500 mg tablet, Take  by mouth daily. , Disp: , Rfl:     cholecalciferol, vitamin d3, (VITAMIN D) 1,000 unit tablet, Take  by mouth daily. , Disp: , Rfl:     MULTIVITAMINS (MULTIVITAMIN PO), Take  by mouth., Disp: , Rfl:     Past Medical History:   Diagnosis Date    Arthritis     hands/low back    Diabetes ([facility]     borderline    GERD (gastroesophageal reflux disease)     Glaucoma     Hypercholesterolemia     Hypertension     Ill-defined condition     borderline anemia    Ill-defined condition     bladder infections    Impaired glucose tolerance     Menopause     Thyroid disease     hypothyroidism       Past Surgical History:   Procedure Laterality Date    CARDIOVERSION, ELECTIVE  10/31/2018         CARDIOVERSION, ELECTIVE  2018         HX BREAST BIOPSY Bilateral 1990    benign    HX CATARACT REMOVAL      bilateral    HX DILATION AND CURETTAGE      HX ORTHOPAEDIC      carpal tunnel release -bilateral    HX ROTATOR CUFF REPAIR Right        Social History     Tobacco Use   Smoking Status Former Smoker    Packs/day: 1.00    Years: 15.00    Pack years: 15.00    Types: Cigarettes    Last attempt to quit: 1995    Years since quittin.9   Smokeless Tobacco Never Used       Social History     Socioeconomic History    Marital status:      Spouse name: Not on file    Number of children: Not on file    Years of education: Not on file    Highest education level: Not on file   Tobacco Use    Smoking status: Former Smoker     Packs/day: 1.00     Years: 15.00     Pack years: 15.00     Types: Cigarettes     Last attempt to quit: 1995     Years since quittin.9    Smokeless tobacco: Never Used   Substance and Sexual Activity    Alcohol use: Yes     Alcohol/week: 0.6 oz     Types: 1 Glasses of wine per week     Comment: Rare--maybe once a month    Drug use:  No Social History Narrative    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 Family History   Problem Relation Age of Onset    Coronary Artery Disease Other         mother  of MI age 80, brother  age 72 of MI, sister has hear problems    Breast Cancer Sister 48    Heart Attack Mother     Other Father         pneumonia    Alzheimer Brother     Cancer Sister     Cancer Sister     Heart Attack Brother     Suicide Brother          Objective:     Visit Vitals  /73 (BP 1 Location: Left arm, BP Patient Position: Sitting)   Pulse 87   Temp 97.9 °F (36.6 °C) (Oral)   Resp 18   Ht 5' 5\" (1.651 m)   Wt 166 lb (75.3 kg)   SpO2 95%   BMI 27.62 kg/m²     Gen: alert, oriented, no acute distress  Head: normocephalic, atraumatic  Eyes:sclera clear, conjunctiva clear  Oral: moist mucus membranes, no oral lesions, no pharyngeal exudate, no pharyngeal erythema  Neck: symmetric normal sized thyroid, no carotid bruits, no JVD  Breast Exam:  Normal R breast appearance with exception of biopsy scar just above nipple. R breast nontender. R breast with small lump at 12 o clock position just above the nipple. Cord of tissue palpated just below lump. Firm, nonmobile. L breast exam WNL. Resp: Normal work of breathing, lungs CTAB, no w/r/r  CV: S1, S2 normal.  No murmurs, rubs, or gallops. Abd:  Normal bowel sounds. Soft, not tender, not distended. Skin: no rash                 No results found for this visit on 18. Assessment/ Plan:   Diagnoses and all orders for this visit:    1. Breast lump on right side at 12 o'clock position  -     [facility] MAMMO RT DX INCL CAD; Future     likely scar tissue from previous biopsy, but will get diagnostic mammogram to know for sure. Verbal and written instructions (see AVS) provided.  Patient expresses understanding of diagnosis and treatment plan. Daina Holloway.  Allen Meckel, MD

## 2018-12-26 NOTE — PROGRESS NOTES
Chief Complaint   Patient presents with    Breast Mass     right side      Health Maintenance reviewed     1. Have you been to the ER, urgent care clinic since your last visit? Hospitalized since your last visit? no    2. Have you seen or consulted any other health care providers outside of the [address] [place] [place] [address] since your last visit? Include any pap smears or colon screening.  No

## 2018-12-27 ENCOUNTER — TELEPHONE (OUTPATIENT)
Dept: CARDIOLOGY CLINIC | Age: 83
End: 2018-12-27

## 2018-12-27 ENCOUNTER — CLINICAL SUPPORT (OUTPATIENT)
Dept: CARDIOLOGY CLINIC | Age: 83
End: 2018-12-27

## 2018-12-27 DIAGNOSIS — I48.21 PERMANENT ATRIAL FIBRILLATION (HCC): Primary | ICD-10-CM

## 2018-12-27 DIAGNOSIS — I50.31 ACUTE DIASTOLIC CHF (CONGESTIVE HEART FAILURE) (HCC): ICD-10-CM

## 2018-12-27 DIAGNOSIS — Z95.0 CARDIAC PACEMAKER IN SITU: Primary | ICD-10-CM

## 2018-12-27 DIAGNOSIS — Z95.0 CARDIAC PACEMAKER IN SITU: ICD-10-CM

## 2018-12-27 DIAGNOSIS — I48.0 PAROXYSMAL ATRIAL FIBRILLATION (HCC): ICD-10-CM

## 2018-12-27 NOTE — TELEPHONE ENCOUNTER
----- Message from Roni Mcnair MD sent at 12/27/2018 11:38 AM EST -----  Labs were okay. Please advise.

## 2018-12-27 NOTE — PROGRESS NOTES
Valentin Lockett  3/5/1932  Marian Quinn MD          Subjective:    Patient is here for 2 week site check and device interrogation after pacemaker implantation. The patient denies chest pain or shortness of breath. Denies fevers.      Patient Active Problem List    Diagnosis Date Noted    Atrial fibrillation ([address] 12/05/2018    Acute diastolic CHF (congestive heart failure) ([address] 11/26/2018    Respiratory failure with hypoxia ([address] 11/23/2018    Sigmoid diverticulosis 11/20/2018    Mitral regurgitation 10/31/2018    Paroxysmal atrial fibrillation ([address] 10/25/2018    Hypercholesterolemia     Osteoarthritis of hip 11/20/2017    PAD (peripheral artery disease) ([address] 04/04/2017    Anemia 02/06/2017    Chronic UTI 02/06/2017    Venous (peripheral) insufficiency 01/08/2015    Hypothyroid 06/11/2012    Hypertension 06/11/2012    Arthritis 06/11/2012    Hyperlipidemia 06/11/2012    IGT (impaired glucose tolerance) 06/30/2011    Restless leg syndrome 06/30/2011      Past Medical History:   Diagnosis Date    Arthritis     hands/low back    Diabetes ([address]     borderline    GERD (gastroesophageal reflux disease)     Glaucoma     Hypercholesterolemia     Hypertension     Ill-defined condition     borderline anemia    Ill-defined condition     bladder infections    Impaired glucose tolerance     Menopause     Thyroid disease     hypothyroidism      Past Surgical History:   Procedure Laterality Date    CARDIOVERSION, ELECTIVE  10/31/2018         CARDIOVERSION, ELECTIVE  11/28/2018         HX BREAST BIOPSY Bilateral 1990    benign    HX CATARACT REMOVAL      bilateral    HX DILATION AND CURETTAGE      HX ORTHOPAEDIC      carpal tunnel release -bilateral    HX ROTATOR CUFF REPAIR Right      Allergies   Allergen Reactions    Aspirin Other (comments)     Swelling shut of eyelids    Macrobid [Nitrofurantoin Monohyd/M-Cryst] Unknown (comments)    Pcn [Penicillins] Hives      Family History   Problem Relation Age of Onset    Coronary Artery Disease Other         mother  of MI age 80, brother  age 72 of MI, sister has hear problems    Breast Cancer Sister 48    Heart Attack Mother     Other Father         pneumonia    Alzheimer Brother     Cancer Sister     Cancer Sister     Heart Attack Brother     Suicide Brother       Current Outpatient Medications   Medication Sig    furosemide (LASIX) 40 mg tablet Take 1 Tab by mouth daily.  apixaban (ELIQUIS) 2.5 mg tablet Take 1 Tab by mouth two (2) times a day.  acetaminophen/diphenhydramine (TYLENOL PM PO) Take 2 Tabs by mouth as needed.  cranberry fruit extract (CRANBERRY CONCENTRATE PO) Take  by mouth.  levothyroxine (SYNTHROID) 88 mcg tablet Take 1 Tab by mouth Daily (before breakfast).  lovastatin (MEVACOR) 40 mg tablet TAKE 1 TABLET EVERY EVENING TO LOWER CHOLESTEROL    omeprazole (PRILOSEC) 20 mg capsule TAKE 1 CAPSULE DAILY.  ACCU-CHEK MULTICLIX LANCET misc AS DIRECTED    latanoprost (XALATAN) 0.005 % ophthalmic solution Administer 1 Drop to both eyes nightly.  DOCUSATE CALCIUM (STOOL SOFTENER PO) Take  by mouth daily.  ascorbic acid (VITAMIN C) 500 mg tablet Take  by mouth daily.  cholecalciferol, vitamin d3, (VITAMIN D) 1,000 unit tablet Take  by mouth daily.  MULTIVITAMINS (MULTIVITAMIN PO) Take  by mouth. No current facility-administered medications for this visit. Review of Systems:    General: Pt denies excessive weight gain or loss. Pt is able to conduct ADL's  Respiratory: Denies shortness of breath, ROWE, wheezing or stridor. Cardiovascular: Denies precordial pain, palpitations, edema or PND        Physical ExamPhysical Exam:      General: Well developed, in no acute distress. .  Chest: left subclavian pacer site approximated well  Neuro: A&Ox3, speech clear, gait stable. Assessment:   Assessment:     ICD-10-CM ICD-9-CM    1.  Permanent atrial fibrillation (HCC) I48.2 427.31    2. Cardiac pacemaker in situ Z95.0 V45.01         Plan:     Patient feels well following generator implant. Left subclavian pacemaker site approximated well, no discharge. No erythema or heat. Follow up as planned.      Eliza Kirkpatrick, ANP

## 2018-12-28 ENCOUNTER — TELEPHONE (OUTPATIENT)
Dept: CARDIOLOGY CLINIC | Age: 83
End: 2018-12-28

## 2018-12-28 NOTE — TELEPHONE ENCOUNTER
Called, spoke to pt, two identifiers verified. Advised not to have any dental work until the 8 week post procedure [place]. Pt verbalized understanding of information discussed w/ no further questions at this time.      Devyn Montero RN

## 2018-12-28 NOTE — TELEPHONE ENCOUNTER
Please call pt to discuss information for an upcoming dental appt. Please advise.   Thanks, [employer] Gilbert

## 2019-01-02 RX ORDER — LEVOTHYROXINE SODIUM 88 UG/1
88 TABLET ORAL
Qty: 90 TAB | Refills: 0 | Status: SHIPPED | OUTPATIENT
Start: 2019-01-02 | End: 2019-03-29 | Stop reason: SDUPTHER

## 2019-01-07 ENCOUNTER — HOSPITAL ENCOUNTER (OUTPATIENT)
Dept: MAMMOGRAPHY | Age: 84
Discharge: HOME | End: 2019-01-07
Attending: FAMILY MEDICINE
Payer: MEDICARE

## 2019-01-07 DIAGNOSIS — N63.15 BREAST LUMP ON RIGHT SIDE AT 12 O'CLOCK POSITION: ICD-10-CM

## 2019-01-07 PROCEDURE — 76642 ULTRASOUND BREAST LIMITED: CPT

## 2019-01-07 PROCEDURE — 77065 DX MAMMO INCL CAD UNI: CPT

## 2019-02-25 ENCOUNTER — TELEPHONE (OUTPATIENT)
Dept: FAMILY MEDICINE CLINIC | Age: 84
End: 2019-02-25

## 2019-02-26 ENCOUNTER — TELEPHONE (OUTPATIENT)
Dept: CARDIOLOGY CLINIC | Age: 84
End: 2019-02-26

## 2019-02-26 NOTE — TELEPHONE ENCOUNTER
Wants a call back.  [phone]   Wants to know if its ok for her to get the Shingrix shot while she has a pace maker

## 2019-02-27 NOTE — TELEPHONE ENCOUNTER
Pt would like a call regarding whether or not she can have a shingles shot with a pacemaker.   Thanks, [employer] Gilbert

## 2019-03-12 ENCOUNTER — OFFICE VISIT (OUTPATIENT)
Dept: CARDIOLOGY CLINIC | Age: 84
End: 2019-03-12

## 2019-03-12 ENCOUNTER — CLINICAL SUPPORT (OUTPATIENT)
Dept: CARDIOLOGY CLINIC | Age: 84
End: 2019-03-12

## 2019-03-12 VITALS
DIASTOLIC BLOOD PRESSURE: 68 MMHG | HEIGHT: 65 IN | RESPIRATION RATE: 18 BRPM | HEART RATE: 64 BPM | WEIGHT: 166 LBS | OXYGEN SATURATION: 97 % | BODY MASS INDEX: 27.66 KG/M2 | SYSTOLIC BLOOD PRESSURE: 160 MMHG

## 2019-03-12 DIAGNOSIS — I73.9 PAD (PERIPHERAL ARTERY DISEASE) (HCC): ICD-10-CM

## 2019-03-12 DIAGNOSIS — I48.0 PAROXYSMAL ATRIAL FIBRILLATION (HCC): ICD-10-CM

## 2019-03-12 DIAGNOSIS — I48.0 PAROXYSMAL ATRIAL FIBRILLATION (HCC): Primary | ICD-10-CM

## 2019-03-12 DIAGNOSIS — E78.5 HYPERLIPIDEMIA, UNSPECIFIED HYPERLIPIDEMIA TYPE: ICD-10-CM

## 2019-03-12 DIAGNOSIS — I50.31 ACUTE DIASTOLIC CHF (CONGESTIVE HEART FAILURE) (HCC): ICD-10-CM

## 2019-03-12 DIAGNOSIS — I48.21 PERMANENT ATRIAL FIBRILLATION (HCC): ICD-10-CM

## 2019-03-12 DIAGNOSIS — I44.2 CHB (COMPLETE HEART BLOCK) (HCC): ICD-10-CM

## 2019-03-12 DIAGNOSIS — Z95.0 CARDIAC PACEMAKER IN SITU: Primary | ICD-10-CM

## 2019-03-12 RX ORDER — TIMOLOL MALEATE 5 MG/ML
1 SOLUTION/ DROPS OPHTHALMIC 2 TIMES DAILY
COMMUNITY

## 2019-03-12 NOTE — PROGRESS NOTES
1. Have you been to the ER, urgent care clinic since your last visit? Hospitalized since your last visit? No    2. Have you seen or consulted any other health care providers outside of the [address] [place] [address] since your last visit? Include any pap smears or colon screening. No    Chief Complaint   Patient presents with    Irregular Heart Beat     3 mo appt. Denied cardiac symptoms.

## 2019-03-12 NOTE — PROGRESS NOTES
Subjective:      Carlos Manuel Bentley is a 80 y.o. female is here for EP follow up. The patient denies chest pain/ shortness of breath, orthopnea, PND, LE edema, palpitations, syncope, presyncope or fatigue.        Patient Active Problem List    Diagnosis Date Noted    Atrial fibrillation ([address] 12/05/2018    Acute diastolic CHF (congestive heart failure) ([address] 11/26/2018    Respiratory failure with hypoxia ([address] 11/23/2018    Sigmoid diverticulosis 11/20/2018    Mitral regurgitation 10/31/2018    Paroxysmal atrial fibrillation ([address] 10/25/2018    Hypercholesterolemia     Osteoarthritis of hip 11/20/2017    PAD (peripheral artery disease) ([address] 04/04/2017    Anemia 02/06/2017    Chronic UTI 02/06/2017    Venous (peripheral) insufficiency 01/08/2015    Hypothyroid 06/11/2012    Hypertension 06/11/2012    Arthritis 06/11/2012    Hyperlipidemia 06/11/2012    IGT (impaired glucose tolerance) 06/30/2011    Restless leg syndrome 06/30/2011      Karma Jaquez MD  Past Medical History:   Diagnosis Date    Arthritis     hands/low back    Diabetes ([address]     borderline    GERD (gastroesophageal reflux disease)     Glaucoma     Hypercholesterolemia     Hypertension     Ill-defined condition     borderline anemia    Ill-defined condition     bladder infections    Impaired glucose tolerance     Menopause     Thyroid disease     hypothyroidism      Past Surgical History:   Procedure Laterality Date    CARDIOVERSION, ELECTIVE  10/31/2018         CARDIOVERSION, ELECTIVE  11/28/2018         HX BREAST BIOPSY Bilateral 1990    benign    HX CATARACT REMOVAL      bilateral    HX DILATION AND CURETTAGE      HX ORTHOPAEDIC      carpal tunnel release -bilateral    HX ROTATOR CUFF REPAIR Right      Allergies   Allergen Reactions    Aspirin Other (comments)     Swelling shut of eyelids    Macrobid [Nitrofurantoin Monohyd/M-Cryst] Unknown (comments)    Pcn [Penicillins] Hives      Family History Problem Relation Age of Onset    Coronary Artery Disease Other         mother  of MI age 80, brother  age 72 of MI, sister has hear problems    Breast Cancer Sister         46s    Heart Attack Mother     Other Father         pneumonia    Alzheimer Brother     Cancer Sister     Cancer Sister     Heart Attack Brother     Suicide Brother     negative for cardiac disease  Social History     Socioeconomic History    Marital status:      Spouse name: Not on file    Number of children: Not on file    Years of education: Not on file    Highest education level: Not on file   Tobacco Use    Smoking status: Former Smoker     Packs/day: 1.00     Years: 15.00     Pack years: 15.00     Types: Cigarettes     Last attempt to quit: 1995     Years since quittin.1    Smokeless tobacco: Never Used   Substance and Sexual Activity    Alcohol use: Yes     Alcohol/week: 0.6 oz     Types: 1 Glasses of wine per week     Comment: Rare--maybe once a month    Drug use: No   Social History Narrative    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 Lives with . Current Outpatient Medications   Medication Sig    timolol (TIMOPTIC) 0.5 % ophthalmic solution 1 Drop two (2) times a day.  apixaban (ELIQUIS) 2.5 mg tablet Take 1 Tab by mouth two (2) times a day.  levothyroxine (SYNTHROID) 88 mcg tablet Take 1 Tab by mouth Daily (before breakfast).  furosemide (LASIX) 40 mg tablet Take 1 Tab by mouth daily.  acetaminophen/diphenhydramine (TYLENOL PM PO) Take 2 Tabs by mouth as needed.  cranberry fruit extract (CRANBERRY CONCENTRATE PO) Take  by mouth daily.  lovastatin (MEVACOR) 40 mg tablet TAKE 1 TABLET EVERY EVENING TO LOWER CHOLESTEROL    omeprazole (PRILOSEC) 20 mg capsule TAKE 1 CAPSULE DAILY.  ACCU-CHEK MULTICLIX LANCET misc AS DIRECTED    latanoprost (XALATAN) 0.005 % ophthalmic solution Administer 1 Drop to both eyes nightly.     DOCUSATE CALCIUM (STOOL SOFTENER PO) Take by mouth daily.  ascorbic acid (VITAMIN C) 500 mg tablet Take  by mouth daily.  cholecalciferol, vitamin d3, (VITAMIN D) 1,000 unit tablet Take  by mouth daily.  MULTIVITAMINS (MULTIVITAMIN PO) Take  by mouth daily. No current facility-administered medications for this visit. Vitals:    03/12/19 1108   BP: 170/70   Pulse: 64   Resp: 18   SpO2: 97%   Weight: 166 lb (75.3 kg)   Height: 5' 5\" (1.651 m)       I have reviewed the nurses notes, vitals, problem list, allergy list, medical history, family, social history and medications. Review of Symptoms:    General: Pt denies excessive weight gain or loss. Pt is able to conduct ADL's  HEENT: Denies blurred vision, headaches, epistaxis and difficulty swallowing. Respiratory: Denies shortness of breath, ROWE, wheezing or stridor. Cardiovascular: Denies precordial pain, palpitations, edema or PND  Gastrointestinal: Denies poor appetite, indigestion, abdominal pain or blood in stool  Urinary: Denies dysuria, pyuria  Musculoskeletal: Denies pain or swelling from muscles or joints  Neurologic: Denies tremor, paresthesias, or sensory motor disturbance  Skin: Denies rash, itching or texture change. Psych: Denies depression      Physical Exam:      General: Well developed, in no acute distress. HEENT: Eyes - PERRL, no jvd  Heart:  Normal S1/S2 negative S3 or S4. Regular, no murmur, gallop or rub.   Respiratory: Clear bilaterally x 4, no wheezing or rales  Extremities:  No edema, normal cap refill, no cyanosis. Musculoskeletal: No clubbing  Neuro: A&Ox3, speech clear, gait stable. Skin: Skin color is normal. No rashes or lesions. Non diaphoretic  Vascular: 2+ pulses symmetric in all extremities    Cardiographics    Ekg: V paced.      Results for orders placed or performed during the hospital encounter of 11/23/18   EKG, 12 LEAD, INITIAL   Result Value Ref Range    Ventricular Rate 54 BPM    Atrial Rate 54 BPM    P-R Interval 192 ms    QRS Duration 84 ms Q-T Interval 516 ms    QTC Calculation (Bezet) 489 ms    Calculated P Axis 73 degrees    Calculated R Axis 37 degrees    Calculated T Axis 109 degrees    Diagnosis       Sinus bradycardia  Nonspecific T wave abnormality  Prolonged QT  Confirmed by Laveda Stain ([id number]) on 11/28/2018 10:48:08 AM     Results for orders placed or performed in visit on 02/03/14   CARDIAC HOLTER MONITOR, 24 HOURS    Narrative    ECG Monitor/24 hours, Complete    Reason for Holter Monitor   PVC's    Heartbeat    Slowest 53  Average 71  Fastest  113    Results:   Underlying Rhythm: Normal sinus rhythm      Atrial Arrhythmias: premature atrial contractions; occasional and  a few short runs of PSVT    AV Conduction: normal    Ventricular Arrhythmias: premature ventricular contractions;  frequent and ventricular couplets and one ventricular triplet    ST Segment Analysis:non-specific changes     Symptom Correlation:  Shortness of breath corresponds to NSR with activity. Comment:   Normal sinus rhythm with frequent PVC's occasional PAC's, a few  short runs of PSVT     Agata Davis MD            Results for orders placed or performed in visit on 12/05/11   AMB POC EKG ROUTINE W/ 12 LEADS, INTER & REP    Impression    Normal sinus rhythm. Nonspecific ST wave changes in the lateral  chest leads.          Lab Results   Component Value Date/Time    WBC 6.5 11/26/2018 03:53 AM    HGB 11.9 11/26/2018 03:53 AM    HCT 37.0 11/26/2018 03:53 AM    PLATELET 447 19/86/6778 03:53 AM    MCV 95.1 11/26/2018 03:53 AM      Lab Results   Component Value Date/Time    Sodium 143 12/19/2018 02:06 PM    Potassium 4.0 12/19/2018 02:06 PM    Chloride 100 12/19/2018 02:06 PM    CO2 29 12/19/2018 02:06 PM    Anion gap 6 11/27/2018 02:54 AM    Glucose 136 (H) 12/19/2018 02:06 PM    BUN 16 12/19/2018 02:06 PM    Creatinine 1.42 (H) 12/19/2018 02:06 PM    BUN/Creatinine ratio 11 (L) 12/19/2018 02:06 PM    GFR est AA 39 (L) 12/19/2018 02:06 PM    GFR est non-AA 33 (L) 2018 02:06 PM    Calcium 9.5 2018 02:06 PM    Bilirubin, total 0.4 2018 03:53 AM    AST (SGOT) 32 2018 03:53 AM    Alk. phosphatase 139 (H) 2018 03:53 AM    Protein, total 6.5 2018 03:53 AM    Albumin 3.1 (L) 2018 03:53 AM    Globulin 3.4 2018 03:53 AM    A-G Ratio 0.9 (L) 2018 03:53 AM    ALT (SGPT) 45 2018 03:53 AM      Lab Results   Component Value Date/Time    TSH 5.56 (H) 2018 12:44 AM        Assessment:           ICD-10-CM ICD-9-CM    1. Paroxysmal atrial fibrillation (HCC) I48.0 427.31 AMB POC EKG ROUTINE W/ 12 LEADS, INTER & REP   2. Permanent atrial fibrillation (HCC) I48.2 427.31    3. Acute diastolic CHF (congestive heart failure) (HCC) I50.31 428.31      428.0    4. PAD (peripheral artery disease) ([facility]) I73.9 443.9    5. Hyperlipidemia, unspecified hyperlipidemia type E78.5 272.4      Orders Placed This Encounter    AMB POC EKG ROUTINE W/ 12 LEADS, INTER & REP     Order Specific Question:   Reason for Exam:     Answer:   routine    timolol (TIMOPTIC) 0.5 % ophthalmic solution     Si Drop two (2) times a day. Plan:     Ms Consuelo Guevara is here for 3 mo follow up from AVN and dual chamber PPM 18. She is 100% RVP with 14 episodes (3.4% of the time) of asymptomatic AFL on eliquis. Her Bps are high. I have asked her to keep a log and present them to her PCP next month at follow up. Was previously on metoprolol. She is enrolled in remote monitoring and we will see her back in 1 year. Continue medical management for HTN, AFL and GERD. Thank you for allowing me to participate in Carlos Manuel Session 's care. Sera Rivera NP    Patient seen and examined. All pertinent data reviewed. I have reviewed detailed note as outlined by Sera Rivera NP. Case discussed with Nursing/medical assistant staff and Sera Rivera NP. Plans as outlined. V paced sp avn abl. On [address] for AF. Cont med rx for htn and hyperlipidemia. F/u in one year.     Amparo De La Torre MD, Makenna Alarcno

## 2019-03-29 RX ORDER — OMEPRAZOLE 20 MG/1
CAPSULE, DELAYED RELEASE ORAL
Qty: 90 CAP | Refills: 3 | Status: SHIPPED | OUTPATIENT
Start: 2019-03-29 | End: 2020-04-06 | Stop reason: SDUPTHER

## 2019-03-29 RX ORDER — LEVOTHYROXINE SODIUM 88 UG/1
88 TABLET ORAL
Qty: 90 TAB | Refills: 3 | Status: SHIPPED | OUTPATIENT
Start: 2019-03-29 | End: 2020-04-06 | Stop reason: SDUPTHER

## 2019-04-25 ENCOUNTER — OFFICE VISIT (OUTPATIENT)
Dept: FAMILY MEDICINE CLINIC | Age: 84
End: 2019-04-25

## 2019-04-25 VITALS
BODY MASS INDEX: 28.16 KG/M2 | OXYGEN SATURATION: 95 % | DIASTOLIC BLOOD PRESSURE: 61 MMHG | SYSTOLIC BLOOD PRESSURE: 138 MMHG | WEIGHT: 169 LBS | TEMPERATURE: 97.5 F | HEART RATE: 61 BPM | HEIGHT: 65 IN | RESPIRATION RATE: 16 BRPM

## 2019-04-25 DIAGNOSIS — M16.0 PRIMARY OSTEOARTHRITIS OF BOTH HIPS: ICD-10-CM

## 2019-04-25 DIAGNOSIS — M54.50 BILATERAL LOW BACK PAIN WITHOUT SCIATICA, UNSPECIFIED CHRONICITY: Primary | ICD-10-CM

## 2019-04-25 DIAGNOSIS — I10 ESSENTIAL HYPERTENSION: ICD-10-CM

## 2019-04-25 DIAGNOSIS — E03.9 HYPOTHYROIDISM, UNSPECIFIED TYPE: ICD-10-CM

## 2019-04-25 DIAGNOSIS — E78.2 MIXED HYPERLIPIDEMIA: ICD-10-CM

## 2019-04-25 DIAGNOSIS — I87.2 VENOUS (PERIPHERAL) INSUFFICIENCY: ICD-10-CM

## 2019-04-25 DIAGNOSIS — I48.91 ATRIAL FIBRILLATION, UNSPECIFIED TYPE (HCC): ICD-10-CM

## 2019-04-25 DIAGNOSIS — E55.9 VITAMIN D DEFICIENCY: ICD-10-CM

## 2019-04-25 DIAGNOSIS — R73.03 PRE-DIABETES: ICD-10-CM

## 2019-04-25 DIAGNOSIS — N39.0 URINARY TRACT INFECTION WITHOUT HEMATURIA, SITE UNSPECIFIED: ICD-10-CM

## 2019-04-25 DIAGNOSIS — D50.9 IRON DEFICIENCY ANEMIA, UNSPECIFIED IRON DEFICIENCY ANEMIA TYPE: ICD-10-CM

## 2019-04-25 LAB
BILIRUB UR QL STRIP: NEGATIVE
GLUCOSE UR-MCNC: NEGATIVE MG/DL
KETONES P FAST UR STRIP-MCNC: NEGATIVE MG/DL
PH UR STRIP: 7 [PH] (ref 4.6–8)
PROT UR QL STRIP: NORMAL
SP GR UR STRIP: 1.01 (ref 1–1.03)
UA UROBILINOGEN AMB POC: NORMAL (ref 0.2–1)
URINALYSIS CLARITY POC: NORMAL
URINALYSIS COLOR POC: YELLOW
URINE BLOOD POC: NEGATIVE
URINE LEUKOCYTES POC: NORMAL
URINE NITRITES POC: POSITIVE

## 2019-04-25 RX ORDER — SULFAMETHOXAZOLE AND TRIMETHOPRIM 800; 160 MG/1; MG/1
1 TABLET ORAL 2 TIMES DAILY
Qty: 14 TAB | Refills: 0 | Status: SHIPPED | OUTPATIENT
Start: 2019-04-25 | End: 2019-05-02

## 2019-04-25 NOTE — PROGRESS NOTES
Chief Complaint   Patient presents with    Back Pain    Physical    Hypertension     follow up     1. Have you been to the ER, urgent care clinic since your last visit? Hospitalized since your last visit? No    2. Have you seen or consulted any other health care providers outside of the [address] [place] [address] since your last visit? Include any pap smears or colon screening.  No    Health Maintenance reviewed

## 2019-04-25 NOTE — PROGRESS NOTES
Subjective:      Chief Complaint   Patient presents with    Back Pain    Physical    Hypertension     follow up       Francesca Angel is a 80 y.o. female with history of hypertension, here for follow up. Hypertension ROS: taking medications as instructed, no medication side effects noted, home BP monitoring in range of 414-673'Q systolic over 89-31'D diastolic, no TIA's, no chest pain on exertion, no dyspnea on exertion, no swelling of ankles,headaches, shortness of breath, vision change. she generally follows a low fat low cholesterol diet, generally follows a low sodium diet, exercises sporadically, nonsmoker. She does weigh herself daily and weight is stable. She follows with cardiology routinely. She calls in some pacer readings at times. She has no chest pain or ROWE but does feel tired often after she has done some activity. Has had some complaints of lower back pain \"for a long time\". Worse when she gets up in the morning, \"when I get out of the bed when I sit up in the morning, its heavy in my back, like there is a lot of weight right there when I sit up and after I move around for a while it gets better\". She usually sleeps on side and does use a thin pillow between her knees. She has history of osteoarthritis in her hips. Complains of bilateral legs \"during the night, usually around 3am, they start burning so bad and if I get up and start walking around they will get better but I know its from my veins in my legs because I see a doctor about that and I am suppose to wear support hose and I just dont\". This complaint is chronic and she has seen cardiology in the past for these complaints. She has history of chronic UTI. Says she was on prophylactic antibiotics and was seeing urology and says she was weaned off the antibiotic in June or July. No infection that she was aware of since then. Denies any dysuria. No recent hospitalizations since last visit.     Past Medical History:   Diagnosis Date    Arthritis     hands/low back    Diabetes ([facility]     borderline    GERD (gastroesophageal reflux disease)     Glaucoma     Hypercholesterolemia     Hypertension     Ill-defined condition     borderline anemia    Ill-defined condition     bladder infections    Impaired glucose tolerance     Menopause     Thyroid disease     hypothyroidism     Past Surgical History:   Procedure Laterality Date    CARDIOVERSION, ELECTIVE  10/31/2018         CARDIOVERSION, ELECTIVE  2018         HX BREAST BIOPSY Bilateral     benign    HX CATARACT REMOVAL      bilateral    HX DILATION AND CURETTAGE      HX ORTHOPAEDIC      carpal tunnel release -bilateral    HX ROTATOR CUFF REPAIR Right      Social History     Tobacco Use    Smoking status: Former Smoker     Packs/day: 1.00     Years: 15.00     Pack years: 15.00     Types: Cigarettes     Last attempt to quit: 1995     Years since quittin.3    Smokeless tobacco: Never Used   Substance Use Topics    Alcohol use: Yes     Alcohol/week: 0.6 oz     Types: 1 Glasses of wine per week     Comment: Rare--maybe once a month     family history includes Alzheimer in her brother; Breast Cancer in her sister; Cancer in her sister and sister; Coronary Artery Disease in an other family member; Heart Attack in her brother and mother; Other in her father; Suicide in her brother. Current Outpatient Medications on File Prior to Visit   Medication Sig    omeprazole (PRILOSEC) 20 mg capsule TAKE 1 CAPSULE DAILY.  levothyroxine (SYNTHROID) 88 mcg tablet Take 1 Tab by mouth Daily (before breakfast).  timolol (TIMOPTIC) 0.5 % ophthalmic solution 1 Drop two (2) times a day.  apixaban (ELIQUIS) 2.5 mg tablet Take 1 Tab by mouth two (2) times a day.  furosemide (LASIX) 40 mg tablet Take 1 Tab by mouth daily.  acetaminophen/diphenhydramine (TYLENOL PM PO) Take 2 Tabs by mouth as needed.     cranberry fruit extract (CRANBERRY CONCENTRATE PO) Take  by mouth daily.  lovastatin (MEVACOR) 40 mg tablet TAKE 1 TABLET EVERY EVENING TO LOWER CHOLESTEROL    ACCU-CHEK MULTICLIX LANCET misc AS DIRECTED    latanoprost (XALATAN) 0.005 % ophthalmic solution Administer 1 Drop to both eyes nightly.  DOCUSATE CALCIUM (STOOL SOFTENER PO) Take  by mouth daily.  ascorbic acid (VITAMIN C) 500 mg tablet Take  by mouth daily.  cholecalciferol, vitamin d3, (VITAMIN D) 1,000 unit tablet Take  by mouth daily.  MULTIVITAMINS (MULTIVITAMIN PO) Take  by mouth daily. No current facility-administered medications on file prior to visit. Allergies   Allergen Reactions    Aspirin Other (comments)     Swelling shut of eyelids    Macrobid [Nitrofurantoin Monohyd/M-Cryst] Unknown (comments)    Pcn [Penicillins] Hives       ROS:   History obtained from the patient   Neurological: no paresthesias, weakness, or dizziness  GEN: no weight loss, weight gain, fatigue or night sweats  CV: no PND, orthopnea, or palpitations, no chest pain  Resp: no dyspnea on exertion, no cough  Abd: no nausea, vomiting or diarrhea, no bloody or black stools  Endocrine: no hair loss, excessive thirst or polyuria    Nurses note reviewed    Objective:     Visit Vitals  /61 (BP 1 Location: Right arm, BP Patient Position: Sitting)   Pulse 61   Temp 97.5 °F (36.4 °C) (Oral)   Resp 16   Ht 5' 5\" (1.651 m)   Wt 169 lb (76.7 kg)   SpO2 95%   BMI 28.12 kg/m²     General:   alert, cooperative and no distress   Eyes: conjunctivae/sclerae clear. PERRL, EOM's intact   Ears: External auditory canals clear, tympanic membranes clear   Sinuses/Nose: No maxillary or frontal tenderness. Mouth:  No oral lesions, no pharyngeal erythema, no exudates   Neck: Trachea midline, no thyromegaly, no bruits   Heart: S1 and S2 normal,no murmurs noted. Left chest pacemaker site intact   Lungs: Clear to auscultation bilaterally, no increased work of breathing   Abdomen: Soft, nontender. Normal bowel sounds   Extremities: No edema or cyanosis       Results for orders placed or performed in visit on 04/25/19   AMB POC URINALYSIS DIP STICK AUTO W/O MICRO   Result Value Ref Range    Color (UA POC) Yellow     Clarity (UA POC) Slightly Cloudy     Glucose (UA POC) Negative Negative    Bilirubin (UA POC) Negative Negative    Ketones (UA POC) Negative Negative    Specific gravity (UA POC) 1.015 1.001 - 1.035    Blood (UA POC) Negative Negative    pH (UA POC) 7.0 4.6 - 8.0    Protein (UA POC) Trace Negative    Urobilinogen (UA POC) 0.2 mg/dL 0.2 - 1    Nitrites (UA POC) Positive Negative    Leukocyte esterase (UA POC) 2+ Negative         Assessment/Plan:   Differential diagnosis and treatment options reviewed with patient who is in agreement with treatment plan as outlined below. ICD-10-CM ICD-9-CM    1. Bilateral low back pain without sciatica, unspecified chronicity M54.5 724.2 AMB POC URINALYSIS DIP STICK AUTO W/O MICRO   2. Atrial fibrillation, unspecified type ([facility] I48.91 427.31    3. Mixed hyperlipidemia C25.1 320.9 METABOLIC PANEL, COMPREHENSIVE      LIPID PANEL   4. Essential hypertension G14 426.6 METABOLIC PANEL, COMPREHENSIVE   5. Primary osteoarthritis of both hips M16.0 715.15    6. Venous (peripheral) insufficiency I87.2 459.81    7. Iron deficiency anemia, unspecified iron deficiency anemia type D50.9 280.9 CBC WITH AUTOMATED DIFF   8. Hypothyroidism, unspecified type E03.9 244.9 TSH 3RD GENERATION   9. Pre-diabetes R73.03 790.29 HEMOGLOBIN A1C WITH EAG   10. Vitamin D deficiency E55.9 268.9 VITAMIN D, 25 HYDROXY   11. Urinary tract infection without hematuria, site unspecified N39.0 599.0 CULTURE, URINE      trimethoprim-sulfamethoxazole (BACTRIM DS, SEPTRA DS) 160-800 mg per tablet     BP at goal. No change in medication.   +uti. Will send culture and start on Bactrim for now. May be cause of her back pain, but likely her back pain is arthritic in nature.     She will continue to follow up with cardiology as planned for management of parox Afib/pacer and PVD  Labs today. Will call with those results. Discussed BMI and healthy weight. Encouraged patient to work to implement changes including diet high in raw fruits and vegetables, lean protein and good fats. Limit refined, processed carbohydrates and sugar. Encouraged regular exercise. Will return in three months for wellness. Verbal and written instructions (see AVS) provided. Patient expresses understanding and agreement of diagnosis and treatment plan.

## 2019-04-26 LAB
25(OH)D3+25(OH)D2 SERPL-MCNC: 39.9 NG/ML (ref 30–100)
ALBUMIN SERPL-MCNC: 4.2 G/DL (ref 3.5–4.7)
ALBUMIN/GLOB SERPL: 1.8 {RATIO} (ref 1.2–2.2)
ALP SERPL-CCNC: 95 IU/L (ref 39–117)
ALT SERPL-CCNC: 17 IU/L (ref 0–32)
AST SERPL-CCNC: 23 IU/L (ref 0–40)
BASOPHILS # BLD AUTO: 0.1 X10E3/UL (ref 0–0.2)
BASOPHILS NFR BLD AUTO: 2 %
BILIRUB SERPL-MCNC: 0.4 MG/DL (ref 0–1.2)
BUN SERPL-MCNC: 23 MG/DL (ref 8–27)
BUN/CREAT SERPL: 25 (ref 12–28)
CALCIUM SERPL-MCNC: 9.6 MG/DL (ref 8.7–10.3)
CHLORIDE SERPL-SCNC: 102 MMOL/L (ref 96–106)
CHOLEST SERPL-MCNC: 182 MG/DL (ref 100–199)
CO2 SERPL-SCNC: 27 MMOL/L (ref 20–29)
CREAT SERPL-MCNC: 0.91 MG/DL (ref 0.57–1)
EOSINOPHIL # BLD AUTO: 0.3 X10E3/UL (ref 0–0.4)
EOSINOPHIL NFR BLD AUTO: 6 %
ERYTHROCYTE [DISTWIDTH] IN BLOOD BY AUTOMATED COUNT: 13.8 % (ref 12.3–15.4)
EST. AVERAGE GLUCOSE BLD GHB EST-MCNC: 148 MG/DL
GLOBULIN SER CALC-MCNC: 2.3 G/DL (ref 1.5–4.5)
GLUCOSE SERPL-MCNC: 134 MG/DL (ref 65–99)
HBA1C MFR BLD: 6.8 % (ref 4.8–5.6)
HCT VFR BLD AUTO: 35.9 % (ref 34–46.6)
HDLC SERPL-MCNC: 75 MG/DL
HGB BLD-MCNC: 11.7 G/DL (ref 11.1–15.9)
IMM GRANULOCYTES # BLD AUTO: 0 X10E3/UL (ref 0–0.1)
IMM GRANULOCYTES NFR BLD AUTO: 0 %
INTERPRETATION, 910389: NORMAL
INTERPRETATION: NORMAL
LDLC SERPL CALC-MCNC: 83 MG/DL (ref 0–99)
LYMPHOCYTES # BLD AUTO: 1.5 X10E3/UL (ref 0.7–3.1)
LYMPHOCYTES NFR BLD AUTO: 26 %
Lab: NORMAL
MCH RBC QN AUTO: 31.1 PG (ref 26.6–33)
MCHC RBC AUTO-ENTMCNC: 32.6 G/DL (ref 31.5–35.7)
MCV RBC AUTO: 96 FL (ref 79–97)
MONOCYTES # BLD AUTO: 0.5 X10E3/UL (ref 0.1–0.9)
MONOCYTES NFR BLD AUTO: 8 %
NEUTROPHILS # BLD AUTO: 3.3 X10E3/UL (ref 1.4–7)
NEUTROPHILS NFR BLD AUTO: 58 %
PDF IMAGE, 910387: NORMAL
PLATELET # BLD AUTO: 207 X10E3/UL (ref 150–379)
POTASSIUM SERPL-SCNC: 4.1 MMOL/L (ref 3.5–5.2)
PROT SERPL-MCNC: 6.5 G/DL (ref 6–8.5)
RBC # BLD AUTO: 3.76 X10E6/UL (ref 3.77–5.28)
SODIUM SERPL-SCNC: 144 MMOL/L (ref 134–144)
TRIGL SERPL-MCNC: 118 MG/DL (ref 0–149)
TSH SERPL DL<=0.005 MIU/L-ACNC: 0.42 UIU/ML (ref 0.45–4.5)
VLDLC SERPL CALC-MCNC: 24 MG/DL (ref 5–40)
WBC # BLD AUTO: 5.7 X10E3/UL (ref 3.4–10.8)

## 2019-04-27 LAB
BACTERIA UR CULT: ABNORMAL
BACTERIA UR CULT: ABNORMAL

## 2019-04-29 NOTE — PROGRESS NOTES
Patient verified her name and . Patient notified per Dr. Karrie Patel \" Bactrim should kill the bacteria in her urine, please have her complete entire antibiotic. Her thyroid level was a little low, could be little off secondary to her UTI.  Should repeat her thyroid level in 3 months.     Her HgbA1c went up a little to 6.8.  I suggest watching her diet a little closer, avoid too many carbohydrates or sweets\"  Patient verbalized understanding.

## 2019-04-29 NOTE — PROGRESS NOTES
Bactrim should kill the bacteria in her urine, please have her complete entire antibiotic. Her thyroid level was a little low, could be little off secondary to her UTI. Should repeat her thyroid level in 3 months. Her HgbA1c went up a little to 6.8. I suggest watching her diet a little closer, avoid too many carbohydrates or sweets. Let me know if she has any questions.    Thanks   Aruna Briggs [facility]

## 2019-05-06 ENCOUNTER — TELEPHONE (OUTPATIENT)
Dept: FAMILY MEDICINE CLINIC | Age: 84
End: 2019-05-06

## 2019-05-06 ENCOUNTER — OFFICE VISIT (OUTPATIENT)
Dept: FAMILY MEDICINE CLINIC | Age: 84
End: 2019-05-06

## 2019-05-06 VITALS
HEART RATE: 70 BPM | RESPIRATION RATE: 16 BRPM | WEIGHT: 168 LBS | TEMPERATURE: 98 F | SYSTOLIC BLOOD PRESSURE: 152 MMHG | BODY MASS INDEX: 27.99 KG/M2 | OXYGEN SATURATION: 95 % | DIASTOLIC BLOOD PRESSURE: 71 MMHG | HEIGHT: 65 IN

## 2019-05-06 DIAGNOSIS — E11.9 CONTROLLED TYPE 2 DIABETES MELLITUS WITHOUT COMPLICATION, WITHOUT LONG-TERM CURRENT USE OF INSULIN (HCC): ICD-10-CM

## 2019-05-06 DIAGNOSIS — N39.0 CHRONIC UTI: Primary | ICD-10-CM

## 2019-05-06 PROBLEM — J96.91 RESPIRATORY FAILURE WITH HYPOXIA (HCC): Status: RESOLVED | Noted: 2018-11-23 | Resolved: 2019-05-06

## 2019-05-06 LAB
ALBUMIN UR QL STRIP: 80 MG/L
BILIRUB UR QL STRIP: NEGATIVE
CREATININE, URINE POC: 50 MG/DL
GLUCOSE UR-MCNC: NEGATIVE MG/DL
KETONES P FAST UR STRIP-MCNC: NEGATIVE MG/DL
MICROALBUMIN/CREAT RATIO POC: NORMAL MG/G
PH UR STRIP: 6 [PH] (ref 4.6–8)
PROT UR QL STRIP: NEGATIVE
SP GR UR STRIP: 1.01 (ref 1–1.03)
UA UROBILINOGEN AMB POC: NORMAL (ref 0.2–1)
URINALYSIS CLARITY POC: NORMAL
URINALYSIS COLOR POC: YELLOW
URINE BLOOD POC: NORMAL
URINE LEUKOCYTES POC: NORMAL
URINE NITRITES POC: NEGATIVE

## 2019-05-06 RX ORDER — CIPROFLOXACIN 500 MG/1
500 TABLET ORAL 2 TIMES DAILY
Qty: 20 TAB | Refills: 0 | Status: SHIPPED | OUTPATIENT
Start: 2019-05-06 | End: 2019-05-16

## 2019-05-06 NOTE — TELEPHONE ENCOUNTER
Called pt and verified name and . Placed pt on visit to see Dr. Curly Ott today, pt voiced that she is still having UTI problems. Pt voiced understanding.

## 2019-05-06 NOTE — PROGRESS NOTES
Chief Complaint   Patient presents with    Dysuria     1. Have you been to the ER, urgent care clinic since your last visit? Hospitalized since your last visit? No    2. Have you seen or consulted any other health care providers outside of the [address] [place] [address] since your last visit? Include any pap smears or colon screening.  No     Health Maintenance Due   Topic Date Due    FOOT EXAM Q1  03/05/1942    MICROALBUMIN Q1  03/05/1942    EYE EXAM RETINAL OR DILATED  03/05/1942    GLAUCOMA SCREENING Q2Y  01/06/2019     She is going to eye doctor soon and will have them seen records

## 2019-05-06 NOTE — PROGRESS NOTES
Chief Complaint   Patient presents with    Dysuria     Pt has a history of recurrent UTIs. Pt was recently treated with Bactrim for a klebsiella UTI. Pt reports that her symptoms improved, however 2 days ago they returned with burning and pain with urination. Pt used to be on preventative abx, but was taken off due to resolution. Subjective: (As above and below)     Chief Complaint   Patient presents with    Dysuria     she is a 80y.o. year old female who presents for evaluation. Reviewed PmHx, RxHx, FmHx, SocHx, AllgHx and updated in chart. Review of Systems - negative except as listed above    Objective:     Vitals:    05/06/19 1500   BP: 152/71   Pulse: 70   Resp: 16   Temp: 98 °F (36.7 °C)   TempSrc: Oral   SpO2: 95%   Weight: 168 lb (76.2 kg)   Height: 5' 5\" (1.651 m)     Physical Examination: General appearance - alert, well appearing, and in no distress  Mental status - normal mood, behavior, speech, dress, motor activity, and thought processes  Mouth - mucous membranes moist, pharynx normal without lesions  Chest - clear to auscultation, no wheezes, rales or rhonchi, symmetric air entry  Heart - normal rate, regular rhythm, normal S1, S2, no murmurs, rubs, clicks or gallops  Musculoskeletal - no joint tenderness, deformity or swelling  Extremities - peripheral pulses normal, no pedal edema, no clubbing or cyanosis    Assessment/ Plan:   1. Chronic UTI  -check culture, start on Cipro  - AMB POC URINALYSIS DIP STICK AUTO W/O MICRO    2. Controlled type 2 diabetes mellitus without complication, without long-term current use of insulin (HCC)  -currently diet controlled  -recheck in 3 months  - AMB POC URINE, MICROALBUMIN, SEMIQUANT (3 RESULTS)       I have discussed the diagnosis with the patient and the intended plan as seen in the above orders. The patient has received an after-visit summary and questions were answered concerning future plans.      Medication Side Effects and Warnings were discussed with patient: yes  Patient Labs were reviewed: yes  Patient Past Records were reviewed:  yes    Renetta Pickens M.D.

## 2019-05-09 LAB — BACTERIA UR CULT: ABNORMAL

## 2019-05-09 NOTE — PROGRESS NOTES
Please advise patient that bacteria was noted in urine, current antibiotic is effective. Please complete Cipro as written.

## 2019-06-01 RX ORDER — CEPHALEXIN 500 MG/1
500 CAPSULE ORAL 3 TIMES DAILY
Qty: 21 CAP | Refills: 0 | Status: SHIPPED | OUTPATIENT
Start: 2019-06-01 | End: 2019-06-08

## 2019-06-03 ENCOUNTER — TELEPHONE (OUTPATIENT)
Dept: FAMILY MEDICINE CLINIC | Age: 84
End: 2019-06-03

## 2019-06-03 NOTE — TELEPHONE ENCOUNTER
Talked to Ms. Janeth Goldsmith and Dr. Param Martin treated her for UTI symptoms over the weekend. I Told her to call back after she is done with antibiotic for a few days so that we can test her urine. But if she is still having symptoms of a UTI at that point she will need to see a provider.  She voiced understanding

## 2019-06-03 NOTE — TELEPHONE ENCOUNTER
Message from [facility]    Pt requesting a return call concerning a continued urinary tract infection. Pt would like a prescription sent over to the [facility] on file [phone].

## 2019-06-11 RX ORDER — LOVASTATIN 40 MG/1
TABLET ORAL
Qty: 90 TAB | Refills: 3 | Status: SHIPPED | OUTPATIENT
Start: 2019-06-11 | End: 2020-07-08

## 2019-06-11 NOTE — TELEPHONE ENCOUNTER
Requested Prescriptions     Pending Prescriptions Disp Refills    lovastatin (MEVACOR) 40 mg tablet 90 Tab 3       Requested by pharmacy.

## 2019-06-17 ENCOUNTER — OFFICE VISIT (OUTPATIENT)
Dept: CARDIOLOGY CLINIC | Age: 84
End: 2019-06-17

## 2019-06-17 DIAGNOSIS — I48.21 PERMANENT ATRIAL FIBRILLATION (HCC): ICD-10-CM

## 2019-06-17 DIAGNOSIS — Z95.0 CARDIAC PACEMAKER IN SITU: Primary | ICD-10-CM

## 2019-07-10 ENCOUNTER — OFFICE VISIT (OUTPATIENT)
Dept: CARDIOLOGY CLINIC | Age: 84
End: 2019-07-10

## 2019-07-10 VITALS
OXYGEN SATURATION: 94 % | HEIGHT: 65 IN | DIASTOLIC BLOOD PRESSURE: 70 MMHG | HEART RATE: 82 BPM | WEIGHT: 167.5 LBS | RESPIRATION RATE: 16 BRPM | SYSTOLIC BLOOD PRESSURE: 112 MMHG | BODY MASS INDEX: 27.91 KG/M2

## 2019-07-10 DIAGNOSIS — Z95.0 CARDIAC PACEMAKER IN SITU: ICD-10-CM

## 2019-07-10 DIAGNOSIS — I48.21 PERMANENT ATRIAL FIBRILLATION (HCC): Primary | ICD-10-CM

## 2019-07-10 DIAGNOSIS — I10 ESSENTIAL HYPERTENSION: ICD-10-CM

## 2019-07-10 DIAGNOSIS — E78.2 MIXED HYPERLIPIDEMIA: ICD-10-CM

## 2019-07-10 NOTE — PROGRESS NOTES
1. Have you been to the ER, urgent care clinic since your last visit? Hospitalized since your last visit? NO    2. Have you seen or consulted any other health care providers outside of the [address] [place] [address] since your last visit? Include any pap smears or colon screening. NO    6 MONTH FOLLOW UP. C/O EXTREME TIREDNESS, SOB, SWELLING OFF AND ON.

## 2019-07-10 NOTE — LETTER
7/10/19 Patient: Liana Ruiz YOB: 1932 Date of Visit: 7/10/2019 Bandar Noble MD 
[address] [facility] [address] [facility] [address] VIA In Basket Dear Lorraine García MD, Thank you for referring Ms. Deidre Shah to [address] [place] [address] for evaluation. My notes for this consultation are attached. If you have questions, please do not hesitate to call me. I look forward to following your patient along with you.  
 
 
Sincerely, 
 
Darice Meigs, MD

## 2019-07-10 NOTE — PROGRESS NOTES
Lakhwinder Lee, LINDSEY-BC    Subjective/HPI:     Ms. Lino Elmore is a 80 y.o. female is here for routine f/u. She has a PMHx of permanent AF s/p AV monica ablation with PPM, HTN and HLD. She notes no improvement in SOB symptoms despite PPM placement with AV monica ablation. She gets tired easily and winded going up stairs. She denies chest pain symptoms. She denies orthopnea, PND. She has minimal lower extremity edema which is controlled with Lasix. She denies palpitation symptoms or dizziness.     PCP Provider  Suellen Noble MD    Patient Active Problem List   Diagnosis Code    Restless leg syndrome G25.81    Hypothyroid E03.9    Hypertension I10    Arthritis M19.90    Hyperlipidemia E78.5    Venous (peripheral) insufficiency I87.2    Anemia D64.9    Chronic UTI N39.0    PAD (peripheral artery disease) ([facility]) I73.9    Osteoarthritis of hip M16.9    Hypercholesterolemia E78.00    Paroxysmal atrial fibrillation ([facility]) I48.0    Mitral regurgitation I34.0    Sigmoid diverticulosis K57.30    Acute diastolic CHF (congestive heart failure) ([facility]) I50.31    Atrial fibrillation ([facility]) I48.91     Past Surgical History:   Procedure Laterality Date    CARDIOVERSION, ELECTIVE  10/31/2018         CARDIOVERSION, ELECTIVE  2018         HX BREAST BIOPSY Bilateral     benign    HX CATARACT REMOVAL      bilateral    HX DILATION AND CURETTAGE      HX ORTHOPAEDIC      carpal tunnel release -bilateral    HX ROTATOR CUFF REPAIR Right      Family History   Problem Relation Age of Onset    Coronary Artery Disease Other         mother  of MI age 80, brother  age 72 of MI, sister has hear problems    Breast Cancer Sister         46s    Heart Attack Mother     Other Father         pneumonia    Alzheimer Brother     Cancer Sister     Cancer Sister     Heart Attack Brother     Suicide Brother      Social History     Socioeconomic History    Marital status:  Spouse name: Not on file    Number of children: Not on file    Years of education: Not on file    Highest education level: Not on file   Occupational History    Not on file   Social Needs    Financial resource strain: Not on file    Food insecurity:     Worry: Not on file     Inability: Not on file    Transportation needs:     Medical: Not on file     Non-medical: Not on file   Tobacco Use    Smoking status: Former Smoker     Packs/day: 1.00     Years: 15.00     Pack years: 15.00     Types: Cigarettes     Last attempt to quit: 1995     Years since quittin.5    Smokeless tobacco: Never Used   Substance and Sexual Activity    Alcohol use: Yes     Alcohol/week: 0.6 oz     Types: 1 Glasses of wine per week     Comment: Rare--maybe once a month    Drug use: No    Sexual activity: Not on file   Lifestyle    Physical activity:     Days per week: Not on file     Minutes per session: Not on file    Stress: Not on file   Relationships    Social connections:     Talks on phone: Not on file     Gets together: Not on file     Attends Ep[employer] service: Not on file     Active member of club or organization: Not on file     Attends meetings of clubs or organizations: Not on file     Relationship status: Not on file    Intimate partner violence:     Fear of current or ex partner: Not on file     Emotionally abused: Not on file     Physically abused: Not on file     Forced sexual activity: Not on file   Other Topics Concern    Not on file   Social History Narrative    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 Lives with .        Allergies   Allergen Reactions    Aspirin Other (comments)     Swelling shut of eyelids    Macrobid [Nitrofurantoin Monohyd/M-Cryst] Unknown (comments)    Pcn [Penicillins] Hives        Current Outpatient Medications   Medication Sig    lovastatin (MEVACOR) 40 mg tablet TAKE 1 TABLET EVERY EVENING TO LOWER CHOLESTEROL    omeprazole (PRILOSEC) 20 mg capsule TAKE 1 CAPSULE DAILY.    levothyroxine (SYNTHROID) 88 mcg tablet Take 1 Tab by mouth Daily (before breakfast).  timolol (TIMOPTIC) 0.5 % ophthalmic solution 1 Drop two (2) times a day.  apixaban (ELIQUIS) 2.5 mg tablet Take 1 Tab by mouth two (2) times a day.  furosemide (LASIX) 40 mg tablet Take 1 Tab by mouth daily.  acetaminophen/diphenhydramine (TYLENOL PM PO) Take 2 Tabs by mouth as needed.  cranberry fruit extract (CRANBERRY CONCENTRATE PO) Take  by mouth daily.  ACCU-CHEK MULTICLIX LANCET misc AS DIRECTED    latanoprost (XALATAN) 0.005 % ophthalmic solution Administer 1 Drop to both eyes nightly.  DOCUSATE CALCIUM (STOOL SOFTENER PO) Take  by mouth daily.  ascorbic acid (VITAMIN C) 500 mg tablet Take  by mouth daily.  cholecalciferol, vitamin d3, (VITAMIN D) 1,000 unit tablet Take  by mouth daily.  MULTIVITAMINS (MULTIVITAMIN PO) Take  by mouth daily. No current facility-administered medications for this visit. I have reviewed the problem list, allergy list, medical history, family, social history and medications. Review of Symptoms:    Review of Systems   Constitutional: Positive for malaise/fatigue. Negative for chills, fever and weight loss. HENT: Negative for nosebleeds. Eyes: Negative for blurred vision and double vision. Respiratory: Positive for shortness of breath. Negative for cough and wheezing. Cardiovascular: Negative for chest pain, palpitations, orthopnea, leg swelling and PND. Gastrointestinal: Negative for abdominal pain, blood in stool, diarrhea, nausea and vomiting. Musculoskeletal: Negative for joint pain. Skin: Negative for rash. Neurological: Negative for dizziness, tingling and loss of consciousness. Endo/Heme/Allergies: Does not bruise/bleed easily. Physical Exam:      General: Well developed, in no acute distress, cooperative and alert  HEENT: No carotid bruits, no JVD, trach is midline. Neck Supple, PEERL, EOM intact.   Heart: reg rate and rhythm; normal S1/S2; no murmurs, gallops or rubs. Respiratory: Clear bilaterally x 4, no wheezing or rales  Abdomen:   Soft, non-tender, no distention, no masses. + BS. Extremities:  Normal cap refill, no cyanosis, atraumatic. No edema. Neuro: A&Ox3, speech clear, gait stable. Skin: Skin color is normal. No rashes or lesions. Non diaphoretic  Vascular: 2+ pulses symmetric in all extremities    Vitals:    07/10/19 1315   BP: 112/70   Pulse: 82   Resp: 16   SpO2: 94%   Weight: 167 lb 8 oz (76 kg)   Height: 5' 5\" (1.651 m)       Cardiographics    ECG: V-paced  Results for orders placed or performed during the hospital encounter of 11/23/18   EKG, 12 LEAD, INITIAL   Result Value Ref Range    Ventricular Rate 54 BPM    Atrial Rate 54 BPM    P-R Interval 192 ms    QRS Duration 84 ms    Q-T Interval 516 ms    QTC Calculation (Bezet) 489 ms    Calculated P Axis 73 degrees    Calculated R Axis 37 degrees    Calculated T Axis 109 degrees    Diagnosis       Sinus bradycardia  Nonspecific T wave abnormality  Prolonged QT  Confirmed by Nathan Johnston ([id number]) on 11/28/2018 10:48:08 AM     Results for orders placed or performed in visit on 02/03/14   CARDIAC HOLTER MONITOR, 24 HOURS    Narrative    ECG Monitor/24 hours, Complete    Reason for Holter Monitor   PVC's    Heartbeat    Slowest 53  Average 71  Fastest  113    Results:   Underlying Rhythm: Normal sinus rhythm      Atrial Arrhythmias: premature atrial contractions; occasional and  a few short runs of PSVT    AV Conduction: normal    Ventricular Arrhythmias: premature ventricular contractions;  frequent and ventricular couplets and one ventricular triplet    ST Segment Analysis:non-specific changes     Symptom Correlation:  Shortness of breath corresponds to NSR with activity.     Comment:   Normal sinus rhythm with frequent PVC's occasional PAC's, a few  short runs of PSVT     Maria Esther Arvizu MD            Results for orders placed or performed in visit on 12/05/11   AMB POC EKG ROUTINE W/ 12 LEADS, INTER & REP    Impression    Normal sinus rhythm. Nonspecific ST wave changes in the lateral  chest leads. Cardiology Labs:  Lab Results   Component Value Date/Time    Cholesterol, total 182 04/25/2019 09:27 AM    HDL Cholesterol 75 04/25/2019 09:27 AM    LDL, calculated 83 04/25/2019 09:27 AM    Triglyceride 118 04/25/2019 09:27 AM       Lab Results   Component Value Date/Time    Sodium 144 04/25/2019 09:27 AM    Potassium 4.1 04/25/2019 09:27 AM    Chloride 102 04/25/2019 09:27 AM    CO2 27 04/25/2019 09:27 AM    Anion gap 6 11/27/2018 02:54 AM    Glucose 134 (H) 04/25/2019 09:27 AM    BUN 23 04/25/2019 09:27 AM    Creatinine 0.91 04/25/2019 09:27 AM    BUN/Creatinine ratio 25 04/25/2019 09:27 AM    GFR est AA 66 04/25/2019 09:27 AM    GFR est non-AA 57 (L) 04/25/2019 09:27 AM    Calcium 9.6 04/25/2019 09:27 AM    Bilirubin, total 0.4 04/25/2019 09:27 AM    AST (SGOT) 23 04/25/2019 09:27 AM    Alk. phosphatase 95 04/25/2019 09:27 AM    Protein, total 6.5 04/25/2019 09:27 AM    Albumin 4.2 04/25/2019 09:27 AM    Globulin 3.4 11/26/2018 03:53 AM    A-G Ratio 1.8 04/25/2019 09:27 AM    ALT (SGPT) 17 04/25/2019 09:27 AM        Orders Placed This Encounter    AMB POC EKG ROUTINE W/ 12 LEADS, INTER & REP     Order Specific Question:   Reason for Exam:     Answer:   pamela        Assessment:     Assessment:       ICD-10-CM ICD-9-CM    1. Permanent atrial fibrillation (HCC) I48.2 427.31 AMB POC EKG ROUTINE W/ 12 LEADS, INTER & REP      ECHO ADULT COMPLETE   2. Essential hypertension I10 401.9    3. Mixed hyperlipidemia E78.2 272.2    4. Cardiac pacemaker in situ Z95.0 V45.01         Plan:     1. Permanent atrial fibrillation (HCC)  S/p AV monica ablation  Still with some shortness of breath sypmtoms. Will obtain echocardiogram to r/o heart failure due to 100% RV pacing  If normal, consider pulmonary workup.   To see PCP in 2 weeks and recommended other labwork to evaluate for fatigue/shortness of breath symptoms at that time  Continue Eliquis therapy   Stress test normal in March 2017  - AMB POC EKG ROUTINE W/ 12 LEADS, INTER & REP    2. Essential hypertension  BP controlled. Continue anti-hypertensive therapy and low sodium diet    3. Mixed hyperlipidemia  Continue statin therapy and low fat, low cholesterol diet  Lipids managed by PCP    4. Cardiac pacemaker in situ  S/p AV monica ablation with PPM placement  Device interrogation remotely in 6/2019 with 99.5% /7.4% AP  Continue with routine device interrogation with Dr. Sujey Rush    5. Counseled on diet and exercise- eventual goal of 30-60 minutes 5-7 times a week as per AHA guidelines.        Follow-up in 1 year with me and with Dr. Sujey Rush as scheduled if echo is normal.    Karl Troncoso MD

## 2019-07-22 DIAGNOSIS — I34.0 NON-RHEUMATIC MITRAL REGURGITATION: Primary | ICD-10-CM

## 2019-07-22 RX ORDER — LISINOPRIL 2.5 MG/1
2.5 TABLET ORAL DAILY
Qty: 60 TAB | Refills: 1 | Status: SHIPPED | OUTPATIENT
Start: 2019-07-22 | End: 2019-07-23 | Stop reason: SDUPTHER

## 2019-07-23 ENCOUNTER — TELEPHONE (OUTPATIENT)
Dept: CARDIOLOGY CLINIC | Age: 84
End: 2019-07-23

## 2019-07-23 DIAGNOSIS — I34.0 NON-RHEUMATIC MITRAL REGURGITATION: ICD-10-CM

## 2019-07-23 NOTE — TELEPHONE ENCOUNTER
----- Message from Héctor Michelle NP sent at 7/22/2019  4:37 PM EDT -----  Gema Richards,    Please call the patient and inform that echocardiogram does not show any heart failure, ejection fraction is 55-60%. However, her mitral valve appears to have worsened, leaking more than last year's echo. I would like to treat this -- let's start a very small dose of Lisinopril 2.5 mg daily. Check labs in 2 weeks from starting med. She has pulmonary hypertension (high blood pressure in the lungs) -- this is most likely what is causing her shortness of breath. I think pulmonary workup would benefit her greatly. We can refer her if she'd like, or have her PCP obtain referral, if she already does not have a pulmonologist.  Rx e-scribed, labs ordered.     Thanks,  Moira Mendes

## 2019-07-23 NOTE — TELEPHONE ENCOUNTER
Patient informed labs slip mailed to [address] [place] [address]. [id number] patient information faxed to Dr Fernando Florence @ [phone]

## 2019-07-24 ENCOUNTER — TELEPHONE (OUTPATIENT)
Dept: CARDIOLOGY CLINIC | Age: 84
End: 2019-07-24

## 2019-07-24 RX ORDER — LISINOPRIL 2.5 MG/1
2.5 TABLET ORAL DAILY
Qty: 90 TAB | Refills: 1 | Status: SHIPPED | OUTPATIENT
Start: 2019-07-24 | End: 2020-01-03

## 2019-07-29 ENCOUNTER — TELEPHONE (OUTPATIENT)
Dept: CARDIOLOGY CLINIC | Age: 84
End: 2019-07-29

## 2019-07-30 ENCOUNTER — OFFICE VISIT (OUTPATIENT)
Dept: FAMILY MEDICINE CLINIC | Age: 84
End: 2019-07-30

## 2019-07-30 VITALS
RESPIRATION RATE: 18 BRPM | HEART RATE: 90 BPM | HEIGHT: 65 IN | DIASTOLIC BLOOD PRESSURE: 61 MMHG | BODY MASS INDEX: 28.16 KG/M2 | SYSTOLIC BLOOD PRESSURE: 119 MMHG | TEMPERATURE: 98 F | WEIGHT: 169 LBS | OXYGEN SATURATION: 95 %

## 2019-07-30 DIAGNOSIS — Z00.00 MEDICARE ANNUAL WELLNESS VISIT, SUBSEQUENT: ICD-10-CM

## 2019-07-30 DIAGNOSIS — R73.9 ELEVATED BLOOD SUGAR: ICD-10-CM

## 2019-07-30 DIAGNOSIS — Z13.39 SCREENING FOR ALCOHOLISM: ICD-10-CM

## 2019-07-30 DIAGNOSIS — N39.0 CHRONIC UTI: ICD-10-CM

## 2019-07-30 DIAGNOSIS — E78.2 MIXED HYPERLIPIDEMIA: ICD-10-CM

## 2019-07-30 DIAGNOSIS — I10 ESSENTIAL HYPERTENSION: ICD-10-CM

## 2019-07-30 DIAGNOSIS — E03.9 HYPOTHYROIDISM, UNSPECIFIED TYPE: Primary | ICD-10-CM

## 2019-07-30 DIAGNOSIS — I27.20 PULMONARY HTN (HCC): ICD-10-CM

## 2019-07-30 LAB
BILIRUB UR QL STRIP: NEGATIVE
GLUCOSE UR-MCNC: NEGATIVE MG/DL
KETONES P FAST UR STRIP-MCNC: NEGATIVE MG/DL
PH UR STRIP: 6.5 [PH] (ref 4.6–8)
PROT UR QL STRIP: NEGATIVE
SP GR UR STRIP: 1.01 (ref 1–1.03)
UA UROBILINOGEN AMB POC: NORMAL (ref 0.2–1)
URINALYSIS CLARITY POC: CLEAR
URINALYSIS COLOR POC: YELLOW
URINE BLOOD POC: NEGATIVE
URINE LEUKOCYTES POC: NORMAL
URINE NITRITES POC: NEGATIVE

## 2019-07-30 NOTE — PROGRESS NOTES
Chief Complaint   Patient presents with   [place] Maintenance reviewed     1. Have you been to the ER, urgent care clinic since your last visit? Hospitalized since your last visit? No    2. Have you seen or consulted any other health care providers outside of the [address] [place] [address] since your last visit? Include any pap smears or colon screening.  No

## 2019-07-30 NOTE — PATIENT INSTRUCTIONS
Medicare Wellness Visit, Female     The best way to live healthy is to have a lifestyle where you eat a well-balanced diet, exercise regularly, limit alcohol use, and quit all forms of tobacco/nicotine, if applicable. Regular preventive services are another way to keep healthy. Preventive services (vaccines, screening tests, monitoring & exams) can help personalize your care plan, which helps you manage your own care. Screening tests can find health problems at the earliest stages, when they are easiest to treat. [facility] follows the current, evidence-based guidelines published by the [facility] Griselda ([facility]) when recommending preventive services for our patients. Because we follow these guidelines, sometimes recommendations change over time as research supports it. (For example, mammograms used to be recommended annually. Even though Medicare will still pay for an annual mammogram, the newer guidelines recommend a mammogram every two years for women of average risk.)  Of course, you and your doctor may decide to screen more often for some diseases, based on your risk and your health status. Preventive services for you include:  - Medicare offers their members a free annual wellness visit, which is time for you and your primary care provider to discuss and plan for your preventive service needs. Take advantage of this benefit every year!  -All adults over the age of 72 should receive the recommended pneumonia vaccines. Current USPSTF guidelines recommend a series of two vaccines for the best pneumonia protection.   -All adults should have a flu vaccine yearly and a tetanus vaccine every 10 years. All adults age 61 and older should receive a shingles vaccine once in their lifetime.    -A bone mass density test is recommended when a woman turns 65 to screen for osteoporosis. This test is only recommended one time, as a screening.  Some providers will use this same test as a disease monitoring tool if you already have osteoporosis. -All adults age 38-68 who are overweight should have a diabetes screening test once every three years.   -Other screening tests and preventive services for persons with diabetes include: an eye exam to screen for diabetic retinopathy, a kidney function test, a foot exam, and stricter control over your cholesterol.   -Cardiovascular screening for adults with routine risk involves an electrocardiogram (ECG) at intervals determined by your doctor.   -Colorectal cancer screenings should be done for adults age 54-65 with no increased risk factors for colorectal cancer. There are a number of acceptable methods of screening for this type of cancer. Each test has its own benefits and drawbacks. Discuss with your doctor what is most appropriate for you during your annual wellness visit. The different tests include: colonoscopy (considered the best screening method), a fecal occult blood test, a fecal DNA test, and sigmoidoscopy. -Breast cancer screenings are recommended every other year for women of normal risk, age 54-69.  -Cervical cancer screenings for women over age 72 are only recommended with certain risk factors.   -All adults born between [facility] should be screened once for Hepatitis C. Here is a list of your current Health Maintenance items (your personalized list of preventive services) with a due date: There are no preventive care reminders to display for this patient.

## 2019-07-30 NOTE — PROGRESS NOTES
Chief Complaint   Patient presents with    Annual Wellness Visit     Pt saw her cardiologist recently, Dr. Cammie Winslow, and was diagnosed with a leaky valve. Pt was started on medication to treat this finding, lisinopril. Subjective: (As above and below)     Chief Complaint   Patient presents with    Annual Wellness Visit     she is a 80y.o. year old female who presents for evaluation. Reviewed PmHx, RxHx, FmHx, SocHx, AllgHx and updated in chart. Review of Systems - negative except as listed above    Objective:     Vitals:    07/30/19 1022   BP: 146/70   Pulse: 90   Resp: 18   Temp: 98 °F (36.7 °C)   TempSrc: Oral   SpO2: 95%   Weight: 169 lb (76.7 kg)   Height: 5' 5\" (1.651 m)     Physical Examination: General appearance - alert, well appearing, and in no distress  Mental status - normal mood, behavior, speech, dress, motor activity, and thought processes  Mouth - mucous membranes moist, pharynx normal without lesions  Chest - clear to auscultation, no wheezes, rales or rhonchi, symmetric air entry  Heart - systolic murmur 2/6 at lower left sternal border  Musculoskeletal - no joint tenderness, deformity or swelling  Extremities - peripheral pulses normal, no pedal edema, no clubbing or cyanosis    Assessment/ Plan:   1. Hypothyroidism, unspecified type  -continue on synthroid   - TSH 3RD GENERATION    2. Essential hypertension  -initially elevated  - CBC W/O DIFF    3. Mixed hyperlipidemia  -check fasting labs  - METABOLIC PANEL, COMPREHENSIVE  - LIPID PANEL    4. Pulmonary HTN ([address]  -follow up with pulm as recommended by cardiology    5. Elevated blood sugar  - HEMOGLOBIN A1C WITH EAG    6. Chronic UTI  -check UA today  - AMB POC URINALYSIS DIP STICK AUTO W/O MICRO       I have discussed the diagnosis with the patient and the intended plan as seen in the above orders. The patient has received an after-visit summary and questions were answered concerning future plans.      Medication Side Effects and Warnings were discussed with patient: yes  Patient Labs were reviewed: yes  Patient Past Records were reviewed:  yes    Latrice Bosch M.D. This is the Subsequent Medicare Annual Wellness Exam, performed 12 months or more after the Initial AWV or the last Subsequent AWV    I have reviewed the patient's medical history in detail and updated the computerized patient record. History     Past Medical History:   Diagnosis Date    Arthritis     hands/low back    Diabetes ([address]     borderline    GERD (gastroesophageal reflux disease)     Glaucoma     Hypercholesterolemia     Hypertension     Ill-defined condition     borderline anemia    Ill-defined condition     bladder infections    Impaired glucose tolerance     Menopause     Thyroid disease     hypothyroidism      Past Surgical History:   Procedure Laterality Date    CARDIOVERSION, ELECTIVE  10/31/2018         CARDIOVERSION, ELECTIVE  11/28/2018         HX BREAST BIOPSY Bilateral 1990    benign    HX CATARACT REMOVAL      bilateral    HX DILATION AND CURETTAGE      HX ORTHOPAEDIC      carpal tunnel release -bilateral    HX ROTATOR CUFF REPAIR Right      Current Outpatient Medications   Medication Sig Dispense Refill    lisinopril (PRINIVIL, ZESTRIL) 2.5 mg tablet Take 1 Tab by mouth daily. 90 Tab 1    lovastatin (MEVACOR) 40 mg tablet TAKE 1 TABLET EVERY EVENING TO LOWER CHOLESTEROL 90 Tab 3    omeprazole (PRILOSEC) 20 mg capsule TAKE 1 CAPSULE DAILY. 90 Cap 3    levothyroxine (SYNTHROID) 88 mcg tablet Take 1 Tab by mouth Daily (before breakfast). 90 Tab 3    timolol (TIMOPTIC) 0.5 % ophthalmic solution 1 Drop two (2) times a day.  apixaban (ELIQUIS) 2.5 mg tablet Take 1 Tab by mouth two (2) times a day. 60 Tab 12    furosemide (LASIX) 40 mg tablet Take 1 Tab by mouth daily. 90 Tab 2    acetaminophen/diphenhydramine (TYLENOL PM PO) Take 2 Tabs by mouth as needed.       cranberry fruit extract (CRANBERRY CONCENTRATE PO) Take  by mouth daily.  ACCU-CHEK MULTICLIX LANCET mis AS DIRECTED 100 Each PRN    latanoprost (XALATAN) 0.005 % ophthalmic solution Administer 1 Drop to both eyes nightly.  DOCUSATE CALCIUM (STOOL SOFTENER PO) Take  by mouth daily.  ascorbic acid (VITAMIN C) 500 mg tablet Take  by mouth daily.  cholecalciferol, vitamin d3, (VITAMIN D) 1,000 unit tablet Take  by mouth daily.  MULTIVITAMINS (MULTIVITAMIN PO) Take  by mouth daily. Allergies   Allergen Reactions    Aspirin Other (comments)     Swelling shut of eyelids    Macrobid [Nitrofurantoin Monohyd/M-Cryst] Unknown (comments)    Pcn [Penicillins] Hives     Family History   Problem Relation Age of Onset    Coronary Artery Disease Other         mother  of MI age 80, brother  age 72 of MI, sister has hear problems    Breast Cancer Sister         46s    Heart Attack Mother     Other Father         pneumonia    Alzheimer Brother     Cancer Sister     Cancer Sister     Heart Attack Brother     Suicide Brother      Social History     Tobacco Use    Smoking status: Former Smoker     Packs/day: 1.00     Years: 15.00     Pack years: 15.00     Types: Cigarettes     Last attempt to quit: 1995     Years since quittin.5    Smokeless tobacco: Never Used   Substance Use Topics    Alcohol use:  Yes     Alcohol/week: 1.0 standard drinks     Types: 1 Glasses of wine per week     Comment: Rare--maybe once a month     Patient Active Problem List   Diagnosis Code    Restless leg syndrome G25.81    Hypothyroid E03.9    Hypertension I10    Arthritis M19.90    Hyperlipidemia E78.5    Venous (peripheral) insufficiency I87.2    Anemia D64.9    Chronic UTI N39.0    PAD (peripheral artery disease) (HCC) I73.9    Osteoarthritis of hip M16.9    Hypercholesterolemia E78.00    Paroxysmal atrial fibrillation (HCC) I48.0    Mitral regurgitation I34.0    Sigmoid diverticulosis K57.30    Acute diastolic CHF (congestive heart failure) [facility] I50.31    Atrial fibrillation (HCC) I48.91       Depression Risk Factor Screening:     3 most recent PHQ Screens 7/10/2019   Little interest or pleasure in doing things Not at all   Feeling down, depressed, irritable, or hopeless Not at all   Total Score PHQ 2 0     Alcohol Risk Factor Screening: You do not drink alcohol or very rarely. Functional Ability and Level of Safety:   Hearing Loss  The patient needs further evaluation. Pt was told that she needs hearing aids, but would like to hold off. Activities of Daily Living  The home contains: handrails and stair lifts, cane, walker, ramps. Pt currently does not need to use any of the above lsited stefano  daily basis. Patient does total self care    Fall Risk  Fall Risk Assessment, last 12 mths 7/10/2019   Able to walk? Yes   Fall in past 12 months? -   Fall with injury? -   Number of falls in past 12 months -   Fall Risk Score -       Abuse Screen  Patient is not abused    Cognitive Screening   Evaluation of Cognitive Function:  Has your family/caregiver stated any concerns about your memory: no  Normal    Patient Care Team   Patient Care Team:  Thais Ramirez MD as PCP - General (Family Practice)  Ben Figueredo MD as Physician (Cardiology)  Caryn Rivera MD as Physician (Cardiology)  Lakisha Goldberg MD as Physician (Cardiology)  Lakisha Goldberg MD as Physician (Cardiology)    Assessment/Plan   Education and counseling provided:  Are appropriate based on today's review and evaluation    Diagnoses and all orders for this visit:    1. Hypothyroidism, unspecified type  -     TSH 3RD GENERATION    2. Essential hypertension  -     CBC W/O DIFF    3. Mixed hyperlipidemia  -     METABOLIC PANEL, COMPREHENSIVE  -     LIPID PANEL    4. Pulmonary HTN (HCC)    5. Elevated blood sugar  -     HEMOGLOBIN A1C WITH EAG    6. Chronic UTI  -     AMB POC URINALYSIS DIP STICK AUTO W/O MICRO    7.  Medicare annual wellness visit, subsequent    8. Screening for alcoholism  -     MS ANNUAL ALCOHOL SCREEN 15 MIN        There are no preventive care reminders to display for this patient.

## 2019-07-31 LAB
ALBUMIN SERPL-MCNC: 4.6 G/DL (ref 3.5–4.7)
ALBUMIN/GLOB SERPL: 2.1 {RATIO} (ref 1.2–2.2)
ALP SERPL-CCNC: 90 IU/L (ref 39–117)
ALT SERPL-CCNC: 13 IU/L (ref 0–32)
AST SERPL-CCNC: 20 IU/L (ref 0–40)
BILIRUB SERPL-MCNC: 0.5 MG/DL (ref 0–1.2)
BUN SERPL-MCNC: 27 MG/DL (ref 8–27)
BUN/CREAT SERPL: 22 (ref 12–28)
CALCIUM SERPL-MCNC: 9.7 MG/DL (ref 8.7–10.3)
CHLORIDE SERPL-SCNC: 98 MMOL/L (ref 96–106)
CHOLEST SERPL-MCNC: 197 MG/DL (ref 100–199)
CO2 SERPL-SCNC: 27 MMOL/L (ref 20–29)
CREAT SERPL-MCNC: 1.24 MG/DL (ref 0.57–1)
ERYTHROCYTE [DISTWIDTH] IN BLOOD BY AUTOMATED COUNT: 12.5 % (ref 12.3–15.4)
EST. AVERAGE GLUCOSE BLD GHB EST-MCNC: 146 MG/DL
GLOBULIN SER CALC-MCNC: 2.2 G/DL (ref 1.5–4.5)
GLUCOSE SERPL-MCNC: 192 MG/DL (ref 65–99)
HBA1C MFR BLD: 6.7 % (ref 4.8–5.6)
HCT VFR BLD AUTO: 39.2 % (ref 34–46.6)
HDLC SERPL-MCNC: 69 MG/DL
HGB BLD-MCNC: 12.8 G/DL (ref 11.1–15.9)
INTERPRETATION, 910389: NORMAL
INTERPRETATION: NORMAL
LDLC SERPL CALC-MCNC: 97 MG/DL (ref 0–99)
Lab: NORMAL
MCH RBC QN AUTO: 30.6 PG (ref 26.6–33)
MCHC RBC AUTO-ENTMCNC: 32.7 G/DL (ref 31.5–35.7)
MCV RBC AUTO: 94 FL (ref 79–97)
PDF IMAGE, 910387: NORMAL
PLATELET # BLD AUTO: 194 X10E3/UL (ref 150–450)
POTASSIUM SERPL-SCNC: 4.2 MMOL/L (ref 3.5–5.2)
PROT SERPL-MCNC: 6.8 G/DL (ref 6–8.5)
RBC # BLD AUTO: 4.18 X10E6/UL (ref 3.77–5.28)
SODIUM SERPL-SCNC: 141 MMOL/L (ref 134–144)
TRIGL SERPL-MCNC: 154 MG/DL (ref 0–149)
TSH SERPL DL<=0.005 MIU/L-ACNC: 0.47 UIU/ML (ref 0.45–4.5)
VLDLC SERPL CALC-MCNC: 31 MG/DL (ref 5–40)
WBC # BLD AUTO: 7.2 X10E3/UL (ref 3.4–10.8)

## 2019-07-31 NOTE — PROGRESS NOTES
Diabetes is well controlled  Kidney function is slightly increased, please recheck in 4-6 weeks. All other labs are within normal limits.    Please inform

## 2019-08-01 LAB — BACTERIA UR CULT: ABNORMAL

## 2019-08-01 NOTE — PROGRESS NOTES
Contacted pt and verified name and . Informed pt of results, pt verbalized understanding, no questions at this time. Scheduled pt to come in for labs on 2019. Please order future labs.      Thanks

## 2019-08-05 DIAGNOSIS — I34.0 NON-RHEUMATIC MITRAL REGURGITATION: ICD-10-CM

## 2019-08-05 RX ORDER — CIPROFLOXACIN 500 MG/1
500 TABLET ORAL 2 TIMES DAILY
Qty: 20 TAB | Refills: 0 | Status: SHIPPED | OUTPATIENT
Start: 2019-08-05 | End: 2019-08-15

## 2019-08-06 ENCOUNTER — TELEPHONE (OUTPATIENT)
Dept: FAMILY MEDICINE CLINIC | Age: 84
End: 2019-08-06

## 2019-08-06 ENCOUNTER — TELEPHONE (OUTPATIENT)
Dept: CARDIOLOGY CLINIC | Age: 84
End: 2019-08-06

## 2019-08-06 NOTE — TELEPHONE ENCOUNTER
Patient concerned with using Cipro and taking her other medications for UTI recommend she take the antibiotic as requested the pharmacy will check with any interactions.

## 2019-08-06 NOTE — TELEPHONE ENCOUNTER
Patient have a question about new medication and doesn't know that name, but want you to please return her call.           Thanks

## 2019-08-09 ENCOUNTER — HOSPITAL ENCOUNTER (OUTPATIENT)
Dept: GENERAL RADIOLOGY | Age: 84
Discharge: HOME | End: 2019-08-09
Payer: MEDICARE

## 2019-08-09 DIAGNOSIS — I27.0 PRIMARY PULMONARY HYPERTENSION (PPH) (HCC): ICD-10-CM

## 2019-08-09 PROCEDURE — 71046 X-RAY EXAM CHEST 2 VIEWS: CPT

## 2019-08-10 LAB
BUN SERPL-MCNC: 29 MG/DL (ref 8–27)
BUN/CREAT SERPL: 22 (ref 12–28)
CALCIUM SERPL-MCNC: 9.7 MG/DL (ref 8.7–10.3)
CHLORIDE SERPL-SCNC: 101 MMOL/L (ref 96–106)
CO2 SERPL-SCNC: 25 MMOL/L (ref 20–29)
CREAT SERPL-MCNC: 1.34 MG/DL (ref 0.57–1)
GLUCOSE SERPL-MCNC: 130 MG/DL (ref 65–99)
INTERPRETATION: NORMAL
POTASSIUM SERPL-SCNC: 4.4 MMOL/L (ref 3.5–5.2)
SODIUM SERPL-SCNC: 142 MMOL/L (ref 134–144)

## 2019-08-12 NOTE — PROGRESS NOTES
Camelia,    Please call the patient and inform that her labs look stable. Continue with present medications, no changes.     Thanks,  [employer]

## 2019-08-13 NOTE — PROGRESS NOTES
Verified patient with 2 identifiers   Spoke with patient regarding results and recommendations. Voiced understanding.

## 2019-09-05 ENCOUNTER — LAB ONLY (OUTPATIENT)
Dept: FAMILY MEDICINE CLINIC | Age: 84
End: 2019-09-05

## 2019-09-05 DIAGNOSIS — I10 ESSENTIAL HYPERTENSION: Primary | ICD-10-CM

## 2019-09-06 LAB
BUN SERPL-MCNC: 28 MG/DL (ref 8–27)
BUN/CREAT SERPL: 24 (ref 12–28)
CALCIUM SERPL-MCNC: 9.6 MG/DL (ref 8.7–10.3)
CHLORIDE SERPL-SCNC: 101 MMOL/L (ref 96–106)
CO2 SERPL-SCNC: 25 MMOL/L (ref 20–29)
CREAT SERPL-MCNC: 1.19 MG/DL (ref 0.57–1)
GLUCOSE SERPL-MCNC: 138 MG/DL (ref 65–99)
INTERPRETATION: NORMAL
POTASSIUM SERPL-SCNC: 4.5 MMOL/L (ref 3.5–5.2)
SODIUM SERPL-SCNC: 145 MMOL/L (ref 134–144)

## 2019-09-10 ENCOUNTER — TELEPHONE (OUTPATIENT)
Dept: FAMILY MEDICINE CLINIC | Age: 84
End: 2019-09-10

## 2019-09-10 RX ORDER — CYCLOBENZAPRINE HCL 10 MG
10 TABLET ORAL
Qty: 60 TAB | Refills: 2 | Status: SHIPPED | OUTPATIENT
Start: 2019-09-10 | End: 2020-10-05

## 2019-09-12 RX ORDER — FUROSEMIDE 40 MG/1
TABLET ORAL
Qty: 90 TAB | Refills: 0 | Status: SHIPPED | OUTPATIENT
Start: 2019-09-12 | End: 2020-04-06

## 2019-09-13 RX ORDER — FUROSEMIDE 40 MG/1
TABLET ORAL
Qty: 90 TAB | Refills: 1 | OUTPATIENT
Start: 2019-09-13

## 2019-09-16 ENCOUNTER — OFFICE VISIT (OUTPATIENT)
Dept: CARDIOLOGY CLINIC | Age: 84
End: 2019-09-16

## 2019-09-16 DIAGNOSIS — I48.0 PAROXYSMAL ATRIAL FIBRILLATION (HCC): ICD-10-CM

## 2019-09-16 DIAGNOSIS — Z95.0 CARDIAC PACEMAKER IN SITU: Primary | ICD-10-CM

## 2019-09-19 ENCOUNTER — CLINICAL SUPPORT (OUTPATIENT)
Dept: FAMILY MEDICINE CLINIC | Age: 84
End: 2019-09-19

## 2019-09-19 DIAGNOSIS — Z23 ENCOUNTER FOR IMMUNIZATION: ICD-10-CM

## 2019-09-19 NOTE — PROGRESS NOTES
Gautam Huerta is a 80 y.o. female who presents for routine immunizations. She denies any symptoms , reactions or allergies that would exclude them from being immunized today. Risks and adverse reactions were discussed and the VIS was given to them. All questions were addressed. She was observed for 10 min post injection. There were no reactions observed.     Lorie Christopher, LÁZARO

## 2019-09-19 NOTE — PATIENT INSTRUCTIONS
Vaccine Information Statement    Influenza (Flu) Vaccine (Inactivated or Recombinant): What You Need to Know    Many Vaccine Information Statements are available in [place] and other languages. See www.immunize.org/vis  Hojas de información sobre vacunas están disponibles en español y en muchos otros idiomas. Visite www.immunize.org/vis    1. Why get vaccinated? Influenza vaccine can prevent influenza (flu). Flu is a contagious disease that spreads around the [facility] every year, usually between October and May. Anyone can get the flu, but it is more dangerous for some people. Infants and young children, people 72years of age and older, pregnant women, and people with certain health conditions or a weakened immune system are at greatest risk of flu complications. Pneumonia, bronchitis, sinus infections and ear infections are examples of flu-related complications. If you have a medical condition, such as heart disease, cancer or diabetes, flu can make it worse. Flu can cause fever and chills, sore throat, muscle aches, fatigue, cough, headache, and runny or stuffy nose. Some people may have vomiting and diarrhea, though this is more common in children than adults. Each year thousands of people in the [facility] die from flu, and many more are hospitalized. Flu vaccine prevents millions of illnesses and flu-related visits to the doctor each year. 2. Influenza vaccines     CDC recommends everyone 10months of age and older get vaccinated every flu season. Children 6 months through 6years of age may need 2 doses during a single flu season. Everyone else needs only 1 dose each flu season. It takes about 2 weeks for protection to develop after vaccination. There are many flu viruses, and they are always changing. Each year a new flu vaccine is made to protect against three or four viruses that are likely to cause disease in the upcoming flu season.  Even when the vaccine doesnt exactly match these viruses, it may still provide some protection. Influenza vaccine does not cause flu. Influenza vaccine may be given at the same time as other vaccines. 3. Talk with your health care provider    Tell your vaccine provider if the person getting the vaccine:   Has had an allergic reaction after a previous dose of influenza vaccine, or has any severe, life-threatening allergies.  Has ever had Guillain-Barré Syndrome (also called GBS). In some cases, your health care provider may decide to postpone influenza vaccination to a future visit. People with minor illnesses, such as a cold, may be vaccinated. People who are moderately or severely ill should usually wait until they recover before getting influenza vaccine. Your health care provider can give you more information. 4. Risks of a reaction     Soreness, redness, and swelling where shot is given, fever, muscle aches, and headache can happen after influenza vaccine.  There may be a very small increased risk of Guillain-Barré Syndrome (GBS) after inactivated influenza vaccine (the flu shot). Stephanie Ospina children who get the flu shot along with pneumococcal vaccine (PCV13), and/or DTaP vaccine at the same time might be slightly more likely to have a seizure caused by fever. Tell your health care provider if a child who is getting flu vaccine has ever had a seizure. People sometimes faint after medical procedures, including vaccination. Tell your provider if you feel dizzy or have vision changes or ringing in the ears. As with any medicine, there is a very remote chance of a vaccine causing a severe allergic reaction, other serious injury, or death. 5. What if there is a serious problem? An allergic reaction could occur after the vaccinated person leaves the clinic.  If you see signs of a severe allergic reaction (hives, swelling of the face and throat, difficulty breathing, a fast heartbeat, dizziness, or weakness), call 9-1-1 and get the person to the nearest hospital.    For other signs that concern you, call your health care provider. Adverse reactions should be reported to the Vaccine Adverse Event Reporting System (VAERS). Your health care provider will usually file this report, or you can do it yourself. Visit the VAERS website at www.vaers. [facility].gov or call 8-487.760.3726. VAERS is only for reporting reactions, and VAERS staff do not give medical advice. 6. The National Vaccine Injury Compensation Program    The [facility] Vaccine Injury Compensation Program (VICP) is a federal program that was created to compensate people who may have been injured by certain vaccines. Visit the VICP website at www.[facility].gov/vaccinecompensation or call 7-793.317.3396 to learn about the program and about filing a claim. There is a time limit to file a claim for compensation. 7. How can I learn more?  Ask your health care provider.  Call your local or state health department.  Contact the Centers for Disease Control and Prevention (CDC):  - Call 5-710.951.6563 (1-800-CDC-INFO) or  - Visit CDCs influenza website at www.cdc.gov/flu    Vaccine Information Statement (Interim)  Inactivated Influenza Vaccine   8/15/2019  42 JIGNESH Lugo [id number]   Department of Health and Human Services  Centers for Disease Control and Prevention    Office Use Only

## 2019-10-15 ENCOUNTER — TELEPHONE (OUTPATIENT)
Dept: FAMILY MEDICINE CLINIC | Age: 84
End: 2019-10-15

## 2019-12-23 ENCOUNTER — OFFICE VISIT (OUTPATIENT)
Dept: CARDIOLOGY CLINIC | Age: 84
End: 2019-12-23

## 2019-12-23 DIAGNOSIS — Z95.0 CARDIAC PACEMAKER IN SITU: Primary | ICD-10-CM

## 2019-12-23 DIAGNOSIS — I48.21 PERMANENT ATRIAL FIBRILLATION (HCC): ICD-10-CM

## 2020-01-03 DIAGNOSIS — I34.0 NON-RHEUMATIC MITRAL REGURGITATION: ICD-10-CM

## 2020-01-03 RX ORDER — LISINOPRIL 2.5 MG/1
TABLET ORAL
Qty: 60 TAB | Refills: 0 | Status: SHIPPED | OUTPATIENT
Start: 2020-01-03 | End: 2020-03-20

## 2020-01-08 ENCOUNTER — HOSPITAL ENCOUNTER (OUTPATIENT)
Dept: MAMMOGRAPHY | Age: 85
Discharge: HOME | End: 2020-01-08
Attending: FAMILY MEDICINE
Payer: MEDICARE

## 2020-01-08 DIAGNOSIS — Z12.31 VISIT FOR SCREENING MAMMOGRAM: ICD-10-CM

## 2020-01-08 PROCEDURE — 77067 SCR MAMMO BI INCL CAD: CPT

## 2020-03-20 DIAGNOSIS — I34.0 NON-RHEUMATIC MITRAL REGURGITATION: ICD-10-CM

## 2020-03-20 RX ORDER — LISINOPRIL 2.5 MG/1
TABLET ORAL
Qty: 60 TAB | Refills: 0 | Status: SHIPPED | OUTPATIENT
Start: 2020-03-20 | End: 2020-04-06

## 2020-04-06 DIAGNOSIS — I34.0 NON-RHEUMATIC MITRAL REGURGITATION: ICD-10-CM

## 2020-04-06 RX ORDER — LISINOPRIL 2.5 MG/1
TABLET ORAL
Qty: 60 TAB | Refills: 0 | Status: SHIPPED | OUTPATIENT
Start: 2020-04-06 | End: 2020-07-06

## 2020-04-06 RX ORDER — OMEPRAZOLE 20 MG/1
CAPSULE, DELAYED RELEASE ORAL
Qty: 90 CAP | Refills: 3 | Status: SHIPPED | OUTPATIENT
Start: 2020-04-06 | End: 2021-07-19

## 2020-04-06 RX ORDER — LEVOTHYROXINE SODIUM 88 UG/1
88 TABLET ORAL
Qty: 90 TAB | Refills: 3 | Status: SHIPPED | OUTPATIENT
Start: 2020-04-06 | End: 2021-04-24

## 2020-04-06 RX ORDER — FUROSEMIDE 40 MG/1
TABLET ORAL
Qty: 90 TAB | Refills: 0 | Status: SHIPPED | OUTPATIENT
Start: 2020-04-06 | End: 2020-07-06

## 2020-04-06 RX ORDER — APIXABAN 2.5 MG/1
TABLET, FILM COATED ORAL
Qty: 180 TAB | Refills: 0 | Status: SHIPPED | OUTPATIENT
Start: 2020-04-06 | End: 2020-07-07

## 2020-04-06 NOTE — TELEPHONE ENCOUNTER
[address] is requesting a refill on med    Requested Prescriptions     Pending Prescriptions Disp Refills    levothyroxine (SYNTHROID) 88 mcg tablet 90 Tab 3     Sig: Take 1 Tab by mouth Daily (before breakfast).  omeprazole (PRILOSEC) 20 mg capsule 90 Cap 3     Sig: TAKE 1 CAPSULE DAILY.

## 2020-06-24 ENCOUNTER — TELEPHONE (OUTPATIENT)
Dept: FAMILY MEDICINE CLINIC | Age: 85
End: 2020-06-24

## 2020-06-24 DIAGNOSIS — L60.8 TOENAIL DEFORMITY: Primary | ICD-10-CM

## 2020-06-24 NOTE — TELEPHONE ENCOUNTER
Called pt, verified name and . Informed pt that per Dr. Zari Cuevas Referral written. Pt stated understanding.

## 2020-06-24 NOTE — TELEPHONE ENCOUNTER
Patient is requesting for a referral for a foot doctor. She is having a problem with her toenail.  She would like a doctor near [place] since she lives in [facility].     If approved please send info to Giovanni Escobar for the referral.     Cell [phone]

## 2020-07-08 RX ORDER — LOVASTATIN 40 MG/1
TABLET ORAL
Qty: 90 TAB | Refills: 0 | Status: SHIPPED | OUTPATIENT
Start: 2020-07-08 | End: 2020-10-01

## 2020-07-22 ENCOUNTER — TELEPHONE (OUTPATIENT)
Dept: CARDIOLOGY CLINIC | Age: 85
End: 2020-07-22

## 2020-07-22 ENCOUNTER — OFFICE VISIT (OUTPATIENT)
Dept: CARDIOLOGY CLINIC | Age: 85
End: 2020-07-22

## 2020-07-22 VITALS
OXYGEN SATURATION: 96 % | DIASTOLIC BLOOD PRESSURE: 78 MMHG | BODY MASS INDEX: 26.29 KG/M2 | SYSTOLIC BLOOD PRESSURE: 170 MMHG | RESPIRATION RATE: 18 BRPM | WEIGHT: 157.8 LBS | HEART RATE: 66 BPM | HEIGHT: 65 IN

## 2020-07-22 DIAGNOSIS — I10 ESSENTIAL HYPERTENSION: ICD-10-CM

## 2020-07-22 DIAGNOSIS — Z95.0 CARDIAC PACEMAKER IN SITU: ICD-10-CM

## 2020-07-22 DIAGNOSIS — I48.21 PERMANENT ATRIAL FIBRILLATION (HCC): Primary | ICD-10-CM

## 2020-07-22 DIAGNOSIS — E78.00 HYPERCHOLESTEROLEMIA: ICD-10-CM

## 2020-07-22 DIAGNOSIS — I34.0 NON-RHEUMATIC MITRAL REGURGITATION: ICD-10-CM

## 2020-07-22 NOTE — PROGRESS NOTES
Subjective/HPI:     Ms. Darryl Penaloza is a 80 y.o. female is here for routine f/u. She has a PMHx of permanent AF s/p AV monica ablation with PPM, mod-severe MR, HTN and HLD. She reports she has her baseline ROWE. She was seen by Dr Nancy Mena in September and had PFTs completed. She is unsure of the results. She has occasional heart flutterings. She denies CP, orthopnea, PND, edema, lightheadedness or syncope.        PCP Provider  Felix Noble MD    Patient Active Problem List   Diagnosis Code    Restless leg syndrome G25.81    Hypothyroid E03.9    Hypertension I10    Arthritis M19.90    Hyperlipidemia E78.5    Venous (peripheral) insufficiency I87.2    Anemia D64.9    Chronic UTI N39.0    PAD (peripheral artery disease) ([facility] [address] I73.9    Osteoarthritis of hip M16.9    Hypercholesterolemia E78.00    Paroxysmal atrial fibrillation (HCC) I48.0    Mitral regurgitation I34.0    Sigmoid diverticulosis K57.30    Acute diastolic CHF (congestive heart failure) (HCC) I50.31    Atrial fibrillation ([facility] [address] I48.91     Past Surgical History:   Procedure Laterality Date    CARDIOVERSION, ELECTIVE  10/31/2018         CARDIOVERSION, ELECTIVE  2018         HX BREAST BIOPSY Bilateral     benign    HX CATARACT REMOVAL      bilateral    HX DILATION AND CURETTAGE      HX ORTHOPAEDIC      carpal tunnel release -bilateral    HX ROTATOR CUFF REPAIR Right      Family History   Problem Relation Age of Onset    Coronary Artery Disease Other         mother  of MI age 80, brother  age 72 of MI, sister has hear problems    Breast Cancer Sister         46s    Heart Attack Mother     Other Father         pneumonia    Alzheimer Brother     Cancer Sister     Cancer Sister     Heart Attack Brother     Suicide Brother      Social History     Socioeconomic History    Marital status:      Spouse name: Not on file    Number of children: Not on file    Years of education: Not on file    Highest education level: Not on file   Occupational History    Not on file   Social Needs    Financial resource strain: Not on file    Food insecurity     Worry: Not on file     Inability: Not on file    Transportation needs     Medical: Not on file     Non-medical: Not on file   Tobacco Use    Smoking status: Former Smoker     Packs/day: 1.00     Years: 15.00     Pack years: 15.00     Types: Cigarettes     Last attempt to quit: 1995     Years since quittin.5    Smokeless tobacco: Never Used   Substance and Sexual Activity    Alcohol use: Yes     Alcohol/week: 1.0 standard drinks     Types: 1 Glasses of wine per week     Comment: Rare--maybe once a month    Drug use: No    Sexual activity: Not on file   Lifestyle    Physical activity     Days per week: Not on file     Minutes per session: Not on file    Stress: Not on file   Relationships    Social connections     Talks on phone: Not on file     Gets together: Not on file     Attends [employer] service: Not on file     Active member of club or organization: Not on file     Attends meetings of clubs or organizations: Not on file     Relationship status: Not on file    Intimate partner violence     Fear of current or ex partner: Not on file     Emotionally abused: Not on file     Physically abused: Not on file     Forced sexual activity: Not on file   Other Topics Concern    Not on file   Social History Narrative    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 Lives with .        Allergies   Allergen Reactions    Aspirin Other (comments)     Swelling shut of eyelids    Macrobid [Nitrofurantoin Monohyd/M-Cryst] Unknown (comments)    Pcn [Penicillins] Hives        Current Outpatient Medications   Medication Sig    lovastatin (MEVACOR) 40 mg tablet TAKE 1 TABLET EVERY EVENING TO LOWER CHOLESTEROL    Eliquis 2.5 mg tablet TAKE ONE TABLET BY MOUTH 2 TIMES A DAY    lisinopriL (PRINIVIL, ZESTRIL) 2.5 mg tablet TAKE ONE TABLET BY MOUTH EVERY DAY    furosemide (LASIX) 40 mg tablet TAKE ONE TABLET BY MOUTH EVERY DAY    levothyroxine (SYNTHROID) 88 mcg tablet Take 1 Tab by mouth Daily (before breakfast).  omeprazole (PRILOSEC) 20 mg capsule TAKE 1 CAPSULE DAILY.  cyclobenzaprine (FLEXERIL) 10 mg tablet Take 1 Tab by mouth two (2) times daily as needed for Muscle Spasm(s).  timolol (TIMOPTIC) 0.5 % ophthalmic solution 1 Drop two (2) times a day.  acetaminophen/diphenhydramine (TYLENOL PM PO) Take 2 Tabs by mouth as needed.  cranberry fruit extract (CRANBERRY CONCENTRATE PO) Take 2 Tabs by mouth daily.  ACCU-CHEK MULTICLIX LANCET misc AS DIRECTED    latanoprost (XALATAN) 0.005 % ophthalmic solution Administer 1 Drop to both eyes nightly.  DOCUSATE CALCIUM (STOOL SOFTENER PO) Take  by mouth daily.  ascorbic acid (VITAMIN C) 500 mg tablet Take  by mouth daily.  cholecalciferol, vitamin d3, (VITAMIN D) 1,000 unit tablet Take  by mouth daily.  MULTIVITAMINS (MULTIVITAMIN PO) Take  by mouth daily. No current facility-administered medications for this visit. I have reviewed the problem list, allergy list, medical history, family, social history and medications. Review of Symptoms:    Review of Systems   Constitutional: Negative for chills, fever, malaise/fatigue and weight loss. HENT: Negative for nosebleeds. Eyes: Negative for blurred vision and double vision. Respiratory: Negative for cough, shortness of breath and wheezing. Cardiovascular: Positive for palpitations. Negative for chest pain, orthopnea, leg swelling and PND. Gastrointestinal: Negative for abdominal pain, blood in stool, diarrhea, nausea and vomiting. Musculoskeletal: Negative for joint pain. Skin: Negative for rash. Neurological: Negative for dizziness, tingling and loss of consciousness. Endo/Heme/Allergies: Does not bruise/bleed easily.        Physical Exam:      General: Well developed, in no acute distress, cooperative and alert  HEENT: No carotid bruits, no JVD, trach is midline. Neck Supple, PEERL, EOM intact. Heart:  reg rate and rhythm; normal S1/S2;+GUNNAR, no gallops or rubs. Respiratory: Clear bilaterally x 4, no wheezing or rales  Abdomen:   Soft, non-tender, no distention, no masses. + BS. Extremities:  Normal cap refill, no cyanosis, atraumatic. No edema. Neuro: A&Ox3, speech clear, gait stable. Skin: Skin color is normal. No rashes or lesions. Non diaphoretic  Vascular: 2+ pulses symmetric in all extremities    Vitals:    07/22/20 1354   BP: 170/78   Pulse: 66   Resp: 18   SpO2: 96%   Weight: 157 lb 12.8 oz (71.6 kg)   Height: 5' 5\" (1.651 m)       Cardiographics    ECG: V-paced    Results for orders placed or performed during the hospital encounter of 11/23/18   EKG, 12 LEAD, INITIAL   Result Value Ref Range    Ventricular Rate 54 BPM    Atrial Rate 54 BPM    P-R Interval 192 ms    QRS Duration 84 ms    Q-T Interval 516 ms    QTC Calculation (Bezet) 489 ms    Calculated P Axis 73 degrees    Calculated R Axis 37 degrees    Calculated T Axis 109 degrees    Diagnosis       Sinus bradycardia  Nonspecific T wave abnormality  Prolonged QT  Confirmed by Nathaly Mccabe ([id number]) on 11/28/2018 10:48:08 AM     Results for orders placed or performed in visit on 02/03/14   CARDIAC HOLTER MONITOR, 24 HOURS    Narrative    ECG Monitor/24 hours, Complete    Reason for Holter Monitor   PVC's    Heartbeat    Slowest 53  Average 71  Fastest  113    Results:   Underlying Rhythm: Normal sinus rhythm      Atrial Arrhythmias: premature atrial contractions; occasional and  a few short runs of PSVT    AV Conduction: normal    Ventricular Arrhythmias: premature ventricular contractions;  frequent and ventricular couplets and one ventricular triplet    ST Segment Analysis:non-specific changes     Symptom Correlation:  Shortness of breath corresponds to NSR with activity.     Comment:   Normal sinus rhythm with frequent PVC's occasional PAC's, a few  short runs of PSVT     Kyle Mccarty MD            Results for orders placed or performed in visit on 12/05/11   AMB POC EKG ROUTINE W/ 12 LEADS, INTER & REP    Impression    Normal sinus rhythm. Nonspecific ST wave changes in the lateral  chest leads. Cardiology Labs:  Lab Results   Component Value Date/Time    Cholesterol, total 197 07/30/2019 10:48 AM    HDL Cholesterol 69 07/30/2019 10:48 AM    LDL, calculated 97 07/30/2019 10:48 AM    Triglyceride 154 (H) 07/30/2019 10:48 AM       Lab Results   Component Value Date/Time    Sodium 145 (H) 09/05/2019 09:23 AM    Potassium 4.5 09/05/2019 09:23 AM    Chloride 101 09/05/2019 09:23 AM    CO2 25 09/05/2019 09:23 AM    Anion gap 6 11/27/2018 02:54 AM    Glucose 138 (H) 09/05/2019 09:23 AM    BUN 28 (H) 09/05/2019 09:23 AM    Creatinine 1.19 (H) 09/05/2019 09:23 AM    BUN/Creatinine ratio 24 09/05/2019 09:23 AM    GFR est AA 47 (L) 09/05/2019 09:23 AM    GFR est non-AA 41 (L) 09/05/2019 09:23 AM    Calcium 9.6 09/05/2019 09:23 AM    Bilirubin, total 0.5 07/30/2019 10:48 AM    Alk. phosphatase 90 07/30/2019 10:48 AM    Protein, total 6.8 07/30/2019 10:48 AM    Albumin 4.6 07/30/2019 10:48 AM    Globulin 3.4 11/26/2018 03:53 AM    A-G Ratio 2.1 07/30/2019 10:48 AM    ALT (SGPT) 13 07/30/2019 10:48 AM        Orders Placed This Encounter    AMB POC EKG ROUTINE W/ 12 LEADS, INTER & REP     Order Specific Question:   Reason for Exam:     Answer:   routine        Assessment:     Assessment:       ICD-10-CM ICD-9-CM    1. Permanent atrial fibrillation (HCC)  I48.21 427.31 AMB POC EKG ROUTINE W/ 12 LEADS, INTER & REP      ECHO ADULT COMPLETE   2. Essential hypertension  I10 401.9 ECHO ADULT COMPLETE   3. Non-rheumatic mitral regurgitation  I34.0 424.0 ECHO ADULT COMPLETE   4. Hypercholesterolemia  E78.00 272.0 AMB POC EKG ROUTINE W/ 12 LEADS, INTER & REP      ECHO ADULT COMPLETE   5.  Cardiac pacemaker in situ  Z95.0 V45.01 ECHO ADULT COMPLETE Plan:     1. Permanent atrial fibrillation (HCC)  S/p AV monica ablation with pacemaker. Reports occasional heart fluttering, no increase in intensity or severity. Cont with Eliquis 2.5mg bid. Renal function being monitored by PCP. Request labs from PCP. She is doing well with low-dose Eliquis, and hesitant at this time to increase dose at age 80, with mild renal dysfunction, and weight getting close to 60 kg.    2. Mitral valve regurgitation per echo 7/19 EF 55-60% with mod to severe MR. She was started on Lisinopril at that time. She additionally had pulm HTN that was moderate. She was recommended to see pulmonary for further evaluation of this. She was seen by Dr Nida Helm in 9/19 with order for PFTs. Repeat echo now. 3. Essential hypertension  BP elevated in office today. She reports at home her BP has been controlled. Cont to monitor BP at home. Goal 140s/80s or lower. Continue anti-hypertensive therapy and low sodium diet    4. Mixed hyperlipidemia  Continue statin therapy and low fat, low cholesterol diet  Lipids managed by PCP    5. Cardiac pacemaker in situ  S/p AV monica ablation with PPM placement  Device interrogation remotely in 12/2019 with  92.5% AP <0.1%  Continue with routine device interrogation with Dr. Linda Paulino    6. Counseled on diet and exercise- eventual goal of 30-60 minutes 5-7 times a week as per AHA guidelines. Follow-up in 1 year if MR is stable.      Yanet Rodríguez MD

## 2020-07-22 NOTE — LETTER
7/22/20 Patient: Danii Felix YOB: 1932 Date of Visit: 7/22/2020 Radha Noble MD 
[address] [facility] [address] [facility] [place] [address] VIA In Basket Dear Héctor Bills MD, Thank you for referring Ms. Tj Vallecillo to [address] [place] [address] for evaluation. My notes for this consultation are attached. If you have questions, please do not hesitate to call me. I look forward to following your patient along with you.  
 
 
Sincerely, 
 
Yanet Rodríguez MD

## 2020-07-22 NOTE — PROGRESS NOTES
1. Have you been to the ER, urgent care clinic since your last visit? Hospitalized since your last visit? No.    2. Have you seen or consulted any other health care providers outside of the [address] [place] [address] since your last visit? Include any pap smears or colon screening.    No.      Chief Complaint   Patient presents with    Annual Exam     heart fluttering

## 2020-07-22 NOTE — TELEPHONE ENCOUNTER
Patient would like to know is she to have the echo ordered in AUGUST 2020 or 2021? She was confused during her visit today.

## 2020-07-22 NOTE — TELEPHONE ENCOUNTER
Left message on Identified VM that Echo is ordered for this year. Pamela Trent would be calling her to schedule that.advised to call office at [phone] with any questions.

## 2020-07-22 NOTE — LETTER
7/22/2020 2:41 PM 
 
Patient:  Darlene Calderon YOB: 1932 Date of Visit: 7/22/2020 Dear Soham Mcdermott MD 
[address] [address] VIA Facsimile: [phone]: Thank you for referring Ms. Daniel Young to me for evaluation/treatment. Below are the relevant portions of my assessment and plan of care. If you have questions, please do not hesitate to call me. I look forward to following Ms. Odette Sheldon along with you.  
 
 
 
Sincerely, 
 
 
Rusty Altman MD

## 2020-08-04 ENCOUNTER — ANCILLARY PROCEDURE (OUTPATIENT)
Dept: CARDIOLOGY CLINIC | Age: 85
End: 2020-08-04
Payer: MEDICARE

## 2020-08-04 VITALS
WEIGHT: 157 LBS | HEIGHT: 65 IN | DIASTOLIC BLOOD PRESSURE: 78 MMHG | SYSTOLIC BLOOD PRESSURE: 170 MMHG | BODY MASS INDEX: 26.16 KG/M2

## 2020-08-04 DIAGNOSIS — E78.00 HYPERCHOLESTEROLEMIA: ICD-10-CM

## 2020-08-04 DIAGNOSIS — I34.0 NON-RHEUMATIC MITRAL REGURGITATION: ICD-10-CM

## 2020-08-04 DIAGNOSIS — I48.21 PERMANENT ATRIAL FIBRILLATION (HCC): ICD-10-CM

## 2020-08-04 DIAGNOSIS — Z95.0 CARDIAC PACEMAKER IN SITU: ICD-10-CM

## 2020-08-04 DIAGNOSIS — I10 ESSENTIAL HYPERTENSION: ICD-10-CM

## 2020-08-04 PROCEDURE — 93306 TTE W/DOPPLER COMPLETE: CPT | Performed by: INTERNAL MEDICINE

## 2020-08-06 ENCOUNTER — TELEPHONE (OUTPATIENT)
Dept: CARDIOLOGY CLINIC | Age: 85
End: 2020-08-06

## 2020-08-06 LAB
ECHO AO ASC DIAM: 2.82 CM
ECHO AO ROOT DIAM: 3.04 CM
ECHO AV PEAK GRADIENT: 4.64 MMHG
ECHO AV PEAK VELOCITY: 107.71 CM/S
ECHO EST RA PRESSURE: 3 MMHG
ECHO LA AREA 4C: 24.89 CM2
ECHO LA MAJOR AXIS: 4.14 CM
ECHO LA MINOR AXIS: 2.32 CM
ECHO LA VOL 2C: 72.65 ML (ref 22–52)
ECHO LA VOL 4C: 79.42 ML (ref 22–52)
ECHO LA VOL BP: 88.42 ML (ref 22–52)
ECHO LA VOL/BSA BIPLANE: 49.54 ML/M2 (ref 16–28)
ECHO LA VOLUME INDEX A2C: 40.71 ML/M2 (ref 16–28)
ECHO LA VOLUME INDEX A4C: 44.5 ML/M2 (ref 16–28)
ECHO LV E' LATERAL VELOCITY: 8.97 CM/S
ECHO LV E' SEPTAL VELOCITY: 5.41 CM/S
ECHO LV INTERNAL DIMENSION DIASTOLIC: 4.07 CM (ref 3.9–5.3)
ECHO LV INTERNAL DIMENSION SYSTOLIC: 2.35 CM
ECHO LV ISOVOLUMETRIC RELAXATION TIME (IVRT): 0.05 S
ECHO LV IVSD: 1.21 CM (ref 0.6–0.9)
ECHO LV MASS 2D: 174.1 G (ref 67–162)
ECHO LV MASS INDEX 2D: 97.5 G/M2 (ref 43–95)
ECHO LV POSTERIOR WALL DIASTOLIC: 1.23 CM (ref 0.6–0.9)
ECHO LVOT PEAK GRADIENT: 1.4 MMHG
ECHO LVOT PEAK VELOCITY: 59.19 CM/S
ECHO MV "A" WAVE DURATION: 0.17 S
ECHO MV A VELOCITY: 29.74 CM/S
ECHO MV AREA PHT: 4.52 CM2
ECHO MV E DECELERATION TIME (DT): 0.17 S
ECHO MV E VELOCITY: 147.49 CM/S
ECHO MV E/A RATIO: 4.96
ECHO MV E/E' LATERAL: 16.44
ECHO MV E/E' RATIO (AVERAGED): 21.85
ECHO MV E/E' SEPTAL: 27.26
ECHO MV EROA PISA: 0.14 CM2
ECHO MV PRESSURE HALF TIME (PHT): 0.05 S
ECHO MV REGURGITANT RADIUS PISA: 0.61 CM
ECHO MV REGURGITANT VOLUME: 31.99 ML
ECHO MV REGURGITANT VTIA: 226.2 CM
ECHO PV REGURGITANT MAX VELOCITY: 629.24 CM/S
ECHO RIGHT VENTRICULAR SYSTOLIC PRESSURE (RVSP): 54.21 MMHG
ECHO TV REGURGITANT MAX VELOCITY: 357.81 CM/S
ECHO TV REGURGITANT PEAK GRADIENT: 51.21 MMHG

## 2020-08-06 NOTE — TELEPHONE ENCOUNTER
----- Message from Luc Rider NP sent at 8/6/2020  9:34 AM EDT -----  Please let pt know her echo shows stable mitral valve leakiness. Heart strength is still normal. Her pulm htn is stable.

## 2020-08-06 NOTE — PROGRESS NOTES
Please let pt know her echo shows stable mitral valve leakiness. Heart strength is still normal. Her pulm htn is stable.

## 2020-08-13 ENCOUNTER — OFFICE VISIT (OUTPATIENT)
Dept: FAMILY MEDICINE CLINIC | Age: 85
End: 2020-08-13
Payer: MEDICARE

## 2020-08-13 VITALS
SYSTOLIC BLOOD PRESSURE: 151 MMHG | HEART RATE: 90 BPM | OXYGEN SATURATION: 92 % | WEIGHT: 158.8 LBS | TEMPERATURE: 98.7 F | DIASTOLIC BLOOD PRESSURE: 74 MMHG | BODY MASS INDEX: 26.46 KG/M2 | HEIGHT: 65 IN | RESPIRATION RATE: 18 BRPM

## 2020-08-13 DIAGNOSIS — E11.9 CONTROLLED TYPE 2 DIABETES MELLITUS WITHOUT COMPLICATION, WITHOUT LONG-TERM CURRENT USE OF INSULIN (HCC): ICD-10-CM

## 2020-08-13 DIAGNOSIS — I73.9 PAD (PERIPHERAL ARTERY DISEASE) (HCC): Primary | ICD-10-CM

## 2020-08-13 DIAGNOSIS — Z00.00 MEDICARE ANNUAL WELLNESS VISIT, SUBSEQUENT: ICD-10-CM

## 2020-08-13 DIAGNOSIS — I48.91 ATRIAL FIBRILLATION, UNSPECIFIED TYPE (HCC): ICD-10-CM

## 2020-08-13 PROBLEM — I50.31 ACUTE DIASTOLIC CHF (CONGESTIVE HEART FAILURE) (HCC): Status: RESOLVED | Noted: 2018-11-26 | Resolved: 2020-08-13

## 2020-08-13 PROBLEM — I48.0 PAROXYSMAL ATRIAL FIBRILLATION (HCC): Status: RESOLVED | Noted: 2018-10-25 | Resolved: 2020-08-13

## 2020-08-13 LAB
ALBUMIN UR QL STRIP: 10 MG/L
BILIRUB UR QL STRIP: NEGATIVE
CREATININE, URINE POC: 10 MG/DL
GLUCOSE UR-MCNC: NEGATIVE MG/DL
KETONES P FAST UR STRIP-MCNC: NEGATIVE MG/DL
MICROALBUMIN/CREAT RATIO POC: <30 MG/G
PH UR STRIP: 6.5 [PH] (ref 4.6–8)
PROT UR QL STRIP: NEGATIVE
SP GR UR STRIP: 1.03 (ref 1–1.03)
UA UROBILINOGEN AMB POC: NORMAL (ref 0.2–1)
URINALYSIS CLARITY POC: CLEAR
URINALYSIS COLOR POC: YELLOW
URINE BLOOD POC: NEGATIVE
URINE LEUKOCYTES POC: NORMAL
URINE NITRITES POC: NEGATIVE

## 2020-08-13 PROCEDURE — G8427 DOCREV CUR MEDS BY ELIG CLIN: HCPCS | Performed by: FAMILY MEDICINE

## 2020-08-13 PROCEDURE — 1100F PTFALLS ASSESS-DOCD GE2>/YR: CPT | Performed by: FAMILY MEDICINE

## 2020-08-13 PROCEDURE — 3288F FALL RISK ASSESSMENT DOCD: CPT | Performed by: FAMILY MEDICINE

## 2020-08-13 PROCEDURE — 82044 UR ALBUMIN SEMIQUANTITATIVE: CPT | Performed by: FAMILY MEDICINE

## 2020-08-13 PROCEDURE — 36415 COLL VENOUS BLD VENIPUNCTURE: CPT | Performed by: FAMILY MEDICINE

## 2020-08-13 PROCEDURE — G0439 PPPS, SUBSEQ VISIT: HCPCS | Performed by: FAMILY MEDICINE

## 2020-08-13 PROCEDURE — 81003 URINALYSIS AUTO W/O SCOPE: CPT | Performed by: FAMILY MEDICINE

## 2020-08-13 PROCEDURE — 99000 SPECIMEN HANDLING OFFICE-LAB: CPT | Performed by: FAMILY MEDICINE

## 2020-08-13 PROCEDURE — 1090F PRES/ABSN URINE INCON ASSESS: CPT | Performed by: FAMILY MEDICINE

## 2020-08-13 PROCEDURE — G8419 CALC BMI OUT NRM PARAM NOF/U: HCPCS | Performed by: FAMILY MEDICINE

## 2020-08-13 PROCEDURE — G8432 DEP SCR NOT DOC, RNG: HCPCS | Performed by: FAMILY MEDICINE

## 2020-08-13 PROCEDURE — G8536 NO DOC ELDER MAL SCRN: HCPCS | Performed by: FAMILY MEDICINE

## 2020-08-13 PROCEDURE — 99213 OFFICE O/P EST LOW 20 MIN: CPT | Performed by: FAMILY MEDICINE

## 2020-08-13 RX ORDER — PREGABALIN 75 MG/1
75 CAPSULE ORAL 2 TIMES DAILY
Qty: 60 CAP | Refills: 0 | Status: SHIPPED | OUTPATIENT
Start: 2020-08-13 | End: 2021-07-28

## 2020-08-13 NOTE — PATIENT INSTRUCTIONS
Medicare Wellness Visit, Female     The best way to live healthy is to have a lifestyle where you eat a well-balanced diet, exercise regularly, limit alcohol use, and quit all forms of tobacco/nicotine, if applicable. Regular preventive services are another way to keep healthy. Preventive services (vaccines, screening tests, monitoring & exams) can help personalize your care plan, which helps you manage your own care. Screening tests can find health problems at the earliest stages, when they are easiest to treat. [facility] follows the current, evidence-based guidelines published by the [facility]alob[facility] ([facility]) when recommending preventive services for our patients. Because we follow these guidelines, sometimes recommendations change over time as research supports it. (For example, mammograms used to be recommended annually. Even though Medicare will still pay for an annual mammogram, the newer guidelines recommend a mammogram every two years for women of average risk). Of course, you and your doctor may decide to screen more often for some diseases, based on your risk and your co-morbidities (chronic disease you are already diagnosed with). Preventive services for you include:  - Medicare offers their members a free annual wellness visit, which is time for you and your primary care provider to discuss and plan for your preventive service needs. Take advantage of this benefit every year!  -All adults over the age of 72 should receive the recommended pneumonia vaccines. Current USPSTF guidelines recommend a series of two vaccines for the best pneumonia protection.   -All adults should have a flu vaccine yearly and a tetanus vaccine every 10 years.   -All adults age 48 and older should receive the shingles vaccines (series of two vaccines).       -All adults age 38-68 who are overweight should have a diabetes screening test once every three years.   -All adults born between [birth year] and 1965 should be screened once for Hepatitis C.  -Other screening tests and preventive services for persons with diabetes include: an eye exam to screen for diabetic retinopathy, a kidney function test, a foot exam, and stricter control over your cholesterol.   -Cardiovascular screening for adults with routine risk involves an electrocardiogram (ECG) at intervals determined by your doctor.   -Colorectal cancer screenings should be done for adults age 54-65 with no increased risk factors for colorectal cancer. There are a number of acceptable methods of screening for this type of cancer. Each test has its own benefits and drawbacks. Discuss with your doctor what is most appropriate for you during your annual wellness visit. The different tests include: colonoscopy (considered the best screening method), a fecal occult blood test, a fecal DNA test, and sigmoidoscopy.    -A bone mass density test is recommended when a woman turns 65 to screen for osteoporosis. This test is only recommended one time, as a screening. Some providers will use this same test as a disease monitoring tool if you already have osteoporosis. -Breast cancer screenings are recommended every other year for women of normal risk, age 54-69.  -Cervical cancer screenings for women over age 72 are only recommended with certain risk factors.      Here is a list of your current Health Maintenance items (your personalized list of preventive services) with a due date:  Health Maintenance Due   Topic Date Due    Diabetic Foot Care  03/05/1942    Eye Exam  03/05/1942    Albumin Urine Test  05/06/2020    Flu Vaccine  08/01/2020    Cholesterol Test   07/30/2020

## 2020-08-13 NOTE — PROGRESS NOTES
Chief Complaint   Patient presents with    Leg Pain    Labs     Pt reports that she has been having burning in her legs at night. Pt reports that she had this problem once before, pt had seen the vascular surgeon, had a procedure. Pt reports that she was tried on gabapentin, but could not tolerate due to side effects. Pt reports that it is better now, but still has intermittent leg pain. Pt has not eaten for 6 hours. Subjective: (As above and below)     Chief Complaint   Patient presents with    Leg Pain    Labs     she is a 80y.o. year old female who presents for evaluation. Reviewed PmHx, RxHx, FmHx, SocHx, AllgHx and updated in chart. Review of Systems - negative except as listed above    Objective:     Vitals:    08/13/20 1407   BP: 151/74   Pulse: 90   Resp: 18   Temp: 98.7 °F (37.1 °C)   TempSrc: Temporal   SpO2: 92%   Weight: 158 lb 12.8 oz (72 kg)   Height: 5' 5\" (1.651 m)     Physical Examination: General appearance - alert, well appearing, and in no distress  Mental status - normal mood, behavior, speech, dress, motor activity, and thought processes  Eyes - pupils equal and reactive, extraocular eye movements intact  Chest - clear to auscultation, no wheezes, rales or rhonchi, symmetric air entry  Heart - normal rate, regular rhythm, normal S1, S2, no murmurs, rubs, clicks or gallops  Musculoskeletal - no joint tenderness, deformity or swelling  Extremities - peripheral pulses normal, no pedal edema, no clubbing or cyanosis, feet normal, good pulses, normal color, temperature and sensation, varicose veins noted  Skin - normal coloration and turgor, no rashes, no suspicious skin lesions noted    Assessment/ Plan:   1. PAD (peripheral artery disease) ([address]  -start on lyrica to help with neuropathy type pain  - pregabalin (LYRICA) 75 mg capsule; Take 1 Cap by mouth two (2) times a day. Max Daily Amount: 150 mg. Dispense: 60 Cap;  Refill: 0  - COLLECTION VENOUS BLOOD,VENIPUNCTURE  - [place] HANDLG&/OR CONVEY OF SPEC FOR TR OFFICE TO LAB    2. Atrial fibrillation, unspecified type ([facility]  -continue on eliquis  - CBC WITH AUTOMATED DIFF  - COLLECTION VENOUS BLOOD,VENIPUNCTURE  - [place] HANDLG&/OR CONVEY OF SPEC FOR TR OFFICE TO LAB    3. Controlled type 2 diabetes mellitus without complication, without long-term current use of insulin ([facility])  -check labs  - pregabalin (LYRICA) 75 mg capsule; Take 1 Cap by mouth two (2) times a day. Max Daily Amount: 150 mg. Dispense: 60 Cap; Refill: 0  - METABOLIC PANEL, COMPREHENSIVE  - HEMOGLOBIN A1C WITH EAG  - LIPID PANEL  - TSH 3RD GENERATION  - COLLECTION VENOUS BLOOD,VENIPUNCTURE  - [place] HANDLG&/OR CONVEY OF SPEC FOR TR OFFICE TO LAB       I have discussed the diagnosis with the patient and the intended plan as seen in the above orders. The patient has received an after-visit summary and questions were answered concerning future plans. Medication Side Effects and Warnings were discussed with patient: yes  Patient Labs were reviewed: yes  Patient Past Records were reviewed:  yes    Fco Davis M.D. This is the Subsequent Medicare Annual Wellness Exam, performed 12 months or more after the Initial AWV or the last Subsequent AWV    I have reviewed the patient's medical history in detail and updated the computerized patient record.      History     Patient Active Problem List   Diagnosis Code    Restless leg syndrome G25.81    Hypothyroid E03.9    Hypertension I10    Arthritis M19.90    Hyperlipidemia E78.5    Venous (peripheral) insufficiency I87.2    Anemia D64.9    Chronic UTI N39.0    PAD (peripheral artery disease) ([facility]) I73.9    Osteoarthritis of hip M16.9    Hypercholesterolemia E78.00    Mitral regurgitation I34.0    Sigmoid diverticulosis K57.30    Atrial fibrillation ([facility]) I48.91     Past Medical History:   Diagnosis Date    Arthritis     hands/low back    Diabetes ([facility] [address] [address]     borderline    GERD (gastroesophageal reflux disease)     Glaucoma     Hypercholesterolemia     Hypertension     Ill-defined condition     borderline anemia    Ill-defined condition     bladder infections    Impaired glucose tolerance     Menopause     Thyroid disease     hypothyroidism      Past Surgical History:   Procedure Laterality Date    CARDIOVERSION, ELECTIVE  10/31/2018         CARDIOVERSION, ELECTIVE  11/28/2018         HX BREAST BIOPSY Bilateral 1990    benign    HX CATARACT REMOVAL      bilateral    HX DILATION AND CURETTAGE      HX ORTHOPAEDIC      carpal tunnel release -bilateral    HX ROTATOR CUFF REPAIR Right      Current Outpatient Medications   Medication Sig Dispense Refill    pregabalin (LYRICA) 75 mg capsule Take 1 Cap by mouth two (2) times a day. Max Daily Amount: 150 mg. 60 Cap 0    lovastatin (MEVACOR) 40 mg tablet TAKE 1 TABLET EVERY EVENING TO LOWER CHOLESTEROL 90 Tab 0    Eliquis 2.5 mg tablet TAKE ONE TABLET BY MOUTH 2 TIMES A  Tab 3    lisinopriL (PRINIVIL, ZESTRIL) 2.5 mg tablet TAKE ONE TABLET BY MOUTH EVERY DAY 90 Tab 3    furosemide (LASIX) 40 mg tablet TAKE ONE TABLET BY MOUTH EVERY DAY 90 Tab 3    levothyroxine (SYNTHROID) 88 mcg tablet Take 1 Tab by mouth Daily (before breakfast). 90 Tab 3    omeprazole (PRILOSEC) 20 mg capsule TAKE 1 CAPSULE DAILY. 90 Cap 3    cyclobenzaprine (FLEXERIL) 10 mg tablet Take 1 Tab by mouth two (2) times daily as needed for Muscle Spasm(s). 60 Tab 2    timolol (TIMOPTIC) 0.5 % ophthalmic solution 1 Drop two (2) times a day.  acetaminophen/diphenhydramine (TYLENOL PM PO) Take 2 Tabs by mouth as needed.  cranberry fruit extract (CRANBERRY CONCENTRATE PO) Take 2 Tabs by mouth daily.  ACCU-CHEK MULTICLIX LANCET misc AS DIRECTED 100 Each PRN    latanoprost (XALATAN) 0.005 % ophthalmic solution Administer 1 Drop to both eyes nightly.  DOCUSATE CALCIUM (STOOL SOFTENER PO) Take  by mouth daily.  ascorbic acid (VITAMIN C) 500 mg tablet Take  by mouth daily.  cholecalciferol, vitamin d3, (VITAMIN D) 1,000 unit tablet Take  by mouth daily.  MULTIVITAMINS (MULTIVITAMIN PO) Take  by mouth daily. Allergies   Allergen Reactions    Aspirin Other (comments)     Swelling shut of eyelids    Macrobid [Nitrofurantoin Monohyd/M-Cryst] Unknown (comments)    Pcn [Penicillins] Hives       Family History   Problem Relation Age of Onset    Coronary Artery Disease Other         mother  of MI age 80, brother  age 72 of MI, sister has hear problems    Breast Cancer Sister         46s    Heart Attack Mother     Other Father         pneumonia    Alzheimer Brother     Cancer Sister     Cancer Sister     Heart Attack Brother     Suicide Brother      Social History     Tobacco Use    Smoking status: Former Smoker     Packs/day: 1.00     Years: 15.00     Pack years: 15.00     Types: Cigarettes     Last attempt to quit: 1995     Years since quittin.6    Smokeless tobacco: Never Used   Substance Use Topics    Alcohol use: Yes     Alcohol/week: 1.0 standard drinks     Types: 1 Glasses of wine per week     Comment: Rare--maybe once a month       Depression Risk Factor Screening:     3 most recent PHQ Screens 2020   Little interest or pleasure in doing things Not at all   Feeling down, depressed, irritable, or hopeless Not at all   Total Score PHQ 2 0       Alcohol Risk Factor Screening:   Do you average 1 drink per night or more than 7 drinks a week:  No    On any one occasion in the past three months have you have had more than 3 drinks containing alcohol:  No      Functional Ability and Level of Safety:   Hearing: Hearing is good. Pt has a prescription for hearing aids, but does not feel like she needs them yet     Activities of Daily Living: The home contains: handrails and grab bars  Patient does total self care     Ambulation: with difficulty, uses a walker     Fall Risk:  Fall Risk Assessment, last 12 mths 2020   Able to walk?  Yes   Fall in past 12 months? Yes   Fall with injury? Yes   Number of falls in past 12 months 1   Fall Risk Score 2     Abuse Screen:  Patient is not abused       Cognitive Screening   Has your family/caregiver stated any concerns about your memory: no, pt takes prevagen out of caution     Patient Care Team   Patient Care Team:  Jyoti Pressley MD as PCP - General (Family Medicine)  Willian Thakur MD as Physician (Cardiology)  Tatiana Morse MD as Physician (Cardiology)  Rafael Neville MD as Physician (Cardiology)  Rafael Neville MD as Physician (Cardiology)    Assessment/Plan   Education and counseling provided:  Are appropriate based on today's review and evaluation    Diagnoses and all orders for this visit:    1. PAD (peripheral artery disease) (HCC)  -     pregabalin (LYRICA) 75 mg capsule; Take 1 Cap by mouth two (2) times a day. Max Daily Amount: 150 mg.  -     COLLECTION VENOUS BLOOD,VENIPUNCTURE  -     [place] HANDLG&/OR CONVEY OF SPEC FOR TR OFFICE TO LAB    2. Atrial fibrillation, unspecified type (HCC)  -     CBC WITH AUTOMATED DIFF  -     COLLECTION VENOUS BLOOD,VENIPUNCTURE  -     [place] HANDLG&/OR CONVEY OF SPEC FOR TR OFFICE TO LAB    3. Controlled type 2 diabetes mellitus without complication, without long-term current use of insulin (HCC)  -     pregabalin (LYRICA) 75 mg capsule; Take 1 Cap by mouth two (2) times a day. Max Daily Amount: 150 mg.  -     METABOLIC PANEL, COMPREHENSIVE  -     HEMOGLOBIN A1C WITH EAG  -     LIPID PANEL  -     TSH 3RD GENERATION  -     COLLECTION VENOUS BLOOD,VENIPUNCTURE  -     [place] HANDLG&/OR CONVEY OF SPEC FOR TR OFFICE TO LAB    4.  Medicare annual wellness visit, subsequent        Health Maintenance Due   Topic Date Due    Foot Exam Q1  03/05/1942    Eye Exam Retinal or Dilated  03/05/1942    MICROALBUMIN Q1  05/06/2020    Influenza Age 9 to Adult  08/01/2020    Lipid Screen  07/30/2020

## 2020-08-13 NOTE — PROGRESS NOTES
1. Have you been to the ER, urgent care clinic since your last visit? Hospitalized since your last visit? No    2. Have you seen or consulted any other health care providers outside of the [address] [place] [address] since your last visit? Include any pap smears or colon screening.  No     Health Maintenance Due   Topic Date Due    Influenza Age 5 to Adult  08/01/2020    Medicare Yearly Exam  07/30/2020    Lipid Screen  07/30/2020

## 2020-08-14 LAB
ALBUMIN SERPL-MCNC: 4.9 G/DL (ref 3.6–4.6)
ALBUMIN/GLOB SERPL: 1.8 {RATIO} (ref 1.2–2.2)
ALP SERPL-CCNC: 100 IU/L (ref 39–117)
ALT SERPL-CCNC: 16 IU/L (ref 0–32)
AST SERPL-CCNC: 27 IU/L (ref 0–40)
BASOPHILS # BLD AUTO: 0.1 X10E3/UL (ref 0–0.2)
BASOPHILS NFR BLD AUTO: 2 %
BILIRUB SERPL-MCNC: 0.5 MG/DL (ref 0–1.2)
BUN SERPL-MCNC: 24 MG/DL (ref 8–27)
BUN/CREAT SERPL: 20 (ref 12–28)
CALCIUM SERPL-MCNC: 9.8 MG/DL (ref 8.7–10.3)
CHLORIDE SERPL-SCNC: 95 MMOL/L (ref 96–106)
CHOLEST SERPL-MCNC: 212 MG/DL (ref 100–199)
CO2 SERPL-SCNC: 27 MMOL/L (ref 20–29)
CREAT SERPL-MCNC: 1.19 MG/DL (ref 0.57–1)
EOSINOPHIL # BLD AUTO: 0.4 X10E3/UL (ref 0–0.4)
EOSINOPHIL NFR BLD AUTO: 5 %
ERYTHROCYTE [DISTWIDTH] IN BLOOD BY AUTOMATED COUNT: 12.7 % (ref 11.7–15.4)
EST. AVERAGE GLUCOSE BLD GHB EST-MCNC: 146 MG/DL
GLOBULIN SER CALC-MCNC: 2.7 G/DL (ref 1.5–4.5)
GLUCOSE SERPL-MCNC: 93 MG/DL (ref 65–99)
HBA1C MFR BLD: 6.7 % (ref 4.8–5.6)
HCT VFR BLD AUTO: 43 % (ref 34–46.6)
HDLC SERPL-MCNC: 93 MG/DL
HGB BLD-MCNC: 13.8 G/DL (ref 11.1–15.9)
IMM GRANULOCYTES # BLD AUTO: 0 X10E3/UL (ref 0–0.1)
IMM GRANULOCYTES NFR BLD AUTO: 0 %
INTERPRETATION, 910389: NORMAL
INTERPRETATION: NORMAL
LDLC SERPL CALC-MCNC: 65 MG/DL (ref 0–99)
LYMPHOCYTES # BLD AUTO: 2 X10E3/UL (ref 0.7–3.1)
LYMPHOCYTES NFR BLD AUTO: 28 %
Lab: NORMAL
MCH RBC QN AUTO: 31.2 PG (ref 26.6–33)
MCHC RBC AUTO-ENTMCNC: 32.1 G/DL (ref 31.5–35.7)
MCV RBC AUTO: 97 FL (ref 79–97)
MONOCYTES # BLD AUTO: 0.6 X10E3/UL (ref 0.1–0.9)
MONOCYTES NFR BLD AUTO: 8 %
NEUTROPHILS # BLD AUTO: 4 X10E3/UL (ref 1.4–7)
NEUTROPHILS NFR BLD AUTO: 57 %
PDF IMAGE, 910387: NORMAL
PLATELET # BLD AUTO: 209 X10E3/UL (ref 150–450)
POTASSIUM SERPL-SCNC: 4.4 MMOL/L (ref 3.5–5.2)
PROT SERPL-MCNC: 7.6 G/DL (ref 6–8.5)
RBC # BLD AUTO: 4.42 X10E6/UL (ref 3.77–5.28)
SODIUM SERPL-SCNC: 140 MMOL/L (ref 134–144)
TRIGL SERPL-MCNC: 270 MG/DL (ref 0–149)
TSH SERPL DL<=0.005 MIU/L-ACNC: 0.2 UIU/ML (ref 0.45–4.5)
VLDLC SERPL CALC-MCNC: 54 MG/DL (ref 5–40)
WBC # BLD AUTO: 7.2 X10E3/UL (ref 3.4–10.8)

## 2020-08-15 NOTE — PROGRESS NOTES
Stable slight elevation in kidney function. Blood sugars are stable. All other labs stable. A message has been sent in [employer] and the lab work released to the patient.

## 2020-08-17 ENCOUNTER — TELEPHONE (OUTPATIENT)
Dept: FAMILY MEDICINE CLINIC | Age: 85
End: 2020-08-17

## 2020-08-17 NOTE — TELEPHONE ENCOUNTER
Called pt, verified name and . Informed pt that per Dr. Jonatan Garcia Please advise patient that she should follow-up with the vascular surgeon to evaluate leg pain.     Lyrica was given to replace gabapentin which could also not be tolerated. Pt stated that she has a doctor that she has seen previously for this problem and she will follow up with him.

## 2020-08-17 NOTE — TELEPHONE ENCOUNTER
Pt is calling stating you gave her med on Thursday and she states she started taking the med and she has to stop taking it is causing her to have double vision and makes her feel likes she is drunk she would like to know to do

## 2020-08-17 NOTE — TELEPHONE ENCOUNTER
Please advise patient that she should follow-up with the vascular surgeon to evaluate leg pain. Lyrica was given to replace gabapentin which could also not be tolerated.

## 2020-08-25 ENCOUNTER — CLINICAL SUPPORT (OUTPATIENT)
Dept: CARDIOLOGY CLINIC | Age: 85
End: 2020-08-25
Payer: MEDICARE

## 2020-08-25 ENCOUNTER — OFFICE VISIT (OUTPATIENT)
Dept: CARDIOLOGY CLINIC | Age: 85
End: 2020-08-25
Payer: MEDICARE

## 2020-08-25 VITALS
SYSTOLIC BLOOD PRESSURE: 130 MMHG | OXYGEN SATURATION: 96 % | DIASTOLIC BLOOD PRESSURE: 70 MMHG | HEIGHT: 65 IN | WEIGHT: 158.2 LBS | HEART RATE: 70 BPM | RESPIRATION RATE: 16 BRPM | BODY MASS INDEX: 26.36 KG/M2

## 2020-08-25 DIAGNOSIS — I44.2 CHB (COMPLETE HEART BLOCK) (HCC): ICD-10-CM

## 2020-08-25 DIAGNOSIS — Z95.0 CARDIAC PACEMAKER IN SITU: Primary | ICD-10-CM

## 2020-08-25 DIAGNOSIS — E78.2 MIXED HYPERLIPIDEMIA: ICD-10-CM

## 2020-08-25 DIAGNOSIS — I10 ESSENTIAL HYPERTENSION: ICD-10-CM

## 2020-08-25 DIAGNOSIS — I48.21 PERMANENT ATRIAL FIBRILLATION (HCC): Primary | ICD-10-CM

## 2020-08-25 PROCEDURE — 99214 OFFICE O/P EST MOD 30 MIN: CPT | Performed by: INTERNAL MEDICINE

## 2020-08-25 PROCEDURE — 1100F PTFALLS ASSESS-DOCD GE2>/YR: CPT | Performed by: INTERNAL MEDICINE

## 2020-08-25 PROCEDURE — G8536 NO DOC ELDER MAL SCRN: HCPCS | Performed by: INTERNAL MEDICINE

## 2020-08-25 PROCEDURE — 1090F PRES/ABSN URINE INCON ASSESS: CPT | Performed by: INTERNAL MEDICINE

## 2020-08-25 PROCEDURE — 93279 PRGRMG DEV EVAL PM/LDLS PM: CPT | Performed by: INTERNAL MEDICINE

## 2020-08-25 PROCEDURE — G8419 CALC BMI OUT NRM PARAM NOF/U: HCPCS | Performed by: INTERNAL MEDICINE

## 2020-08-25 PROCEDURE — 3288F FALL RISK ASSESSMENT DOCD: CPT | Performed by: INTERNAL MEDICINE

## 2020-08-25 PROCEDURE — G8427 DOCREV CUR MEDS BY ELIG CLIN: HCPCS | Performed by: INTERNAL MEDICINE

## 2020-08-25 PROCEDURE — G8432 DEP SCR NOT DOC, RNG: HCPCS | Performed by: INTERNAL MEDICINE

## 2020-08-25 PROCEDURE — 93000 ELECTROCARDIOGRAM COMPLETE: CPT | Performed by: INTERNAL MEDICINE

## 2020-08-25 NOTE — PROGRESS NOTES
Subjective:      Jose Carter is a 80 y.o. female is here for EP consult. She is here for annual device check. The patient denies chest pain/ shortness of breath, orthopnea, PND, LE edema, palpitations, syncope, presyncope or fatigue.        Patient Active Problem List    Diagnosis Date Noted    Atrial fibrillation ([facility] 2018    Sigmoid diverticulosis 2018    Mitral regurgitation 10/31/2018    Hypercholesterolemia     Osteoarthritis of hip 2017    PAD (peripheral artery disease) ([facility] 2017    Anemia 2017    Chronic UTI 2017    Venous (peripheral) insufficiency 2015    Hypothyroid 2012    Hypertension 2012    Arthritis 2012    Hyperlipidemia 2012    Restless leg syndrome 2011      Ashley Noble MD  Past Medical History:   Diagnosis Date    Arthritis     hands/low back    Diabetes ([facility]     borderline    GERD (gastroesophageal reflux disease)     Glaucoma     Hypercholesterolemia     Hypertension     Ill-defined condition     borderline anemia    Ill-defined condition     bladder infections    Impaired glucose tolerance     Menopause     Thyroid disease     hypothyroidism      Past Surgical History:   Procedure Laterality Date    CARDIOVERSION, ELECTIVE  10/31/2018         CARDIOVERSION, ELECTIVE  2018         HX BREAST BIOPSY Bilateral     benign    HX CATARACT REMOVAL      bilateral    HX DILATION AND CURETTAGE      HX ORTHOPAEDIC      carpal tunnel release -bilateral    HX ROTATOR CUFF REPAIR Right      Allergies   Allergen Reactions    Aspirin Other (comments)     Swelling shut of eyelids    Macrobid [Nitrofurantoin Monohyd/M-Cryst] Unknown (comments)    Pcn [Penicillins] Hives      Family History   Problem Relation Age of Onset    Coronary Artery Disease Other         mother  of MI age 80, brother  age 72 of MI, sister has hear problems    Breast Cancer Sister 46s    Heart Attack Mother     Other Father         pneumonia    Alzheimer Brother     Cancer Sister     Cancer Sister     Heart Attack Brother     Suicide Brother     negative for cardiac disease  Social History     Socioeconomic History    Marital status:      Spouse name: Not on file    Number of children: Not on file    Years of education: Not on file    Highest education level: Not on file   Tobacco Use    Smoking status: Former Smoker     Packs/day: 1.00     Years: 15.00     Pack years: 15.00     Types: Cigarettes     Last attempt to quit: 1995     Years since quittin.6    Smokeless tobacco: Never Used   Substance and Sexual Activity    Alcohol use: Yes     Alcohol/week: 1.0 standard drinks     Types: 1 Glasses of wine per week     Comment: Rare--maybe once a month    Drug use: No   Social History Narrative    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 Lives with . Current Outpatient Medications   Medication Sig    pregabalin (LYRICA) 75 mg capsule Take 1 Cap by mouth two (2) times a day. Max Daily Amount: 150 mg.    lovastatin (MEVACOR) 40 mg tablet TAKE 1 TABLET EVERY EVENING TO LOWER CHOLESTEROL    Eliquis 2.5 mg tablet TAKE ONE TABLET BY MOUTH 2 TIMES A DAY    lisinopriL (PRINIVIL, ZESTRIL) 2.5 mg tablet TAKE ONE TABLET BY MOUTH EVERY DAY    furosemide (LASIX) 40 mg tablet TAKE ONE TABLET BY MOUTH EVERY DAY    levothyroxine (SYNTHROID) 88 mcg tablet Take 1 Tab by mouth Daily (before breakfast).  omeprazole (PRILOSEC) 20 mg capsule TAKE 1 CAPSULE DAILY.  cyclobenzaprine (FLEXERIL) 10 mg tablet Take 1 Tab by mouth two (2) times daily as needed for Muscle Spasm(s).  timolol (TIMOPTIC) 0.5 % ophthalmic solution 1 Drop two (2) times a day.  acetaminophen/diphenhydramine (TYLENOL PM PO) Take 2 Tabs by mouth as needed.  cranberry fruit extract (CRANBERRY CONCENTRATE PO) Take 2 Tabs by mouth daily.     ACCU-CHEK MULTICLIX LANCET misc AS DIRECTED    latanoprost (XALATAN) 0.005 % ophthalmic solution Administer 1 Drop to both eyes nightly.  DOCUSATE CALCIUM (STOOL SOFTENER PO) Take  by mouth daily.  ascorbic acid (VITAMIN C) 500 mg tablet Take  by mouth daily.  cholecalciferol, vitamin d3, (VITAMIN D) 1,000 unit tablet Take  by mouth daily.  MULTIVITAMINS (MULTIVITAMIN PO) Take  by mouth daily. No current facility-administered medications for this visit. Vitals:    08/25/20 0954   BP: 130/70   Pulse: 70   Resp: 16   SpO2: 96%   Weight: 158 lb 3.2 oz (71.8 kg)   Height: 5' 5\" (1.651 m)       I have reviewed the nurses notes, vitals, problem list, allergy list, medical history, family, social history and medications. Review of Symptoms:    General: Pt denies excessive weight gain or loss. Pt is able to conduct ADL's  HEENT: Denies blurred vision, headaches, hearing loss, epistaxis and difficulty swallowing. Respiratory: Denies cough, congestion, shortness of breath, ROWE, wheezing or stridor. Cardiovascular: Denies precordial pain, palpitations, edema or PND  Gastrointestinal: Denies poor appetite, indigestion, abdominal pain or blood in stool  Genitourinary: Denies hematuria, dysuria, increased urinary frequency  Musculoskeletal: Denies joint pain or swelling from muscles or joints  Neurologic: Denies tremor, paresthesias, headache, or sensory motor disturbance  Psychiatric: Denies confusion, insomnia, depression  Integumentray: Denies rash, itching or ulcers. Hematologic: Denies easy bruising, bleeding    Physical Exam:      General: Well developed, in no acute distress. HEENT: Eyes - PERRL, no jvd  Heart:  Normal S1/S2 negative S3 or S4. Regular, no murmur, gallop or rub. Respiratory: Clear bilaterally x 4, no wheezing or rales  Abdomen:   Soft, non-tender, bowel sounds are active. Extremities:  No edema, normal cap refill, no cyanosis. Musculoskeletal: No clubbing  Neuro: A&Ox3, speech clear, gait stable.    Skin: Skin color is normal. No rashes or lesions. Non diaphoretic, no ulcers or subcutaneous nodule  Vascular: 2+ pulses symmetric in all extremities  Psych - judgement intact and orientation is wnl     Cardiographics    Ekg: v paced    Pacer - V paced 94%    Results for orders placed or performed during the hospital encounter of 11/23/18   EKG, 12 LEAD, INITIAL   Result Value Ref Range    Ventricular Rate 54 BPM    Atrial Rate 54 BPM    P-R Interval 192 ms    QRS Duration 84 ms    Q-T Interval 516 ms    QTC Calculation (Bezet) 489 ms    Calculated P Axis 73 degrees    Calculated R Axis 37 degrees    Calculated T Axis 109 degrees    Diagnosis       Sinus bradycardia  Nonspecific T wave abnormality  Prolonged QT  Confirmed by Lila Denise ([id number]) on 11/28/2018 10:48:08 AM     Results for orders placed or performed in visit on 02/03/14   CARDIAC HOLTER MONITOR, 24 HOURS    Narrative    ECG Monitor/24 hours, Complete    Reason for Holter Monitor   PVC's    Heartbeat    Slowest 53  Average 71  Fastest  113    Results:   Underlying Rhythm: Normal sinus rhythm      Atrial Arrhythmias: premature atrial contractions; occasional and  a few short runs of PSVT    AV Conduction: normal    Ventricular Arrhythmias: premature ventricular contractions;  frequent and ventricular couplets and one ventricular triplet    ST Segment Analysis:non-specific changes     Symptom Correlation:  Shortness of breath corresponds to NSR with activity. Comment:   Normal sinus rhythm with frequent PVC's occasional PAC's, a few  short runs of PSVT     Jessica Lim MD            Results for orders placed or performed in visit on 12/05/11   AMB POC EKG ROUTINE W/ 12 LEADS, INTER & REP    Impression    Normal sinus rhythm. Nonspecific ST wave changes in the lateral  chest leads.          Lab Results   Component Value Date/Time    WBC 7.2 08/13/2020 02:50 PM    HGB 13.8 08/13/2020 02:50 PM    HCT 43.0 08/13/2020 02:50 PM    PLATELET 809 94/60/9532 02:50 PM MCV 97 08/13/2020 02:50 PM      Lab Results   Component Value Date/Time    Sodium 140 08/13/2020 02:50 PM    Potassium 4.4 08/13/2020 02:50 PM    Chloride 95 (L) 08/13/2020 02:50 PM    CO2 27 08/13/2020 02:50 PM    Anion gap 6 11/27/2018 02:54 AM    Glucose 93 08/13/2020 02:50 PM    BUN 24 08/13/2020 02:50 PM    Creatinine 1.19 (H) 08/13/2020 02:50 PM    BUN/Creatinine ratio 20 08/13/2020 02:50 PM    GFR est AA 47 (L) 08/13/2020 02:50 PM    GFR est non-AA 41 (L) 08/13/2020 02:50 PM    Calcium 9.8 08/13/2020 02:50 PM    Bilirubin, total 0.5 08/13/2020 02:50 PM    Alk. phosphatase 100 08/13/2020 02:50 PM    Protein, total 7.6 08/13/2020 02:50 PM    Albumin 4.9 (H) 08/13/2020 02:50 PM    Globulin 3.4 11/26/2018 03:53 AM    A-G Ratio 1.8 08/13/2020 02:50 PM    ALT (SGPT) 16 08/13/2020 02:50 PM         Assessment:     Assessment:        ICD-10-CM ICD-9-CM    1. Permanent atrial fibrillation (HCC)  I48.21 427.31 AMB POC EKG ROUTINE W/ 12 LEADS, INTER & REP   2. Essential hypertension  I10 401.9    3. Mixed hyperlipidemia  E78.2 272.2    4. CHB (complete heart block) (HCC)  I44.2 426.0      Orders Placed This Encounter    AMB POC EKG ROUTINE W/ 12 LEADS, INTER & REP     Order Specific Question:   Reason for Exam:     Answer:   routine        Plan:   Arnaud Modi is doing well. She is V paced 94% for chb sp av node abl. She is in AF and on oac. Cont med rx for htn and hyperlipidemia. Arnaud Modi will follow up with me in one year and in the device clinic per routine. Continue medical management for AF, chb, htn and hyperlipidemia. Thank you for allowing me to participate in Arnaud Modi 's care.     Sujata Cabrera MD, Nettie Her

## 2020-08-25 NOTE — LETTER
8/25/20 Patient: Macrina Thakur YOB: 1932 Date of Visit: 8/25/2020 Bevely Handler MD Real 
[address] [facility] [address] [facility] [address] VIA In Basket Dear Kendell Irwin MD, Thank you for referring Ms. Ernesto Lilly to [address] [place] [address] for evaluation. My notes for this consultation are attached. If you have questions, please do not hesitate to call me. I look forward to following your patient along with you. Sincerely, Sb Elena MD

## 2020-08-25 NOTE — PROGRESS NOTES
Chief Complaint   Patient presents with    Follow-up    Pacemaker Check    Irregular Heart Beat     1. Have you been to the ER, urgent care clinic since your last visit? Hospitalized since your last visit? No  2. Have you seen or consulted any other health care providers outside of the [address] [place] [address] since your last visit? Include any pap smears or colon screening. No    Patient C/O lower extremities discoloration ,swelling and aching at night. Ana Luisa Harris

## 2020-09-01 ENCOUNTER — OFFICE VISIT (OUTPATIENT)
Dept: CARDIOLOGY CLINIC | Age: 85
End: 2020-09-01
Payer: MEDICARE

## 2020-09-01 VITALS
BODY MASS INDEX: 26.21 KG/M2 | RESPIRATION RATE: 15 BRPM | HEART RATE: 68 BPM | DIASTOLIC BLOOD PRESSURE: 70 MMHG | SYSTOLIC BLOOD PRESSURE: 150 MMHG | WEIGHT: 157.3 LBS | HEIGHT: 65 IN | OXYGEN SATURATION: 97 %

## 2020-09-01 DIAGNOSIS — I48.21 PERMANENT ATRIAL FIBRILLATION (HCC): ICD-10-CM

## 2020-09-01 DIAGNOSIS — I87.2 VENOUS (PERIPHERAL) INSUFFICIENCY: Primary | ICD-10-CM

## 2020-09-01 PROCEDURE — G8432 DEP SCR NOT DOC, RNG: HCPCS | Performed by: INTERNAL MEDICINE

## 2020-09-01 PROCEDURE — G8419 CALC BMI OUT NRM PARAM NOF/U: HCPCS | Performed by: INTERNAL MEDICINE

## 2020-09-01 PROCEDURE — 1090F PRES/ABSN URINE INCON ASSESS: CPT | Performed by: INTERNAL MEDICINE

## 2020-09-01 PROCEDURE — G8536 NO DOC ELDER MAL SCRN: HCPCS | Performed by: INTERNAL MEDICINE

## 2020-09-01 PROCEDURE — 99214 OFFICE O/P EST MOD 30 MIN: CPT | Performed by: INTERNAL MEDICINE

## 2020-09-01 PROCEDURE — G8427 DOCREV CUR MEDS BY ELIG CLIN: HCPCS | Performed by: INTERNAL MEDICINE

## 2020-09-01 PROCEDURE — 1100F PTFALLS ASSESS-DOCD GE2>/YR: CPT | Performed by: INTERNAL MEDICINE

## 2020-09-01 PROCEDURE — 3288F FALL RISK ASSESSMENT DOCD: CPT | Performed by: INTERNAL MEDICINE

## 2020-09-01 NOTE — PROGRESS NOTES
1. Have you been to the ER, urgent care clinic since your last visit? Hospitalized since your last visit? No.    2. Have you seen or consulted any other health care providers outside of the [address] [place] [address] since your last visit? Include any pap smears or colon screening.    No.      Chief Complaint   Patient presents with    Follow-up     vascular consult- legs burning, red discoloration

## 2020-09-01 NOTE — PROGRESS NOTES
Lilia Mendez, FNP-BC    2020 3:07 PM      Subjective:     Ms. Sánchez Haile is a 80 y.o. female who is here for evaluation of leg pains. She has a PMHx of AF s/p AV monica ablation with PPM, mod-severe MR, venous insufficiency s/p right GSV ablation, HTN and HLD. She reports increased burning in both legs at night time. Symptoms are relieved by walking for a few minutes. Burning does persist during the day as well. She has leg swelling by the end of the day. She has heaviness. She has not been using compression stockings. Previously saw Dr. Bhupinder Patel last in 2017, had undergone right GSV ablation in 2015 and was conservatively managing left GSV insufficiency.     Past Medical History:   Diagnosis Date    Arthritis     hands/low back    Diabetes ([address]     borderline    GERD (gastroesophageal reflux disease)     Glaucoma     Hypercholesterolemia     Hypertension     Ill-defined condition     borderline anemia    Ill-defined condition     bladder infections    Impaired glucose tolerance     Menopause     Thyroid disease     hypothyroidism        Past Surgical History:   Procedure Laterality Date    CARDIOVERSION, ELECTIVE  10/31/2018         CARDIOVERSION, ELECTIVE  2018         HX BREAST BIOPSY Bilateral     benign    HX CATARACT REMOVAL      bilateral    HX DILATION AND CURETTAGE      HX ORTHOPAEDIC      carpal tunnel release -bilateral    HX ROTATOR CUFF REPAIR Right        Family History   Problem Relation Age of Onset    Coronary Artery Disease Other         mother  of MI age 80, brother  age 72 of MI, sister has hear problems    Breast Cancer Sister         46s    Heart Attack Mother     Other Father         pneumonia    Alzheimer Brother     Cancer Sister     Cancer Sister     Heart Attack Brother     Suicide Brother         Social History     Tobacco Use    Smoking status: Former Smoker     Packs/day: 1.00     Years: 15.00     Pack years: 15.00     Types: Cigarettes     Last attempt to quit: 1995     Years since quittin.6    Smokeless tobacco: Never Used   Substance Use Topics    Alcohol use: Yes     Alcohol/week: 1.0 standard drinks     Types: 1 Glasses of wine per week     Comment: Rare--maybe once a month       Visit Vitals  /70 (BP 1 Location: Right arm, BP Patient Position: Sitting)   Pulse 68   Resp 15   Ht 5' 5\" (1.651 m)   Wt 157 lb 4.8 oz (71.4 kg)   SpO2 97%   BMI 26.18 kg/m²       Current Outpatient Medications   Medication Sig    lovastatin (MEVACOR) 40 mg tablet TAKE 1 TABLET EVERY EVENING TO LOWER CHOLESTEROL    Eliquis 2.5 mg tablet TAKE ONE TABLET BY MOUTH 2 TIMES A DAY    lisinopriL (PRINIVIL, ZESTRIL) 2.5 mg tablet TAKE ONE TABLET BY MOUTH EVERY DAY    furosemide (LASIX) 40 mg tablet TAKE ONE TABLET BY MOUTH EVERY DAY    levothyroxine (SYNTHROID) 88 mcg tablet Take 1 Tab by mouth Daily (before breakfast).  omeprazole (PRILOSEC) 20 mg capsule TAKE 1 CAPSULE DAILY.  cyclobenzaprine (FLEXERIL) 10 mg tablet Take 1 Tab by mouth two (2) times daily as needed for Muscle Spasm(s).  timolol (TIMOPTIC) 0.5 % ophthalmic solution 1 Drop two (2) times a day.  acetaminophen/diphenhydramine (TYLENOL PM PO) Take 2 Tabs by mouth as needed.  cranberry fruit extract (CRANBERRY CONCENTRATE PO) Take 2 Tabs by mouth daily.  ACCU-CHEK MULTICLIX LANCET misc AS DIRECTED    latanoprost (XALATAN) 0.005 % ophthalmic solution Administer 1 Drop to both eyes nightly.  DOCUSATE CALCIUM (STOOL SOFTENER PO) Take  by mouth daily.  ascorbic acid (VITAMIN C) 500 mg tablet Take  by mouth daily.  cholecalciferol, vitamin d3, (VITAMIN D) 1,000 unit tablet Take  by mouth daily.  MULTIVITAMINS (MULTIVITAMIN PO) Take  by mouth daily.  pregabalin (LYRICA) 75 mg capsule Take 1 Cap by mouth two (2) times a day. Max Daily Amount: 150 mg. No current facility-administered medications for this visit. Allergies   Allergen Reactions    Aspirin Other (comments)     Swelling shut of eyelids    Macrobid [Nitrofurantoin Monohyd/M-Cryst] Unknown (comments)    Pcn [Penicillins] Hives       Objective:        Data Review:   Lab Results   Component Value Date/Time    Cholesterol, total 212 (H) 08/13/2020 02:50 PM    HDL Cholesterol 93 08/13/2020 02:50 PM    LDL, calculated 65 08/13/2020 02:50 PM    LDL, calculated 97 07/30/2019 10:48 AM    LDL, calculated 83 04/25/2019 09:27 AM    VLDL, calculated 54 (H) 08/13/2020 02:50 PM       Lab Results   Component Value Date/Time    Hemoglobin A1c 6.7 (H) 08/13/2020 02:50 PM    Hemoglobin A1c (POC) 6.0 (A) 02/06/2017 08:00 AM       Lab Results   Component Value Date/Time    Sodium 140 08/13/2020 02:50 PM    Potassium 4.4 08/13/2020 02:50 PM    Chloride 95 (L) 08/13/2020 02:50 PM    CO2 27 08/13/2020 02:50 PM    Glucose 93 08/13/2020 02:50 PM    BUN 24 08/13/2020 02:50 PM    Creatinine 1.19 (H) 08/13/2020 02:50 PM    BUN/Creatinine ratio 20 08/13/2020 02:50 PM    GFR est AA 47 (L) 08/13/2020 02:50 PM    GFR est non-AA 41 (L) 08/13/2020 02:50 PM    Calcium 9.8 08/13/2020 02:50 PM    Anion gap 6 11/27/2018 02:54 AM    Bilirubin, total 0.5 08/13/2020 02:50 PM    ALT (SGPT) 16 08/13/2020 02:50 PM    Alk. phosphatase 100 08/13/2020 02:50 PM    Protein, total 7.6 08/13/2020 02:50 PM    Albumin 4.9 (H) 08/13/2020 02:50 PM    Globulin 3.4 11/26/2018 03:53 AM    A-G Ratio 1.8 08/13/2020 02:50 PM       1. Have you ever had vein stripping surgery YES   If yes, when and which leg? Right GSV MOCA in 5/2015    2. Have you ever had vein injections? NO     3. Have you ever had a blood clot? NO    4. Have you ever had phlebitis? NO                                                                     Does anyone in your family have (or used to have) varicose veins, spider veins, leg ulcers or swollen legs? Father  NO  Mother NO  Brother(s) NO  Sister(s) NO  Other NO           1.   Do you experience any of the following in your legs? Aching/pain? YES  [] One leg [x] Both legs  Heaviness? YES  [] One leg [x] Both legs  Tiredness/fatigue? NO  [] One leg [] Both legs  Itching/burning? YES  [] One leg [x] Both legs  Swollen ankles? YES  [] One leg [x] Both legs  Leg cramps? YES  [] One leg [x] Both legs  Restless legs? NO  [] One leg [] Both legs  Throbbing? NO  [] One leg [] Both legs  Other? 2.  Have your veins gotten worse in recent months? YES    Describe: Increased burning at night time    3. Do you take any medication for pain (i.e., Advil, Motrin)  NO     4. Do you elevate your legs to relieve discomfort? YES    If yes, how long per day do you elevate and does it provide relief? Yes; overnight    5. Do you exercise? NO     6. Do you wear prescription compression stockings? NO       7. Do you wear light support hose (i.e., [employer])? NO      8. Do you have any problem walking? NO                    9.   What type of work do you do? Retired           How long do you stand (hours per day) at work? n/a At home? 2-3 hours        10. Have you ever had any test(s) done on your veins? YES          11. Were you diagnosed with saphenous vein reflux? YES    Review of Symptoms:    Review of Systems   Constitutional: Negative for chills, fever and weight loss. HENT: Negative for nosebleeds. Eyes: Negative for blurred vision and double vision. Respiratory: Negative for cough, shortness of breath and wheezing. Cardiovascular: Negative for chest pain, palpitations, orthopnea, leg swelling and PND. Gastrointestinal: Negative for abdominal pain, blood in stool, diarrhea, nausea and vomiting. Musculoskeletal: Negative for joint pain. Skin: Negative for rash. Neurological: Negative for dizziness, tingling and loss of consciousness. Endo/Heme/Allergies: Does not bruise/bleed easily.        Physical Exam:      General: Well developed, in no acute distress, cooperative and alert  HEENT: No carotid bruits, no JVD, trach is midline. Neck Supple, PEERL, EOM intact. Heart:  reg rate and rhythm; normal S1/S2; no murmurs, no gallops or rubs. Respiratory: Clear bilaterally x 4, no wheezing or rales  Abdomen:   Soft, non-tender, no distention, no masses. + BS. Extremities:  Normal cap refill, no cyanosis, atraumatic. No edema. Neuro: A&Ox3, speech clear, gait stable. Skin: Skin color is normal. No rashes or lesions. Non diaphoretic  Vascular: 2+ pulses symmetric in all extremities      Assessment:       ICD-10-CM ICD-9-CM    1. Venous (peripheral) insufficiency  I87.2 459.81    2. Permanent atrial fibrillation (HCC)  I48.21 427.31        Plan:     1. Venous (peripheral) insufficiency  Hx of right GSV ablation in 5/2015; had left GSV insufficiency, managed conservatively  Recurrent symptoms in both legs, not been using compression stockings  Will assess with venous duplex  1.  - Instruction given on medication dosage  2.  - Instruction given on daily leg elevation   3.  - Instruction given for mild exercise  4.  - Instruction given for weight reduction    2. Permanent atrial fibrillation (HCC)  S/p AV monica ablation  On Eliquis -- needs to be higher dosing, but pt is hesitant to increase dose at this time  Keep f/u with Dr. Elisabeth Zapata    F/u with Dr. Peewee Vera after testing complete    Zhao Maciel NP  9/1/2020      Patient seen and examined by me with nurse practitioner. I personally performed all components of the history, physical, and medical decision making and agree with the assessment and plan as noted.     Trev Corona MD

## 2020-09-24 ENCOUNTER — ANCILLARY PROCEDURE (OUTPATIENT)
Dept: CARDIOLOGY CLINIC | Age: 85
End: 2020-09-24
Payer: MEDICARE

## 2020-09-24 ENCOUNTER — TELEPHONE (OUTPATIENT)
Dept: CARDIOLOGY CLINIC | Age: 85
End: 2020-09-24

## 2020-09-24 LAB
LEFT GSV AT KNEE DIAM: 0.65 CM
LEFT GSV AT KNEE RFX: 3038 S
LEFT GSV BK MID DIAM: 0.38 CM
LEFT GSV BK MID RFX: 2927 S
LEFT GSV JUNC DIAM: 0.89 CM
LEFT GSV JUNC RFX: 898 S
LEFT GSV THIGH PROX DIAM: 0.87 CM
LEFT GSV THIGH PROX RFX: 0 S
LEFT SSV PROX DIAM: 0.32 CM
LEFT SSV PROX RFX: 0 S
RIGHT CFV RFX: 1.3 S
RIGHT GSV AK RFX: 0 S
RIGHT GSV AT KNEE DIAM: 0.29 CM
RIGHT GSV BK MID DIAM: 0.52 CM
RIGHT GSV BK MID RFX: 2950 S
RIGHT GSV JUNC DIAM: 0.85 CM
RIGHT GSV JUNC RFX: 2251 S
RIGHT GSV THIGH PROX DIAM: 0.91 CM
RIGHT GSV THIGH PROX RFX: 0 S
RIGHT SSV PROX DIAM: 0.6 CM
RIGHT SSV PROX RFX: 0 S

## 2020-09-24 PROCEDURE — 93970 EXTREMITY STUDY: CPT | Performed by: INTERNAL MEDICINE

## 2020-09-24 NOTE — TELEPHONE ENCOUNTER
----- Message from Sánchez Dela Cruz NP sent at 9/24/2020  4:11 PM EDT -----  Leida,    Please call patient and inform that venous ultrasound does show leaky veins in both legs -- worse from previous study. Please have her come see Dr. Gonzalez Friday to discuss further. Thanks,  Jeyson pt,verified pt with two pt identifiers, advised pt her le venous doppler showed leaky veins in both legs-which has gotten worse since the last study. Verified appt on 10/6/20 to discuss the results with . pt verbalized understanding.

## 2020-09-24 NOTE — PROGRESS NOTES
Leida,    Please call patient and inform that venous ultrasound does show leaky veins in both legs -- worse from previous study. Please have her come see Dr. Ling Kerns to discuss further.     Thanks,  [employer]

## 2020-09-28 NOTE — TELEPHONE ENCOUNTER
patient informed. Detail Level: Detailed Quality 130: Documentation Of Current Medications In The Medical Record: Current Medications Documented

## 2020-10-01 RX ORDER — LOVASTATIN 40 MG/1
TABLET ORAL
Qty: 90 TAB | Refills: 0 | Status: SHIPPED | OUTPATIENT
Start: 2020-10-01 | End: 2021-01-08

## 2020-10-05 RX ORDER — CYCLOBENZAPRINE HCL 10 MG
TABLET ORAL
Qty: 60 TAB | Refills: 0 | Status: SHIPPED | OUTPATIENT
Start: 2020-10-05 | End: 2022-01-03

## 2020-10-06 ENCOUNTER — OFFICE VISIT (OUTPATIENT)
Dept: CARDIOLOGY CLINIC | Age: 85
End: 2020-10-06
Payer: MEDICARE

## 2020-10-06 VITALS
WEIGHT: 158.3 LBS | OXYGEN SATURATION: 97 % | RESPIRATION RATE: 18 BRPM | DIASTOLIC BLOOD PRESSURE: 62 MMHG | BODY MASS INDEX: 26.37 KG/M2 | HEART RATE: 70 BPM | SYSTOLIC BLOOD PRESSURE: 150 MMHG | HEIGHT: 65 IN

## 2020-10-06 DIAGNOSIS — I10 ESSENTIAL HYPERTENSION: ICD-10-CM

## 2020-10-06 DIAGNOSIS — I87.2 VENOUS (PERIPHERAL) INSUFFICIENCY: Primary | ICD-10-CM

## 2020-10-06 DIAGNOSIS — I48.21 PERMANENT ATRIAL FIBRILLATION (HCC): ICD-10-CM

## 2020-10-06 DIAGNOSIS — I83.893 VARICOSE VEINS OF BOTH LEGS WITH EDEMA: ICD-10-CM

## 2020-10-06 PROCEDURE — 99214 OFFICE O/P EST MOD 30 MIN: CPT | Performed by: INTERNAL MEDICINE

## 2020-10-06 PROCEDURE — G8427 DOCREV CUR MEDS BY ELIG CLIN: HCPCS | Performed by: INTERNAL MEDICINE

## 2020-10-06 PROCEDURE — 1101F PT FALLS ASSESS-DOCD LE1/YR: CPT | Performed by: INTERNAL MEDICINE

## 2020-10-06 PROCEDURE — G8419 CALC BMI OUT NRM PARAM NOF/U: HCPCS | Performed by: INTERNAL MEDICINE

## 2020-10-06 PROCEDURE — 1090F PRES/ABSN URINE INCON ASSESS: CPT | Performed by: INTERNAL MEDICINE

## 2020-10-06 PROCEDURE — G8536 NO DOC ELDER MAL SCRN: HCPCS | Performed by: INTERNAL MEDICINE

## 2020-10-06 PROCEDURE — G8432 DEP SCR NOT DOC, RNG: HCPCS | Performed by: INTERNAL MEDICINE

## 2020-10-06 NOTE — PROGRESS NOTES
Marisol Horowitz, [facility]-BC    10/6/2020 3:07 PM      Subjective:     Ms. Nathaly Bowen is a 80 y.o. female who is here for evaluation of leg pains. She has a PMHx of AF s/p AV monica ablation with PPM, mod-severe MR, venous insufficiency s/p right GSV ablation, HTN and HLD. Had venous duplex done to reassess for venous insufficiency    Past Medical History:   Diagnosis Date    Arthritis     hands/low back    Diabetes ([address]     borderline    GERD (gastroesophageal reflux disease)     Glaucoma     Hypercholesterolemia     Hypertension     Ill-defined condition     borderline anemia    Ill-defined condition     bladder infections    Impaired glucose tolerance     Menopause     Thyroid disease     hypothyroidism        Past Surgical History:   Procedure Laterality Date    CARDIOVERSION, ELECTIVE  10/31/2018         CARDIOVERSION, ELECTIVE  2018         HX BREAST BIOPSY Bilateral 1990    benign    HX CATARACT REMOVAL      bilateral    HX DILATION AND CURETTAGE      HX ORTHOPAEDIC      carpal tunnel release -bilateral    HX ROTATOR CUFF REPAIR Right        Family History   Problem Relation Age of Onset    Coronary Artery Disease Other         mother  of MI age 80, brother  age 72 of MI, sister has hear problems    Breast Cancer Sister         46s    Heart Attack Mother     Other Father         pneumonia    Alzheimer Brother     Cancer Sister     Cancer Sister     Heart Attack Brother     Suicide Brother         Social History     Tobacco Use    Smoking status: Former Smoker     Packs/day: 1.00     Years: 15.00     Pack years: 15.00     Types: Cigarettes     Last attempt to quit: 1995     Years since quittin.7    Smokeless tobacco: Never Used   Substance Use Topics    Alcohol use:  Yes     Alcohol/week: 1.0 standard drinks     Types: 1 Glasses of wine per week     Comment: Rare--maybe once a month       Visit Vitals  BP (!) 150/62 (BP 1 Location: Left arm, BP Patient Position: Sitting)   Pulse 70   Resp 18   Ht 5' 5\" (1.651 m)   Wt 158 lb 4.8 oz (71.8 kg)   SpO2 97%   BMI 26.34 kg/m²       Current Outpatient Medications   Medication Sig    cyclobenzaprine (FLEXERIL) 10 mg tablet TAKE ONE TABLET BY MOUTH 2 TIMES A DAY AS NEEDED FOR MUSCLE SPASMS    lovastatin (MEVACOR) 40 mg tablet TAKE 1 TABLET EVERY EVENING TO LOWER CHOLESTEROL    pregabalin (LYRICA) 75 mg capsule Take 1 Cap by mouth two (2) times a day. Max Daily Amount: 150 mg.    Eliquis 2.5 mg tablet TAKE ONE TABLET BY MOUTH 2 TIMES A DAY    lisinopriL (PRINIVIL, ZESTRIL) 2.5 mg tablet TAKE ONE TABLET BY MOUTH EVERY DAY    furosemide (LASIX) 40 mg tablet TAKE ONE TABLET BY MOUTH EVERY DAY    levothyroxine (SYNTHROID) 88 mcg tablet Take 1 Tab by mouth Daily (before breakfast).  omeprazole (PRILOSEC) 20 mg capsule TAKE 1 CAPSULE DAILY.  timolol (TIMOPTIC) 0.5 % ophthalmic solution Administer 1 Drop to both eyes two (2) times a day.  acetaminophen/diphenhydramine (TYLENOL PM PO) Take 2 Tabs by mouth as needed.  cranberry fruit extract (CRANBERRY CONCENTRATE PO) Take 2 Tabs by mouth daily.  ACCU-CHEK MULTICLIX LANCET misc AS DIRECTED    latanoprost (XALATAN) 0.005 % ophthalmic solution Administer 1 Drop to both eyes nightly.  DOCUSATE CALCIUM (STOOL SOFTENER PO) Take  by mouth daily.  ascorbic acid (VITAMIN C) 500 mg tablet Take  by mouth daily.  cholecalciferol, vitamin d3, (VITAMIN D) 1,000 unit tablet Take  by mouth daily.  MULTIVITAMINS (MULTIVITAMIN PO) Take  by mouth daily. No current facility-administered medications for this visit.           Allergies   Allergen Reactions    Aspirin Other (comments)     Swelling shut of eyelids    Gabapentin Other (comments)     Vision change    Macrobid [Nitrofurantoin Monohyd/M-Cryst] Unknown (comments)    Pcn [Penicillins] Hives       Objective:        Data Review:   Lab Results   Component Value Date/Time    Cholesterol, total 212 (H) 08/13/2020 02:50 PM    HDL Cholesterol 93 08/13/2020 02:50 PM    LDL, calculated 65 08/13/2020 02:50 PM    LDL, calculated 97 07/30/2019 10:48 AM    LDL, calculated 83 04/25/2019 09:27 AM    VLDL, calculated 54 (H) 08/13/2020 02:50 PM       Lab Results   Component Value Date/Time    Hemoglobin A1c 6.7 (H) 08/13/2020 02:50 PM    Hemoglobin A1c (POC) 6.0 (A) 02/06/2017 08:00 AM       Lab Results   Component Value Date/Time    Sodium 140 08/13/2020 02:50 PM    Potassium 4.4 08/13/2020 02:50 PM    Chloride 95 (L) 08/13/2020 02:50 PM    CO2 27 08/13/2020 02:50 PM    Glucose 93 08/13/2020 02:50 PM    BUN 24 08/13/2020 02:50 PM    Creatinine 1.19 (H) 08/13/2020 02:50 PM    BUN/Creatinine ratio 20 08/13/2020 02:50 PM    GFR est AA 47 (L) 08/13/2020 02:50 PM    GFR est non-AA 41 (L) 08/13/2020 02:50 PM    Calcium 9.8 08/13/2020 02:50 PM    Anion gap 6 11/27/2018 02:54 AM    Bilirubin, total 0.5 08/13/2020 02:50 PM    ALT (SGPT) 16 08/13/2020 02:50 PM    Alk. phosphatase 100 08/13/2020 02:50 PM    Protein, total 7.6 08/13/2020 02:50 PM    Albumin 4.9 (H) 08/13/2020 02:50 PM    Globulin 3.4 11/26/2018 03:53 AM    A-G Ratio 1.8 08/13/2020 02:50 PM       Review of Symptoms:    Review of Systems   Constitutional: Negative for chills, fever and weight loss. HENT: Negative for nosebleeds. Eyes: Negative for blurred vision and double vision. Respiratory: Negative for cough, shortness of breath and wheezing. Cardiovascular: Negative for chest pain, palpitations, orthopnea, and PND. Gastrointestinal: Negative for abdominal pain, blood in stool, diarrhea, nausea and vomiting. Musculoskeletal: Negative for joint pain. Skin: Negative for rash. Neurological: Negative for dizziness, tingling and loss of consciousness. Endo/Heme/Allergies: Does not bruise/bleed easily. Physical Exam:      General: Well developed, in no acute distress, cooperative and alert  HEENT: No carotid bruits, no JVD, trach is midline.  Neck Supple, PEERL, EOM intact. Heart:  reg rate and rhythm; normal S1/S2; no murmurs, no gallops or rubs. Respiratory: Clear bilaterally x 4, no wheezing or rales  Abdomen:   Soft, non-tender, no distention, no masses. + BS. Extremities:  Normal cap refill, no cyanosis, atraumatic. No edema. Neuro: A&Ox3, speech clear, gait stable. Skin: Skin color is normal. No rashes or lesions. Non diaphoretic  Vascular: 2+ pulses symmetric in all extremities      PHYSICIAN TO COMPLETE BELOW THIS POINT    Date of Initial Physician Evaluation:___09/01/2020_____  Check all that apply:  [x] Reviewed Venous history  [x] Physical examination of the affected leg(s)   [x] Duplex or Doppler Scan results reviewed. Duplex or Doppler Ultrasound of the venous system demonstrate:  [x] Absence of deep venous thrombosis  [x] Greater and/or lesser saphenous vein or  valvular incompetence/reflux that correlates with        patients symptoms  []   valvular incompetence/reflux that correlates with patients symptoms    [x] Graduated, elasticized compression stockings (20-30 mmHg), has been prescribed. [x] Standing Photos taken of leg(s)  [] Front     [] Back     [x] Front and back   [x] Other causes of patients leg(s) symptoms have been ruled out             Assessment:       ICD-10-CM ICD-9-CM    1. Venous (peripheral) insufficiency  I87.2 459.81    2. Permanent atrial fibrillation (HCC)  I48.21 427.31    3. Essential hypertension  I10 401.9    4. Varicose veins of both legs with edema  I83.893 454.8      [unfilled]    [facility] class:      []C1   []C2   [x]C3    []C4    []4a    []4b   []C5   []C6         Plan:     1.  Venous (peripheral) insufficiency, varicose vein with swelling of LE:   Hx of right GSV ablation in 5/2015; had left GSV insufficiency, managed conservatively  Venous duplex done 9/2020 with bilateral GSV insufficiency with deep vein insufficiency of the right CFV  Resume compression stockings bilaterally, thigh high  F/u in 3 months  1.  - Instruction given on daily leg elevation   2.  - Instruction given for mild exercise  3.  - Instruction given for weight reduction    2. Permanent atrial fibrillation (HCC)  S/p AV monica ablation  On Eliquis  Keep f/u with Dr. Antonietta Read    F/u with Dr. Tonie Chacon in 3 months      Patient seen and examined by me with nurse practitioner in vascular clinic. I personally performed all components of the history, physical, and medical decision making and agree with the assessment and plan as noted.     Governor MD Elizabeth  10/6/2020

## 2020-10-06 NOTE — PROGRESS NOTES
Chief Complaint   Patient presents with    Results     Here to discuss results of LE venous study - feels like they are on fire -     Leg Swelling     goes down at night however hurt worse at night      1. Have you been to the ER, urgent care clinic since your last visit? Hospitalized since your last visit? No     2. Have you seen or consulted any other health care providers outside of the [address] [place] [address] since your last visit? Include any pap smears or colon screening.  No

## 2020-10-07 ENCOUNTER — CLINICAL SUPPORT (OUTPATIENT)
Dept: FAMILY MEDICINE CLINIC | Age: 85
End: 2020-10-07
Payer: MEDICARE

## 2020-10-07 VITALS — TEMPERATURE: 97.9 F

## 2020-10-07 DIAGNOSIS — Z23 NEEDS FLU SHOT: Primary | ICD-10-CM

## 2020-10-07 PROCEDURE — G0008 ADMIN INFLUENZA VIRUS VAC: HCPCS

## 2020-10-07 PROCEDURE — 90694 VACC AIIV4 NO PRSRV 0.5ML IM: CPT

## 2020-10-07 NOTE — PROGRESS NOTES
Chief Complaint   Patient presents with    Immunization/Injection     flu shot        Calvin Mcdermott is a 80 y.o. female who presents for routine immunizations. She denies any symptoms , reactions or allergies that would exclude them from being immunized today. Risks and adverse reactions were discussed and the VIS was given to them. All questions were addressed. She was observed for 5 min post injection. There were no reactions observed.     Morgan Santosh, LÁZARO

## 2020-12-14 ENCOUNTER — OFFICE VISIT (OUTPATIENT)
Dept: CARDIOLOGY CLINIC | Age: 85
End: 2020-12-14
Payer: MEDICARE

## 2020-12-14 DIAGNOSIS — Z95.0 CARDIAC PACEMAKER IN SITU: Primary | ICD-10-CM

## 2020-12-14 DIAGNOSIS — I44.2 CHB (COMPLETE HEART BLOCK) (HCC): ICD-10-CM

## 2020-12-14 PROCEDURE — 93294 REM INTERROG EVL PM/LDLS PM: CPT | Performed by: INTERNAL MEDICINE

## 2020-12-14 PROCEDURE — 93296 REM INTERROG EVL PM/IDS: CPT | Performed by: INTERNAL MEDICINE

## 2020-12-21 ENCOUNTER — TRANSCRIBE ORDER (OUTPATIENT)
Dept: SCHEDULING | Age: 85
End: 2020-12-21

## 2020-12-21 DIAGNOSIS — Z12.31 VISIT FOR SCREENING MAMMOGRAM: Primary | ICD-10-CM

## 2021-01-08 RX ORDER — LOVASTATIN 40 MG/1
TABLET ORAL
Qty: 90 TAB | Refills: 0 | Status: SHIPPED | OUTPATIENT
Start: 2021-01-08 | End: 2021-04-16

## 2021-01-12 ENCOUNTER — DOCUMENTATION ONLY (OUTPATIENT)
Dept: CARDIOLOGY CLINIC | Age: 86
End: 2021-01-12

## 2021-01-12 ENCOUNTER — OFFICE VISIT (OUTPATIENT)
Dept: CARDIOLOGY CLINIC | Age: 86
End: 2021-01-12
Payer: MEDICARE

## 2021-01-12 VITALS
DIASTOLIC BLOOD PRESSURE: 72 MMHG | SYSTOLIC BLOOD PRESSURE: 136 MMHG | BODY MASS INDEX: 25.71 KG/M2 | HEIGHT: 65 IN | HEART RATE: 83 BPM | OXYGEN SATURATION: 97 % | WEIGHT: 154.3 LBS | RESPIRATION RATE: 16 BRPM

## 2021-01-12 DIAGNOSIS — I48.21 PERMANENT ATRIAL FIBRILLATION (HCC): ICD-10-CM

## 2021-01-12 DIAGNOSIS — I87.2 VENOUS INSUFFICIENCY OF BOTH LOWER EXTREMITIES: Primary | ICD-10-CM

## 2021-01-12 PROCEDURE — G8536 NO DOC ELDER MAL SCRN: HCPCS | Performed by: INTERNAL MEDICINE

## 2021-01-12 PROCEDURE — 99214 OFFICE O/P EST MOD 30 MIN: CPT | Performed by: INTERNAL MEDICINE

## 2021-01-12 PROCEDURE — G8427 DOCREV CUR MEDS BY ELIG CLIN: HCPCS | Performed by: INTERNAL MEDICINE

## 2021-01-12 PROCEDURE — 1101F PT FALLS ASSESS-DOCD LE1/YR: CPT | Performed by: INTERNAL MEDICINE

## 2021-01-12 PROCEDURE — G8510 SCR DEP NEG, NO PLAN REQD: HCPCS | Performed by: INTERNAL MEDICINE

## 2021-01-12 PROCEDURE — G8419 CALC BMI OUT NRM PARAM NOF/U: HCPCS | Performed by: INTERNAL MEDICINE

## 2021-01-12 PROCEDURE — 1090F PRES/ABSN URINE INCON ASSESS: CPT | Performed by: INTERNAL MEDICINE

## 2021-01-12 NOTE — PROGRESS NOTES
1/12/2021 1:27 PM      Subjective:     Ms. Reanna Montoya is a 80 y.o. female here for f/u after conservative therapy. She has a PMHx of AF s/p AV monica ablation with PPM, mod-severe MR, venous insufficiency s/p right GSV ablation, HTN and HLD. Although conservative therapy does improve the symptoms intermittently, they do not alleviate them to the point that she is impaired in the daily activities of living. During prolonged periods of standing at home, the patient must sit or take a break due to aching, cramping, burning, itching, or swelling in the lower extremities. To compensate for this functional impairment, the patient has tried conservative therapies; however, these conservative measures have failed to resolve these symptoms. The conservative treatment measures include the following:   Compression Stocking Therapy  o Period of Time:   - []   Three Weeks  - [x]  Three Months  - []   Six Months         o Strength of Compression:   - []  10 - 20 mmHg  - [x]  20 - 30 mmHg  - [] 30 - 40 mmHg   Use of Over-The-Counter Analgesics   Exercise   Elevation of Lower Extremity Above Heart Level    Current Outpatient Medications   Medication Sig    Eliquis 2.5 mg tablet TAKE ONE TABLET BY MOUTH 2 TIMES A DAY    lovastatin (MEVACOR) 40 mg tablet TAKE 1 TABLET EVERY EVENING TO LOWER CHOLESTEROL    cyclobenzaprine (FLEXERIL) 10 mg tablet TAKE ONE TABLET BY MOUTH 2 TIMES A DAY AS NEEDED FOR MUSCLE SPASMS    lisinopriL (PRINIVIL, ZESTRIL) 2.5 mg tablet TAKE ONE TABLET BY MOUTH EVERY DAY    furosemide (LASIX) 40 mg tablet TAKE ONE TABLET BY MOUTH EVERY DAY    levothyroxine (SYNTHROID) 88 mcg tablet Take 1 Tab by mouth Daily (before breakfast).  omeprazole (PRILOSEC) 20 mg capsule TAKE 1 CAPSULE DAILY.  timolol (TIMOPTIC) 0.5 % ophthalmic solution Administer 1 Drop to both eyes two (2) times a day.  acetaminophen/diphenhydramine (TYLENOL PM PO) Take 2 Tabs by mouth as needed.     cranberry fruit extract (CRANBERRY CONCENTRATE PO) Take 2 Tabs by mouth daily.  ACCU-CHEK MULTICLIX LANCET misc AS DIRECTED    DOCUSATE CALCIUM (STOOL SOFTENER PO) Take  by mouth daily.  ascorbic acid (VITAMIN C) 500 mg tablet Take  by mouth daily.  cholecalciferol, vitamin d3, (VITAMIN D) 1,000 unit tablet Take  by mouth daily.  MULTIVITAMINS (MULTIVITAMIN PO) Take  by mouth daily.  pregabalin (LYRICA) 75 mg capsule Take 1 Cap by mouth two (2) times a day. Max Daily Amount: 150 mg.    latanoprost (XALATAN) 0.005 % ophthalmic solution Administer 1 Drop to both eyes nightly. No current facility-administered medications for this visit.           Allergies   Allergen Reactions    Aspirin Other (comments)     Swelling shut of eyelids    Gabapentin Other (comments)     Vision change    Macrobid [Nitrofurantoin Monohyd/M-Cryst] Unknown (comments)    Pcn [Penicillins] Hives       Past Medical History:   Diagnosis Date    Arthritis     hands/low back    Diabetes ([address]     borderline    GERD (gastroesophageal reflux disease)     Glaucoma     Hypercholesterolemia     Hypertension     Ill-defined condition     borderline anemia    Ill-defined condition     bladder infections    Impaired glucose tolerance     Menopause     Thyroid disease     hypothyroidism       Objective:      Visit Vitals  /72 (BP 1 Location: Right arm, BP Patient Position: Sitting)   Pulse 83   Resp 16   Ht 5' 5\" (1.651 m)   Wt 154 lb 4.8 oz (70 kg)   SpO2 97%   BMI 25.68 kg/m²       Data Review:     Lab Results   Component Value Date/Time    Cholesterol, total 212 (H) 08/13/2020 02:50 PM    HDL Cholesterol 93 08/13/2020 02:50 PM    LDL, calculated 65 08/13/2020 02:50 PM    LDL, calculated 97 07/30/2019 10:48 AM    LDL, calculated 83 04/25/2019 09:27 AM    VLDL, calculated 54 (H) 08/13/2020 02:50 PM       Lab Results   Component Value Date/Time    Hemoglobin A1c 6.7 (H) 08/13/2020 02:50 PM    Hemoglobin A1c (POC) 6.0 (A) 02/06/2017 08:00 AM       Lab Results   Component Value Date/Time    Sodium 140 08/13/2020 02:50 PM    Potassium 4.4 08/13/2020 02:50 PM    Chloride 95 (L) 08/13/2020 02:50 PM    CO2 27 08/13/2020 02:50 PM    Glucose 93 08/13/2020 02:50 PM    BUN 24 08/13/2020 02:50 PM    Creatinine 1.19 (H) 08/13/2020 02:50 PM    BUN/Creatinine ratio 20 08/13/2020 02:50 PM    GFR est AA 47 (L) 08/13/2020 02:50 PM    GFR est non-AA 41 (L) 08/13/2020 02:50 PM    Calcium 9.8 08/13/2020 02:50 PM    Anion gap 6 11/27/2018 02:54 AM    Bilirubin, total 0.5 08/13/2020 02:50 PM    ALT (SGPT) 16 08/13/2020 02:50 PM    Alk. phosphatase 100 08/13/2020 02:50 PM    Protein, total 7.6 08/13/2020 02:50 PM    Albumin 4.9 (H) 08/13/2020 02:50 PM    Globulin 3.4 11/26/2018 03:53 AM    A-G Ratio 1.8 08/13/2020 02:50 PM       Review of Symptoms:    General: Pt denies excessive weight gain or loss. Pt is able to conduct ADL's  HEENT: Denies blurred vision, headaches, epistaxis and difficulty swallowing. Respiratory: Denies shortness of breath, ROWE, wheezing or stridor. Cardiovascular: Denies precordial pain, palpitations, edema or PND  Gastrointestinal: Denies poor appetite, indigestion, abdominal pain or blood in stool  Urinary: Denies dysuria, pyuria  Musculoskeletal: +gonzalo leg pain, denies swelling from muscles or joints  Neurologic: Denies tremor, paresthesias, or sensory motor disturbance  Skin: Denies rash, itching or texture change. Psych: Denies depression    Physical Exam:      General: Well developed, in no acute distress, cooperative and alert  HEENT: No carotid bruits, no JVD, trach is midline. Neck Supple, PEERL, EOM intact. Heart:  reg rate and rhythm; normal S1/S2; no murmurs, no gallops or rubs. Respiratory: Clear bilaterally x 4, no wheezing or rales  Abdomen:   Soft, non-tender, no distention, no masses. + BS. Extremities:  Normal cap refill, no cyanosis, atraumatic. No edema. Neuro: A&Ox3, speech clear, gait stable. Skin: Skin color is normal. No rashes or lesions. Non diaphoretic  Vascular: 2+ pulses symmetric in all extremities. +varicosies gonzalo calves      PHYSICIAN TO COMPLETE    Date of Physician Reevaluation:____1/12/21__________________  (To review results of trial of conservative therapy-lasting at least 3-6 months):  Patient is symptomatic with varicosities despite compliance with conservative therapy. Has failed conservative treatment. Check all that apply:  [x] Other causes of patients leg(s) symptoms have been ruled out  [x] Completed conservative treatment to include: compression stockings, medication, leg elevation, mild exercise & weight         reduction (as appropriate). Time length of conservative treatment[de-identified] __3 months___________    Patient is symptomatic with varicosities causing the following: (check all that apply):  [x] Has persistent aching, cramping, burning, pain, itching, and/or swelling during activity or after prolonged standing. [] Significant, recurrent superficial phlebitis  [] Hemorrhage from a ruptured varix  [] Non-healing skin ulceration of the leg  [] Other complications associated:  ___________________      Duplex or Doppler Ultrasound of the venous system demonstrate:  [x] Absence of deep venous thrombosis  [x] Greater and/or lesser saphenous vein or  valvular incompetence/reflux that correlates with        patients symptoms  []   valvular incompetence/reflux that correlates with patients symptoms         Assessment:       ICD-10-CM ICD-9-CM    1. Venous insufficiency of both lower extremities  I87.2 459.81    2. Permanent atrial fibrillation (HCC)  I48.21 427.31        Plan:   Venous insufficiency: Hx of right GSV ablation in 5/2015; had left GSV insufficiency, managed conservatively  Venous duplex done 9/2020 with bilateral GSV insufficiency with deep vein insufficiency of the right CFV.  At her last appt in October we recommended conservative therapy including compression stockings. She reports some improvement in her pain with compression stockings, but still having discomfort. I have recommend RF ablation of the left GSV. The procedure(s) will be performed in my office using local tumescent anesthesia. she is anxious to move forward in finding relief. Permanent afib  S/p AV monica ablation  On Eliquis  Keep f/u with Dr. Kassandra Last NP  1/12/2021      Patient seen and examined by me with nurse practitioner in vascular clinic. I personally performed all components of the history, physical, and medical decision making and agree with the assessment and plan as noted.     Gutierrez Ferro MD

## 2021-01-12 NOTE — PROGRESS NOTES
1/12/2021     «PatientPrevensInsurancePayerPlan»  «PatientPrimaryInsuranceAddressFormatted»    Attention: Provider Services    Re: Predetermination of Medical Necessity and Plan Benefits    Patient Name   Heladio Hdez  Date of Birth   3/5/1932    Subscriber Name  «AlexSubscriber»  Date of Birth   Click here to enter a date. Relationship to Patient  «AlexSubscriberRelatio»  Identification Number  «AlexMemberID»  Group Number   «[id number]»          To Whom It May Concern:    Heladio Hdez is a 80 y.o. female, who presented to my office for evaluation, diagnosis, and treatment. Heladio Hdez reports that the symptoms she is experiencing are limiting her ability to perform the activities of daily living. The patient presented with   Aching/pain   [] One leg [x] Both legs  Heaviness     [] One leg [x] Both legs  Tiredness/fatigue   [] One leg [] Both legs  Itching/burning   [] One leg [x] Both legs  Swollen ankles   [] One leg [x] Both legs  Leg cramp    [] One leg [x] Both legs  Restless legs   [] One leg [] Both legs         Throbbing   [] One leg []Bothlegs. The patient states that she has suffered from these symptoms for long time, and, although conservative therapy does improve the symptoms intermittently, they do not alleviate them to the point that she is impaired in the daily activities of living. During prolonged periods of standing, the patient must sit or take a break due to aching, cramping, burning, itching, or swelling in the lower extremities. To compensate for this functional impairment, the patient has tried conservative therapies; however, these conservative measures have failed to resolve these symptoms.   The conservative treatment measures include the following:     Compression Stocking Therapy  o Period of Time:   - []   Three Weeks  - [x]  Three Months  - []   Six Months         o Strength of Compression:   - []  10 - 20 mmHg  - [x]  20 - 30 mmHg  - [] 30 - 40 mmHg   Use of Over-The-Counter Analgesics     Exercise  o Frequency: daily     Elevation of Lower Extremity Above Heart Level  o Frequency: daily    A bilateral Duplex Ultrasound examination was performed, and the report is attached for review. The patients history reveals no presence of lymphedema, arterial insufficiency, or deep vein thrombosis; nor is the patient within six months post-partem. In conclusion, the summary of my findings and recommended, staged treatment include the following:    Diagnosis  Patient Active Problem List   Diagnosis Code    Restless leg syndrome G25.81    Hypothyroid E03.9    Hypertension I10    Arthritis M19.90    Hyperlipidemia E78.5    Venous (peripheral) insufficiency I87.2    Anemia D64.9    Chronic UTI N39.0    PAD (peripheral artery disease) ([facility]) I73.9    Osteoarthritis of hip M16.9    Hypercholesterolemia E78.00    Mitral regurgitation I34.0    Sigmoid diverticulosis K57.30    Atrial fibrillation ([facility]) I48.91    Varicose veins of both legs with edema I83.893       Recommended Treatment    Left GSV RF ablation. The procedure(s) will be performed in my office using local tumescent anesthesia. Enclosed you will find the supporting documentation of my findings, which include the following:     History and Physical Examination   Doppler Examination   Lower Extremity Patient Photographs    The patient has requested that we verify coverage for the recommended treatment under their insurance plan and, understandably, she is anxious to move forward in finding relief. I appreciate your review and response at your earliest convenience. Should you have any questions, please feel free to contact me at [phone].     Sincerely,    Gail Reed MD    [facility]

## 2021-01-12 NOTE — PROGRESS NOTES
Identified pt with two pt identifiers(name and ). Reviewed record in preparation for visit and have obtained necessary documentation. Chief Complaint   Patient presents with    Follow-up     3 month      Vitals:    21 1312   BP: 136/72   Pulse: 83   Resp: 16   SpO2: 97%   Weight: 154 lb 4.8 oz (70 kg)   Height: 5' 5\" (1.651 m)   PainSc:   0 - No pain       Health Maintenance Review: Patient reminded of \"due or due soon\" health maintenance. I have asked the patient to contact his/her primary care provider (PCP) for follow-up on his/her health maintenance. Coordination of Care Questionnaire:  :   1) Have you been to an emergency room, urgent care, or hospitalized since your last visit? If yes, where when, and reason for visit? no       2. Have seen or consulted any other health care provider since your last visit? If yes, where when, and reason for visit? NO      Patient is accompanied by self I have received verbal consent from Shayy Clark to discuss any/all medical information while they are present in the room.

## 2021-02-11 ENCOUNTER — HOSPITAL ENCOUNTER (OUTPATIENT)
Dept: MAMMOGRAPHY | Age: 86
Discharge: HOME | End: 2021-02-11
Attending: FAMILY MEDICINE
Payer: MEDICARE

## 2021-02-11 DIAGNOSIS — Z12.31 VISIT FOR SCREENING MAMMOGRAM: ICD-10-CM

## 2021-02-11 PROCEDURE — 77067 SCR MAMMO BI INCL CAD: CPT

## 2021-03-15 ENCOUNTER — OFFICE VISIT (OUTPATIENT)
Dept: CARDIOLOGY CLINIC | Age: 86
End: 2021-03-15

## 2021-03-15 ENCOUNTER — CLINICAL SUPPORT (OUTPATIENT)
Dept: CARDIOLOGY CLINIC | Age: 86
End: 2021-03-15
Payer: MEDICARE

## 2021-03-15 VITALS
HEART RATE: 83 BPM | SYSTOLIC BLOOD PRESSURE: 162 MMHG | HEIGHT: 65 IN | DIASTOLIC BLOOD PRESSURE: 80 MMHG | OXYGEN SATURATION: 97 % | BODY MASS INDEX: 25.33 KG/M2 | WEIGHT: 152 LBS

## 2021-03-15 DIAGNOSIS — I44.2 CHB (COMPLETE HEART BLOCK) (HCC): ICD-10-CM

## 2021-03-15 DIAGNOSIS — I87.2 VENOUS (PERIPHERAL) INSUFFICIENCY: ICD-10-CM

## 2021-03-15 DIAGNOSIS — Z95.0 CARDIAC PACEMAKER IN SITU: Primary | ICD-10-CM

## 2021-03-15 DIAGNOSIS — I83.893 VARICOSE VEINS OF BOTH LEGS WITH EDEMA: Primary | ICD-10-CM

## 2021-03-15 PROCEDURE — 93294 REM INTERROG EVL PM/LDLS PM: CPT | Performed by: INTERNAL MEDICINE

## 2021-03-15 PROCEDURE — 36475 ENDOVENOUS RF 1ST VEIN: CPT | Performed by: INTERNAL MEDICINE

## 2021-03-15 PROCEDURE — 93296 REM INTERROG EVL PM/IDS: CPT | Performed by: INTERNAL MEDICINE

## 2021-03-15 NOTE — PROGRESS NOTES
[facility]    Patient: Shea Jonas  : 3/5/1932  Chart no: [id number]  DATE OF SERVICE: 3/15/2021    Procedure: Endovenous radiofrequency ablation of the left greater saphenous vein (s) of the lower extremity  Anesthesia: Local infiltration  Estimated blood loss: minimal  Specimen: none. Tumescent vol: 500 ml    OPERATIVE NOTE     The patient was transferred to the procedure suite and the insufficient saphenous vein was mapped by ultrasound and diagrammed on the overlying skin. The depth and diameter of the vein(s) to be treated was documented. The varicose tributary veins and suitable access sites were identified and mapped as well. The patient was then positioned supine on the procedure table. The affected limb was prepped and draped in the usual sterile fashion. The RF catheter was placed on the sterile field, flushed and wiped down, prepared, and connected by a sterile cable. The patient was placed in reverse-Trendelenburg position and local anesthesia was instilled in the skin overlying the access site. The vein was accessed using ultrasound guidance and the Seldinger technique, a guide wire was introduced through the needle, which was then exchanged over the guide wire for a 7F sheath, which was secured in place. The guide wire was removed and the sheath was flushed. The RF catheter was placed into the vein through the sheath and Preferentially, imaging was used to place the catheter tip just inferior to the superficial epigastric vein to preserve normal physiological flow in that vein. Additionally, it was confirmed by ultrasound guidance that the catheter tip was also placed a minimum of 2cm distal to the saphenofemoral junction.     After the RF catheter position was verified by ultrasound, tumescent anesthesia was infiltrated, under ultrasound guidance, precisely into the perivenous compartment along the entire length of vein from the entry site to the saphenofemoral junction until a \"halo\" of fluid was noted around the vein. The patient was then placed in Trendelenburg position to further exsanguinate the superficial venous system. After RF catheter position was again confirmed with ultrasound imaging, and under direct external compression along the length of the heating element, RF energy was applied. The vein was segmentally ablated by heating a 7 cm segment and then indexing the catheter forward by 6.5 cm until the treatment length is completed. Device temperature was maintained at 120±5 ºC with an initial power level of 40W dropping to below 20W for each treatment. Total vein length treated 42 cm Total cycles of RF 12 for 4 minutes. Repeat ultrasound of the saphenous vein was performed, confirming successful treatment. The catheter and sheath were withdrawn and hemostasis established with direct pressure. After assuring hemostasis, the skin incision over the saphenous vein was closed with a bandage and a compression wrap, and/or graduated compression stocking was applied from the level of the foot to the most proximal level of the thigh. Patient tolerated procedure very well.        Jarrett Carrero MD, [place], [id number] [facility]   [address] [facility] [address] [facility]  [phone]

## 2021-03-15 NOTE — PATIENT INSTRUCTIONS
RADIOFREQUENCY ABLATION      Post procedure instructions:      Make sure you walk for 20 minutes before you leave the facility and at least 10 minutes per hour for the remainder of the day of your procedure. 1. You will be asked to return to the office 3 days after your procedure for a follow up ultrasound unless told otherwise. 2. You are to wear your compression stocking for 72 hours following your procedure. After the 72 hours you are to wear your compression stockings only during the day for at least 2 weeks. You do not sleep with your stockings on except for the initial 72 hours. 3. If you are sent home with a bandage wrap, do not get it wet. If you are to take a shower after the procedure, tie a plastic bag over the bandage in order to keep it dry. 4. Please do not lift anything over 20 pounds for 7 days following your procedure. If you are going to rest after your procedure please place pillows under your leg and elevate that leg so your feet are at the level of your heart. 5. Notify our staff if your employer requires an excused doctors note for any reason pertaining to the day of your procedure and follow ups. 6. After your procedure you may return to your normal routine/activities unless told otherwise. 7. Please notify Dr. Rylee Pean. Robel if you experience any discoloration of your toes or discomfort of the bandage wrap, inability to move your toes, bleeding, or pain you feel is not tolerable. Our office number is [phone].

## 2021-03-17 ENCOUNTER — ANCILLARY PROCEDURE (OUTPATIENT)
Dept: CARDIOLOGY CLINIC | Age: 86
End: 2021-03-17
Payer: MEDICARE

## 2021-03-17 DIAGNOSIS — Z98.890 STATUS POST ENDOVENOUS RADIOFREQUENCY ABLATION (RFA) OF SAPHENOUS VEIN: ICD-10-CM

## 2021-03-17 PROCEDURE — 93971 EXTREMITY STUDY: CPT | Performed by: INTERNAL MEDICINE

## 2021-03-30 ENCOUNTER — OFFICE VISIT (OUTPATIENT)
Dept: CARDIOLOGY CLINIC | Age: 86
End: 2021-03-30
Payer: MEDICARE

## 2021-03-30 VITALS
HEART RATE: 67 BPM | HEIGHT: 65 IN | RESPIRATION RATE: 16 BRPM | DIASTOLIC BLOOD PRESSURE: 58 MMHG | OXYGEN SATURATION: 97 % | BODY MASS INDEX: 25.56 KG/M2 | SYSTOLIC BLOOD PRESSURE: 132 MMHG | WEIGHT: 153.4 LBS

## 2021-03-30 DIAGNOSIS — I10 ESSENTIAL HYPERTENSION: ICD-10-CM

## 2021-03-30 DIAGNOSIS — I83.893 VARICOSE VEINS OF BOTH LEGS WITH EDEMA: ICD-10-CM

## 2021-03-30 DIAGNOSIS — I87.2 VENOUS (PERIPHERAL) INSUFFICIENCY: Primary | ICD-10-CM

## 2021-03-30 PROCEDURE — 1090F PRES/ABSN URINE INCON ASSESS: CPT | Performed by: INTERNAL MEDICINE

## 2021-03-30 PROCEDURE — G8536 NO DOC ELDER MAL SCRN: HCPCS | Performed by: INTERNAL MEDICINE

## 2021-03-30 PROCEDURE — G8419 CALC BMI OUT NRM PARAM NOF/U: HCPCS | Performed by: INTERNAL MEDICINE

## 2021-03-30 PROCEDURE — 1101F PT FALLS ASSESS-DOCD LE1/YR: CPT | Performed by: INTERNAL MEDICINE

## 2021-03-30 PROCEDURE — G8432 DEP SCR NOT DOC, RNG: HCPCS | Performed by: INTERNAL MEDICINE

## 2021-03-30 PROCEDURE — 99213 OFFICE O/P EST LOW 20 MIN: CPT | Performed by: INTERNAL MEDICINE

## 2021-03-30 PROCEDURE — G8427 DOCREV CUR MEDS BY ELIG CLIN: HCPCS | Performed by: INTERNAL MEDICINE

## 2021-03-30 RX ORDER — MECLIZINE HCL 12.5 MG 12.5 MG/1
12.5 TABLET ORAL
COMMUNITY

## 2021-03-30 NOTE — PROGRESS NOTES
3/30/2021 2:31 PM      Subjective:     Fidel Fofana is seen in vascular clinic for f/u visit after undergoing left GSV RF ablation. Feeling better. She denies chest pain, chest pressure/discomfort, dyspnea, palpitations, irregular heart beats, near-syncope, syncope, fatigue, orthopnea, paroxysmal nocturnal dyspnea, exertional chest pressure/discomfort, claudication, lower extremity edema. Visit Vitals  BP (!) 132/58 (BP 1 Location: Right upper arm, BP Patient Position: Sitting, BP Cuff Size: Large adult)   Pulse 67   Resp 16   Ht 5' 5\" (1.651 m)   Wt 153 lb 6.4 oz (69.6 kg)   LMP  (LMP Unknown)   SpO2 97%   BMI 25.53 kg/m²     Current Outpatient Medications   Medication Sig    meclizine (ANTIVERT) 12.5 mg tablet Take 12.5 mg by mouth three (3) times daily as needed for Dizziness.  OTHER PREVGEN 1 TAB DAILY AS NEEDED    lisinopriL (PRINIVIL, ZESTRIL) 2.5 mg tablet TAKE ONE TABLET BY MOUTH EVERY DAY    Eliquis 2.5 mg tablet TAKE ONE TABLET BY MOUTH 2 TIMES A DAY    lovastatin (MEVACOR) 40 mg tablet TAKE 1 TABLET EVERY EVENING TO LOWER CHOLESTEROL    cyclobenzaprine (FLEXERIL) 10 mg tablet TAKE ONE TABLET BY MOUTH 2 TIMES A DAY AS NEEDED FOR MUSCLE SPASMS    furosemide (LASIX) 40 mg tablet TAKE ONE TABLET BY MOUTH EVERY DAY    levothyroxine (SYNTHROID) 88 mcg tablet Take 1 Tab by mouth Daily (before breakfast).  omeprazole (PRILOSEC) 20 mg capsule TAKE 1 CAPSULE DAILY.  timolol (TIMOPTIC) 0.5 % ophthalmic solution Administer 1 Drop to both eyes two (2) times a day.  acetaminophen/diphenhydramine (TYLENOL PM PO) Take 2 Tabs by mouth as needed.  cranberry fruit extract (CRANBERRY CONCENTRATE PO) Take 2 Tabs by mouth daily.  ACCU-CHEK MULTICLIX LANCET misc AS DIRECTED    latanoprost (XALATAN) 0.005 % ophthalmic solution Administer 1 Drop to both eyes nightly.  DOCUSATE CALCIUM (STOOL SOFTENER PO) Take  by mouth daily.     ascorbic acid (VITAMIN C) 500 mg tablet Take  by mouth daily.  cholecalciferol, vitamin d3, (VITAMIN D) 1,000 unit tablet Take  by mouth daily.  MULTIVITAMINS (MULTIVITAMIN PO) Take  by mouth daily.  pregabalin (LYRICA) 75 mg capsule Take 1 Cap by mouth two (2) times a day. Max Daily Amount: 150 mg. No current facility-administered medications for this visit.           Objective:      Visit Vitals  BP (!) 132/58 (BP 1 Location: Right upper arm, BP Patient Position: Sitting, BP Cuff Size: Large adult)   Pulse 67   Resp 16   Ht 5' 5\" (1.651 m)   Wt 153 lb 6.4 oz (69.6 kg)   SpO2 97%   BMI 25.53 kg/m²       Past Medical History:   Diagnosis Date    Arthritis     hands/low back    Diabetes (HCC)     borderline    GERD (gastroesophageal reflux disease)     Glaucoma     Hypercholesterolemia     Hypertension     Ill-defined condition     borderline anemia    Ill-defined condition     bladder infections    Impaired glucose tolerance     Menopause     Thyroid disease     hypothyroidism      Past Surgical History:   Procedure Laterality Date    CARDIOVERSION, ELECTIVE  10/31/2018         CARDIOVERSION, ELECTIVE  2018         HX BREAST BIOPSY Bilateral     benign    HX CATARACT REMOVAL      bilateral    HX DILATION AND CURETTAGE      HX ORTHOPAEDIC      carpal tunnel release -bilateral    HX ROTATOR CUFF REPAIR Right      Allergies   Allergen Reactions    Aspirin Other (comments)     Swelling shut of eyelids    Gabapentin Other (comments)     Vision change    Macrobid [Nitrofurantoin Monohyd/M-Cryst] Unknown (comments)    Pcn [Penicillins] Hives      Family History   Problem Relation Age of Onset    Coronary Artery Disease Other         mother  of MI age 80, brother  age 72 of MI, sister has hear problems    Breast Cancer Sister         46s    Heart Attack Mother     Other Father         pneumonia    Alzheimer Brother     Cancer Sister     Cancer Sister     Heart Attack Brother     Suicide Brother Social History     Socioeconomic History    Marital status:      Spouse name: Not on file    Number of children: Not on file    Years of education: Not on file    Highest education level: Not on file   Occupational History    Not on file   Social Needs    Financial resource strain: Not on file    Food insecurity     Worry: Not on file     Inability: Not on file    Transportation needs     Medical: Not on file     Non-medical: Not on file   Tobacco Use    Smoking status: Former Smoker     Packs/day: 1.00     Years: 15.00     Pack years: 15.00     Types: Cigarettes     Quit date: 1995     Years since quittin.2    Smokeless tobacco: Never Used   Substance and Sexual Activity    Alcohol use: Yes     Alcohol/week: 1.0 standard drinks     Types: 1 Glasses of wine per week     Frequency: Monthly or less     Comment: Rare--maybe once a month    Drug use: No    Sexual activity: Not on file   Lifestyle    Physical activity     Days per week: Not on file     Minutes per session: Not on file    Stress: Not on file   Relationships    Social connections     Talks on phone: Not on file     Gets together: Not on file     Attends [employer] service: Not on file     Active member of club or organization: Not on file     Attends meetings of clubs or organizations: Not on file     Relationship status: Not on file    Intimate partner violence     Fear of current or ex partner: Not on file     Emotionally abused: Not on file     Physically abused: Not on file     Forced sexual activity: Not on file   Other Topics Concern    Not on file   Social History Narrative    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 Lives with . Review of Systems   General: Not Present- Dietary Changes, Fatigue and Medication Changes. Skin: Not Present- Bruising and Excessive Sweating. HEENT: Not Present- Headache, Visual Loss and Vertigo.   Respiratory: Not Present- Cough, Decreased Exercise Tolerance and Wheezing.  Cardiovascular: Not Present- Abnormal Blood Pressure, Chest Pain, Claudications, Difficulty Breathing Lying Down, Difficulty Breathing On Exertion, Edema, Fainting / Blacking Out, Irregular Heart Beat, Palpitations, Paroxysmal Nocturnal Dyspnea, Rapid Heart Rate and Shortness of Breath.  Gastrointestinal: Not Present- Black, Tarry Stool, Bloody Stool, Diarrhea, Hematemesis, Rectal Bleeding and Vomiting.  Musculoskeletal: Not Present- Muscle Pain and Muscle Weakness.  Neurological: Not Present- Dizziness.  Psychiatric: Not Present- Depression.  Endocrine: Not Present- Cold Intolerance, Heat Intolerance and Thyroid Problems.  Hematology: Not Present- Abnormal Bleeding, Anemia, Blood Clots and Easy Bruising.       Physical Exam   The physical exam findings are as follows:       General   Mental Status - Alert. General Appearance - Not in acute distress.      Neck   Carotid Arteries - normal upstroke. No Bruits.      Chest and Lung Exam   Inspection: Accessory muscles - No use of accessory muscles in breathing.  Auscultation:   Breath sounds: - Normal.      Cardiovascular   Inspection: Jugular vein - Bilateral - Inspection Normal.  Palpation/Percussion:   Apical Impulse: - Normal.  Auscultation: Rhythm - Regular. Heart Sounds - S1 WNL and S2 WNL. No S3 or S4.  Murmurs & Other Heart Sounds: Auscultation of the heart reveals - No Murmurs.      Abdomen   Palpation/Percussion: Palpation and Percussion of the abdomen reveal - No Palpable abdominal masses.  Auscultation: Auscultation of the abdomen reveals - Bowel sounds normal.      Peripheral Vascular   Upper Extremity: Inspection - Bilateral - No Cyanotic nailbeds or Digital clubbing.  Lower Extremity:   Palpation: Edema - Bilateral - No edema.      Neurologic   Mental Status: Affect - normal.  Motor: - Normal. Gait - Normal.      Assessment:       ICD-10-CM ICD-9-CM    1. Venous (peripheral) insufficiency  I87.2 459.81    2. Varicose veins of both legs with  edema  I83.893 454.8    3. Essential hypertension  I10 401.9        Plan:     Venous insufficiency:   S/p left GSV ablation 5/2021. Feeling much better. Monitor. F/u in 1 years. S/p rigth GSV ablation in 5/2015. No symptoms in right leg. Last reflux study noted. Monitor.      Permanent afib  S/p AV monica ablation  On Eliquis  F/u with Dr. Larene Favre

## 2021-04-16 RX ORDER — LOVASTATIN 40 MG/1
TABLET ORAL
Qty: 90 TAB | Refills: 0 | Status: SHIPPED | OUTPATIENT
Start: 2021-04-16 | End: 2021-04-19

## 2021-04-19 RX ORDER — LOVASTATIN 40 MG/1
TABLET ORAL
Qty: 90 TAB | Refills: 0 | Status: SHIPPED | OUTPATIENT
Start: 2021-04-19 | End: 2021-10-13

## 2021-04-24 RX ORDER — LEVOTHYROXINE SODIUM 88 UG/1
TABLET ORAL
Qty: 90 TAB | Refills: 0 | Status: SHIPPED | OUTPATIENT
Start: 2021-04-24 | End: 2021-07-08

## 2021-06-14 ENCOUNTER — OFFICE VISIT (OUTPATIENT)
Dept: CARDIOLOGY CLINIC | Age: 86
End: 2021-06-14
Payer: MEDICARE

## 2021-06-14 DIAGNOSIS — I44.2 CHB (COMPLETE HEART BLOCK) (HCC): ICD-10-CM

## 2021-06-14 DIAGNOSIS — Z95.0 CARDIAC PACEMAKER IN SITU: Primary | ICD-10-CM

## 2021-06-14 PROCEDURE — 93294 REM INTERROG EVL PM/LDLS PM: CPT | Performed by: INTERNAL MEDICINE

## 2021-06-14 PROCEDURE — 93296 REM INTERROG EVL PM/IDS: CPT | Performed by: INTERNAL MEDICINE

## 2021-07-08 RX ORDER — LEVOTHYROXINE SODIUM 88 UG/1
TABLET ORAL
Qty: 90 TABLET | Refills: 0 | Status: SHIPPED | OUTPATIENT
Start: 2021-07-08 | End: 2021-09-26 | Stop reason: SDUPTHER

## 2021-07-28 ENCOUNTER — OFFICE VISIT (OUTPATIENT)
Dept: CARDIOLOGY CLINIC | Age: 86
End: 2021-07-28
Payer: MEDICARE

## 2021-07-28 VITALS
SYSTOLIC BLOOD PRESSURE: 138 MMHG | DIASTOLIC BLOOD PRESSURE: 70 MMHG | OXYGEN SATURATION: 98 % | HEART RATE: 73 BPM | WEIGHT: 151.5 LBS | HEIGHT: 65 IN | RESPIRATION RATE: 18 BRPM | BODY MASS INDEX: 25.24 KG/M2

## 2021-07-28 DIAGNOSIS — E78.2 MIXED HYPERLIPIDEMIA: ICD-10-CM

## 2021-07-28 DIAGNOSIS — I48.21 PERMANENT ATRIAL FIBRILLATION (HCC): Primary | ICD-10-CM

## 2021-07-28 DIAGNOSIS — I87.2 VENOUS (PERIPHERAL) INSUFFICIENCY: ICD-10-CM

## 2021-07-28 DIAGNOSIS — I10 ESSENTIAL HYPERTENSION: ICD-10-CM

## 2021-07-28 DIAGNOSIS — I44.2 CHB (COMPLETE HEART BLOCK) (HCC): ICD-10-CM

## 2021-07-28 DIAGNOSIS — Z95.0 CARDIAC PACEMAKER IN SITU: ICD-10-CM

## 2021-07-28 PROCEDURE — G8510 SCR DEP NEG, NO PLAN REQD: HCPCS | Performed by: INTERNAL MEDICINE

## 2021-07-28 PROCEDURE — 99214 OFFICE O/P EST MOD 30 MIN: CPT | Performed by: INTERNAL MEDICINE

## 2021-07-28 PROCEDURE — 1090F PRES/ABSN URINE INCON ASSESS: CPT | Performed by: INTERNAL MEDICINE

## 2021-07-28 PROCEDURE — 93000 ELECTROCARDIOGRAM COMPLETE: CPT | Performed by: INTERNAL MEDICINE

## 2021-07-28 PROCEDURE — G8536 NO DOC ELDER MAL SCRN: HCPCS | Performed by: INTERNAL MEDICINE

## 2021-07-28 PROCEDURE — 1101F PT FALLS ASSESS-DOCD LE1/YR: CPT | Performed by: INTERNAL MEDICINE

## 2021-07-28 PROCEDURE — G8427 DOCREV CUR MEDS BY ELIG CLIN: HCPCS | Performed by: INTERNAL MEDICINE

## 2021-07-28 PROCEDURE — G8419 CALC BMI OUT NRM PARAM NOF/U: HCPCS | Performed by: INTERNAL MEDICINE

## 2021-07-28 NOTE — LETTER
7/28/2021    Patient: Aaron Villarreal   YOB: 1932   Date of Visit: 7/28/2021     Purvi Noble MD  [address] [place] [address] [phone]  [address] [place]    Dear Liston Gosselin, MD,      Thank you for referring Ms. Naima Pichardo to [facility] for evaluation. My notes for this consultation are attached. If you have questions, please do not hesitate to call me. I look forward to following your patient along with you.       Sincerely,    Youlanda Gaucher, MD

## 2021-07-28 NOTE — PROGRESS NOTES
1. Have you been to the ER, urgent care clinic since your last visit? Hospitalized since your last visit? No    2. Have you seen or consulted any other health care providers outside of the [address] [place] [address] since your last visit? Include any pap smears or colon screening.  No     Chief Complaint   Patient presents with   [facility] Follow-up     Annual.

## 2021-07-28 NOTE — PROGRESS NOTES
[address]  [phone]     Subjective:      Aaron Villarreal is a 80 y.o. female is here for routine f/u. She has a PMHx of permanent AF s/p AV monica ablation with PPM, mod-severe MR, HTN and HLD. Last seen by us in 7/2020. She reports she has her baseline ROWE. She was seen by Dr Kg Diaz in September and had PFTs completed. She is unsure of the results. She has occasional heart flutterings. Today, no further palpitation. Received both of her covid vaccines. The patient denies chest pain/ shortness of breath, orthopnea, PND, LE edema, palpitations, syncope, or presyncope. ECHO 8/2020  · LV: Normal cavity size, systolic function (ejection fraction normal) and diastolic function. Mild concentric hypertrophy. Estimated left ventricular ejection fraction is 55 - 60%. Wall motion: normal.  · LA: Severely dilated left atrium. · RA: Dilated right atrium. · MV: Mitral valve thickening. Moderate mitral valve regurgitation is present. · TV: Moderate tricuspid valve regurgitation is present. · PA: Moderate pulmonary hypertension. Pulmonary arterial systolic pressure is 54 mmHg.          Patient Active Problem List    Diagnosis Date Noted    Varicose veins of both legs with edema 10/06/2020    Atrial fibrillation ([address] 12/05/2018    Sigmoid diverticulosis 11/20/2018    Mitral regurgitation 10/31/2018    Hypercholesterolemia     Osteoarthritis of hip 11/20/2017    PAD (peripheral artery disease) ([address] 04/04/2017    Anemia 02/06/2017    Chronic UTI 02/06/2017    Venous (peripheral) insufficiency 01/08/2015    Hypothyroid 06/11/2012    Hypertension 06/11/2012    Arthritis 06/11/2012    Hyperlipidemia 06/11/2012    Restless leg syndrome 06/30/2011      Purvi Noble MD  Past Medical History:   Diagnosis Date    Arthritis     hands/low back    Atrial fibrillation ([address]     Congestive heart failure ([address]     Diabetes ([address] borderline    GERD (gastroesophageal reflux disease)     Glaucoma     Hypercholesterolemia     Hypertension     Ill-defined condition     borderline anemia    Ill-defined condition     bladder infections    Impaired glucose tolerance     Long term current use of anticoagulant therapy     Menopause     Pacemaker     Thyroid disease     hypothyroidism    Valvular heart disease       Past Surgical History:   Procedure Laterality Date    CARDIOVERSION, ELECTIVE  10/31/2018         CARDIOVERSION, ELECTIVE  2018         HX BREAST BIOPSY Bilateral 1990    benign    HX CATARACT REMOVAL      bilateral    HX DILATION AND CURETTAGE      HX ORTHOPAEDIC      carpal tunnel release -bilateral    HX ROTATOR CUFF REPAIR Right      Allergies   Allergen Reactions    Aspirin Other (comments)     Swelling shut of eyelids    Gabapentin Other (comments)     Vision change    Macrobid [Nitrofurantoin Monohyd/M-Cryst] Unknown (comments)    Pcn [Penicillins] Hives      Family History   Problem Relation Age of Onset    Coronary Artery Disease Other         mother  of MI age 80, brother  age 72 of MI, sister has hear problems    Breast Cancer Sister         46s    Heart Attack Mother     Other Father         pneumonia    Alzheimer Brother     Cancer Sister     Cancer Sister     Heart Attack Brother     Suicide Brother       Social History     Socioeconomic History    Marital status:      Spouse name: Not on file    Number of children: Not on file    Years of education: Not on file    Highest education level: Not on file   Occupational History    Not on file   Tobacco Use    Smoking status: Former Smoker     Packs/day: 1.00     Years: 15.00     Pack years: 15.00     Types: Cigarettes     Quit date: 1995     Years since quittin.5    Smokeless tobacco: Never Used   Substance and Sexual Activity    Alcohol use:  Yes     Alcohol/week: 1.0 standard drinks     Types: 1 Glasses of wine per week     Comment: Rare--maybe once a month    Drug use: No    Sexual activity: Not on file   Other Topics Concern    Not on file   Social History Narrative    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 Lives with . Social Determinants of Health     Financial Resource Strain:     Difficulty of Paying Living Expenses:    Food Insecurity:     Worried About Running Out of Food in the Last Year:     [address] in the Last Year:    Transportation Needs:     Lack of Transportation (Medical):  Lack of Transportation (Non-Medical):    Physical Activity:     Days of Exercise per Week:     Minutes of Exercise per Session:    Stress:     Feeling of Stress :    Social Connections:     Frequency of Communication with Friends and Family:     Frequency of Social Gatherings with Friends and Family:     Attends Judaism Services:     Active Member of Clubs or Organizations:     Attends Club or Organization Meetings:     Marital Status:    Intimate Partner Violence:     Fear of Current or Ex-Partner:     Emotionally Abused:     Physically Abused:     Sexually Abused:       Current Outpatient Medications   Medication Sig    omeprazole (PRILOSEC) 20 mg capsule TAKE 1 CAPSULE DAILY.  levothyroxine (SYNTHROID) 88 mcg tablet TAKE ONE TABLET BY MOUTH EVERY DAY BEFORE BREAKFAST    furosemide (LASIX) 40 mg tablet TAKE ONE TABLET BY MOUTH EVERY DAY    lovastatin (MEVACOR) 40 mg tablet TAKE 1 TABLET EVERY EVENING TO LOWER CHOLESTEROL    lisinopriL (PRINIVIL, ZESTRIL) 2.5 mg tablet TAKE ONE TABLET BY MOUTH EVERY DAY    Eliquis 2.5 mg tablet TAKE ONE TABLET BY MOUTH 2 TIMES A DAY    meclizine (ANTIVERT) 12.5 mg tablet Take 12.5 mg by mouth three (3) times daily as needed for Dizziness.  OTHER PREVGEN 1 TAB DAILY AS NEEDED    timolol (TIMOPTIC) 0.5 % ophthalmic solution Administer 1 Drop to both eyes two (2) times a day.     acetaminophen/diphenhydramine (TYLENOL PM PO) Take 2 Tabs by mouth as needed.  cranberry fruit extract (CRANBERRY CONCENTRATE PO) Take 2 Tabs by mouth daily.  ACCU-CHEK MULTICLIX LANCET misc AS DIRECTED    latanoprost (XALATAN) 0.005 % ophthalmic solution Administer 1 Drop to both eyes nightly.  DOCUSATE CALCIUM (STOOL SOFTENER PO) Take  by mouth daily.  ascorbic acid (VITAMIN C) 500 mg tablet Take  by mouth daily.  cholecalciferol, vitamin d3, (VITAMIN D) 1,000 unit tablet Take  by mouth daily.  MULTIVITAMINS (MULTIVITAMIN PO) Take  by mouth daily.  cyclobenzaprine (FLEXERIL) 10 mg tablet TAKE ONE TABLET BY MOUTH 2 TIMES A DAY AS NEEDED FOR MUSCLE SPASMS     No current facility-administered medications for this visit. Review of Symptoms:  11 systems reviewed, negative other than as stated in the HPI    Physical ExamPhysical Exam:    Vitals:    07/28/21 1437   BP: 138/70   Pulse: 73   Resp: 18   SpO2: 98%   Weight: 151 lb 8 oz (68.7 kg)   Height: 5' 5\" (1.651 m)     Body mass index is 25.21 kg/m². General PE  Gen:  NAD  Mental Status - Alert. General Appearance - Not in acute distress. HEENT:  PERRL, no carotid bruits or JVD  Chest and Lung Exam   Inspection: Accessory muscles - No use of accessory muscles in breathing. Auscultation:   Breath sounds: - Normal.   Cardiovascular   Inspection: Jugular vein - Bilateral - Inspection Normal.   Palpation/Percussion:   Apical Impulse: - Normal.   Auscultation: Rhythm - IRRegular. Heart Sounds - S1 WNL and S2 WNL. No S3 or S4. Murmurs & Other Heart Sounds: Auscultation of the heart reveals - No Murmurs. Peripheral Vascular   Upper Extremity: Inspection - Bilateral - No Cyanotic nailbeds or Digital clubbing. Lower Extremity:   Palpation: Edema - Bilateral - No edema. Abdomen:   Soft, non-tender, bowel sounds are active.   Neuro: A&O times 3, CN and motor grossly WNL    Labs:   Lab Results   Component Value Date/Time    Cholesterol, total 212 (H) 08/13/2020 02:50 PM    Cholesterol, total 197 07/30/2019 10:48 AM    Cholesterol, total 182 04/25/2019 09:27 AM    Cholesterol, total 193 12/29/2017 12:00 AM    Cholesterol, total 190 02/06/2017 08:36 AM    HDL Cholesterol 93 08/13/2020 02:50 PM    HDL Cholesterol 69 07/30/2019 10:48 AM    HDL Cholesterol 75 04/25/2019 09:27 AM    HDL Cholesterol 82 12/29/2017 12:00 AM    HDL Cholesterol 96 02/06/2017 08:36 AM    LDL, calculated 65 08/13/2020 02:50 PM    LDL, calculated 97 07/30/2019 10:48 AM    LDL, calculated 83 04/25/2019 09:27 AM    LDL, calculated 83 12/29/2017 12:00 AM    LDL, calculated 67 02/06/2017 08:36 AM    Triglyceride 270 (H) 08/13/2020 02:50 PM    Triglyceride 154 (H) 07/30/2019 10:48 AM    Triglyceride 118 04/25/2019 09:27 AM    Triglyceride 141 12/29/2017 12:00 AM    Triglyceride 137 02/06/2017 08:36 AM     No results found for: CPK, CPKX, CPX  Lab Results   Component Value Date/Time    Sodium 140 08/13/2020 02:50 PM    Potassium 4.4 08/13/2020 02:50 PM    Chloride 95 (L) 08/13/2020 02:50 PM    CO2 27 08/13/2020 02:50 PM    Anion gap 6 11/27/2018 02:54 AM    Glucose 93 08/13/2020 02:50 PM    BUN 24 08/13/2020 02:50 PM    Creatinine 1.19 (H) 08/13/2020 02:50 PM    BUN/Creatinine ratio 20 08/13/2020 02:50 PM    GFR est AA 47 (L) 08/13/2020 02:50 PM    GFR est non-AA 41 (L) 08/13/2020 02:50 PM    Calcium 9.8 08/13/2020 02:50 PM    Bilirubin, total 0.5 08/13/2020 02:50 PM    Alk. phosphatase 100 08/13/2020 02:50 PM    Protein, total 7.6 08/13/2020 02:50 PM    Albumin 4.9 (H) 08/13/2020 02:50 PM    Globulin 3.4 11/26/2018 03:53 AM    A-G Ratio 1.8 08/13/2020 02:50 PM    ALT (SGPT) 16 08/13/2020 02:50 PM       EKG:         Assessment:     Assessment:        ICD-10-CM ICD-9-CM    1. Permanent atrial fibrillation (HCC)  I48.21 427.31    2. Essential hypertension  I10 401.9 AMB POC EKG ROUTINE W/ 12 LEADS, INTER & REP   3. Venous (peripheral) insufficiency  I87.2 459.81    4. Mixed hyperlipidemia  E78.2 272.2    5.  Cardiac pacemaker in situ  Z95.0 V45.01    6. CHB (complete heart block) (HCC)  I44.2 426.0        Orders Placed This Encounter    AMB POC EKG ROUTINE W/ 12 LEADS, INTER & REP     Order Specific Question:   Reason for Exam:     Answer:   routine        Plan:     Permanent atrial fibrillation ([address]  S/p AV monica ablation with pacemaker  Cont with Eliquis 2.5mg bid. She is doing well with low-dose Eliquis, and hesitant at this time to increase dose at age 80, with mild renal dysfunction, and weight getting close to 60 kg. Serum Cr 1.19; Hgb 13.8 in 8/2020     Mitral valve regurgitation   Per echo 8/2020  EF 55-60% mod MR   per echo 7/19 EF 55-60% with mod to severe MR. Continue afterload reduction with ace-I  Repeat echo-could consider mitral valve clipping if regurgitation becomes severe    Pulm HTN  Echo 8/2020: PA: Moderate pulmonary hypertension. Pulmonary arterial systolic pressure is 54 mmHg. Followed by Dr Joel Macdonald, due for follow up       Essential hypertension  Controlled with current therapy     Mixed hyperlipidemia  8/2020 LDL 65 On statin  Lipids managed by PCP    Cardiac pacemaker in situ  S/p AV monica ablation with PPM placement  Continue with routine device interrogation with Dr. Angelita Leyva, last OV 8/2020: She is V paced 94% for chb sp av node abl. She is in AF and on oac.     Venous insufficiency:   S/p left GSV ablation 5/2021. S/p right GSV ablation in 5/2015. Followed by Dr Matilda Cohen on diet and exercise- eventual goal of 30-60 minutes 5-7 times a week as per AHA guidelines.          Continue current care and f/u in 6 months. Magdy Yadav NP    Patient seen and examined by me with the above nurse practitioner. I personally performed all components of the history, physical, and medical decision making and agree with the assessment and plan with minor modifications as noted. Today the patient presents with stable cardiac status, tolerating Eliquis with controlled blood pressure and lipids.     General PE  Gen:  NAD  Mental Status - Alert. General Appearance - Not in acute distress. HEENT:  PERRL, no carotid bruits or JVD  Chest and Lung Exam   Inspection: Accessory muscles - No use of accessory muscles in breathing. Auscultation:   Breath sounds: - Normal.   Cardiovascular   Inspection: Jugular vein - Bilateral - Inspection Normal.   Palpation/Percussion:   Apical Impulse: - Normal.   Auscultation: Rhythm - Regular. Heart Sounds - S1 WNL and S2 WNL. No S3 or S4. Murmurs & Other Heart Sounds: Auscultation of the heart reveals -2 out of 6 systolic murmur. Peripheral Vascular   Upper Extremity: Inspection - Bilateral - No Cyanotic nailbeds or Digital clubbing. Lower Extremity:   Palpation: Edema - Bilateral - No edema. Abdomen:   Soft, non-tender, bowel sounds are active. Neuro: A&O times 3, CN and motor grossly WNL    Check echo to follow-up moderate mitral regurgitation. Follow up in 6 months if doing well.

## 2021-08-24 ENCOUNTER — ANCILLARY PROCEDURE (OUTPATIENT)
Dept: CARDIOLOGY CLINIC | Age: 86
End: 2021-08-24
Payer: MEDICARE

## 2021-08-24 VITALS
SYSTOLIC BLOOD PRESSURE: 138 MMHG | DIASTOLIC BLOOD PRESSURE: 70 MMHG | WEIGHT: 151 LBS | BODY MASS INDEX: 25.16 KG/M2 | HEIGHT: 65 IN

## 2021-08-24 DIAGNOSIS — I34.0 MITRAL VALVE INSUFFICIENCY, UNSPECIFIED ETIOLOGY: ICD-10-CM

## 2021-08-24 PROCEDURE — 93306 TTE W/DOPPLER COMPLETE: CPT | Performed by: INTERNAL MEDICINE

## 2021-08-25 LAB
ECHO AO ASC DIAM: 2.25 CM
ECHO AO ROOT DIAM: 2.52 CM
ECHO AV AREA PEAK VELOCITY: 1.8 CM2
ECHO AV AREA/BSA PEAK VELOCITY: 1 CM2/M2
ECHO AV PEAK GRADIENT: 6.62 MMHG
ECHO AV PEAK VELOCITY: 128.67 CM/S
ECHO EST RA PRESSURE: 8 MMHG
ECHO LA AREA 4C: 26.65 CM2
ECHO LA MAJOR AXIS: 3.58 CM
ECHO LA MINOR AXIS: 2.03 CM
ECHO LA VOL 2C: 92.31 ML (ref 22–52)
ECHO LA VOL 4C: 82.65 ML (ref 22–52)
ECHO LA VOL BP: 92.23 ML (ref 22–52)
ECHO LA VOL/BSA BIPLANE: 52.4 ML/M2 (ref 16–28)
ECHO LA VOLUME INDEX A2C: 52.45 ML/M2 (ref 16–28)
ECHO LA VOLUME INDEX A4C: 46.96 ML/M2 (ref 16–28)
ECHO LV E' LATERAL VELOCITY: 9.77 CM/S
ECHO LV E' SEPTAL VELOCITY: 8.7 CM/S
ECHO LV INTERNAL DIMENSION DIASTOLIC: 3.62 CM (ref 3.9–5.3)
ECHO LV INTERNAL DIMENSION SYSTOLIC: 2.19 CM
ECHO LV IVSD: 1.31 CM (ref 0.6–0.9)
ECHO LV MASS 2D: 145.4 G (ref 67–162)
ECHO LV MASS INDEX 2D: 82.6 G/M2 (ref 43–95)
ECHO LV POSTERIOR WALL DIASTOLIC: 1.12 CM (ref 0.6–0.9)
ECHO LVOT DIAM: 1.97 CM
ECHO LVOT PEAK GRADIENT: 2.29 MMHG
ECHO LVOT PEAK VELOCITY: 75.65 CM/S
ECHO LVOT SV: 54.5 ML
ECHO LVOT VTI: 17.82 CM
ECHO MV A VELOCITY: 40.75 CM/S
ECHO MV AREA PHT: 2.45 CM2
ECHO MV AREA VTI: 1.16 CM2
ECHO MV E DECELERATION TIME (DT): 309.32 MS
ECHO MV E VELOCITY: 135.23 CM/S
ECHO MV E/A RATIO: 3.32
ECHO MV E/E' LATERAL: 13.84
ECHO MV E/E' RATIO (AVERAGED): 14.69
ECHO MV E/E' SEPTAL: 15.54
ECHO MV EROA PISA: 0.19 CM2
ECHO MV MAX VELOCITY: 162.06 CM/S
ECHO MV MEAN GRADIENT: 2.4 MMHG
ECHO MV PEAK GRADIENT: 10.5 MMHG
ECHO MV PRESSURE HALF TIME (PHT): 89.7 MS
ECHO MV REGURGITANT RADIUS PISA: 0.66 CM
ECHO MV REGURGITANT VOLUME: 39.45 ML
ECHO MV REGURGITANT VTIA: 209.48 CM
ECHO MV VTI: 47.13 CM
ECHO RIGHT VENTRICULAR SYSTOLIC PRESSURE (RVSP): 50.51 MMHG
ECHO RV TAPSE: 2.08 CM (ref 1.5–2)
ECHO TV REGURGITANT MAX VELOCITY: 326 CM/S
ECHO TV REGURGITANT PEAK GRADIENT: 42.51 MMHG
MR PISA PV: 625.26 CM/S

## 2021-08-26 ENCOUNTER — TELEPHONE (OUTPATIENT)
Dept: CARDIOLOGY CLINIC | Age: 86
End: 2021-08-26

## 2021-08-26 NOTE — TELEPHONE ENCOUNTER
----- Message from Alan Enriquez NP sent at 8/25/2021  9:07 PM EDT -----  Echo  showed normal pumping heart strength. Her mitral valve looks better----mild-mod leakage (prev moderate); Noted high pressure in pulmonary artery, followed by Dr Flash Jurado. Pls make sure she scheduled follow up with her.   thanks

## 2021-08-26 NOTE — PROGRESS NOTES
Echo  showed normal pumping heart strength. Her mitral valve looks better----mild-mod leakage (prev moderate); Noted high pressure in pulmonary artery, followed by Dr Makayla Dorsey. Pls make sure she scheduled follow up with her.   thanks

## 2021-08-31 ENCOUNTER — OFFICE VISIT (OUTPATIENT)
Dept: CARDIOLOGY CLINIC | Age: 86
End: 2021-08-31
Payer: MEDICARE

## 2021-08-31 ENCOUNTER — OFFICE VISIT (OUTPATIENT)
Dept: CARDIOLOGY CLINIC | Age: 86
End: 2021-08-31

## 2021-08-31 VITALS
WEIGHT: 155.6 LBS | HEIGHT: 65 IN | RESPIRATION RATE: 18 BRPM | DIASTOLIC BLOOD PRESSURE: 74 MMHG | OXYGEN SATURATION: 95 % | BODY MASS INDEX: 25.92 KG/M2 | HEART RATE: 78 BPM | SYSTOLIC BLOOD PRESSURE: 102 MMHG

## 2021-08-31 DIAGNOSIS — I44.2 CHB (COMPLETE HEART BLOCK) (HCC): ICD-10-CM

## 2021-08-31 DIAGNOSIS — I10 HYPERTENSION, UNSPECIFIED TYPE: Primary | ICD-10-CM

## 2021-08-31 DIAGNOSIS — I48.21 PERMANENT ATRIAL FIBRILLATION (HCC): ICD-10-CM

## 2021-08-31 DIAGNOSIS — Z95.0 CARDIAC PACEMAKER IN SITU: Primary | ICD-10-CM

## 2021-08-31 DIAGNOSIS — I34.0 NONRHEUMATIC MITRAL VALVE REGURGITATION: ICD-10-CM

## 2021-08-31 DIAGNOSIS — E78.00 HYPERCHOLESTEROLEMIA: ICD-10-CM

## 2021-08-31 PROCEDURE — G8419 CALC BMI OUT NRM PARAM NOF/U: HCPCS | Performed by: INTERNAL MEDICINE

## 2021-08-31 PROCEDURE — 1101F PT FALLS ASSESS-DOCD LE1/YR: CPT | Performed by: INTERNAL MEDICINE

## 2021-08-31 PROCEDURE — 1090F PRES/ABSN URINE INCON ASSESS: CPT | Performed by: INTERNAL MEDICINE

## 2021-08-31 PROCEDURE — G8536 NO DOC ELDER MAL SCRN: HCPCS | Performed by: INTERNAL MEDICINE

## 2021-08-31 PROCEDURE — G8510 SCR DEP NEG, NO PLAN REQD: HCPCS | Performed by: INTERNAL MEDICINE

## 2021-08-31 PROCEDURE — 99213 OFFICE O/P EST LOW 20 MIN: CPT | Performed by: INTERNAL MEDICINE

## 2021-08-31 PROCEDURE — G8427 DOCREV CUR MEDS BY ELIG CLIN: HCPCS | Performed by: INTERNAL MEDICINE

## 2021-08-31 PROCEDURE — 93280 PM DEVICE PROGR EVAL DUAL: CPT | Performed by: INTERNAL MEDICINE

## 2021-08-31 NOTE — PROGRESS NOTES
1. Have you been to the ER, urgent care clinic since your last visit? Hospitalized since your last visit? No    2. Have you seen or consulted any other health care providers outside of the [address] [place] [address] since your last visit? Include any pap smears or colon screening. No     Chief Complaint   Patient presents with    Pacemaker Check     annual f/up.  SOB

## 2021-08-31 NOTE — LETTER
2021    Patient: Stacy Mcclendon   YOB: 1932   Date of Visit: 2021     Nancie Noble MD  [address] [place] [address] [phone]  [address] [facility] [address]    Dear Jayce Chen MD,      Thank you for referring Ms. Jereld Apgar to [address] [place] [address] [place] for evaluation. My notes for this consultation are attached. If you have questions, please do not hesitate to call me. I look forward to following your patient along with you.       Sincerely,    Lulu Walker MD

## 2021-08-31 NOTE — PROGRESS NOTES
Subjective:      Kareem White is a 80 y.o. female is here for EP consult. The patient denies chest pain/ shortness of breath, orthopnea, PND, LE edema, palpitations, syncope, presyncope or fatigue.        Patient Active Problem List    Diagnosis Date Noted    Varicose veins of both legs with edema 10/06/2020    Atrial fibrillation ([facility] 12/05/2018    Sigmoid diverticulosis 11/20/2018    Mitral regurgitation 10/31/2018    Hypercholesterolemia     Osteoarthritis of hip 11/20/2017    PAD (peripheral artery disease) ([facility] [address] 04/04/2017    Anemia 02/06/2017    Chronic UTI 02/06/2017    Venous (peripheral) insufficiency 01/08/2015    Hypothyroid 06/11/2012    Hypertension 06/11/2012    Arthritis 06/11/2012    Hyperlipidemia 06/11/2012    Restless leg syndrome 06/30/2011      Amee Noble MD  Past Medical History:   Diagnosis Date    Arthritis     hands/low back    Atrial fibrillation (HCC)     Congestive heart failure (HCC)     Diabetes ([facility] [address]     borderline    GERD (gastroesophageal reflux disease)     Glaucoma     Hypercholesterolemia     Hypertension     Ill-defined condition     borderline anemia    Ill-defined condition     bladder infections    Impaired glucose tolerance     Long term current use of anticoagulant therapy     Menopause     Pacemaker     Thyroid disease     hypothyroidism    Valvular heart disease       Past Surgical History:   Procedure Laterality Date    CARDIOVERSION, ELECTIVE  10/31/2018         CARDIOVERSION, ELECTIVE  11/28/2018         HX BREAST BIOPSY Bilateral 1990    benign    HX CATARACT REMOVAL      bilateral    HX DILATION AND CURETTAGE      HX ORTHOPAEDIC      carpal tunnel release -bilateral    HX ROTATOR CUFF REPAIR Right      Allergies   Allergen Reactions    Aspirin Other (comments)     Swelling shut of eyelids    Gabapentin Other (comments)     Vision change    Macrobid [Nitrofurantoin Monohyd/M-Cryst] Unknown (comments)  Pcn [Penicillins] Hives      Family History   Problem Relation Age of Onset    Coronary Artery Disease Other         mother  of MI age 80, brother  age 72 of MI, sister has hear problems    Breast Cancer Sister         46s    Heart Attack Mother     Other Father         pneumonia    Alzheimer Brother     Cancer Sister     Cancer Sister     Heart Attack Brother     Suicide Brother     negative for cardiac disease  Social History     Socioeconomic History    Marital status:      Spouse name: Not on file    Number of children: Not on file    Years of education: Not on file    Highest education level: Not on file   Tobacco Use    Smoking status: Former Smoker     Packs/day: 1.00     Years: 15.00     Pack years: 15.00     Types: Cigarettes     Quit date: 1995     Years since quittin.6    Smokeless tobacco: Never Used   Substance and Sexual Activity    Alcohol use: Yes     Alcohol/week: 1.0 standard drinks     Types: 1 Glasses of wine per week     Comment: Rare--maybe once a month    Drug use: No   Social History Narrative    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 Lives with . Social Determinants of Health     Financial Resource Strain:     Difficulty of Paying Living Expenses:    Food Insecurity:     Worried About Running Out of Food in the Last Year:     [address] in the Last Year:    Transportation Needs:     Lack of Transportation (Medical):      Lack of Transportation (Non-Medical):    Physical Activity:     Days of Exercise per Week:     Minutes of Exercise per Session:    Stress:     Feeling of Stress :    Social Connections:     Frequency of Communication with Friends and Family:     Frequency of Social Gatherings with Friends and Family:     Attends [employer] Services:     Active Member of Clubs or Organizations:     Attends Club or Organization Meetings:     Marital Status:      Current Outpatient Medications   Medication Sig    ACCU-CHEK MULTICLIX LANCET misc AS DIRECTED    omeprazole (PRILOSEC) 20 mg capsule TAKE 1 CAPSULE DAILY.  levothyroxine (SYNTHROID) 88 mcg tablet TAKE ONE TABLET BY MOUTH EVERY DAY BEFORE BREAKFAST    furosemide (LASIX) 40 mg tablet TAKE ONE TABLET BY MOUTH EVERY DAY    lovastatin (MEVACOR) 40 mg tablet TAKE 1 TABLET EVERY EVENING TO LOWER CHOLESTEROL    lisinopriL (PRINIVIL, ZESTRIL) 2.5 mg tablet TAKE ONE TABLET BY MOUTH EVERY DAY    Eliquis 2.5 mg tablet TAKE ONE TABLET BY MOUTH 2 TIMES A DAY    meclizine (ANTIVERT) 12.5 mg tablet Take 12.5 mg by mouth three (3) times daily as needed for Dizziness.  OTHER PREVGEN 1 TAB DAILY AS NEEDED    cyclobenzaprine (FLEXERIL) 10 mg tablet TAKE ONE TABLET BY MOUTH 2 TIMES A DAY AS NEEDED FOR MUSCLE SPASMS    timolol (TIMOPTIC) 0.5 % ophthalmic solution Administer 1 Drop to both eyes two (2) times a day.  acetaminophen/diphenhydramine (TYLENOL PM PO) Take 2 Tabs by mouth as needed.  cranberry fruit extract (CRANBERRY CONCENTRATE PO) Take 2 Tabs by mouth daily.  latanoprost (XALATAN) 0.005 % ophthalmic solution Administer 1 Drop to both eyes nightly.  DOCUSATE CALCIUM (STOOL SOFTENER PO) Take  by mouth daily.  ascorbic acid (VITAMIN C) 500 mg tablet Take  by mouth daily.  cholecalciferol, vitamin d3, (VITAMIN D) 1,000 unit tablet Take  by mouth daily.  MULTIVITAMINS (MULTIVITAMIN PO) Take  by mouth daily. No current facility-administered medications for this visit. Vitals:    08/31/21 1052   BP: 102/74   Pulse: 78   Resp: 18   SpO2: 95%   Weight: 155 lb 9.6 oz (70.6 kg)   Height: 5' 5\" (1.651 m)       I have reviewed the nurses notes, vitals, problem list, allergy list, medical history, family, social history and medications. Review of Symptoms:    General: Pt denies excessive weight gain or loss. Pt is able to conduct ADL's  HEENT: Denies blurred vision, headaches, hearing loss, epistaxis and difficulty swallowing.   Respiratory: Denies cough, congestion, shortness of breath, ROWE, wheezing or stridor. Cardiovascular: Denies precordial pain, palpitations, edema or PND  Gastrointestinal: Denies poor appetite, indigestion, abdominal pain or blood in stool  Genitourinary: Denies hematuria, dysuria, increased urinary frequency  Musculoskeletal: Denies joint pain or swelling from muscles or joints  Neurologic: Denies tremor, paresthesias, headache, or sensory motor disturbance  Psychiatric: Denies confusion, insomnia, depression  Integumentray: Denies rash, itching or ulcers. Hematologic: Denies easy bruising, bleeding    Physical Exam:      General: Well developed, in no acute distress. HEENT: Eyes - PERRL, no jvd  Heart:  Normal S1/S2 negative S3 or S4. Regular, no murmur, gallop or rub. Respiratory: Clear bilaterally x 4, no wheezing or rales  Abdomen:   Soft, non-tender, bowel sounds are active. Extremities:  No edema, normal cap refill, no cyanosis. Musculoskeletal: No clubbing  Neuro: A&Ox3, speech clear, gait stable. Skin: Skin color is normal. No rashes or lesions.  Non diaphoretic, no ulcers or subcutaneous nodule  Vascular: 2+ pulses symmetric in all extremities  Psych - judgement intact and orientation is wnl     Cardiographics    Ekg: v paced    Echo- nl lvef, mild MR    Results for orders placed or performed during the hospital encounter of 11/23/18   EKG, 12 LEAD, INITIAL   Result Value Ref Range    Ventricular Rate 54 BPM    Atrial Rate 54 BPM    P-R Interval 192 ms    QRS Duration 84 ms    Q-T Interval 516 ms    QTC Calculation (Bezet) 489 ms    Calculated P Axis 73 degrees    Calculated R Axis 37 degrees    Calculated T Axis 109 degrees    Diagnosis       Sinus bradycardia  Nonspecific T wave abnormality  Prolonged QT  Confirmed by Marisa Tobin ([id number]) on 11/28/2018 10:48:08 AM     Results for orders placed or performed in visit on 02/03/14   CARDIAC HOLTER MONITOR, 24 HOURS    Narrative    ECG Monitor/24 hours, Complete    Reason for Holter Monitor   PVC's    Heartbeat    Slowest 53  Average 71  Fastest  113    Results:   Underlying Rhythm: Normal sinus rhythm      Atrial Arrhythmias: premature atrial contractions; occasional and  a few short runs of PSVT    AV Conduction: normal    Ventricular Arrhythmias: premature ventricular contractions;  frequent and ventricular couplets and one ventricular triplet    ST Segment Analysis:non-specific changes     Symptom Correlation:  Shortness of breath corresponds to NSR with activity. Comment:   Normal sinus rhythm with frequent PVC's occasional PAC's, a few  short runs of PSVT     Ellaree Spurling, MD            Results for orders placed or performed in visit on 12/05/11   AMB POC EKG ROUTINE W/ 12 LEADS, INTER & REP    Impression    Normal sinus rhythm. Nonspecific ST wave changes in the lateral  chest leads. Lab Results   Component Value Date/Time    WBC 7.2 08/13/2020 02:50 PM    HGB 13.8 08/13/2020 02:50 PM    HCT 43.0 08/13/2020 02:50 PM    PLATELET 332 38/06/0120 02:50 PM    MCV 97 08/13/2020 02:50 PM      Lab Results   Component Value Date/Time    Sodium 140 08/13/2020 02:50 PM    Potassium 4.4 08/13/2020 02:50 PM    Chloride 95 (L) 08/13/2020 02:50 PM    CO2 27 08/13/2020 02:50 PM    Anion gap 6 11/27/2018 02:54 AM    Glucose 93 08/13/2020 02:50 PM    BUN 24 08/13/2020 02:50 PM    Creatinine 1.19 (H) 08/13/2020 02:50 PM    BUN/Creatinine ratio 20 08/13/2020 02:50 PM    GFR est AA 47 (L) 08/13/2020 02:50 PM    GFR est non-AA 41 (L) 08/13/2020 02:50 PM    Calcium 9.8 08/13/2020 02:50 PM    Bilirubin, total 0.5 08/13/2020 02:50 PM    Alk. phosphatase 100 08/13/2020 02:50 PM    Protein, total 7.6 08/13/2020 02:50 PM    Albumin 4.9 (H) 08/13/2020 02:50 PM    Globulin 3.4 11/26/2018 03:53 AM    A-G Ratio 1.8 08/13/2020 02:50 PM    ALT (SGPT) 16 08/13/2020 02:50 PM         Assessment:     Assessment:        ICD-10-CM ICD-9-CM    1.  Hypertension, unspecified type  I10 401.9 AMB POC EKG ROUTINE W/ 12 LEADS, INTER & REP   2. Permanent atrial fibrillation (HCC)  I48.21 427.31    3. Nonrheumatic mitral valve regurgitation  I34.0 424.0    4. Hypercholesterolemia  E78.00 272.0      Orders Placed This Encounter    AMB POC EKG ROUTINE W/ 12 LEADS, INTER & REP     Order Specific Question:   Reason for Exam:     Answer:   routine        Plan:   Kareem White is doing well. She is V paced 99% sp av node abl. She is stable and compliant with oac for permanent AF. Her lvef is wnl and her MR is mild per echo this summer. Cont med rx for htn and hyperlipidemia. F/u in one year. During this visit,  the patient and I had a comprehensive discussion of arrhythmia management using principles of shared decision making. This included a discussion of oral anticoagulation to prevent thromboembolic stroke in individuals with a history of AF and according to an elevated chadsvasc score. We also reviewed the requisite monitoring required of some of these medications. The patient demonstrated a clear understanding of these issues during out discussion. Our plans, determined together after thorough consideration, are outlined else where in this note. Thank you for allowing me to participate in Kareem White 's care.     Karen Saab MD, Jeniffer Leo

## 2021-09-23 ENCOUNTER — OFFICE VISIT (OUTPATIENT)
Dept: FAMILY MEDICINE CLINIC | Age: 86
End: 2021-09-23
Payer: MEDICARE

## 2021-09-23 VITALS
OXYGEN SATURATION: 98 % | HEIGHT: 65 IN | BODY MASS INDEX: 25.52 KG/M2 | DIASTOLIC BLOOD PRESSURE: 79 MMHG | TEMPERATURE: 98 F | WEIGHT: 153.2 LBS | HEART RATE: 66 BPM | RESPIRATION RATE: 16 BRPM | SYSTOLIC BLOOD PRESSURE: 176 MMHG

## 2021-09-23 DIAGNOSIS — E03.9 HYPOTHYROIDISM, UNSPECIFIED TYPE: ICD-10-CM

## 2021-09-23 DIAGNOSIS — E11.9 CONTROLLED TYPE 2 DIABETES MELLITUS WITHOUT COMPLICATION, WITHOUT LONG-TERM CURRENT USE OF INSULIN (HCC): ICD-10-CM

## 2021-09-23 DIAGNOSIS — E78.2 MIXED HYPERLIPIDEMIA: ICD-10-CM

## 2021-09-23 DIAGNOSIS — I10 ESSENTIAL HYPERTENSION: ICD-10-CM

## 2021-09-23 DIAGNOSIS — Z00.00 MEDICARE ANNUAL WELLNESS VISIT, SUBSEQUENT: Primary | ICD-10-CM

## 2021-09-23 DIAGNOSIS — Z23 NEEDS FLU SHOT: ICD-10-CM

## 2021-09-23 DIAGNOSIS — I73.9 PAD (PERIPHERAL ARTERY DISEASE) (HCC): ICD-10-CM

## 2021-09-23 LAB
ALBUMIN SERPL-MCNC: 4.2 G/DL (ref 3.5–5)
ALBUMIN/GLOB SERPL: 1.3 {RATIO} (ref 1.1–2.2)
ALP SERPL-CCNC: 104 U/L (ref 45–117)
ALT SERPL-CCNC: 21 U/L (ref 12–78)
ANION GAP SERPL CALC-SCNC: 4 MMOL/L (ref 5–15)
AST SERPL-CCNC: 24 U/L (ref 15–37)
BILIRUB SERPL-MCNC: 0.7 MG/DL (ref 0.2–1)
BILIRUB UR QL STRIP: NEGATIVE
BUN SERPL-MCNC: 26 MG/DL (ref 6–20)
BUN/CREAT SERPL: 19 (ref 12–20)
CALCIUM SERPL-MCNC: 9.6 MG/DL (ref 8.5–10.1)
CHLORIDE SERPL-SCNC: 105 MMOL/L (ref 97–108)
CHOLEST SERPL-MCNC: 192 MG/DL
CO2 SERPL-SCNC: 31 MMOL/L (ref 21–32)
CREAT SERPL-MCNC: 1.37 MG/DL (ref 0.55–1.02)
ERYTHROCYTE [DISTWIDTH] IN BLOOD BY AUTOMATED COUNT: 13.2 % (ref 11.5–14.5)
EST. AVERAGE GLUCOSE BLD GHB EST-MCNC: 134 MG/DL
GLOBULIN SER CALC-MCNC: 3.3 G/DL (ref 2–4)
GLUCOSE SERPL-MCNC: 115 MG/DL (ref 65–100)
GLUCOSE UR-MCNC: NEGATIVE MG/DL
HBA1C MFR BLD: 6.3 % (ref 4–5.6)
HCT VFR BLD AUTO: 40.9 % (ref 35–47)
HDLC SERPL-MCNC: 87 MG/DL
HDLC SERPL: 2.2 {RATIO} (ref 0–5)
HGB BLD-MCNC: 12.7 G/DL (ref 11.5–16)
KETONES P FAST UR STRIP-MCNC: NEGATIVE MG/DL
LDLC SERPL CALC-MCNC: 75.8 MG/DL (ref 0–100)
MCH RBC QN AUTO: 31.1 PG (ref 26–34)
MCHC RBC AUTO-ENTMCNC: 31.1 G/DL (ref 30–36.5)
MCV RBC AUTO: 100.2 FL (ref 80–99)
NRBC # BLD: 0 K/UL (ref 0–0.01)
NRBC BLD-RTO: 0 PER 100 WBC
PH UR STRIP: 7.5 [PH] (ref 4.6–8)
PLATELET # BLD AUTO: 176 K/UL (ref 150–400)
PMV BLD AUTO: 11.6 FL (ref 8.9–12.9)
POTASSIUM SERPL-SCNC: 4.4 MMOL/L (ref 3.5–5.1)
PROT SERPL-MCNC: 7.5 G/DL (ref 6.4–8.2)
PROT UR QL STRIP: NEGATIVE
RBC # BLD AUTO: 4.08 M/UL (ref 3.8–5.2)
SODIUM SERPL-SCNC: 140 MMOL/L (ref 136–145)
SP GR UR STRIP: 1.01 (ref 1–1.03)
TRIGL SERPL-MCNC: 146 MG/DL (ref ?–150)
TSH SERPL DL<=0.05 MIU/L-ACNC: 0.08 UIU/ML (ref 0.36–3.74)
UA UROBILINOGEN AMB POC: NORMAL (ref 0.2–1)
URINALYSIS CLARITY POC: NORMAL
URINALYSIS COLOR POC: NORMAL
URINE BLOOD POC: NORMAL
URINE LEUKOCYTES POC: NORMAL
URINE NITRITES POC: POSITIVE
VLDLC SERPL CALC-MCNC: 29.2 MG/DL
WBC # BLD AUTO: 6.4 K/UL (ref 3.6–11)

## 2021-09-23 PROCEDURE — 1101F PT FALLS ASSESS-DOCD LE1/YR: CPT | Performed by: FAMILY MEDICINE

## 2021-09-23 PROCEDURE — 1090F PRES/ABSN URINE INCON ASSESS: CPT | Performed by: FAMILY MEDICINE

## 2021-09-23 PROCEDURE — G0439 PPPS, SUBSEQ VISIT: HCPCS | Performed by: FAMILY MEDICINE

## 2021-09-23 PROCEDURE — G8419 CALC BMI OUT NRM PARAM NOF/U: HCPCS | Performed by: FAMILY MEDICINE

## 2021-09-23 PROCEDURE — G0008 ADMIN INFLUENZA VIRUS VAC: HCPCS | Performed by: FAMILY MEDICINE

## 2021-09-23 PROCEDURE — 81003 URINALYSIS AUTO W/O SCOPE: CPT | Performed by: FAMILY MEDICINE

## 2021-09-23 PROCEDURE — G8427 DOCREV CUR MEDS BY ELIG CLIN: HCPCS | Performed by: FAMILY MEDICINE

## 2021-09-23 PROCEDURE — G8536 NO DOC ELDER MAL SCRN: HCPCS | Performed by: FAMILY MEDICINE

## 2021-09-23 PROCEDURE — 99213 OFFICE O/P EST LOW 20 MIN: CPT | Performed by: FAMILY MEDICINE

## 2021-09-23 PROCEDURE — 90694 VACC AIIV4 NO PRSRV 0.5ML IM: CPT | Performed by: FAMILY MEDICINE

## 2021-09-23 PROCEDURE — G8510 SCR DEP NEG, NO PLAN REQD: HCPCS | Performed by: FAMILY MEDICINE

## 2021-09-23 NOTE — PROGRESS NOTES
Chief Complaint   Patient presents with    Annual Wellness Visit     Pt is doing well , no concerns. No changes in memory, but still has trouble at times. Subjective: (As above and below)     Chief Complaint   Patient presents with    Annual Wellness Visit     she is a 80y.o. year old female who presents for evaluation. Reviewed PmHx, RxHx, FmHx, SocHx, AllgHx and updated in chart. Review of Systems - negative except as listed above    Objective:     Vitals:    09/23/21 1057   BP: (!) 176/79   Pulse: 66   Resp: 16   Temp: 98 °F (36.7 °C)   TempSrc: Oral   SpO2: 98%   Weight: 153 lb 3.2 oz (69.5 kg)   Height: 5' 5\" (1.651 m)     Physical Examination: General appearance - alert, well appearing, and in no distress  Mental status - normal mood, behavior, speech, dress, motor activity, and thought processes  Ears - bilateral TM's and external ear canals normal  Chest - clear to auscultation, no wheezes, rales or rhonchi, symmetric air entry  Heart - normal rate, regular rhythm, normal S1, S2, no murmurs, rubs, clicks or gallops  Musculoskeletal - no joint tenderness, deformity or swelling  Extremities - peripheral pulses normal, no pedal edema, no clubbing or cyanosis    Assessment/ Plan:   1. Medicare annual wellness visit, subsequent  -see below    2. Hypothyroidism, unspecified type  -check labs  - TSH 3RD GENERATION; Future    3. Essential hypertension  -elevated in office, well controlled per home measurements  - CBC W/O DIFF; Future    4. Mixed hyperlipidemia  - METABOLIC PANEL, COMPREHENSIVE; Future  - LIPID PANEL; Future    5. Controlled type 2 diabetes mellitus without complication, without long-term current use of insulin ([facility] [address]  -well controlled  - HEMOGLOBIN A1C WITH EAG; Future    6. PAD (peripheral artery disease) (HCC)  -stable    7. Needs flu shot  - INFLUENZA, HIGH DOSE SEASONAL       I have discussed the diagnosis with the patient and the intended plan as seen in the above orders.   The patient has received an after-visit summary and questions were answered concerning future plans. Medication Side Effects and Warnings were discussed with patient: yes  Patient Labs were reviewed: yes  Patient Past Records were reviewed:  yes    Jared Perez M.D. This is the Subsequent Medicare Annual Wellness Exam, performed 12 months or more after the Initial AWV or the last Subsequent AWV    I have reviewed the patient's medical history in detail and updated the computerized patient record. Assessment/Plan   Education and counseling provided:  Are appropriate based on today's review and evaluation    1. Medicare annual wellness visit, subsequent  2. Hypothyroidism, unspecified type  -     TSH 3RD GENERATION; Future  3. Essential hypertension  -     CBC W/O DIFF; Future  4. Mixed hyperlipidemia  -     METABOLIC PANEL, COMPREHENSIVE; Future  -     LIPID PANEL; Future  5. Controlled type 2 diabetes mellitus without complication, without long-term current use of insulin (HCC)  -     HEMOGLOBIN A1C WITH EAG; Future  6. PAD (peripheral artery disease) ([facility] [address]  7. Needs flu shot  -     INFLUENZA, HIGH DOSE SEASONAL       Depression Risk Factor Screening     3 most recent PHQ Screens 9/23/2021   Little interest or pleasure in doing things Not at all   Feeling down, depressed, irritable, or hopeless Not at all   Total Score PHQ 2 0       Alcohol Risk Screen    Do you average more than 1 drink per night or more than 7 drinks a week:  No    On any one occasion in the past three months have you have had more than 3 drinks containing alcohol:  No        Functional Ability and Level of Safety    Hearing: Pt has some hearing loss      Activities of Daily Living: The home contains: handrails, grab bars and chair lift, stair lift  Patient does total self care      Ambulation: with mild difficulty     Fall Risk:  Fall Risk Assessment, last 12 mths 9/23/2021   Able to walk? Yes   Fall in past 12 months?  1   Do you feel unsteady? 0   Are you worried about falling 0   Is TUG test greater than 12 seconds? -   Is the gait abnormal? -   Number of falls in past 12 months 1   Fall with injury?  0      Abuse Screen:  Patient is not abused       Cognitive Screening    Has your family/caregiver stated any concerns about your memory: yes - on aricept, no changes     Health Maintenance Due     Health Maintenance Due   Topic Date Due    Lipid Screen  08/13/2021    Flu Vaccine (1) 09/01/2021       Patient Care Team   Patient Care Team:  Carlos Manuel Bergman MD as PCP - General (Family Medicine)  [facility]  Mk Farris MD as Physician (Cardiology)  Ewa Phoenix MD as Physician (Cardiology)  Andrew Parra MD as Physician (Cardiology)  Andrew Parra MD as Physician (Cardiology)  Gerardo Schwarz MD as Physician (Pulmonary Disease)    History     Patient Active Problem List   Diagnosis Code    Restless leg syndrome G25.81    Hypothyroid E03.9    Hypertension I10    Arthritis M19.90    Hyperlipidemia E78.5    Venous (peripheral) insufficiency I87.2    Anemia D64.9    Chronic UTI N39.0    PAD (peripheral artery disease) ([address] I73.9    Osteoarthritis of hip M16.9    Hypercholesterolemia E78.00    Mitral regurgitation I34.0    Sigmoid diverticulosis K57.30    Atrial fibrillation ([address] I48.91    Varicose veins of both legs with edema I83.893     Past Medical History:   Diagnosis Date    Arthritis     hands/low back    Atrial fibrillation ([address]     Congestive heart failure ([address]     Diabetes ([address]     borderline    GERD (gastroesophageal reflux disease)     Glaucoma     Hypercholesterolemia     Hypertension     Ill-defined condition     borderline anemia    Ill-defined condition     bladder infections    Impaired glucose tolerance     Long term current use of anticoagulant therapy     Menopause     Pacemaker     Thyroid disease hypothyroidism    Valvular heart disease       Past Surgical History:   Procedure Laterality Date    CARDIOVERSION, ELECTIVE  10/31/2018         CARDIOVERSION, ELECTIVE  11/28/2018         HX BREAST BIOPSY Bilateral 1990    benign    HX CATARACT REMOVAL      bilateral    HX DILATION AND CURETTAGE      HX ORTHOPAEDIC      carpal tunnel release -bilateral    HX ROTATOR CUFF REPAIR Right      Current Outpatient Medications   Medication Sig Dispense Refill    omeprazole (PRILOSEC) 20 mg capsule TAKE 1 CAPSULE DAILY. 90 Capsule 1    levothyroxine (SYNTHROID) 88 mcg tablet TAKE ONE TABLET BY MOUTH EVERY DAY BEFORE BREAKFAST 90 Tablet 0    furosemide (LASIX) 40 mg tablet TAKE ONE TABLET BY MOUTH EVERY DAY 90 Tablet 3    lovastatin (MEVACOR) 40 mg tablet TAKE 1 TABLET EVERY EVENING TO LOWER CHOLESTEROL 90 Tab 0    lisinopriL (PRINIVIL, ZESTRIL) 2.5 mg tablet TAKE ONE TABLET BY MOUTH EVERY DAY 90 Tab 3    Eliquis 2.5 mg tablet TAKE ONE TABLET BY MOUTH 2 TIMES A  Tab 1    meclizine (ANTIVERT) 12.5 mg tablet Take 12.5 mg by mouth three (3) times daily as needed for Dizziness.  OTHER PREVGEN 1 TAB DAILY AS NEEDED      cyclobenzaprine (FLEXERIL) 10 mg tablet TAKE ONE TABLET BY MOUTH 2 TIMES A DAY AS NEEDED FOR MUSCLE SPASMS 60 Tab 0    timolol (TIMOPTIC) 0.5 % ophthalmic solution Administer 1 Drop to both eyes two (2) times a day.  acetaminophen/diphenhydramine (TYLENOL PM PO) Take 2 Tabs by mouth as needed.  cranberry fruit extract (CRANBERRY CONCENTRATE PO) Take 2 Tabs by mouth daily.  ACCU-CHEK MULTICLIX LANCET misc AS DIRECTED 100 Each PRN    latanoprost (XALATAN) 0.005 % ophthalmic solution Administer 1 Drop to both eyes nightly.  DOCUSATE CALCIUM (STOOL SOFTENER PO) Take  by mouth daily.  ascorbic acid (VITAMIN C) 500 mg tablet Take  by mouth daily.  cholecalciferol, vitamin d3, (VITAMIN D) 1,000 unit tablet Take  by mouth daily.       MULTIVITAMINS (MULTIVITAMIN PO) Take  by mouth daily. Allergies   Allergen Reactions    Aspirin Other (comments)     Swelling shut of eyelids    Gabapentin Other (comments)     Vision change    Macrobid [Nitrofurantoin Monohyd/M-Cryst] Unknown (comments)    Pcn [Penicillins] Hives       Family History   Problem Relation Age of Onset    Coronary Artery Disease Other         mother  of MI age 80, brother  age 72 of MI, sister has hear problems    Breast Cancer Sister         46s    Heart Attack Mother     Other Father         pneumonia    Alzheimer Brother     Cancer Sister     Cancer Sister     Heart Attack Brother     Suicide Brother      Social History     Tobacco Use    Smoking status: Former Smoker     Packs/day: 1.00     Years: 15.00     Pack years: 15.00     Types: Cigarettes     Quit date: 1995     Years since quittin.7    Smokeless tobacco: Never Used   Substance Use Topics    Alcohol use:  Yes     Alcohol/week: 1.0 standard drinks     Types: 1 Glasses of wine per week     Comment: Rare--maybe once a month         Dat Sawyer MD

## 2021-09-23 NOTE — PROGRESS NOTES
1. Have you been to the ER, urgent care clinic since your last visit? Hospitalized since your last visit? No    2. Have you seen or consulted any other health care providers outside of the [address] [place] [address] since your last visit? Include any pap smears or colon screening. Yes, Pulmonology.      Health Maintenance Due   Topic Date Due    Lipid Screen  08/13/2021    Medicare Yearly Exam  08/14/2021    Flu Vaccine (1) 09/01/2021     Chief Complaint   Patient presents with   [employer] Annual Wellness Visit

## 2021-09-23 NOTE — PATIENT INSTRUCTIONS
Medicare Wellness Visit, Female     The best way to live healthy is to have a lifestyle where you eat a well-balanced diet, exercise regularly, limit alcohol use, and quit all forms of tobacco/nicotine, if applicable. Regular preventive services are another way to keep healthy. Preventive services (vaccines, screening tests, monitoring & exams) can help personalize your care plan, which helps you manage your own care. Screening tests can find health problems at the earliest stages, when they are easiest to treat. [facility] follows the current, evidence-based guidelines published by the [facility] ([employer]) when recommending preventive services for our patients. Because we follow these guidelines, sometimes recommendations change over time as research supports it. (For example, mammograms used to be recommended annually. Even though Medicare will still pay for an annual mammogram, the newer guidelines recommend a mammogram every two years for women of average risk). Of course, you and your doctor may decide to screen more often for some diseases, based on your risk and your co-morbidities (chronic disease you are already diagnosed with). Preventive services for you include:  - Medicare offers their members a free annual wellness visit, which is time for you and your primary care provider to discuss and plan for your preventive service needs. Take advantage of this benefit every year!  -All adults over the age of 72 should receive the recommended pneumonia vaccines. Current USPSTF guidelines recommend a series of two vaccines for the best pneumonia protection.   -All adults should have a flu vaccine yearly and a tetanus vaccine every 10 years.   -All adults age 48 and older should receive the shingles vaccines (series of two vaccines).       -All adults age 38-68 who are overweight should have a diabetes screening test once every three years.   -All adults born between [birth year] and 1965 should be screened once for Hepatitis C.  -Other screening tests and preventive services for persons with diabetes include: an eye exam to screen for diabetic retinopathy, a kidney function test, a foot exam, and stricter control over your cholesterol.   -Cardiovascular screening for adults with routine risk involves an electrocardiogram (ECG) at intervals determined by your doctor.   -Colorectal cancer screenings should be done for adults age 54-65 with no increased risk factors for colorectal cancer. There are a number of acceptable methods of screening for this type of cancer. Each test has its own benefits and drawbacks. Discuss with your doctor what is most appropriate for you during your annual wellness visit. The different tests include: colonoscopy (considered the best screening method), a fecal occult blood test, a fecal DNA test, and sigmoidoscopy.    -A bone mass density test is recommended when a woman turns 65 to screen for osteoporosis. This test is only recommended one time, as a screening. Some providers will use this same test as a disease monitoring tool if you already have osteoporosis. -Breast cancer screenings are recommended every other year for women of normal risk, age 54-69.  -Cervical cancer screenings for women over age 72 are only recommended with certain risk factors. Here is a list of your current Health Maintenance items (your personalized list of preventive services) with a due date:  Health Maintenance Due   Topic Date Due    Cholesterol Test   08/13/2021    Yearly Flu Vaccine (1) 09/01/2021         Vaccine Information Statement    Influenza (Flu) Vaccine (Inactivated or Recombinant): What You Need to Know    Many vaccine information statements are available in [place] and other languages. See www.immunize.org/vis. Hojas de información sobre vacunas están disponibles en español y en muchos otros idiomas. Visite www.immunize.org/vis.     1. Why get vaccinated? Influenza vaccine can prevent influenza (flu). Flu is a contagious disease that spreads around the [place] every year, usually between October and May. Anyone can get the flu, but it is more dangerous for some people. Infants and young children, people 72 years and older, pregnant people, and people with certain health conditions or a weakened immune system are at greatest risk of flu complications. Pneumonia, bronchitis, sinus infections, and ear infections are examples of flu-related complications. If you have a medical condition, such as heart disease, cancer, or diabetes, flu can make it worse. Flu can cause fever and chills, sore throat, muscle aches, fatigue, cough, headache, and runny or stuffy nose. Some people may have vomiting and diarrhea, though this is more common in children than adults. In an average year, thousands of people in the [facility] die from flu, and many more are hospitalized. Flu vaccine prevents millions of illnesses and flu-related visits to the doctor each year. 2. Influenza vaccines     CDC recommends everyone 6 months and older get vaccinated every flu season. Children 6 months through 6years of age may need 2 doses during a single flu season. Everyone else needs only 1 dose each flu season. It takes about 2 weeks for protection to develop after vaccination. There are many flu viruses, and they are always changing. Each year a new flu vaccine is made to protect against the influenza viruses believed to be likely to cause disease in the upcoming flu season. Even when the vaccine doesnt exactly match these viruses, it may still provide some protection. Influenza vaccine does not cause flu. Influenza vaccine may be given at the same time as other vaccines.     3. Talk with your health care provider    Tell your vaccination provider if the person getting the vaccine:   Has had an allergic reaction after a previous dose of influenza vaccine, or has any severe, life-threatening allergies    Has ever had Guillain-Barré Syndrome (also called GBS)    In some cases, your health care provider may decide to postpone influenza vaccination until a future visit. Influenza vaccine can be administered at any time during pregnancy. People who are or will be pregnant during influenza season should receive inactivated influenza vaccine. People with minor illnesses, such as a cold, may be vaccinated. People who are moderately or severely ill should usually wait until they recover before getting influenza vaccine. Your health care provider can give you more information. 4. Risks of a vaccine reaction     Soreness, redness, and swelling where the shot is given, fever, muscle aches, and headache can happen after influenza vaccination.  There may be a very small increased risk of Guillain-Barré Syndrome (GBS) after inactivated influenza vaccine (the flu shot). Aracelis Ratel children who get the flu shot along with pneumococcal vaccine (PCV13) and/or DTaP vaccine at the same time might be slightly more likely to have a seizure caused by fever. Tell your health care provider if a child who is getting flu vaccine has ever had a seizure. People sometimes faint after medical procedures, including vaccination. Tell your provider if you feel dizzy or have vision changes or ringing in the ears. As with any medicine, there is a very remote chance of a vaccine causing a severe allergic reaction, other serious injury, or death. 5. What if there is a serious problem? An allergic reaction could occur after the vaccinated person leaves the clinic. If you see signs of a severe allergic reaction (hives, swelling of the face and throat, difficulty breathing, a fast heartbeat, dizziness, or weakness), call 9-1-1 and get the person to the nearest hospital.    For other signs that concern you, call your health care provider.     Adverse reactions should be reported to the Vaccine Adverse Event Reporting System (VAERS). Your health care provider will usually file this report, or you can do it yourself. Visit the VAERS website at www.vaers. [facility].gov or call 7-269.123.5693. VAERS is only for reporting reactions, and VAERS staff members do not give medical advice. 6. The National Vaccine Injury Compensation Program    The [facility] Vaccine Injury Compensation Program (VICP) is a federal program that was created to compensate people who may have been injured by certain vaccines. Claims regarding alleged injury or death due to vaccination have a time limit for filing, which may be as short as two years. Visit the VICP website at www.[facility].gov/vaccinecompensation or call 4-565.946.5903 to learn about the program and about filing a claim. 7. How can I learn more?  Ask your health care provider.  Call your local or state health department.  Visit the website of the Food and Drug Administration (FDA) for vaccine package inserts and additional information at www.fda.gov/vaccines-blood-biologics/vaccines.  Contact the Centers for Disease Control and Prevention (CDC):  - Call 8-691.649.8470 (1-800-CDC-INFO) or  - Visit CDCs influenza website at www.cdc.gov/flu. Vaccine Information Statement   Inactivated Influenza Vaccine   8/6/2021  [address] [id number]-[id number]   Department of Health and Human Services  Centers for Disease Control and Prevention    Office Use Only

## 2021-09-26 RX ORDER — CIPROFLOXACIN 500 MG/1
500 TABLET ORAL 2 TIMES DAILY
Qty: 20 TABLET | Refills: 0 | Status: SHIPPED | OUTPATIENT
Start: 2021-09-26 | End: 2021-10-06

## 2021-09-26 RX ORDER — LEVOTHYROXINE SODIUM 75 UG/1
75 TABLET ORAL
Qty: 90 TABLET | Refills: 1 | Status: SHIPPED | OUTPATIENT
Start: 2021-09-26 | End: 2022-02-15 | Stop reason: SDUPTHER

## 2021-09-28 ENCOUNTER — TELEPHONE (OUTPATIENT)
Dept: FAMILY MEDICINE CLINIC | Age: 86
End: 2021-09-28

## 2021-09-28 NOTE — TELEPHONE ENCOUNTER
Patient calling with med question. she wanted to know if she should stop taking \"other med\" while taking levothyroxine.  Please call her at [phone]

## 2021-09-28 NOTE — TELEPHONE ENCOUNTER
verified. Informed pt per Dr. Kristian Byrnes to take the 75 mcg levothyroxine, because the 88 mcg has been d/c'd. Understanding vocalized.

## 2021-10-04 ENCOUNTER — TELEPHONE (OUTPATIENT)
Dept: FAMILY MEDICINE CLINIC | Age: 86
End: 2021-10-04

## 2021-12-06 ENCOUNTER — OFFICE VISIT (OUTPATIENT)
Dept: CARDIOLOGY CLINIC | Age: 86
End: 2021-12-06
Payer: MEDICARE

## 2021-12-06 ENCOUNTER — TELEPHONE (OUTPATIENT)
Dept: CARDIOLOGY CLINIC | Age: 86
End: 2021-12-06

## 2021-12-06 DIAGNOSIS — I44.2 CHB (COMPLETE HEART BLOCK) (HCC): ICD-10-CM

## 2021-12-06 DIAGNOSIS — Z95.0 CARDIAC PACEMAKER IN SITU: Primary | ICD-10-CM

## 2021-12-06 DIAGNOSIS — I48.21 PERMANENT ATRIAL FIBRILLATION (HCC): ICD-10-CM

## 2021-12-06 PROCEDURE — 93296 REM INTERROG EVL PM/IDS: CPT | Performed by: INTERNAL MEDICINE

## 2021-12-06 PROCEDURE — 93294 REM INTERROG EVL PM/LDLS PM: CPT | Performed by: INTERNAL MEDICINE

## 2021-12-06 NOTE — TELEPHONE ENCOUNTER
Patient is calling in regards to her transmission this morning. Patient just wants to make sure that the office received the transmission. Please callback and advise.           Callback Number: [phone]        Chuck Mills

## 2021-12-20 RX ORDER — LEVOTHYROXINE SODIUM 88 UG/1
TABLET ORAL
Qty: 90 TABLET | Refills: 0 | Status: SHIPPED | OUTPATIENT
Start: 2021-12-20 | End: 2022-01-03

## 2022-01-03 RX ORDER — LEVOTHYROXINE SODIUM 88 UG/1
TABLET ORAL
Qty: 90 TABLET | Refills: 0 | Status: SHIPPED | OUTPATIENT
Start: 2022-01-03 | End: 2022-02-15 | Stop reason: ALTCHOICE

## 2022-01-03 RX ORDER — OMEPRAZOLE 20 MG/1
CAPSULE, DELAYED RELEASE ORAL
Qty: 90 CAPSULE | Refills: 0 | Status: SHIPPED | OUTPATIENT
Start: 2022-01-03 | End: 2022-04-26

## 2022-01-03 RX ORDER — CYCLOBENZAPRINE HCL 10 MG
TABLET ORAL
Qty: 60 TABLET | Refills: 0 | Status: SHIPPED | OUTPATIENT
Start: 2022-01-03 | End: 2022-04-26

## 2022-02-11 ENCOUNTER — TRANSCRIBE ORDER (OUTPATIENT)
Dept: SCHEDULING | Age: 87
End: 2022-02-11

## 2022-02-11 DIAGNOSIS — Z12.31 SCREENING MAMMOGRAM FOR HIGH-RISK PATIENT: Primary | ICD-10-CM

## 2022-02-15 ENCOUNTER — OFFICE VISIT (OUTPATIENT)
Dept: FAMILY MEDICINE CLINIC | Age: 87
End: 2022-02-15
Payer: MEDICARE

## 2022-02-15 VITALS
OXYGEN SATURATION: 96 % | SYSTOLIC BLOOD PRESSURE: 174 MMHG | WEIGHT: 150.8 LBS | TEMPERATURE: 98 F | HEART RATE: 61 BPM | HEIGHT: 65 IN | DIASTOLIC BLOOD PRESSURE: 74 MMHG | RESPIRATION RATE: 16 BRPM | BODY MASS INDEX: 25.12 KG/M2

## 2022-02-15 DIAGNOSIS — R41.3 MEMORY CHANGE: ICD-10-CM

## 2022-02-15 DIAGNOSIS — I48.21 PERMANENT ATRIAL FIBRILLATION (HCC): ICD-10-CM

## 2022-02-15 DIAGNOSIS — E11.9 CONTROLLED TYPE 2 DIABETES MELLITUS WITHOUT COMPLICATION, WITHOUT LONG-TERM CURRENT USE OF INSULIN (HCC): ICD-10-CM

## 2022-02-15 DIAGNOSIS — R30.0 DYSURIA: ICD-10-CM

## 2022-02-15 DIAGNOSIS — I73.9 PAD (PERIPHERAL ARTERY DISEASE) (HCC): ICD-10-CM

## 2022-02-15 DIAGNOSIS — E03.9 HYPOTHYROIDISM, UNSPECIFIED TYPE: Primary | ICD-10-CM

## 2022-02-15 LAB
BILIRUB UR QL STRIP: NEGATIVE
GLUCOSE UR-MCNC: NEGATIVE MG/DL
KETONES P FAST UR STRIP-MCNC: NEGATIVE MG/DL
PH UR STRIP: 7 [PH] (ref 4.6–8)
PROT UR QL STRIP: NORMAL
SP GR UR STRIP: 1.02 (ref 1–1.03)
UA UROBILINOGEN AMB POC: NORMAL (ref 0.2–1)
URINALYSIS CLARITY POC: CLEAR
URINALYSIS COLOR POC: YELLOW
URINE BLOOD POC: NEGATIVE
URINE LEUKOCYTES POC: NEGATIVE
URINE NITRITES POC: POSITIVE

## 2022-02-15 PROCEDURE — G8419 CALC BMI OUT NRM PARAM NOF/U: HCPCS | Performed by: FAMILY MEDICINE

## 2022-02-15 PROCEDURE — 1101F PT FALLS ASSESS-DOCD LE1/YR: CPT | Performed by: FAMILY MEDICINE

## 2022-02-15 PROCEDURE — 1090F PRES/ABSN URINE INCON ASSESS: CPT | Performed by: FAMILY MEDICINE

## 2022-02-15 PROCEDURE — 99214 OFFICE O/P EST MOD 30 MIN: CPT | Performed by: FAMILY MEDICINE

## 2022-02-15 PROCEDURE — 81003 URINALYSIS AUTO W/O SCOPE: CPT | Performed by: FAMILY MEDICINE

## 2022-02-15 PROCEDURE — G8427 DOCREV CUR MEDS BY ELIG CLIN: HCPCS | Performed by: FAMILY MEDICINE

## 2022-02-15 PROCEDURE — G8510 SCR DEP NEG, NO PLAN REQD: HCPCS | Performed by: FAMILY MEDICINE

## 2022-02-15 PROCEDURE — G8536 NO DOC ELDER MAL SCRN: HCPCS | Performed by: FAMILY MEDICINE

## 2022-02-15 RX ORDER — LEVOTHYROXINE SODIUM 75 UG/1
75 TABLET ORAL
Qty: 90 TABLET | Refills: 3 | Status: SHIPPED | OUTPATIENT
Start: 2022-02-15 | End: 2022-02-18 | Stop reason: SDUPTHER

## 2022-02-15 RX ORDER — DONEPEZIL HYDROCHLORIDE 10 MG/1
10 TABLET, FILM COATED ORAL
Qty: 90 TABLET | Refills: 3 | Status: SHIPPED | OUTPATIENT
Start: 2022-02-15 | End: 2022-03-14

## 2022-02-15 RX ORDER — LEVOTHYROXINE SODIUM 88 UG/1
TABLET ORAL
Qty: 90 TABLET | Refills: 0 | Status: CANCELLED | OUTPATIENT
Start: 2022-02-15

## 2022-02-15 NOTE — PROGRESS NOTES
1. Have you been to the ER, urgent care clinic since your last visit? Hospitalized since your last visit? No    2. Have you seen or consulted any other health care providers outside of the [address] [place] [address] since your last visit? Include any pap smears or colon screening. Yes, Dermatology.      Health Maintenance Due   Topic Date Due    Foot Exam Q1  Never done    Eye Exam Retinal or Dilated  Never done    COVID-19 Vaccine (3 - Booster for [employer] series) 07/27/2021    MICROALBUMIN Q1  08/13/2021     Chief Complaint   Patient presents with    Annual Wellness Visit    Dysuria

## 2022-02-15 NOTE — PROGRESS NOTES
Chief Complaint   Patient presents with    Annual Wellness Visit    Dysuria     Pt has been taking prevagen, has noticed increased confusion. Pt is worried that she has done things wrong. Pt is concerned that she also has a bladder infection, has been lingering for three weeks. No frequency, some discomfort. Pt and family have noticed memory changes even before urinary symptoms. Pt has been taking new thyroid dose. Subjective: (As above and below)     Chief Complaint   Patient presents with    Annual Wellness Visit    Dysuria     she is a 80y.o. year old female who presents for evaluation. Reviewed PmHx, RxHx, FmHx, SocHx, AllgHx and updated in chart. Review of Systems - negative except as listed above    Objective:     Vitals:    02/15/22 0944 02/15/22 0951   BP: (!) 184/80 (!) 174/74   Pulse: 61    Resp: 16    Temp: 98 °F (36.7 °C)    TempSrc: Oral    SpO2: 96%    Weight: 150 lb 12.8 oz (68.4 kg)    Height: 5' 5\" (1.651 m)      Physical Examination: General appearance - alert, well appearing, and in no distress  Mental status - normal mood, behavior, speech, dress, motor activity, and thought processes  Chest - clear to auscultation, no wheezes, rales or rhonchi, symmetric air entry  Heart - normal rate, regular rhythm, normal S1, S2, no murmurs, rubs, clicks or gallops  Musculoskeletal - no joint tenderness, deformity or swelling  Extremities - peripheral pulses normal, no pedal edema, no clubbing or cyanosis    Assessment/ Plan:   1. PAD (peripheral artery disease) (HCC)  -stable, no changes    2. Permanent atrial fibrillation (HCC)  -continue on Eliquis    3. Controlled type 2 diabetes mellitus without complication, without long-term current use of insulin (HCC)  -check labs  - METABOLIC PANEL, COMPREHENSIVE; Future  - CBC W/O DIFF; Future  - LIPID PANEL; Future  - HEMOGLOBIN A1C WITH EAG; Future  - TSH 3RD GENERATION;  Future  - MICROALBUMIN, UR, RAND W/ MICROALB/CREAT RATIO; Future    4. Hypothyroidism, unspecified type  -check TSH    5. Dysuria  - AMB POC URINALYSIS DIP STICK AUTO W/O MICRO  - CULTURE, URINE; Future    6. Memory change  -start on medication as written  - donepeziL (ARICEPT) 10 mg tablet; Take 1 Tablet by mouth nightly. Dispense: 90 Tablet; Refill: 3       I have discussed the diagnosis with the patient and the intended plan as seen in the above orders. The patient has received an after-visit summary and questions were answered concerning future plans.      Medication Side Effects and Warnings were discussed with patient: yes  Patient Labs were reviewed: yes  Patient Past Records were reviewed:  yes    Yossi Hernandez M.D.

## 2022-02-16 LAB
ALBUMIN SERPL-MCNC: 4.3 G/DL (ref 3.5–5)
ALBUMIN/GLOB SERPL: 1.4 {RATIO} (ref 1.1–2.2)
ALP SERPL-CCNC: 113 U/L (ref 45–117)
ALT SERPL-CCNC: 21 U/L (ref 12–78)
ANION GAP SERPL CALC-SCNC: 3 MMOL/L (ref 5–15)
AST SERPL-CCNC: 29 U/L (ref 15–37)
BILIRUB SERPL-MCNC: 0.7 MG/DL (ref 0.2–1)
BUN SERPL-MCNC: 28 MG/DL (ref 6–20)
BUN/CREAT SERPL: 22 (ref 12–20)
CALCIUM SERPL-MCNC: 10 MG/DL (ref 8.5–10.1)
CHLORIDE SERPL-SCNC: 106 MMOL/L (ref 97–108)
CHOLEST SERPL-MCNC: 188 MG/DL
CO2 SERPL-SCNC: 30 MMOL/L (ref 21–32)
CREAT SERPL-MCNC: 1.27 MG/DL (ref 0.55–1.02)
CREAT UR-MCNC: 83 MG/DL
ERYTHROCYTE [DISTWIDTH] IN BLOOD BY AUTOMATED COUNT: 13.5 % (ref 11.5–14.5)
EST. AVERAGE GLUCOSE BLD GHB EST-MCNC: 137 MG/DL
GLOBULIN SER CALC-MCNC: 3.1 G/DL (ref 2–4)
GLUCOSE SERPL-MCNC: 135 MG/DL (ref 65–100)
HBA1C MFR BLD: 6.4 % (ref 4–5.6)
HCT VFR BLD AUTO: 41.2 % (ref 35–47)
HDLC SERPL-MCNC: 81 MG/DL
HDLC SERPL: 2.3 {RATIO} (ref 0–5)
HGB BLD-MCNC: 12.5 G/DL (ref 11.5–16)
LDLC SERPL CALC-MCNC: 81.4 MG/DL (ref 0–100)
MCH RBC QN AUTO: 31.2 PG (ref 26–34)
MCHC RBC AUTO-ENTMCNC: 30.3 G/DL (ref 30–36.5)
MCV RBC AUTO: 102.7 FL (ref 80–99)
MICROALBUMIN UR-MCNC: 9.31 MG/DL
MICROALBUMIN/CREAT UR-RTO: 112 MG/G (ref 0–30)
NRBC # BLD: 0 K/UL (ref 0–0.01)
NRBC BLD-RTO: 0 PER 100 WBC
PLATELET # BLD AUTO: 189 K/UL (ref 150–400)
PMV BLD AUTO: 11.5 FL (ref 8.9–12.9)
POTASSIUM SERPL-SCNC: 4.1 MMOL/L (ref 3.5–5.1)
PROT SERPL-MCNC: 7.4 G/DL (ref 6.4–8.2)
RBC # BLD AUTO: 4.01 M/UL (ref 3.8–5.2)
SODIUM SERPL-SCNC: 139 MMOL/L (ref 136–145)
TRIGL SERPL-MCNC: 128 MG/DL (ref ?–150)
TSH SERPL DL<=0.05 MIU/L-ACNC: 0.03 UIU/ML (ref 0.36–3.74)
VLDLC SERPL CALC-MCNC: 25.6 MG/DL
WBC # BLD AUTO: 6.8 K/UL (ref 3.6–11)

## 2022-02-18 LAB
BACTERIA SPEC CULT: ABNORMAL
CC UR VC: ABNORMAL
SERVICE CMNT-IMP: ABNORMAL

## 2022-02-18 RX ORDER — NITROFURANTOIN 25; 75 MG/1; MG/1
100 CAPSULE ORAL 2 TIMES DAILY
Qty: 14 CAPSULE | Refills: 0 | Status: SHIPPED | OUTPATIENT
Start: 2022-02-18 | End: 2022-02-25

## 2022-02-18 RX ORDER — LEVOTHYROXINE SODIUM 50 UG/1
50 TABLET ORAL
Qty: 90 TABLET | Refills: 1 | Status: SHIPPED | OUTPATIENT
Start: 2022-02-18 | End: 2022-09-26 | Stop reason: DRUGHIGH

## 2022-02-18 NOTE — PROGRESS NOTES
Called pt to review results - abx called in for UTI  Synthroid reduced  All other labs stable  Pt expressed understanding

## 2022-03-01 ENCOUNTER — OFFICE VISIT (OUTPATIENT)
Dept: FAMILY MEDICINE CLINIC | Age: 87
End: 2022-03-01
Payer: MEDICARE

## 2022-03-01 VITALS
OXYGEN SATURATION: 96 % | HEIGHT: 65 IN | SYSTOLIC BLOOD PRESSURE: 136 MMHG | DIASTOLIC BLOOD PRESSURE: 67 MMHG | WEIGHT: 147.6 LBS | HEART RATE: 70 BPM | RESPIRATION RATE: 16 BRPM | BODY MASS INDEX: 24.59 KG/M2 | TEMPERATURE: 97.5 F

## 2022-03-01 DIAGNOSIS — R41.3 MEMORY CHANGE: ICD-10-CM

## 2022-03-01 DIAGNOSIS — B96.20 E. COLI UTI: Primary | ICD-10-CM

## 2022-03-01 DIAGNOSIS — N39.0 E. COLI UTI: Primary | ICD-10-CM

## 2022-03-01 LAB
BILIRUB UR QL STRIP: NEGATIVE
GLUCOSE UR-MCNC: NEGATIVE MG/DL
KETONES P FAST UR STRIP-MCNC: NEGATIVE MG/DL
PH UR STRIP: 6.5 [PH] (ref 4.6–8)
PROT UR QL STRIP: NEGATIVE
SP GR UR STRIP: 1.01 (ref 1–1.03)
UA UROBILINOGEN AMB POC: NORMAL (ref 0.2–1)
URINALYSIS CLARITY POC: NORMAL
URINALYSIS COLOR POC: YELLOW
URINE BLOOD POC: NORMAL
URINE LEUKOCYTES POC: NORMAL
URINE NITRITES POC: NEGATIVE

## 2022-03-01 PROCEDURE — 1101F PT FALLS ASSESS-DOCD LE1/YR: CPT | Performed by: FAMILY MEDICINE

## 2022-03-01 PROCEDURE — G8510 SCR DEP NEG, NO PLAN REQD: HCPCS | Performed by: FAMILY MEDICINE

## 2022-03-01 PROCEDURE — G8420 CALC BMI NORM PARAMETERS: HCPCS | Performed by: FAMILY MEDICINE

## 2022-03-01 PROCEDURE — 1090F PRES/ABSN URINE INCON ASSESS: CPT | Performed by: FAMILY MEDICINE

## 2022-03-01 PROCEDURE — G8536 NO DOC ELDER MAL SCRN: HCPCS | Performed by: FAMILY MEDICINE

## 2022-03-01 PROCEDURE — 81003 URINALYSIS AUTO W/O SCOPE: CPT | Performed by: FAMILY MEDICINE

## 2022-03-01 PROCEDURE — 99213 OFFICE O/P EST LOW 20 MIN: CPT | Performed by: FAMILY MEDICINE

## 2022-03-01 PROCEDURE — G8427 DOCREV CUR MEDS BY ELIG CLIN: HCPCS | Performed by: FAMILY MEDICINE

## 2022-03-01 NOTE — PROGRESS NOTES
1. Have you been to the ER, urgent care clinic since your last visit? Hospitalized since your last visit? No    2. Have you seen or consulted any other health care providers outside of the [address] [place] [address] since your last visit? Include any pap smears or colon screening.  No    Health Maintenance Due   Topic Date Due    Foot Exam Q1  Never done    Eye Exam Retinal or Dilated  Never done    COVID-19 Vaccine (3 - Booster for [employer] series) 07/27/2021     Chief Complaint   Patient presents with    Medication Evaluation     f/u
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
## 2022-03-05 LAB
BACTERIA SPEC CULT: ABNORMAL
CC UR VC: ABNORMAL
SERVICE CMNT-IMP: ABNORMAL

## 2022-03-06 RX ORDER — SULFAMETHOXAZOLE AND TRIMETHOPRIM 800; 160 MG/1; MG/1
1 TABLET ORAL 2 TIMES DAILY
Qty: 20 TABLET | Refills: 0 | Status: SHIPPED | OUTPATIENT
Start: 2022-03-06 | End: 2022-03-16

## 2022-03-10 ENCOUNTER — OFFICE VISIT (OUTPATIENT)
Dept: CARDIOLOGY CLINIC | Age: 87
End: 2022-03-10
Payer: MEDICARE

## 2022-03-10 DIAGNOSIS — Z95.0 CARDIAC PACEMAKER IN SITU: Primary | ICD-10-CM

## 2022-03-10 DIAGNOSIS — I48.21 PERMANENT ATRIAL FIBRILLATION (HCC): ICD-10-CM

## 2022-03-10 DIAGNOSIS — I44.2 CHB (COMPLETE HEART BLOCK) (HCC): ICD-10-CM

## 2022-03-10 PROCEDURE — 93294 REM INTERROG EVL PM/LDLS PM: CPT | Performed by: INTERNAL MEDICINE

## 2022-03-10 PROCEDURE — 93296 REM INTERROG EVL PM/IDS: CPT | Performed by: INTERNAL MEDICINE

## 2022-03-14 ENCOUNTER — OFFICE VISIT (OUTPATIENT)
Dept: FAMILY MEDICINE CLINIC | Age: 87
End: 2022-03-14
Payer: MEDICARE

## 2022-03-14 VITALS
HEART RATE: 82 BPM | BODY MASS INDEX: 23.32 KG/M2 | HEIGHT: 65 IN | SYSTOLIC BLOOD PRESSURE: 126 MMHG | DIASTOLIC BLOOD PRESSURE: 62 MMHG | WEIGHT: 140 LBS | TEMPERATURE: 98.1 F | RESPIRATION RATE: 18 BRPM | OXYGEN SATURATION: 95 %

## 2022-03-14 DIAGNOSIS — R05.9 COUGH: ICD-10-CM

## 2022-03-14 DIAGNOSIS — E11.9 TYPE 2 DIABETES MELLITUS WITHOUT COMPLICATION, WITHOUT LONG-TERM CURRENT USE OF INSULIN (HCC): ICD-10-CM

## 2022-03-14 DIAGNOSIS — N39.0 RECURRENT UTI: ICD-10-CM

## 2022-03-14 DIAGNOSIS — R41.3 MEMORY CHANGE: ICD-10-CM

## 2022-03-14 DIAGNOSIS — R09.89 CHEST CONGESTION: Primary | ICD-10-CM

## 2022-03-14 PROCEDURE — G8420 CALC BMI NORM PARAMETERS: HCPCS | Performed by: FAMILY MEDICINE

## 2022-03-14 PROCEDURE — G8536 NO DOC ELDER MAL SCRN: HCPCS | Performed by: FAMILY MEDICINE

## 2022-03-14 PROCEDURE — G8510 SCR DEP NEG, NO PLAN REQD: HCPCS | Performed by: FAMILY MEDICINE

## 2022-03-14 PROCEDURE — 1090F PRES/ABSN URINE INCON ASSESS: CPT | Performed by: FAMILY MEDICINE

## 2022-03-14 PROCEDURE — 1101F PT FALLS ASSESS-DOCD LE1/YR: CPT | Performed by: FAMILY MEDICINE

## 2022-03-14 PROCEDURE — 99214 OFFICE O/P EST MOD 30 MIN: CPT | Performed by: FAMILY MEDICINE

## 2022-03-14 PROCEDURE — G8427 DOCREV CUR MEDS BY ELIG CLIN: HCPCS | Performed by: FAMILY MEDICINE

## 2022-03-14 NOTE — PROGRESS NOTES
Chief Complaint   Patient presents with    Medication Evaluation    Constipation    Nasal Congestion    Vomiting     Health Maintenance reviewed     1. Have you been to the ER, urgent care clinic since your last visit? Hospitalized since your last visit? No    2. Have you seen or consulted any other health care providers outside of the [address] [place] [address] since your last visit? Include any pap smears or colon screening.   No

## 2022-03-14 NOTE — PROGRESS NOTES
Chief Complaint   Patient presents with    Medication Evaluation    Constipation    Nasal Congestion    Vomiting     Pt reports that she has not been feeling good, attributes to Aricept. Family and friends have noticed a negative change, medication has affected her sleep. Pt is interested in trying alternate medication after she hopefully feels better. Pt and her  have recently had cold, congestion, cough, started about a week ago. Fever, last noted last night. Pt has been constipated for several weeks, since she was on abx for UTI. Pt has been having multiple small bowel movements each day. Pt took Miralax Saturday and again last night, had a good bowel movement yesterday. Pt has had recent back to back e coli culture confirmed UTIs. Still has 2 days of medications left. Will recheck urine next week. Subjective: (As above and below)     Chief Complaint   Patient presents with    Medication Evaluation    Constipation    Nasal Congestion    Vomiting     she is a 80y.o. year old female who presents for evaluation. Reviewed PmHx, RxHx, FmHx, SocHx, AllgHx and updated in chart. Review of Systems - negative except as listed above    Objective:     Vitals:    03/14/22 1501 03/14/22 1527   BP: (!) 145/69 126/62   Pulse: 82    Resp: 18    Temp: 98.1 °F (36.7 °C)    TempSrc: Oral    SpO2: 95%    Weight: 140 lb (63.5 kg)    Height: 5' 5\" (1.651 m)      Physical Examination: General appearance - alert, well appearing, and in no distress  Mental status - normal mood, behavior, speech, dress, motor activity, and thought processes  Ears - bilateral TM's and external ear canals normal  Chest - clear to auscultation, no wheezes, rales or rhonchi, symmetric air entry  Heart - irregularly irregular rhythm  Musculoskeletal - no joint tenderness, deformity or swelling  Extremities - peripheral pulses normal, no pedal edema, no clubbing or cyanosis    Assessment/ Plan:   1.  Chest congestion  -check labs  - NOVEL CORONAVIRUS (COVID-19)    2. Cough  -treat based on lab results  - NOVEL CORONAVIRUS (COVID-19)    3. Type 2 diabetes mellitus without complication, without long-term current use of insulin (HCC)  -diet controlled    4. Recurrent UTI  -finish treatment    5. Memory change  -stop aricept, hold on any new medications x 30 days  -then consider alternate medication       I have discussed the diagnosis with the patient and the intended plan as seen in the above orders. The patient has received an after-visit summary and questions were answered concerning future plans.      Medication Side Effects and Warnings were discussed with patient: yes  Patient Labs were reviewed: yes  Patient Past Records were reviewed:  yes    Kavya Funez M.D.

## 2022-03-15 LAB
SARS-COV-2, NAA 2 DAY TAT: NORMAL
SARS-COV-2, NAA: NOT DETECTED

## 2022-03-16 ENCOUNTER — TELEPHONE (OUTPATIENT)
Dept: FAMILY MEDICINE CLINIC | Age: 87
End: 2022-03-16

## 2022-03-18 PROBLEM — I73.9 PAD (PERIPHERAL ARTERY DISEASE) (HCC): Status: ACTIVE | Noted: 2017-04-04

## 2022-03-18 PROBLEM — D64.9 ANEMIA: Status: ACTIVE | Noted: 2017-02-06

## 2022-03-19 PROBLEM — I48.91 ATRIAL FIBRILLATION (HCC): Status: ACTIVE | Noted: 2018-12-05

## 2022-03-19 PROBLEM — K57.30 SIGMOID DIVERTICULOSIS: Status: ACTIVE | Noted: 2018-11-20

## 2022-03-19 PROBLEM — M16.9 OSTEOARTHRITIS OF HIP: Status: ACTIVE | Noted: 2017-11-20

## 2022-03-19 PROBLEM — E11.9 TYPE 2 DIABETES MELLITUS (HCC): Status: ACTIVE | Noted: 2022-03-14

## 2022-03-19 PROBLEM — I83.893 VARICOSE VEINS OF BOTH LEGS WITH EDEMA: Status: ACTIVE | Noted: 2020-10-06

## 2022-03-19 PROBLEM — N39.0 CHRONIC UTI: Status: ACTIVE | Noted: 2017-02-06

## 2022-03-19 PROBLEM — I34.0 MITRAL REGURGITATION: Status: ACTIVE | Noted: 2018-10-31

## 2022-03-21 ENCOUNTER — CLINICAL SUPPORT (OUTPATIENT)
Dept: FAMILY MEDICINE CLINIC | Age: 87
End: 2022-03-21
Payer: MEDICARE

## 2022-03-21 DIAGNOSIS — N39.0 RECURRENT UTI: Primary | ICD-10-CM

## 2022-03-21 LAB
BILIRUB UR QL STRIP: NEGATIVE
GLUCOSE UR-MCNC: NEGATIVE MG/DL
KETONES P FAST UR STRIP-MCNC: NEGATIVE MG/DL
PH UR STRIP: 7 [PH] (ref 4.6–8)
PROT UR QL STRIP: NEGATIVE
SP GR UR STRIP: 1.01 (ref 1–1.03)
UA UROBILINOGEN AMB POC: NORMAL (ref 0.2–1)
URINALYSIS CLARITY POC: CLEAR
URINALYSIS COLOR POC: YELLOW
URINE BLOOD POC: NEGATIVE
URINE LEUKOCYTES POC: NORMAL
URINE NITRITES POC: NEGATIVE

## 2022-03-21 PROCEDURE — 81003 URINALYSIS AUTO W/O SCOPE: CPT | Performed by: FAMILY MEDICINE

## 2022-03-21 NOTE — ADDENDUM NOTE
Addended by: Marry Sahrp on: 3/21/2022 02:02 PM     Modules accepted: Orders Alert/Awake/Cooperative

## 2022-03-24 ENCOUNTER — HOSPITAL ENCOUNTER (OUTPATIENT)
Dept: MAMMOGRAPHY | Age: 87
Discharge: HOME | End: 2022-03-24
Attending: FAMILY MEDICINE
Payer: MEDICARE

## 2022-03-24 DIAGNOSIS — Z12.31 SCREENING MAMMOGRAM FOR HIGH-RISK PATIENT: ICD-10-CM

## 2022-03-24 PROCEDURE — 77063 BREAST TOMOSYNTHESIS BI: CPT

## 2022-03-25 ENCOUNTER — TELEPHONE (OUTPATIENT)
Dept: FAMILY MEDICINE CLINIC | Age: 87
End: 2022-03-25

## 2022-03-25 DIAGNOSIS — N39.0 RECURRENT UTI: Primary | ICD-10-CM

## 2022-03-25 LAB
BACTERIA SPEC CULT: ABNORMAL
CC UR VC: ABNORMAL
SERVICE CMNT-IMP: ABNORMAL

## 2022-03-25 RX ORDER — TETRACYCLINE HYDROCHLORIDE 500 MG/1
500 CAPSULE ORAL
Qty: 21 CAPSULE | Refills: 0 | Status: SHIPPED | OUTPATIENT
Start: 2022-03-25 | End: 2022-04-01

## 2022-03-25 NOTE — PROGRESS NOTES
Please advise pt that there is persistent bacteria in her urine, please take abx as prescribed. Also recommend urology follow up. Please refer to [facility]. Referral written    Recheck culture one week after finishing abx.

## 2022-03-28 NOTE — PROGRESS NOTES
1. Have you been to the ER, urgent care clinic since your last visit? Hospitalized since your last visit? No.    2. Have you seen or consulted any other health care providers outside of the [address] [place] [address] since your last visit? Include any pap smears or colon screening.    No.      Chief Complaint   Patient presents with    Circulatory problem     f/u from [facility]- some burning at night in legs Noted any any information on side effects to relay

## 2022-04-04 ENCOUNTER — OFFICE VISIT (OUTPATIENT)
Dept: CARDIOLOGY CLINIC | Age: 87
End: 2022-04-04
Payer: MEDICARE

## 2022-04-04 VITALS
OXYGEN SATURATION: 96 % | WEIGHT: 144.25 LBS | RESPIRATION RATE: 18 BRPM | SYSTOLIC BLOOD PRESSURE: 139 MMHG | HEART RATE: 70 BPM | DIASTOLIC BLOOD PRESSURE: 70 MMHG | BODY MASS INDEX: 24.03 KG/M2 | HEIGHT: 65 IN

## 2022-04-04 DIAGNOSIS — I10 HYPERTENSION, UNSPECIFIED TYPE: ICD-10-CM

## 2022-04-04 DIAGNOSIS — E78.2 MIXED HYPERLIPIDEMIA: ICD-10-CM

## 2022-04-04 DIAGNOSIS — Z95.0 CARDIAC PACEMAKER IN SITU: ICD-10-CM

## 2022-04-04 DIAGNOSIS — I48.21 PERMANENT ATRIAL FIBRILLATION (HCC): Primary | ICD-10-CM

## 2022-04-04 DIAGNOSIS — I44.2 CHB (COMPLETE HEART BLOCK) (HCC): ICD-10-CM

## 2022-04-04 PROCEDURE — G8510 SCR DEP NEG, NO PLAN REQD: HCPCS | Performed by: INTERNAL MEDICINE

## 2022-04-04 PROCEDURE — 1101F PT FALLS ASSESS-DOCD LE1/YR: CPT | Performed by: INTERNAL MEDICINE

## 2022-04-04 PROCEDURE — 93000 ELECTROCARDIOGRAM COMPLETE: CPT | Performed by: INTERNAL MEDICINE

## 2022-04-04 PROCEDURE — 99214 OFFICE O/P EST MOD 30 MIN: CPT | Performed by: INTERNAL MEDICINE

## 2022-04-04 PROCEDURE — 1090F PRES/ABSN URINE INCON ASSESS: CPT | Performed by: INTERNAL MEDICINE

## 2022-04-04 PROCEDURE — G8427 DOCREV CUR MEDS BY ELIG CLIN: HCPCS | Performed by: INTERNAL MEDICINE

## 2022-04-04 PROCEDURE — G8420 CALC BMI NORM PARAMETERS: HCPCS | Performed by: INTERNAL MEDICINE

## 2022-04-04 PROCEDURE — G8536 NO DOC ELDER MAL SCRN: HCPCS | Performed by: INTERNAL MEDICINE

## 2022-04-04 RX ORDER — PHENAZOPYRIDINE HYDROCHLORIDE 100 MG/1
100 TABLET, FILM COATED ORAL 3 TIMES DAILY
COMMUNITY
Start: 2022-03-31

## 2022-04-04 NOTE — LETTER
4/4/2022    Patient: Mounika Ham   YOB: 1932   Date of Visit: 4/4/2022     Xavi Noble MD  [address] [place] [address] [phone]  [address] [facility] [address]    Dear Dat Guerrero MD,      Thank you for referring Ms. Mayela Castillo to [address] for evaluation. My notes for this consultation are attached. If you have questions, please do not hesitate to call me. I look forward to following your patient along with you.       Sincerely,    Ratna Gastelum MD

## 2022-04-04 NOTE — PROGRESS NOTES
[address] [address] [address] [facility]  [phone]     Subjective:      Dorothy Wall is a 80 y.o. female is here for routine f/u. She has a PMHx of permanent AF s/p AV monica ablation with PPM, mod-severe MR, HTN and HLD. Last seen by us in 7/2021. Today, most recent echo posted below. Continues to follow with Dr Bj Deluna. The patient denies chest pain/ shortness of breath, orthopnea, PND, LE edema, palpitations, syncope, or presyncope. ECHO 8/2021  · LV: Estimated LVEF is 55 - 60%. Normal cavity size and systolic function (ejection fraction normal). Mild concentric hypertrophy. Wall motion: normal.  · RV: Pacing wire present and appears normal.  · MV: Mitral valve leaflet calcification. Mild to moderate mitral valve regurgitation is present. · TV: Mild to moderate tricuspid valve regurgitation is present. · PA: Moderate pulmonary hypertension. Pulmonary arterial systolic pressure is 51 mmHg. ECHO 8/2020  · LV: Normal cavity size, systolic function (ejection fraction normal) and diastolic function. Mild concentric hypertrophy. Estimated left ventricular ejection fraction is 55 - 60%. Wall motion: normal.  · LA: Severely dilated left atrium. · RA: Dilated right atrium. · MV: Mitral valve thickening. Moderate mitral valve regurgitation is present. · TV: Moderate tricuspid valve regurgitation is present. · PA: Moderate pulmonary hypertension. Pulmonary arterial systolic pressure is 54 mmHg.          Patient Active Problem List    Diagnosis Date Noted    Type 2 diabetes mellitus 03/14/2022    Varicose veins of both legs with edema 10/06/2020    Atrial fibrillation ([address] 12/05/2018    Sigmoid diverticulosis 11/20/2018    Mitral regurgitation 10/31/2018    Hypercholesterolemia     Osteoarthritis of hip 11/20/2017    PAD (peripheral artery disease) ([address] 04/04/2017    Anemia 02/06/2017    Chronic UTI 02/06/2017    Venous (peripheral) insufficiency 2015    Hypothyroid 2012    Hypertension 2012    Arthritis 2012    Hyperlipidemia 2012    Restless leg syndrome 2011      Jannette Noble MD  Past Medical History:   Diagnosis Date    Arthritis     hands/low back    Atrial fibrillation ([address]     Congestive heart failure ([address]     Diabetes ([address]     borderline    GERD (gastroesophageal reflux disease)     Glaucoma     Hypercholesterolemia     Hypertension     Ill-defined condition     borderline anemia    Ill-defined condition     bladder infections    Impaired glucose tolerance     Long term current use of anticoagulant therapy     Menopause     Pacemaker     Thyroid disease     hypothyroidism    Valvular heart disease       Past Surgical History:   Procedure Laterality Date    CARDIOVERSION, ELECTIVE  10/31/2018         CARDIOVERSION, ELECTIVE  2018         HX BREAST BIOPSY Bilateral 1990    benign    HX CATARACT REMOVAL      bilateral    HX DILATION AND CURETTAGE      HX ORTHOPAEDIC      carpal tunnel release -bilateral    HX PACEMAKER      HX ROTATOR CUFF REPAIR Right      Allergies   Allergen Reactions    Aspirin Other (comments)     Swelling shut of eyelids    Gabapentin Other (comments)     Vision change    Macrobid [Nitrofurantoin Monohyd/M-Cryst] Unknown (comments)    Pcn [Penicillins] Hives      Family History   Problem Relation Age of Onset    Coronary Art Dis Other         mother  of MI age 80, brother  age 72 of MI, sister has hear problems    Breast Cancer Sister         46s    Heart Attack Mother     Other Father         pneumonia    Alzheimer's Disease Brother     Cancer Sister     Cancer Sister     Heart Attack Brother     Suicide Brother       Social History     Socioeconomic History    Marital status:      Spouse name: Not on file    Number of children: Not on file    Years of education: Not on file    Highest education level: Not on file Occupational History    Not on file   Tobacco Use    Smoking status: Former Smoker     Packs/day: 1.00     Years: 15.00     Pack years: 15.00     Types: Cigarettes     Quit date: 1995     Years since quittin.2    Smokeless tobacco: Never Used   Vaping Use    Vaping Use: Never used   Substance and Sexual Activity    Alcohol use: Yes     Alcohol/week: 1.0 standard drink     Types: 1 Glasses of wine per week     Comment: Rare--maybe once a month    Drug use: No    Sexual activity: Not on file   Other Topics Concern    Not on file   Social History Narrative    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 Lives with . Social Determinants of Health     Financial Resource Strain:     Difficulty of Paying Living Expenses: Not on file   Food Insecurity:     Worried About Running Out of Food in the Last Year: Not on file    Sylvia of Food in the Last Year: Not on file   Transportation Needs:     Lack of Transportation (Medical): Not on file    Lack of Transportation (Non-Medical):  Not on file   Physical Activity:     Days of Exercise per Week: Not on file    Minutes of Exercise per Session: Not on file   Stress:     Feeling of Stress : Not on file   Social Connections:     Frequency of Communication with Friends and Family: Not on file    Frequency of Social Gatherings with Friends and Family: Not on file    Attends Gnosticist Services: Not on file    Active Member of [address] [place] [address] or Organizations: Not on file    Attends Club or Organization Meetings: Not on file    Marital Status: Not on file   Intimate Partner Violence:     Fear of Current or Ex-Partner: Not on file    Emotionally Abused: Not on file    Physically Abused: Not on file    Sexually Abused: Not on file   Housing Stability:     Unable to Pay for Housing in the Last Year: Not on file    Number of [place] in the Last Year: Not on file    Unstable Housing in the Last Year: Not on file      Current Outpatient Medications Medication Sig    phenazopyridine (PYRIDIUM) 100 mg tablet Take 100 mg by mouth three (3) times daily.  levothyroxine (SYNTHROID) 50 mcg tablet Take 1 Tablet by mouth Daily (before breakfast).  Eliquis 2.5 mg tablet TAKE ONE TABLET BY MOUTH 2 TIMES A DAY    omeprazole (PRILOSEC) 20 mg capsule TAKE 1 CAPSULE DAILY.  cyclobenzaprine (FLEXERIL) 10 mg tablet TAKE ONE TABLET BY MOUTH 2 TIMES A DAY AS NEEDED FOR MUSCLE SPASMS    lovastatin (MEVACOR) 40 mg tablet TAKE 1 TABLET EVERY EVENING TO LOWER CHOLESTEROL    furosemide (LASIX) 40 mg tablet TAKE ONE TABLET BY MOUTH EVERY DAY    lisinopriL (PRINIVIL, ZESTRIL) 2.5 mg tablet TAKE ONE TABLET BY MOUTH EVERY DAY    meclizine (ANTIVERT) 12.5 mg tablet Take 12.5 mg by mouth three (3) times daily as needed for Dizziness.  OTHER PREVGEN 1 TAB DAILY AS NEEDED    timolol (TIMOPTIC) 0.5 % ophthalmic solution Administer 1 Drop to both eyes two (2) times a day.  acetaminophen/diphenhydramine (TYLENOL PM PO) Take 2 Tabs by mouth as needed.  cranberry fruit extract (CRANBERRY CONCENTRATE PO) Take 2 Tabs by mouth daily.  ACCU-CHEK MULTICLIX LANCET misc AS DIRECTED    latanoprost (XALATAN) 0.005 % ophthalmic solution Administer 1 Drop to both eyes nightly.  DOCUSATE CALCIUM (STOOL SOFTENER PO) Take  by mouth daily.  ascorbic acid (VITAMIN C) 500 mg tablet Take  by mouth daily.  cholecalciferol, vitamin d3, (VITAMIN D) 1,000 unit tablet Take  by mouth daily.  MULTIVITAMINS (MULTIVITAMIN PO) Take  by mouth daily. No current facility-administered medications for this visit. Review of Symptoms:  11 systems reviewed, negative other than as stated in the HPI    Physical ExamPhysical Exam:    Vitals:    04/04/22 1417 04/04/22 1425   BP: (!) 140/74 139/70   Pulse: 70    Resp: 18    SpO2: 96%    Weight: 144 lb 4 oz (65.4 kg)    Height: 5' 5\" (1.651 m)      Body mass index is 24 kg/m². General PE  Gen:  NAD  Mental Status - Alert.  General Appearance - Not in acute distress. HEENT:  PERRL, no carotid bruits or JVD  Chest and Lung Exam   Inspection: Accessory muscles - No use of accessory muscles in breathing. Auscultation:   Breath sounds: - Normal.   Cardiovascular   Inspection: Jugular vein - Bilateral - Inspection Normal.   Palpation/Percussion:   Apical Impulse: - Normal.   Auscultation: Rhythm - IRRegular. Heart Sounds - S1 WNL and S2 WNL. No S3 or S4. Murmurs & Other Heart Sounds: Auscultation of the heart reveals - No Murmurs. Peripheral Vascular   Upper Extremity: Inspection - Bilateral - No Cyanotic nailbeds or Digital clubbing. Lower Extremity:   Palpation: Edema - Bilateral - No edema. Abdomen:   Soft, non-tender, bowel sounds are active.   Neuro: A&O times 3, CN and motor grossly WNL    Labs:   Lab Results   Component Value Date/Time    Cholesterol, total 188 02/15/2022 11:16 AM    Cholesterol, total 192 09/23/2021 11:58 AM    Cholesterol, total 212 (H) 08/13/2020 02:50 PM    Cholesterol, total 197 07/30/2019 10:48 AM    Cholesterol, total 182 04/25/2019 09:27 AM    HDL Cholesterol 81 02/15/2022 11:16 AM    HDL Cholesterol 87 09/23/2021 11:58 AM    HDL Cholesterol 93 08/13/2020 02:50 PM    HDL Cholesterol 69 07/30/2019 10:48 AM    HDL Cholesterol 75 04/25/2019 09:27 AM    LDL, calculated 81.4 02/15/2022 11:16 AM    LDL, calculated 75.8 09/23/2021 11:58 AM    LDL, calculated 65 08/13/2020 02:50 PM    LDL, calculated 97 07/30/2019 10:48 AM    LDL, calculated 83 04/25/2019 09:27 AM    Triglyceride 128 02/15/2022 11:16 AM    Triglyceride 146 09/23/2021 11:58 AM    Triglyceride 270 (H) 08/13/2020 02:50 PM    Triglyceride 154 (H) 07/30/2019 10:48 AM    Triglyceride 118 04/25/2019 09:27 AM    CHOL/HDL Ratio 2.3 02/15/2022 11:16 AM    CHOL/HDL Ratio 2.2 09/23/2021 11:58 AM     No results found for: CPK, CPKX, CPX  Lab Results   Component Value Date/Time    Sodium 139 02/15/2022 11:16 AM    Potassium 4.1 02/15/2022 11:16 AM    Chloride 106 02/15/2022 11:16 AM    CO2 30 02/15/2022 11:16 AM    Anion gap 3 (L) 02/15/2022 11:16 AM    Glucose 135 (H) 02/15/2022 11:16 AM    BUN 28 (H) 02/15/2022 11:16 AM    Creatinine 1.27 (H) 02/15/2022 11:16 AM    BUN/Creatinine ratio 22 (H) 02/15/2022 11:16 AM    GFR est AA 48 (L) 02/15/2022 11:16 AM    GFR est non-AA 40 (L) 02/15/2022 11:16 AM    Calcium 10.0 02/15/2022 11:16 AM    Bilirubin, total 0.7 02/15/2022 11:16 AM    Alk. phosphatase 113 02/15/2022 11:16 AM    Protein, total 7.4 02/15/2022 11:16 AM    Albumin 4.3 02/15/2022 11:16 AM    Globulin 3.1 02/15/2022 11:16 AM    A-G Ratio 1.4 02/15/2022 11:16 AM    ALT (SGPT) 21 02/15/2022 11:16 AM       EKG:  Atrial fibrillation, ventricular pacing       Assessment:     Assessment:        ICD-10-CM ICD-9-CM    1. Permanent atrial fibrillation (HCC)  I48.21 427.31 AMB POC EKG ROUTINE W/ 12 LEADS, INTER & REP   2. Cardiac pacemaker in situ  Z95.0 V45.01    3. CHB (complete heart block) (HCC)  I44.2 426.0    4. Hypertension, unspecified type  I10 401.9    5. Mixed hyperlipidemia  E78.2 272.2        Orders Placed This Encounter    AMB POC EKG ROUTINE W/ 12 LEADS, INTER & REP     Order Specific Question:   Reason for Exam:     Answer:   Routine    phenazopyridine (PYRIDIUM) 100 mg tablet     Sig: Take 100 mg by mouth three (3) times daily. Plan:     Permanent atrial fibrillation (HCC)  S/p AV monica ablation with pacemaker  Cont with Eliquis 2.5mg bid. She is doing well with low-dose Eliquis, and hesitant at this time to increase dose at age 80, with mild renal dysfunction, and weight getting close to 60 kg. Serum Cr 1.27; Hgb 12.5 both labs on 2/2022    Mitral valve regurgitation   Echo 8/2021 with preserved LVEF, mild-mod MR/TR, improved  Per echo 8/2020  EF 55-60% mod MR   per echo 7/19 EF 55-60% with mod to severe MR. Continue afterload reduction with ace-I    Pulm HTN  Echo 8/2021:  Pulmonary arterial systolic pressure (PASP) is 51 mmHg.  Pulmonary hypertension found to be moderate. Echo 8/2020: PA: Moderate pulmonary hypertension. Pulmonary arterial systolic pressure is 54 mmHg. Followed by Dr Anuj Méndez        Essential hypertension  Controlled with current therapy     Mixed hyperlipidemia  2/2022 LDL 81 On statin  Lipids managed by PCP    CKD III  Serum Cr 1.27 in 2/2022    Cardiac pacemaker in situ  S/p AV monica ablation with PPM placement  Continue with routine device interrogation with Dr. Zeinab Valdez, last OV 8/2021: She is V paced 99% sp av node abl. She is stable and compliant with oac for permanent AF. Her lvef is wnl and her MR is mild per echo this summer     Venous insufficiency:   S/p left GSV ablation 5/2021. S/p right GSV ablation in 5/2015. Followed by Dr Mica Heredia on diet and exercise- eventual goal of 30-60 minutes 5-7 times a week as per AHA guidelines.          Continue current care and f/u in 1 year, sooner as needed    Please note that the nurse practitioner helped with precharting, but did not see the patient today, and the entirety of the history, physical, and assessment and plan is mine.

## 2022-04-04 NOTE — PROGRESS NOTES
Chief Complaint   Patient presents with    Irregular Heart Beat     Annual follow up.  Shortness of Breath     Patient states present when moving around. 1. Have you been to the ER, urgent care clinic since your last visit? Hospitalized since your last visit? No    2. Have you seen or consulted any other health care providers outside of the [address] [place] [address] since your last visit?   No

## 2022-04-05 ENCOUNTER — OFFICE VISIT (OUTPATIENT)
Dept: FAMILY MEDICINE CLINIC | Age: 87
End: 2022-04-05
Payer: MEDICARE

## 2022-04-05 VITALS
DIASTOLIC BLOOD PRESSURE: 86 MMHG | HEART RATE: 70 BPM | SYSTOLIC BLOOD PRESSURE: 138 MMHG | OXYGEN SATURATION: 96 % | RESPIRATION RATE: 20 BRPM | TEMPERATURE: 98.2 F
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
## 2022-04-05 DIAGNOSIS — S20.211A RIB CONTUSION, RIGHT, INITIAL ENCOUNTER: ICD-10-CM

## 2022-04-05 DIAGNOSIS — S80.01XA TRAUMATIC ECCHYMOSIS OF RIGHT KNEE, INITIAL ENCOUNTER: ICD-10-CM

## 2022-04-05 LAB
BILIRUB UR QL STRIP: NEGATIVE
GLUCOSE UR-MCNC: NEGATIVE MG/DL
KETONES P FAST UR STRIP-MCNC: NEGATIVE MG/DL
PH UR STRIP: 5 [PH] (ref 4.6–8)
PROT UR QL STRIP: NEGATIVE
SP GR UR STRIP: 1.01 (ref 1–1.03)
UA UROBILINOGEN AMB POC: NORMAL (ref 0.2–1)
URINALYSIS CLARITY POC: CLEAR
URINALYSIS COLOR POC: YELLOW
URINE BLOOD POC: NORMAL
URINE LEUKOCYTES POC: NORMAL
URINE NITRITES POC: POSITIVE

## 2022-04-05 PROCEDURE — G8427 DOCREV CUR MEDS BY ELIG CLIN: HCPCS | Performed by: FAMILY MEDICINE

## 2022-04-05 PROCEDURE — G8510 SCR DEP NEG, NO PLAN REQD: HCPCS | Performed by: FAMILY MEDICINE

## 2022-04-05 PROCEDURE — 81003 URINALYSIS AUTO W/O SCOPE: CPT | Performed by: FAMILY MEDICINE

## 2022-04-05 PROCEDURE — G8536 NO DOC ELDER MAL SCRN: HCPCS | Performed by: FAMILY MEDICINE

## 2022-04-05 PROCEDURE — G8420 CALC BMI NORM PARAMETERS: HCPCS | Performed by: FAMILY MEDICINE

## 2022-04-05 PROCEDURE — 1101F PT FALLS ASSESS-DOCD LE1/YR: CPT | Performed by: FAMILY MEDICINE

## 2022-04-05 PROCEDURE — 1090F PRES/ABSN URINE INCON ASSESS: CPT | Performed by: FAMILY MEDICINE

## 2022-04-05 PROCEDURE — 99214 OFFICE O/P EST MOD 30 MIN: CPT | Performed by: FAMILY MEDICINE

## 2022-04-05 NOTE — PROGRESS NOTES
Chief Complaint   Patient presents with    Fall     pt fell in [facility] outside of [facility]      Health Maintenance reviewed     1. Have you been to the ER, urgent care clinic since your last visit? Hospitalized since your last visit? No     2. Have you seen or consulted any other health care providers outside of the [address] [place] [address] since your last visit? Include any pap smears or colon screening.   No

## 2022-04-06 ENCOUNTER — TELEPHONE (OUTPATIENT)
Dept: FAMILY MEDICINE CLINIC | Age: 87
End: 2022-04-06

## 2022-04-06 NOTE — TELEPHONE ENCOUNTER
Pt is returning call   Both of these were prescribed by Dr. Sara Calderón 500 MG  PHENAZOPYRIDINE 100 MG     Right breast is really sore and if she has to get an X-RAY done then she will get one done    Please call pt back    Thank you

## 2022-04-08 NOTE — TELEPHONE ENCOUNTER
Called pt to follow up, fall in parking lot this week, still sore, but she reports feeling better each day. Recently completed tetracycline, is continuing on other medication per urology. Reviewed urine culture results, will hold on treatment due to lack of symptoms and recently completed treatment.      Potential contaminant due to diarrhea

## 2022-04-09 NOTE — PROGRESS NOTES
Chief Complaint   Patient presents with   Odean Burrs     pt fell in [facility] outside of building      Patient was coming to clinic today with her , and she was getting out of her car and walking around the front she tripped over the concrete barrier at the front of the parking space. Patient fell forward, hitting her knees and her right ribs. Patient denies head injury.  came to get office staff, patient was alert and talking upon arrival.    With nurse assistance is able to get patient up into a sitting position and then transferred to a wheelchair. Subjective: (As above and below)     Chief Complaint   Patient presents with    Fall     pt fell in [facility] outside of building      she is a 80y.o. year old female who presents for evaluation. Reviewed PmHx, RxHx, FmHx, SocHx, AllgHx and updated in chart. Review of Systems - negative except as listed above    Objective:     Vitals:    04/05/22 1608   BP: 138/86   Pulse: 70   Resp: 20   Temp: 98.2 °F (36.8 °C)   TempSrc: Temporal   SpO2: 96%     Physical Examination: General appearance - alert, well appearing, and in no distress  Mental status - normal mood, behavior, speech, dress, motor activity, and thought processes  Ears - bilateral TM's and external ear canals normal  Chest - clear to auscultation, no wheezes, rales or rhonchi, symmetric air entry, chest wall tenderness noted over right lateral ribs  Heart - normal rate, regular rhythm, normal S1, S2, no murmurs, rubs, clicks or gallops  Musculoskeletal -small abrasion to the lateral right fifth finger, early ecchymosis developing over both knees, skin intact  -No other bruising noted, full range of motion of extremities, patient able to ambulate slowly out of office  Extremities - peripheral pulses normal, no pedal edema, no clubbing or cyanosis    Assessment/ Plan:   1.  Fall, initial encounter  -Mechanism of fall appears accidental, checked urine due to recent infection  - AMB POC URINALYSIS DIP STICK AUTO W/O MICRO    2. Traumatic ecchymosis of right knee, initial encounter  Skin intact, advised patient to ice, elevate and rest    3. Rib contusion, right, initial encounter  Lung sounds normal, advised patient to rest, follow-up with pain is not improving gradually over the next 2 to 3 days. Patient agreed       I have discussed the diagnosis with the patient and the intended plan as seen in the above orders. The patient has received an after-visit summary and questions were answered concerning future plans.      Medication Side Effects and Warnings were discussed with patient: yes  Patient Labs were reviewed: yes  Patient Past Records were reviewed:  yes    Joana Crigler, M.D.

## 2022-04-19 ENCOUNTER — TELEPHONE (OUTPATIENT)
Dept: FAMILY MEDICINE CLINIC | Age: 87
End: 2022-04-19

## 2022-04-19 RX ORDER — TETRACYCLINE HYDROCHLORIDE 500 MG/1
500 CAPSULE ORAL
Qty: 21 CAPSULE | Refills: 0 | Status: SHIPPED | OUTPATIENT
Start: 2022-04-19 | End: 2022-05-09 | Stop reason: SDUPTHER

## 2022-04-19 NOTE — TELEPHONE ENCOUNTER
Pt is calling wanting something called in for her for a uti. States if she has to come in to do a urine drop off she will but she doesn't feel like it.

## 2022-04-29 ENCOUNTER — NURSE TRIAGE (OUTPATIENT)
Dept: OTHER | Facility: CLINIC | Age: 87
End: 2022-04-29

## 2022-04-29 NOTE — TELEPHONE ENCOUNTER
Received call from [facility] [place] at [facility] with Red Flag Complaint. Subjective: Caller states \"sob when in a fib\"     Current Symptoms: sob intermittent at times for years occas dizziness no nausea no vomiting, no cp hx afib, htn and diabetes states has a pacemaker mostly with activity    Onset:  years    Associated Symptoms: NA    Pain Severity:  none    Temperature: none     What has been tried:     LMP: NA Pregnant: NA    Recommended disposition: See PCP within 3 Days    Care advice provided, patient verbalizes understanding; denies any other questions or concerns; instructed to call back for any new or worsening symptoms. Patient/Caller agrees with recommended disposition; writer provided warm transfer to [facility]  at [facility] for appointment scheduling    Attention Provider: Thank you for allowing me to participate in the care of your patient. The patient was connected to triage in response to information provided to the [facility].   Please do not respond through this encounter as the response is not directed to a     Reason for Disposition   MODERATE longstanding difficulty breathing (e.g., speaks in phrases, SOB even at rest, pulse 100-120) and SAME as normal    Protocols used: BREATHING DIFFICULTY-ADULT-[place]

## 2022-05-09 ENCOUNTER — OFFICE VISIT (OUTPATIENT)
Dept: FAMILY MEDICINE CLINIC | Age: 87
End: 2022-05-09
Payer: MEDICARE

## 2022-05-09 VITALS
WEIGHT: 143 LBS | HEIGHT: 65 IN | SYSTOLIC BLOOD PRESSURE: 137 MMHG | OXYGEN SATURATION: 98 % | RESPIRATION RATE: 19 BRPM | TEMPERATURE: 97.7 F | HEART RATE: 54 BPM | DIASTOLIC BLOOD PRESSURE: 84 MMHG | BODY MASS INDEX: 23.82 KG/M2

## 2022-05-09 DIAGNOSIS — N39.0 CHRONIC UTI: ICD-10-CM

## 2022-05-09 DIAGNOSIS — E11.22 TYPE 2 DIABETES MELLITUS WITH CHRONIC KIDNEY DISEASE, WITHOUT LONG-TERM CURRENT USE OF INSULIN, UNSPECIFIED CKD STAGE (HCC): ICD-10-CM

## 2022-05-09 DIAGNOSIS — E55.9 VITAMIN D DEFICIENCY: ICD-10-CM

## 2022-05-09 DIAGNOSIS — N18.30 STAGE 3 CHRONIC KIDNEY DISEASE, UNSPECIFIED WHETHER STAGE 3A OR 3B CKD (HCC): ICD-10-CM

## 2022-05-09 DIAGNOSIS — E78.2 MIXED HYPERLIPIDEMIA: ICD-10-CM

## 2022-05-09 DIAGNOSIS — I10 PRIMARY HYPERTENSION: ICD-10-CM

## 2022-05-09 DIAGNOSIS — E03.9 HYPOTHYROIDISM, UNSPECIFIED TYPE: Primary | ICD-10-CM

## 2022-05-09 DIAGNOSIS — E11.9 TYPE 2 DIABETES MELLITUS WITHOUT COMPLICATION, WITHOUT LONG-TERM CURRENT USE OF INSULIN (HCC): ICD-10-CM

## 2022-05-09 LAB
ALBUMIN SERPL-MCNC: 4.2 G/DL (ref 3.5–5)
ALBUMIN/GLOB SERPL: 1.2 {RATIO} (ref 1.1–2.2)
ALP SERPL-CCNC: 106 U/L (ref 45–117)
ALT SERPL-CCNC: 23 U/L (ref 12–78)
ANION GAP SERPL CALC-SCNC: 5 MMOL/L (ref 5–15)
AST SERPL-CCNC: 35 U/L (ref 15–37)
BILIRUB SERPL-MCNC: 0.7 MG/DL (ref 0.2–1)
BILIRUB UR QL STRIP: NEGATIVE
BUN SERPL-MCNC: 22 MG/DL (ref 6–20)
BUN/CREAT SERPL: 18 (ref 12–20)
CALCIUM SERPL-MCNC: 9.7 MG/DL (ref 8.5–10.1)
CHLORIDE SERPL-SCNC: 104 MMOL/L (ref 97–108)
CHOLEST SERPL-MCNC: 199 MG/DL
CO2 SERPL-SCNC: 30 MMOL/L (ref 21–32)
CREAT SERPL-MCNC: 1.23 MG/DL (ref 0.55–1.02)
ERYTHROCYTE [DISTWIDTH] IN BLOOD BY AUTOMATED COUNT: 15.3 % (ref 11.5–14.5)
EST. AVERAGE GLUCOSE BLD GHB EST-MCNC: 126 MG/DL
GLOBULIN SER CALC-MCNC: 3.4 G/DL (ref 2–4)
GLUCOSE SERPL-MCNC: 117 MG/DL (ref 65–100)
GLUCOSE UR-MCNC: NEGATIVE MG/DL
HBA1C MFR BLD: 6 % (ref 4–5.6)
HCT VFR BLD AUTO: 41.5 % (ref 35–47)
HDLC SERPL-MCNC: 85 MG/DL
HDLC SERPL: 2.3 {RATIO} (ref 0–5)
HGB BLD-MCNC: 12.8 G/DL (ref 11.5–16)
KETONES P FAST UR STRIP-MCNC: NEGATIVE MG/DL
LDLC SERPL CALC-MCNC: 77.2 MG/DL (ref 0–100)
MCH RBC QN AUTO: 31.9 PG (ref 26–34)
MCHC RBC AUTO-ENTMCNC: 30.8 G/DL (ref 30–36.5)
MCV RBC AUTO: 103.5 FL (ref 80–99)
NRBC # BLD: 0 K/UL (ref 0–0.01)
NRBC BLD-RTO: 0 PER 100 WBC
PH UR STRIP: 7.5 [PH] (ref 4.6–8)
PLATELET # BLD AUTO: 171 K/UL (ref 150–400)
PMV BLD AUTO: 11 FL (ref 8.9–12.9)
POTASSIUM SERPL-SCNC: 4.2 MMOL/L (ref 3.5–5.1)
PROT SERPL-MCNC: 7.6 G/DL (ref 6.4–8.2)
PROT UR QL STRIP: NORMAL
RBC # BLD AUTO: 4.01 M/UL (ref 3.8–5.2)
SODIUM SERPL-SCNC: 139 MMOL/L (ref 136–145)
SP GR UR STRIP: 1.01 (ref 1–1.03)
TRIGL SERPL-MCNC: 184 MG/DL (ref ?–150)
TSH SERPL DL<=0.05 MIU/L-ACNC: 3.97 UIU/ML (ref 0.36–3.74)
UA UROBILINOGEN AMB POC: NORMAL (ref 0.2–1)
URINALYSIS CLARITY POC: CLEAR
URINALYSIS COLOR POC: YELLOW
URINE BLOOD POC: NORMAL
URINE LEUKOCYTES POC: NORMAL
URINE NITRITES POC: POSITIVE
VLDLC SERPL CALC-MCNC: 36.8 MG/DL
WBC # BLD AUTO: 6.5 K/UL (ref 3.6–11)

## 2022-05-09 PROCEDURE — 99214 OFFICE O/P EST MOD 30 MIN: CPT | Performed by: FAMILY MEDICINE

## 2022-05-09 PROCEDURE — G8536 NO DOC ELDER MAL SCRN: HCPCS | Performed by: FAMILY MEDICINE

## 2022-05-09 PROCEDURE — G8427 DOCREV CUR MEDS BY ELIG CLIN: HCPCS | Performed by: FAMILY MEDICINE

## 2022-05-09 PROCEDURE — G8420 CALC BMI NORM PARAMETERS: HCPCS | Performed by: FAMILY MEDICINE

## 2022-05-09 PROCEDURE — 1090F PRES/ABSN URINE INCON ASSESS: CPT | Performed by: FAMILY MEDICINE

## 2022-05-09 PROCEDURE — 81003 URINALYSIS AUTO W/O SCOPE: CPT | Performed by: FAMILY MEDICINE

## 2022-05-09 PROCEDURE — 3044F HG A1C LEVEL LT 7.0%: CPT | Performed by: FAMILY MEDICINE

## 2022-05-09 PROCEDURE — G8510 SCR DEP NEG, NO PLAN REQD: HCPCS | Performed by: FAMILY MEDICINE

## 2022-05-09 PROCEDURE — 1101F PT FALLS ASSESS-DOCD LE1/YR: CPT | Performed by: FAMILY MEDICINE

## 2022-05-09 RX ORDER — TETRACYCLINE HYDROCHLORIDE 500 MG/1
500 CAPSULE ORAL
Qty: 21 CAPSULE | Refills: 0 | Status: SHIPPED | OUTPATIENT
Start: 2022-05-09 | End: 2022-05-16

## 2022-05-09 NOTE — PROGRESS NOTES
Chief Complaint   Patient presents with    Hypertension    Fall     pt feels unsteady, had fall last month     Pt is here for routine exam.     Pt has been having recurrent UTIs. Pt is in a-fib all of the time. Saw her cardiologist last week. She reports that she can feel her afib. Some balance issues. Subjective: (As above and below)     Chief Complaint   Patient presents with    Hypertension    Fall     pt feels unsteady, had fall last month     she is a 80y.o. year old female who presents for evaluation. Reviewed PmHx, RxHx, FmHx, SocHx, AllgHx and updated in chart. Review of Systems - negative except as listed above    Objective:     Vitals:    05/09/22 1030   BP: 137/84   Pulse: (!) 54   Resp: 19   Temp: 97.7 °F (36.5 °C)   TempSrc: Oral   SpO2: 98%   Weight: 143 lb (64.9 kg)   Height: 5' 5\" (1.651 m)     Physical Examination: General appearance - alert, well appearing, and in no distress  Mental status - normal mood, behavior, speech, dress, motor activity, and thought processes  Ears - bilateral TM's and external ear canals normal  Chest - clear to auscultation, no wheezes, rales or rhonchi, symmetric air entry  Heart - irregularly irregular rhythm  Musculoskeletal - no joint tenderness, deformity or swelling  Extremities - peripheral pulses normal, no pedal edema, no clubbing or cyanosis    Assessment/ Plan:   1. Hypothyroidism, unspecified type  -check labs  - TSH 3RD GENERATION; Future    2. Primary hypertension  -well controlled  - CBC W/O DIFF; Future    3. Mixed hyperlipidemia  - METABOLIC PANEL, COMPREHENSIVE; Future  - LIPID PANEL; Future    4. Chronic UTI  - AMB POC URINALYSIS DIP STICK AUTO W/O MICRO  - CULTURE, URINE; Future    5. Type 2 diabetes mellitus without complication, without long-term current use of insulin (HCC)  - HEMOGLOBIN A1C WITH EAG; Future    6.  Type 2 diabetes mellitus with chronic kidney disease, without long-term current use of insulin, unspecified CKD stage ([facility]  -well controlled, check labs    7. Stage 3 chronic kidney disease, unspecified whether stage 3a or 3b CKD ([facility]  -check labs       I have discussed the diagnosis with the patient and the intended plan as seen in the above orders. The patient has received an after-visit summary and questions were answered concerning future plans.      Medication Side Effects and Warnings were discussed with patient: yes  Patient Labs were reviewed: yes  Patient Past Records were reviewed:  yes    De Mckee M.D.

## 2022-05-09 NOTE — PROGRESS NOTES
Identified pt with two pt identifiers(name and ). Reviewed record in preparation for visit and have obtained necessary documentation. Chief Complaint   Patient presents with    Hypertension    Fall     pt feels unsteady, had fall last month        Vitals:    22 1030   BP: 137/84   Pulse: (!) 54   Resp: 19   Temp: 97.7 °F (36.5 °C)   TempSrc: Oral   SpO2: 98%   Weight: 143 lb (64.9 kg)   Height: 5' 5\" (1.651 m)   PainSc:   0 - No pain       Health Maintenance Due   Topic    Foot Exam Q1     Eye Exam Retinal or Dilated     COVID-19 Vaccine (3 - Booster for Pfizer series)       1. \"Have you been to the ER, urgent care clinic since your last visit? Hospitalized since your last visit? \" No    2. \"Have you seen or consulted any other health care providers outside of the [address] [place] [address] since your last visit? \" No     3. For patients over 45: Has the patient had a colonoscopy? NA - based on age     If the patient is female:    4. For patients over 36: Has the patient had a mammogram? NA - based on age    11. For patients over 21: Has the patient had a pap smear?  NA - based on age    Current Outpatient Medications   Medication Instructions    ACCU-CHEK MULTICLIX LANCET misc AS DIRECTED    acetaminophen/diphenhydramine (TYLENOL PM PO) 2 Tablets, Oral, AS NEEDED    ascorbic acid (VITAMIN C) 500 mg tablet Oral, DAILY    cholecalciferol, vitamin d3, (VITAMIN D) 1,000 unit tablet DAILY    cranberry fruit extract (CRANBERRY CONCENTRATE PO) 2 Tablets, Oral, DAILY    cyclobenzaprine (FLEXERIL) 10 mg tablet TAKE ONE TABLET BY MOUTH 2 TIMES A DAY AS NEEDED FOR MUSCLE SPASMS    DOCUSATE CALCIUM (STOOL SOFTENER PO) Oral, DAILY    Eliquis 2.5 mg tablet TAKE ONE TABLET BY MOUTH 2 TIMES A DAY    furosemide (LASIX) 40 mg tablet TAKE ONE TABLET BY MOUTH EVERY DAY    latanoprost (XALATAN) 0.005 % ophthalmic solution 1 Drop, EVERY BEDTIME    levothyroxine (SYNTHROID) 50 mcg, Oral, DAILY BEFORE BREAKFAST    lisinopriL (PRINIVIL, ZESTRIL) 2.5 mg, Oral, DAILY    lovastatin (MEVACOR) 40 mg tablet TAKE 1 TABLET EVERY EVENING TO LOWER CHOLESTEROL    meclizine (ANTIVERT) 12.5 mg, Oral, 3 TIMES DAILY AS NEEDED    MULTIVITAMINS (MULTIVITAMIN PO) Oral, DAILY    omeprazole (PRILOSEC) 20 mg capsule TAKE 1 CAPSULE DAILY.     OTHER PREVGEN 1 TAB DAILY AS NEEDED     phenazopyridine (PYRIDIUM) 100 mg, Oral, 3 TIMES DAILY    timolol (TIMOPTIC) 0.5 % ophthalmic solution 1 Drop, Both Eyes, 2 TIMES DAILY       Allergies   Allergen Reactions    Aspirin Other (comments)     Swelling shut of eyelids    Gabapentin Other (comments)     Vision change    Macrobid [Nitrofurantoin Monohyd/M-Cryst] Unknown (comments)    Pcn [Penicillins] Hives       Immunization History   Administered Date(s) Administered    COVID-19, Pfizer Purple top, DILUTE for use, 12+ yrs, 30mcg/0.3mL dose 02/06/2021, 02/27/2021    Influenza High Dose Vaccine PF 10/03/2013, 09/29/2014, 10/14/2015, 11/01/2016, 10/10/2017    Influenza Vaccine 11/02/2000    Influenza Vaccine (Tri) Adjuvanted (>65 Yrs FLUAD TRI [id number]) 10/02/2018, 09/19/2019    Influenza Vaccine Split 10/13/2010, 10/11/2011, 10/22/2012    Influenza, High-dose, Quadrivalent (>65 Yrs Fluzone High Dose Quad [id number]) 09/23/2021    Influenza, Quadrivalent, Adjuvanted (>65 Yrs FLUAD QUAD [id number]) 10/07/2020    Pneumococcal Conjugate (PCV-13) 10/14/2015    Pneumococcal Vaccine (Unspecified Type) 01/01/2005    Zoster 12/15/2011    Zoster Recombinant 03/12/2019, 06/24/2019       Past Medical History:   Diagnosis Date    Arthritis     hands/low back    Atrial fibrillation (HCC)     Congestive heart failure (HCC)     Diabetes (HCC)     borderline    GERD (gastroesophageal reflux disease)     Glaucoma     Hypercholesterolemia     Hypertension     Ill-defined condition     borderline anemia    Ill-defined condition     bladder infections    Impaired glucose tolerance     Long term current use of anticoagulant therapy     Menopause     Pacemaker     Thyroid disease     hypothyroidism    Valvular heart disease

## 2022-05-13 LAB
BACTERIA SPEC CULT: ABNORMAL
CC UR VC: ABNORMAL
SERVICE CMNT-IMP: ABNORMAL

## 2022-05-16 RX ORDER — CIPROFLOXACIN 500 MG/1
500 TABLET ORAL 2 TIMES DAILY
Qty: 20 TABLET | Refills: 0 | Status: SHIPPED | OUTPATIENT
Start: 2022-05-16 | End: 2022-05-26

## 2022-05-17 ENCOUNTER — TELEPHONE (OUTPATIENT)
Dept: FAMILY MEDICINE CLINIC | Age: 87
End: 2022-05-17

## 2022-05-17 NOTE — TELEPHONE ENCOUNTER
Pt states she would like to speak directly to Dr. Regan Tee in regards to antibiotic change. Pt states please try to call her at the end of the day.

## 2022-05-17 NOTE — TELEPHONE ENCOUNTER
----- Message from Kareen Karen sent at 5/17/2022  9:13 AM EDT -----  Subject: Message to Provider    QUESTIONS  Information for Provider? Patient is wanting to discuss her medication   change. Tetracycline 500 MG is what she is currently taking and it is   going to be changed to Cipro. Patient is wanting to discuss this   medication change prior to taking the new medication.   ---------------------------------------------------------------------------  --------------  CALL BACK INFO  What is the best way for the office to contact you? OK to leave message on   voicemail, OK to respond with electronic message via [employer] portal (only   for patients who have registered [employer] account)  Preferred Call Back Phone Number? [phone]  ---------------------------------------------------------------------------  --------------  SCRIPT ANSWERS  Relationship to Patient?  Self

## 2022-05-19 NOTE — TELEPHONE ENCOUNTER
Pt was upset since she reports that it took too long to receive the initial antibiotic. I advised pt that I am not sure why there was a delay since it was prescribed the same day she was seen. The medication was changed based on the results of the urine culture.      Confirmed with pt that she is currently taking Cipro

## 2022-06-02 ENCOUNTER — CLINICAL SUPPORT (OUTPATIENT)
Dept: FAMILY MEDICINE CLINIC | Age: 87
End: 2022-06-02
Payer: MEDICARE

## 2022-06-02 DIAGNOSIS — N39.0 CHRONIC UTI: Primary | ICD-10-CM

## 2022-06-02 LAB
BILIRUB UR QL STRIP: NEGATIVE
GLUCOSE UR-MCNC: NEGATIVE MG/DL
KETONES P FAST UR STRIP-MCNC: NEGATIVE MG/DL
PH UR STRIP: 7 [PH] (ref 4.6–8)
PROT UR QL STRIP: NEGATIVE
SP GR UR STRIP: 1.01 (ref 1–1.03)
UA UROBILINOGEN AMB POC: NORMAL (ref 0.2–1)
URINALYSIS CLARITY POC: NORMAL
URINALYSIS COLOR POC: YELLOW
URINE BLOOD POC: NEGATIVE
URINE LEUKOCYTES POC: NORMAL
URINE NITRITES POC: NEGATIVE

## 2022-06-02 PROCEDURE — 81003 URINALYSIS AUTO W/O SCOPE: CPT | Performed by: FAMILY MEDICINE

## 2022-06-04 LAB
BACTERIA SPEC CULT: NORMAL
CC UR VC: NORMAL
SERVICE CMNT-IMP: NORMAL

## 2022-06-08 ENCOUNTER — TELEPHONE (OUTPATIENT)
Dept: FAMILY MEDICINE CLINIC | Age: 87
End: 2022-06-08

## 2022-07-23 RX ORDER — LEVOTHYROXINE SODIUM 88 UG/1
TABLET ORAL
Qty: 90 TABLET | Refills: 0 | Status: SHIPPED | OUTPATIENT
Start: 2022-07-23 | End: 2022-09-26 | Stop reason: SDUPTHER

## 2022-09-06 RX ORDER — APIXABAN 2.5 MG/1
TABLET, FILM COATED ORAL
Qty: 180 TABLET | Refills: 3 | Status: SHIPPED | OUTPATIENT
Start: 2022-09-06 | End: 2022-09-26 | Stop reason: SDUPTHER

## 2022-09-06 NOTE — TELEPHONE ENCOUNTER
Refill Request Received for the Following Medication     Requested Prescriptions     Pending Prescriptions Disp Refills    Eliquis 2.5 mg tablet [Pharmacy Med Name: Rossy Nicholson 2.5 MG TABLET] 180 Tablet 0     Sig: TAKE ONE TABLET BY MOUTH 2 TIMES A DAY       Last Prescribed:01-    Last Appointment With Me:  4/4/2022     Future Appointments:  Future Appointments   Date Time Provider Jaylen Webb   9/26/2022 10:10 AM Mode Noble MD [facility] BS AMB   4/10/2023  1:00 PM MD MATHEUS Duval BS AMB

## 2022-09-16 ENCOUNTER — TELEPHONE (OUTPATIENT)
Dept: FAMILY MEDICINE CLINIC | Age: 87
End: 2022-09-16

## 2022-09-16 NOTE — TELEPHONE ENCOUNTER
Called patient back in regards to message below.  verified. Informed patient we do not give COVID vaccination/boosters at our office and that she could get them from a pharmacy. Pt stated she has an appointment , informed patient we do have the flu vaccinations here if she would like to get that done at her next appointment. Patient verified understanding and had no further questions.      ----- Message from Lopez aCstellanos sent at 2022 11:25 AM EDT -----  Subject: Message to Provider    QUESTIONS  Information for Provider? Pt. would like a call back from Ángela (maybe   has her name wrong) from the office to ask if she and her  can get   the latest booster during their appt. and pt. also wants to know if the   insurance will cover the booster. ---------------------------------------------------------------------------  --------------  Karey Osborne [facility]  [phone]; OK to leave message on voicemail  ---------------------------------------------------------------------------  --------------  SCRIPT ANSWERS  Relationship to Patient?  Self

## 2022-09-26 ENCOUNTER — OFFICE VISIT (OUTPATIENT)
Dept: FAMILY MEDICINE CLINIC | Age: 87
End: 2022-09-26
Payer: MEDICARE

## 2022-09-26 VITALS
SYSTOLIC BLOOD PRESSURE: 96 MMHG | RESPIRATION RATE: 16 BRPM | DIASTOLIC BLOOD PRESSURE: 57 MMHG | OXYGEN SATURATION: 95 % | HEART RATE: 71 BPM | BODY MASS INDEX: 24.26 KG/M2 | HEIGHT: 65 IN | WEIGHT: 145.6 LBS

## 2022-09-26 DIAGNOSIS — Z00.00 MEDICARE ANNUAL WELLNESS VISIT, SUBSEQUENT: ICD-10-CM

## 2022-09-26 DIAGNOSIS — N18.30 TYPE 2 DIABETES MELLITUS WITH STAGE 3 CHRONIC KIDNEY DISEASE, WITHOUT LONG-TERM CURRENT USE OF INSULIN, UNSPECIFIED WHETHER STAGE 3A OR 3B CKD (HCC): ICD-10-CM

## 2022-09-26 DIAGNOSIS — I10 PRIMARY HYPERTENSION: ICD-10-CM

## 2022-09-26 DIAGNOSIS — E11.22 TYPE 2 DIABETES MELLITUS WITH STAGE 3 CHRONIC KIDNEY DISEASE, WITHOUT LONG-TERM CURRENT USE OF INSULIN, UNSPECIFIED WHETHER STAGE 3A OR 3B CKD (HCC): ICD-10-CM

## 2022-09-26 DIAGNOSIS — N39.0 CHRONIC UTI: Primary | ICD-10-CM

## 2022-09-26 DIAGNOSIS — Z23 NEEDS FLU SHOT: ICD-10-CM

## 2022-09-26 DIAGNOSIS — I48.21 PERMANENT ATRIAL FIBRILLATION (HCC): ICD-10-CM

## 2022-09-26 DIAGNOSIS — E03.9 HYPOTHYROIDISM, UNSPECIFIED TYPE: ICD-10-CM

## 2022-09-26 DIAGNOSIS — I34.0 NON-RHEUMATIC MITRAL REGURGITATION: ICD-10-CM

## 2022-09-26 PROBLEM — Z95.0 CARDIAC PACEMAKER IN SITU: Status: ACTIVE | Noted: 2022-06-09

## 2022-09-26 PROCEDURE — G8420 CALC BMI NORM PARAMETERS: HCPCS | Performed by: FAMILY MEDICINE

## 2022-09-26 PROCEDURE — 1123F ACP DISCUSS/DSCN MKR DOCD: CPT | Performed by: FAMILY MEDICINE

## 2022-09-26 PROCEDURE — 81003 URINALYSIS AUTO W/O SCOPE: CPT | Performed by: FAMILY MEDICINE

## 2022-09-26 PROCEDURE — G0463 HOSPITAL OUTPT CLINIC VISIT: HCPCS | Performed by: FAMILY MEDICINE

## 2022-09-26 PROCEDURE — G0008 ADMIN INFLUENZA VIRUS VAC: HCPCS | Performed by: FAMILY MEDICINE

## 2022-09-26 PROCEDURE — G8427 DOCREV CUR MEDS BY ELIG CLIN: HCPCS | Performed by: FAMILY MEDICINE

## 2022-09-26 PROCEDURE — G0439 PPPS, SUBSEQ VISIT: HCPCS | Performed by: FAMILY MEDICINE

## 2022-09-26 PROCEDURE — G8510 SCR DEP NEG, NO PLAN REQD: HCPCS | Performed by: FAMILY MEDICINE

## 2022-09-26 PROCEDURE — G8536 NO DOC ELDER MAL SCRN: HCPCS | Performed by: FAMILY MEDICINE

## 2022-09-26 PROCEDURE — 90694 VACC AIIV4 NO PRSRV 0.5ML IM: CPT | Performed by: FAMILY MEDICINE

## 2022-09-26 PROCEDURE — 1090F PRES/ABSN URINE INCON ASSESS: CPT | Performed by: FAMILY MEDICINE

## 2022-09-26 PROCEDURE — 99213 OFFICE O/P EST LOW 20 MIN: CPT | Performed by: FAMILY MEDICINE

## 2022-09-26 PROCEDURE — 1101F PT FALLS ASSESS-DOCD LE1/YR: CPT | Performed by: FAMILY MEDICINE

## 2022-09-26 PROCEDURE — 3044F HG A1C LEVEL LT 7.0%: CPT | Performed by: FAMILY MEDICINE

## 2022-09-26 RX ORDER — LOVASTATIN 40 MG/1
TABLET ORAL
Qty: 90 TABLET | Refills: 4 | Status: SHIPPED | OUTPATIENT
Start: 2022-09-26

## 2022-09-26 RX ORDER — CYCLOBENZAPRINE HCL 10 MG
TABLET ORAL
Qty: 60 TABLET | Refills: 1 | Status: SHIPPED | OUTPATIENT
Start: 2022-09-26

## 2022-09-26 RX ORDER — LISINOPRIL 2.5 MG/1
2.5 TABLET ORAL DAILY
Qty: 90 TABLET | Refills: 4 | Status: SHIPPED | OUTPATIENT
Start: 2022-09-26

## 2022-09-26 RX ORDER — FUROSEMIDE 40 MG/1
40 TABLET ORAL DAILY
Qty: 90 TABLET | Refills: 4 | Status: SHIPPED | OUTPATIENT
Start: 2022-09-26

## 2022-09-26 RX ORDER — LEVOTHYROXINE SODIUM 88 UG/1
TABLET ORAL
Qty: 90 TABLET | Refills: 4 | Status: SHIPPED | OUTPATIENT
Start: 2022-09-26 | End: 2022-10-06 | Stop reason: SDUPTHER

## 2022-09-26 RX ORDER — OMEPRAZOLE 20 MG/1
20 CAPSULE, DELAYED RELEASE ORAL DAILY
Qty: 90 CAPSULE | Refills: 4 | Status: SHIPPED | OUTPATIENT
Start: 2022-09-26

## 2022-09-26 NOTE — PROGRESS NOTES
Chief Complaint   Patient presents with    Annual Wellness Visit    Medication Refill     Pt is off of aricept due to side effects. Pt plans to get the COVID booster. Pt reports that she gets winded easily, has to stop and rest, has discussed with her cardiologist.     Subjective: (As above and below)     Chief Complaint   Patient presents with    Annual Wellness Visit    Medication Refill     she is a 80y.o. year old female who presents for evaluation. Reviewed PmHx, RxHx, FmHx, SocHx, AllgHx and updated in chart. Review of Systems - negative except as listed above    Objective:     Vitals:    09/26/22 1007   BP: (!) 96/57   Pulse: 71   Resp: 16   SpO2: 95%   Weight: 145 lb 9.6 oz (66 kg)   Height: 5' 5\" (1.651 m)     Physical Examination: General appearance - alert, well appearing, and in no distress  Mental status - normal mood, behavior, speech, dress, motor activity, and thought processes  Chest - clear to auscultation, no wheezes, rales or rhonchi, symmetric air entry  Heart - normal rate, regular rhythm, normal S1, S2, no murmurs, rubs, clicks or gallops  Musculoskeletal - no joint tenderness, deformity or swelling  Extremities - peripheral pulses normal, no pedal edema, no clubbing or cyanosis    Assessment/ Plan:   1. Chronic UTI  -check UA  - AMB POC URINALYSIS DIP STICK AUTO W/O MICRO    2. Type 2 diabetes mellitus with stage 3 chronic kidney disease, without long-term current use of insulin, unspecified whether stage 3a or 3b CKD (HCC)  - METABOLIC PANEL, COMPREHENSIVE; Future  - LIPID PANEL; Future  - HEMOGLOBIN A1C WITH EAG; Future  - VITAMIN D, 25 HYDROXY; Future  - omeprazole (PRILOSEC) 20 mg capsule; Take 1 Capsule by mouth daily. Dispense: 90 Capsule; Refill: 4  - lovastatin (MEVACOR) 40 mg tablet; TAKE 1 TABLET EVERY EVENING TO LOWER CHOLESTEROL  Dispense: 90 Tablet; Refill: 4    3.  Permanent atrial fibrillation (HCC)  - apixaban (Eliquis) 2.5 mg tablet; TAKE ONE TABLET BY MOUTH 2 TIMES A DAY  Dispense: 180 Tablet; Refill: 4  - furosemide (LASIX) 40 mg tablet; Take 1 Tablet by mouth daily. Dispense: 90 Tablet; Refill: 4    4. Hypothyroidism, unspecified type  - levothyroxine (SYNTHROID) 88 mcg tablet; TAKE ONE TABLET BY MOUTH EVERY DAY BEFORE BREAKFAST  Dispense: 90 Tablet; Refill: 4    5. Primary hypertension  - CBC W/O DIFF; Future  - TSH 3RD GENERATION; Future  - lisinopriL (PRINIVIL, ZESTRIL) 2.5 mg tablet; Take 1 Tablet by mouth daily. Dispense: 90 Tablet; Refill: 4    6. Needs flu shot  - INFLUENZA, FLUAD, (AGE 65 Y+), IM, PF, 0.5 ML    7. Medicare annual wellness visit, subsequent  -see below    8. Non-rheumatic mitral regurgitation  - lisinopriL (PRINIVIL, ZESTRIL) 2.5 mg tablet; Take 1 Tablet by mouth daily. Dispense: 90 Tablet; Refill: 4      I have discussed the diagnosis with the patient and the intended plan as seen in the above orders. The patient has received an after-visit summary and questions were answered concerning future plans. Medication Side Effects and Warnings were discussed with patient: yes  Patient Labs were reviewed: yes  Patient Past Records were reviewed:  yes    Azalea Kirkland M.D. This is the Subsequent Medicare Annual Wellness Exam, performed 12 months or more after the Initial AWV or the last Subsequent AWV    I have reviewed the patient's medical history in detail and updated the computerized patient record. Assessment/Plan   Education and counseling provided:  Are appropriate based on today's review and evaluation    1. Chronic UTI  -     AMB POC URINALYSIS DIP STICK AUTO W/O MICRO  2. Type 2 diabetes mellitus with stage 3 chronic kidney disease, without long-term current use of insulin, unspecified whether stage 3a or 3b CKD ([facility])  -     METABOLIC PANEL, COMPREHENSIVE; Future  -     LIPID PANEL; Future  -     HEMOGLOBIN A1C WITH EAG; Future  -     VITAMIN D, 25 HYDROXY; Future  -     omeprazole (PRILOSEC) 20 mg capsule;  Take 1 Capsule by mouth daily. , Normal, Disp-90 Capsule, R-4  -     lovastatin (MEVACOR) 40 mg tablet; TAKE 1 TABLET EVERY EVENING TO LOWER CHOLESTEROL, Normal, Disp-90 Tablet, R-4  3. Permanent atrial fibrillation (HCC)  -     apixaban (Eliquis) 2.5 mg tablet; TAKE ONE TABLET BY MOUTH 2 TIMES A DAY, Normal, Disp-180 Tablet, R-4  -     furosemide (LASIX) 40 mg tablet; Take 1 Tablet by mouth daily. , Normal, Disp-90 Tablet, R-4  4. Hypothyroidism, unspecified type  -     levothyroxine (SYNTHROID) 88 mcg tablet; TAKE ONE TABLET BY MOUTH EVERY DAY BEFORE BREAKFAST, Normal, Disp-90 Tablet, R-4  5. Primary hypertension  -     CBC W/O DIFF; Future  -     TSH 3RD GENERATION; Future  -     lisinopriL (PRINIVIL, ZESTRIL) 2.5 mg tablet; Take 1 Tablet by mouth daily. , Normal, Disp-90 Tablet, R-4  6. Needs flu shot  -     INFLUENZA, FLUAD, (AGE 65 Y+), IM, PF, 0.5 ML  7. Medicare annual wellness visit, subsequent  8. Non-rheumatic mitral regurgitation  -     lisinopriL (PRINIVIL, ZESTRIL) 2.5 mg tablet; Take 1 Tablet by mouth daily. , Normal, Disp-90 Tablet, R-4       Depression Risk Factor Screening     3 most recent PHQ Screens 9/26/2022   Little interest or pleasure in doing things Not at all   Feeling down, depressed, irritable, or hopeless Several days   Total Score PHQ 2 1       Alcohol & Drug Abuse Risk Screen    Do you average more than 1 drink per night or more than 7 drinks a week:  No    On any one occasion in the past three months have you have had more than 3 drinks containing alcohol:  No          Functional Ability and Level of Safety    Hearing: The patient needs further evaluation. Activities of Daily Living: The home contains: handrails and grab bars  Patient does total self care      Ambulation: with difficulty, uses a cane and but only sometimes     Fall Risk:  Fall Risk Assessment, last 12 mths 9/26/2022   Able to walk? Yes   Fall in past 12 months? 1   Do you feel unsteady?  1   Are you worried about falling 1   Is TUG test greater than 12 seconds? 0   Is the gait abnormal? 0   Number of falls in past 12 months 2   Fall with injury?  0      Abuse Screen:  Patient is not abused       Cognitive Screening    Has your family/caregiver stated any concerns about your memory: yes - diagnosed cognitive impairment, was not able to tolerate medication    Pt takes OTC prevagen  Health Maintenance Due     Health Maintenance Due   Topic Date Due    Foot Exam Q1  Never done    Eye Exam Retinal or Dilated  Never done    Flu Vaccine (1) 08/01/2022    COVID-19 Vaccine (4 - Booster for [employer] series) 08/05/2022       Patient Care Team   Patient Care Team:  De Colon MD as PCP - General (Family Medicine)  Claritza Noble MD as PCP - [facility]  Shanita Mckeon MD as Physician (Cardiovascular Disease Physician)  Jumana Kong MD as Physician (Cardiovascular Disease Physician)  Noelle Shaw MD as Physician (Cardiovascular Disease Physician)  Noelle Shaw MD as Physician (Cardiovascular Disease Physician)  Ruben Lopez MD as Physician (Pulmonary Disease)    History     Patient Active Problem List   Diagnosis Code    Restless leg syndrome G25.81    Hypothyroid E03.9    Hypertension I10    Arthritis M19.90    Hyperlipidemia E78.5    Venous (peripheral) insufficiency I87.2    Anemia D64.9    Chronic UTI N39.0    PAD (peripheral artery disease) (HCC) I73.9    Osteoarthritis of hip M16.9    Hypercholesterolemia E78.00    Mitral regurgitation I34.0    Sigmoid diverticulosis K57.30    Atrial fibrillation (HCC) I48.91    Varicose veins of both legs with edema I83.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    Type 2 diabetes mellitus E11.9    Type 2 diabetes mellitus with chronic kidney disease ([address] E11.22    Chronic renal disease, stage III N18.30    Cardiac pacemaker in situ Z95.0     Past Medical History:   Diagnosis Date    Arthritis     hands/low back    Atrial fibrillation ([address]     Congestive heart failure ([address] Diabetes [address] [address] [address]     borderline    GERD (gastroesophageal reflux disease)     Glaucoma     Hypercholesterolemia     Hypertension     Ill-defined condition     borderline anemia    Ill-defined condition     bladder infections    Impaired glucose tolerance     Long term current use of anticoagulant therapy     Menopause     Pacemaker     Thyroid disease     hypothyroidism    Valvular heart disease       Past Surgical History:   Procedure Laterality Date    CARDIOVERSION, ELECTIVE  10/31/2018         CARDIOVERSION, ELECTIVE  11/28/2018         HX BREAST BIOPSY Bilateral 1990    benign    HX CATARACT REMOVAL      bilateral    HX DILATION AND CURETTAGE      HX ORTHOPAEDIC      carpal tunnel release -bilateral    HX PACEMAKER      HX ROTATOR CUFF REPAIR Right      Current Outpatient Medications   Medication Sig Dispense Refill    apixaban (Eliquis) 2.5 mg tablet TAKE ONE TABLET BY MOUTH 2 TIMES A  Tablet 3    levothyroxine (SYNTHROID) 88 mcg tablet TAKE ONE TABLET BY MOUTH EVERY DAY BEFORE BREAKFAST 90 Tablet 0    lisinopriL (PRINIVIL, ZESTRIL) 2.5 mg tablet Take 1 Tablet by mouth daily. 90 Tablet 3    omeprazole (PRILOSEC) 20 mg capsule TAKE 1 CAPSULE DAILY. 90 Capsule 1    cyclobenzaprine (FLEXERIL) 10 mg tablet TAKE ONE TABLET BY MOUTH 2 TIMES A DAY AS NEEDED FOR MUSCLE SPASMS 60 Tablet 1    phenazopyridine (PYRIDIUM) 100 mg tablet Take 100 mg by mouth three (3) times daily. levothyroxine (SYNTHROID) 50 mcg tablet Take 1 Tablet by mouth Daily (before breakfast). 90 Tablet 1    lovastatin (MEVACOR) 40 mg tablet TAKE 1 TABLET EVERY EVENING TO LOWER CHOLESTEROL 90 Tablet 3    furosemide (LASIX) 40 mg tablet TAKE ONE TABLET BY MOUTH EVERY DAY 90 Tablet 3    meclizine (ANTIVERT) 12.5 mg tablet Take 12.5 mg by mouth three (3) times daily as needed for Dizziness. OTHER PREVGEN 1 TAB DAILY AS NEEDED      timolol (TIMOPTIC) 0.5 % ophthalmic solution Administer 1 Drop to both eyes two (2) times a day. acetaminophen/diphenhydramine (TYLENOL PM PO) Take 2 Tabs by mouth as needed. cranberry fruit extract (CRANBERRY CONCENTRATE PO) Take 2 Tabs by mouth daily. ACCU-CHEK MULTICLIX LANCET misc AS DIRECTED 100 Each PRN    latanoprost (XALATAN) 0.005 % ophthalmic solution Administer 1 Drop to both eyes nightly. DOCUSATE CALCIUM (STOOL SOFTENER PO) Take  by mouth daily. ascorbic acid, vitamin C, (VITAMIN C) 500 mg tablet Take  by mouth daily. cholecalciferol (VITAMIN D3) (1000 Units /25 mcg) tablet Take  by mouth daily. MULTIVITAMINS (MULTIVITAMIN PO) Take  by mouth daily. Allergies   Allergen Reactions    Aspirin Other (comments)     Swelling shut of eyelids    Gabapentin Other (comments)     Vision change    Macrobid [Nitrofurantoin Monohyd/M-Cryst] Unknown (comments)    Pcn [Penicillins] Hives       Family History   Problem Relation Age of Onset    Coronary Art Dis Other         mother  of MI age 80, brother  age 72 of MI, sister has hear problems    Breast Cancer Sister         46s    Heart Attack Mother     Other Father         pneumonia    Alzheimer's Disease Brother     Cancer Sister     Cancer Sister     Heart Attack Brother     Suicide Brother      Social History     Tobacco Use    Smoking status: Former     Packs/day: 1.00     Years: 15.00     Pack years: 15.00     Types: Cigarettes     Quit date: 1995     Years since quittin.7    Smokeless tobacco: Never   Substance Use Topics    Alcohol use:  Yes     Alcohol/week: 1.0 standard drink     Types: 1 Glasses of wine per week     Comment: Rare--maybe once a month         Sayra Belle MD

## 2022-09-26 NOTE — PROGRESS NOTES
Chief Complaint   Patient presents with    Annual Wellness Visit    Medication Refill     Pt would like to get the flu shot. Health Maintenance Due   Topic Date Due    Foot Exam Q1  Never done    Eye Exam Retinal or Dilated  Never done    Flu Vaccine (1) 08/01/2022    COVID-19 Vaccine (4 - Booster for Pfizer series) 08/05/2022    Medicare Yearly Exam  09/24/2022     1. \"Have you been to the ER, urgent care clinic since your last visit? Hospitalized since your last visit? \" No    2. \"Have you seen or consulted any other health care providers outside of the [address] [place] [address] since your last visit? \" Yes, cardiology in [place]. 3. For patients aged 39-70: Has the patient had a colonoscopy / FIT/ Cologuard? NA - based on age      If the patient is female:    4. For patients aged 41-77: Has the patient had a mammogram within the past 2 years? NA - based on age or sex      11. For patients aged 21-65: Has the patient had a pap smear?  NA - based on age or sex

## 2022-09-27 LAB
25(OH)D3 SERPL-MCNC: 85.4 NG/ML (ref 30–100)
ALBUMIN SERPL-MCNC: 4.2 G/DL (ref 3.5–5)
ALBUMIN/GLOB SERPL: 1.3 {RATIO} (ref 1.1–2.2)
ALP SERPL-CCNC: 99 U/L (ref 45–117)
ALT SERPL-CCNC: 22 U/L (ref 12–78)
ANION GAP SERPL CALC-SCNC: 6 MMOL/L (ref 5–15)
AST SERPL-CCNC: 27 U/L (ref 15–37)
BILIRUB SERPL-MCNC: 0.6 MG/DL (ref 0.2–1)
BILIRUB UR QL STRIP: NEGATIVE
BUN SERPL-MCNC: 29 MG/DL (ref 6–20)
BUN/CREAT SERPL: 21 (ref 12–20)
CALCIUM SERPL-MCNC: 9.7 MG/DL (ref 8.5–10.1)
CHLORIDE SERPL-SCNC: 104 MMOL/L (ref 97–108)
CHOLEST SERPL-MCNC: 183 MG/DL
CO2 SERPL-SCNC: 30 MMOL/L (ref 21–32)
CREAT SERPL-MCNC: 1.4 MG/DL (ref 0.55–1.02)
ERYTHROCYTE [DISTWIDTH] IN BLOOD BY AUTOMATED COUNT: 14.1 % (ref 11.5–14.5)
EST. AVERAGE GLUCOSE BLD GHB EST-MCNC: 131 MG/DL
GLOBULIN SER CALC-MCNC: 3.2 G/DL (ref 2–4)
GLUCOSE SERPL-MCNC: 113 MG/DL (ref 65–100)
GLUCOSE UR-MCNC: NEGATIVE MG/DL
HBA1C MFR BLD: 6.2 % (ref 4–5.6)
HCT VFR BLD AUTO: 39.7 % (ref 35–47)
HDLC SERPL-MCNC: 82 MG/DL
HDLC SERPL: 2.2 {RATIO} (ref 0–5)
HGB BLD-MCNC: 12.2 G/DL (ref 11.5–16)
KETONES P FAST UR STRIP-MCNC: NEGATIVE MG/DL
LDLC SERPL CALC-MCNC: 69.2 MG/DL (ref 0–100)
MCH RBC QN AUTO: 31.5 PG (ref 26–34)
MCHC RBC AUTO-ENTMCNC: 30.7 G/DL (ref 30–36.5)
MCV RBC AUTO: 102.6 FL (ref 80–99)
NRBC # BLD: 0 K/UL (ref 0–0.01)
NRBC BLD-RTO: 0 PER 100 WBC
PH UR STRIP: 7 [PH] (ref 4.6–8)
PLATELET # BLD AUTO: 192 K/UL (ref 150–400)
PMV BLD AUTO: 12.2 FL (ref 8.9–12.9)
POTASSIUM SERPL-SCNC: 4.3 MMOL/L (ref 3.5–5.1)
PROT SERPL-MCNC: 7.4 G/DL (ref 6.4–8.2)
PROT UR QL STRIP: NORMAL
RBC # BLD AUTO: 3.87 M/UL (ref 3.8–5.2)
SODIUM SERPL-SCNC: 140 MMOL/L (ref 136–145)
SP GR UR STRIP: 1.01 (ref 1–1.03)
TRIGL SERPL-MCNC: 159 MG/DL (ref ?–150)
TSH SERPL DL<=0.05 MIU/L-ACNC: 0.11 UIU/ML (ref 0.36–3.74)
UA UROBILINOGEN AMB POC: NORMAL (ref 0.2–1)
URINALYSIS CLARITY POC: CLEAR
URINALYSIS COLOR POC: YELLOW
URINE BLOOD POC: NEGATIVE
URINE LEUKOCYTES POC: NORMAL
URINE NITRITES POC: NEGATIVE
VLDLC SERPL CALC-MCNC: 31.8 MG/DL
WBC # BLD AUTO: 7 K/UL (ref 3.6–11)

## 2022-10-06 DIAGNOSIS — E03.9 HYPOTHYROIDISM, UNSPECIFIED TYPE: ICD-10-CM

## 2022-10-06 RX ORDER — LEVOTHYROXINE SODIUM 75 UG/1
TABLET ORAL
Qty: 90 TABLET | Refills: 3 | Status: SHIPPED | OUTPATIENT
Start: 2022-10-06

## 2022-10-06 NOTE — PROGRESS NOTES
verified. Informed patient of message from provider. Patient verified understanding. Patient agreed to decreasing dose to 75mcg daily and would like it sent to the [facility]. Patient made a lab appointment for  and will need orders put in prior.

## 2022-11-04 ENCOUNTER — APPOINTMENT (OUTPATIENT)
Dept: GENERAL RADIOLOGY | Age: 87
End: 2022-11-04

## 2023-02-07 ENCOUNTER — OFFICE VISIT (OUTPATIENT)
Dept: FAMILY MEDICINE CLINIC | Age: 88
End: 2023-02-07
Payer: MEDICARE

## 2023-02-07 ENCOUNTER — HOSPITAL ENCOUNTER (OUTPATIENT)
Dept: GENERAL RADIOLOGY | Age: 88
Discharge: HOME | End: 2023-02-07
Payer: MEDICARE

## 2023-02-07 ENCOUNTER — DOCUMENTATION ONLY (OUTPATIENT)
Dept: FAMILY MEDICINE CLINIC | Age: 88
End: 2023-02-07

## 2023-02-07 VITALS
WEIGHT: 150.2 LBS | BODY MASS INDEX: 25.02 KG/M2 | RESPIRATION RATE: 20 BRPM | HEART RATE: 71 BPM | HEIGHT: 65 IN | TEMPERATURE: 98.7 F | SYSTOLIC BLOOD PRESSURE: 137 MMHG | OXYGEN SATURATION: 98 % | DIASTOLIC BLOOD PRESSURE: 75 MMHG

## 2023-02-07 DIAGNOSIS — R06.02 SOB (SHORTNESS OF BREATH): Primary | ICD-10-CM

## 2023-02-07 DIAGNOSIS — E03.9 HYPOTHYROIDISM, UNSPECIFIED TYPE: ICD-10-CM

## 2023-02-07 DIAGNOSIS — R06.02 SOB (SHORTNESS OF BREATH): ICD-10-CM

## 2023-02-07 DIAGNOSIS — I73.9 PAD (PERIPHERAL ARTERY DISEASE) (HCC): ICD-10-CM

## 2023-02-07 DIAGNOSIS — Z95.0 CARDIAC PACEMAKER IN SITU: ICD-10-CM

## 2023-02-07 DIAGNOSIS — E11.9 TYPE 2 DIABETES MELLITUS WITHOUT COMPLICATION, WITHOUT LONG-TERM CURRENT USE OF INSULIN (HCC): ICD-10-CM

## 2023-02-07 DIAGNOSIS — D64.9 ANEMIA, UNSPECIFIED TYPE: ICD-10-CM

## 2023-02-07 DIAGNOSIS — N39.0 RECURRENT UTI: ICD-10-CM

## 2023-02-07 DIAGNOSIS — R00.2 PALPITATIONS: ICD-10-CM

## 2023-02-07 DIAGNOSIS — I48.21 PERMANENT ATRIAL FIBRILLATION (HCC): ICD-10-CM

## 2023-02-07 DIAGNOSIS — R82.90 ABNORMAL URINALYSIS: ICD-10-CM

## 2023-02-07 LAB
BILIRUB UR QL STRIP: NEGATIVE
GLUCOSE UR-MCNC: NEGATIVE MG/DL
KETONES P FAST UR STRIP-MCNC: NORMAL MG/DL
Lab: NORMAL
PH UR STRIP: 6.5 [PH] (ref 4.6–8)
PROT UR QL STRIP: NORMAL
SP GR UR STRIP: 1.01 (ref 1–1.03)
UA UROBILINOGEN AMB POC: NORMAL (ref 0.2–1)
URINALYSIS CLARITY POC: NORMAL
URINALYSIS COLOR POC: YELLOW
URINE BLOOD POC: NEGATIVE
URINE LEUKOCYTES POC: NORMAL
URINE NITRITES POC: POSITIVE

## 2023-02-07 PROCEDURE — G8427 DOCREV CUR MEDS BY ELIG CLIN: HCPCS | Performed by: NURSE PRACTITIONER

## 2023-02-07 PROCEDURE — G8432 DEP SCR NOT DOC, RNG: HCPCS | Performed by: NURSE PRACTITIONER

## 2023-02-07 PROCEDURE — G8420 CALC BMI NORM PARAMETERS: HCPCS | Performed by: NURSE PRACTITIONER

## 2023-02-07 PROCEDURE — 1101F PT FALLS ASSESS-DOCD LE1/YR: CPT | Performed by: NURSE PRACTITIONER

## 2023-02-07 PROCEDURE — 1123F ACP DISCUSS/DSCN MKR DOCD: CPT | Performed by: NURSE PRACTITIONER

## 2023-02-07 PROCEDURE — 93000 ELECTROCARDIOGRAM COMPLETE: CPT | Performed by: NURSE PRACTITIONER

## 2023-02-07 PROCEDURE — G8536 NO DOC ELDER MAL SCRN: HCPCS | Performed by: NURSE PRACTITIONER

## 2023-02-07 PROCEDURE — 1090F PRES/ABSN URINE INCON ASSESS: CPT | Performed by: NURSE PRACTITIONER

## 2023-02-07 PROCEDURE — 71046 X-RAY EXAM CHEST 2 VIEWS: CPT

## 2023-02-07 PROCEDURE — 99214 OFFICE O/P EST MOD 30 MIN: CPT | Performed by: NURSE PRACTITIONER

## 2023-02-07 PROCEDURE — 81003 URINALYSIS AUTO W/O SCOPE: CPT | Performed by: NURSE PRACTITIONER

## 2023-02-07 RX ORDER — CIPROFLOXACIN 500 MG/1
500 TABLET ORAL 2 TIMES DAILY
Qty: 10 TABLET | Refills: 0 | Status: SHIPPED | OUTPATIENT
Start: 2023-02-07 | End: 2023-02-12

## 2023-02-07 NOTE — PROGRESS NOTES
Subjective:     Chief Complaint   Patient presents with    Cold Symptoms    Shortness of Breath        HPI:  80 y.o.  presents for follow up appointment. SOB for past 3-4 days, occasional runny nose but notes not new for her. She does think that her runny nose is worse over the past few days. Has not slept well for the past few days. No new cough, has chronic dry cough  No CP, history of AFib with pacemaker  Followed by cardiology, Dr Sharmaine Bartlett Seen yearly. Dr pEhraim Carlson follows pacemaker. No swelling  Notes that she had a \"itchy all over\" episode on Saturday night after taking a tylenol rapid release, which she notes was new for her, usually she takes tylenol PM   No fever  No other URI symptoms      History of frequent UTI  friend notes that she has had some memory issues over the past few days and saying she is really weak and tired. She says that she tends to not have dysuria with her UTI                No hospital, ER or specialist visits since last primary care visit except as noted above. Past Medical History:   Diagnosis Date    Arthritis     hands/low back    Atrial fibrillation (HCC)     Congestive heart failure (HCC)     Diabetes (HCC)     borderline    GERD (gastroesophageal reflux disease)     Glaucoma     Hypercholesterolemia     Hypertension     Ill-defined condition     borderline anemia    Ill-defined condition     bladder infections    Impaired glucose tolerance     Long term current use of anticoagulant therapy     Menopause     Pacemaker     Thyroid disease     hypothyroidism    Valvular heart disease        Social History     Tobacco Use    Smoking status: Former     Packs/day: 1.00     Years: 15.00     Pack years: 15.00     Types: Cigarettes     Quit date: 1995     Years since quittin.1    Smokeless tobacco: Never   Vaping Use    Vaping Use: Never used   Substance Use Topics    Alcohol use:  Yes     Alcohol/week: 1.0 standard drink     Types: 1 Glasses of wine per week Comment: Rare--maybe once a month    Drug use: No       Outpatient Medications Marked as Taking for the 2/7/23 encounter (Office Visit) with López Marcano NP   Medication Sig Dispense Refill    levothyroxine (SYNTHROID) 75 mcg tablet TAKE ONE TABLET BY MOUTH EVERY DAY BEFORE BREAKFAST 90 Tablet 3    apixaban (Eliquis) 2.5 mg tablet TAKE ONE TABLET BY MOUTH 2 TIMES A  Tablet 4    omeprazole (PRILOSEC) 20 mg capsule Take 1 Capsule by mouth daily. 90 Capsule 4    lisinopriL (PRINIVIL, ZESTRIL) 2.5 mg tablet Take 1 Tablet by mouth daily. 90 Tablet 4    cyclobenzaprine (FLEXERIL) 10 mg tablet TAKE ONE TABLET BY MOUTH 2 TIMES A DAY AS NEEDED FOR MUSCLE SPASMS 60 Tablet 1    lovastatin (MEVACOR) 40 mg tablet TAKE 1 TABLET EVERY EVENING TO LOWER CHOLESTEROL 90 Tablet 4    furosemide (LASIX) 40 mg tablet Take 1 Tablet by mouth daily. 90 Tablet 4    phenazopyridine (PYRIDIUM) 100 mg tablet Take 100 mg by mouth three (3) times daily. meclizine (ANTIVERT) 12.5 mg tablet Take 12.5 mg by mouth three (3) times daily as needed for Dizziness. OTHER PREVGEN 1 TAB DAILY AS NEEDED      timolol (TIMOPTIC) 0.5 % ophthalmic solution Administer 1 Drop to both eyes two (2) times a day. acetaminophen/diphenhydramine (TYLENOL PM PO) Take 2 Tabs by mouth as needed. cranberry fruit extract (CRANBERRY CONCENTRATE PO) Take 2 Tabs by mouth daily. ACCU-CHEK MULTICLIX LANCET misc AS DIRECTED 100 Each PRN    latanoprost (XALATAN) 0.005 % ophthalmic solution Administer 1 Drop to both eyes nightly. DOCUSATE CALCIUM (STOOL SOFTENER PO) Take  by mouth daily. ascorbic acid, vitamin C, (VITAMIN C) 500 mg tablet Take  by mouth daily. cholecalciferol (VITAMIN D3) (1000 Units /25 mcg) tablet Take  by mouth daily. MULTIVITAMINS (MULTIVITAMIN PO) Take  by mouth daily.          Allergies   Allergen Reactions    Aspirin Other (comments)     Swelling shut of eyelids    Gabapentin Other (comments)     Vision change    Macrobid [Nitrofurantoin Monohyd/M-Cryst] Unknown (comments)    Pcn [Penicillins] Hives       Health Maintenance reviewed      ROS:  Gen: + fatigue, no fever, no chills, no unexplained weight loss or weight gain  Eyes: no excessive tearing, itching, or discharge  Nose: + rhinorrhea, no sinus pain  Mouth: no oral lesions, no sore throat, no difficulty swallowing  Resp: no shortness of breath, no wheezing, occasional dry cough, +ROWE  CV: no chest pain, no orthopnea, no paroxysmal nocturnal dyspnea, no lower extremity edema, no palpitations  Abd: no nausea, no heartburn, no diarrhea, no constipation, no abdominal pain  Neuro: no headaches, no syncope or presyncopal episodes  Endo: no polyuria, no polydipsia. : no hematuria, no dysuria, no frequency, no incontinence  Heme: no lymphadenopathy, no easy bruising or bleeding, no night sweats  MSK: no joint pain or swelling    PE:  Visit Vitals  /75 (BP 1 Location: Left upper arm, BP Patient Position: Sitting, BP Cuff Size: Adult)   Pulse 71   Temp 98.7 °F (37.1 °C) (Temporal)   Resp 20   Ht 5' 5\" (1.651 m)   Wt 150 lb 3.2 oz (68.1 kg)   LMP  (LMP Unknown)   SpO2 98%   BMI 24.99 kg/m²     Gen: alert, oriented, no acute distress  Head: normocephalic, atraumatic  Ears: external auditory canals clear, TMs without erythema or effusion  Eyes: pupils equal round reactive to light, sclera clear, conjunctiva clear  Oral: moist mucus membranes, no oral lesions, no pharyngeal inflammation or exudate  Neck: symmetric normal sized thyroid, no carotid bruits, no jugular vein distention  Resp: no increase work of breathing, lungs clear but diminished, ? Faint crackle left base posterior, no wheezes  CV: S1, S2 normal.  +1-2/6 murmur,no rubs or gallops. Abd: soft, not tender, not distended. No hepatosplenomegaly. Normal bowel sounds. No hernias.    Neuro: cranial nerves intact, normal strength and movement in all extremities, and sensation intact and symmetric. Skin: no lesion or rash  Extremities: no cyanosis or edema    Results for orders placed or performed in visit on 02/07/23   AMB POC URINALYSIS DIP STICK AUTO W/O MICRO   Result Value Ref Range    Color (UA POC) Yellow     Clarity (UA POC) Cloudy     Glucose (UA POC) Negative Negative    Bilirubin (UA POC) Negative Negative    Ketones (UA POC) Trace Negative    Specific gravity (UA POC) 1.015 1.001 - 1.035    Blood (UA POC) Negative Negative    pH (UA POC) 6.5 4.6 - 8.0    Protein (UA POC) 3+ Negative    Urobilinogen (UA POC) 1 mg/dL 0.2 - 1    Nitrites (UA POC) Positive Negative    Leukocyte esterase (UA POC) Trace Negative   AMB POC EKG ROUTINE W/ 12 LEADS, INTER & REP   Result Value Ref Range    VGCC Ab Assay         Assessment/Plan:  Differential diagnosis and treatment options reviewed with patient who is in agreement with treatment plan as outlined below. ICD-10-CM ICD-9-CM    1. SOB (shortness of breath)  R06.02 786.05 AMB POC EKG ROUTINE W/ 12 LEADS, INTER & REP      METABOLIC PANEL, COMPREHENSIVE      CBC WITH AUTOMATED DIFF      NT-PRO BNP      XR CHEST PA LAT      NT-PRO BNP      CBC WITH AUTOMATED DIFF      METABOLIC PANEL, COMPREHENSIVE      2. Palpitations  R00.2 785.1 AMB POC EKG ROUTINE W/ 12 LEADS, INTER & REP      3. Permanent atrial fibrillation (HCC)  I48.21 427.31 AMB POC EKG ROUTINE W/ 12 LEADS, INTER & REP      NT-PRO BNP      NT-PRO BNP      4. PAD (peripheral artery disease) ([facility])  I73.9 443.9       5. Hypothyroidism, unspecified type  E03.9 244.9 TSH 3RD GENERATION      T4, FREE      METABOLIC PANEL, COMPREHENSIVE      METABOLIC PANEL, COMPREHENSIVE      T4, FREE      TSH 3RD GENERATION      6. Type 2 diabetes mellitus without complication, without long-term current use of insulin ([facility])  A98.8 352.51 METABOLIC PANEL, COMPREHENSIVE      HEMOGLOBIN A1C WITH EAG      HEMOGLOBIN A1C WITH EAG      METABOLIC PANEL, COMPREHENSIVE      7.  Recurrent UTI  N39.0 599.0 AMB POC URINALYSIS DIP STICK AUTO W/O MICRO      ciprofloxacin HCl (CIPRO) 500 mg tablet      8. Anemia, unspecified type  D64.9 285.9 CBC WITH AUTOMATED DIFF      IRON PROFILE      IRON PROFILE      CBC WITH AUTOMATED DIFF      9. Cardiac pacemaker in situ  Z95.0 V45.01       10. Abnormal urinalysis  R82.90 791.9 CULTURE, URINE      ciprofloxacin HCl (CIPRO) 500 mg tablet      CULTURE, URINE        EKG-paced, no real change when compared to last.  Should follow up with cardiology, overdue to send her interrogation in for pacer. UA suspicious for UTI, will send UC and start on treatment for probable infection, could be contributing to her symptoms     Will check labs and send for CXR. To ER if SOB worsens or CP     Half dose of cholesterol medicine while on antibiotic. Encouraged hydration. Can take allergy medicine in PM (either tylenol PM or zyrtec)    Encouraged patient to work to implement changes including diet high in raw fruits and vegetables, lean protein and good fats. Limit refined, processed carbohydrates and sugar. I have discussed the diagnosis with the patient and the intended plan as seen in the above orders. The patient has received an after-visit summary and questions were answered concerning future plans. I have discussed medication side effects and warnings with the patient as well. The patient verbalizes understanding and agreement with the plan.

## 2023-02-07 NOTE — PROGRESS NOTES
Chief Complaint   Patient presents with    Nasal Congestion    Breathing Problem       1. Have you been to the ER, urgent care clinic since your last visit? Hospitalized since your last visit? No    2. Have you seen or consulted any other health care providers outside of the [address] [place] [address] since your last visit?   Include any pap smears or colon screening? cardiology    Health Maintenance Due   Topic Date Due    COVID-19 Vaccine (4 - Booster for Thomas Peter series) 05/31/2022

## 2023-02-07 NOTE — PROGRESS NOTES
Patient daughter present in the office today regarding symptoms patient has been experiencing. She states her mom has been complaining of SOB and congestion, has no chest pain, tightness, wheezing or other discomforts, however she states earlier this morning pt was holding her stomach. Daughter also has taken patient's vitals BP: 130/76, HR: 71, Temp: 98.4. Scheduled to see Kandis Seip, NP for further evaluation.

## 2023-02-07 NOTE — PROGRESS NOTES
Triston Lr is a 80 y.o. female  and presents with SOB x 2 days. Patient reports she has not been able to comfortably sleep since Saturday due to shortness of breath. No inciting event reported other than that she ran out of Tylenol PM, which she takes nightly and took Tylenol rapid release. At 3 am afterwards, she felt a sensation of intense itching all over her body that woke her and made her feel the need to take a bath. She denies having any fever, or visible rash. Denies any new cough or wheezing. Reports having palpitations yesterday that have since subsided.  Her aide reports that this morning she sounded very out of breath while speaking to her on the phone, but when taking her vital signs, her SPO2 was 99% and HR 71 bpm.     Patient still has a good appetite and has been hydrating per usual.    Chief Complaint   Patient presents with    Nasal Congestion    Breathing Problem       Past Medical History:   Diagnosis Date    Arthritis     hands/low back    Atrial fibrillation (HCC)     Congestive heart failure (HCC)     Diabetes ([address]     borderline    GERD (gastroesophageal reflux disease)     Glaucoma     Hypercholesterolemia     Hypertension     Ill-defined condition     borderline anemia    Ill-defined condition     bladder infections    Impaired glucose tolerance     Long term current use of anticoagulant therapy     Menopause     Pacemaker     Thyroid disease     hypothyroidism    Valvular heart disease      Past Surgical History:   Procedure Laterality Date    CARDIOVERSION, ELECTIVE  10/31/2018         CARDIOVERSION, ELECTIVE  11/28/2018         HX BREAST BIOPSY Bilateral 1990    benign    HX CATARACT REMOVAL      bilateral    HX DILATION AND CURETTAGE      HX ORTHOPAEDIC      carpal tunnel release -bilateral    HX PACEMAKER      HX ROTATOR CUFF REPAIR Right      Social History     Socioeconomic History    Marital status:      Spouse name: Not on file    Number of children: Not on file Years of education: Not on file    Highest education level: Not on file   Occupational History    Not on file   Tobacco Use    Smoking status: Former     Packs/day: 1.00     Years: 15.00     Pack years: 15.00     Types: Cigarettes     Quit date: 1995     Years since quittin.1    Smokeless tobacco: Never   Vaping Use    Vaping Use: Never used   Substance and Sexual Activity    Alcohol use: Yes     Alcohol/week: 1.0 standard drink     Types: 1 Glasses of wine per week     Comment: Rare--maybe once a month    Drug use: No    Sexual activity: Not on file   Other Topics Concern    Not on file   Social History Narrative    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 Lives with .      Social Determinants of Health     Financial Resource Strain: Not on file   Food Insecurity: Not on file   Transportation Needs: Not on file   Physical Activity: Not on file   Stress: Not on file   Social Connections: Not on file   Intimate Partner Violence: Not on file   Housing Stability: Not on file     Allergies   Allergen Reactions    Aspirin Other (comments)     Swelling shut of eyelids    Gabapentin Other (comments)     Vision change    Macrobid [Nitrofurantoin Monohyd/M-Cryst] Unknown (comments)    Pcn [Penicillins] Hives       Review of Systems -   General ROS: negative for - chills, fever, night sweats, weight gain or weight loss  Psychological ROS: negative for - irritability or mood swings  Ophthalmic ROS: negative for - decreased vision or eye pain  ENT ROS: negative for - headaches, hearing change or sore throat  Allergy and Immunology ROS: negative for - hives  Hematological and Lymphatic ROS: negative for - bleeding problems, bruising, fatigue or jaundice  Endocrine ROS: negative for - breast changes, malaise/lethargy or temperature intolerance  Respiratory ROS: positive for + dry cough, shortness of breath; negative for - wheezing  Cardiovascular ROS: positive for + dyspnea on exertion; no chest pain Gastrointestinal ROS: no abdominal pain, change in bowel habits, or black or bloody stools  Genito-Urinary ROS: no dysuria, trouble voiding, or hematuria  Musculoskeletal ROS: negative for - joint pain, joint stiffness, joint swelling or muscular weakness  Neurological ROS: no TIA or stroke symptoms  Dermatological ROS: negative for - mole changes or rash         Physical Exam  Gen: alert, oriented, no acute distress  Head: normocephalic, atraumatic  Ears: external auditory canals clear, TMs without erythema or effusion  Eyes: pupils equal round reactive to light, sclera clear, conjunctiva clear  Nose: normal turbinates, mild rhinorrhea  Oral: moist mucus membranes, no oral lesions, no pharyngeal inflammation or exudate  Neck: supple, no lymphadenopathy  Resp: no increased work of breathing at rest, lungs clear to ausculation bilaterally  CV: S1, S2 normal, no murmurs, rubs, or gallops. Abd: soft, not tender, not distended. No hepatosplenomegaly. Normal bowel sounds. No hernias. Neuro: cranial nerves intact, normal strength and movement in all extremities, and sensation intact and symmetric.   Skin: no lesion or rash

## 2023-02-08 LAB
ALBUMIN SERPL-MCNC: 3.8 G/DL (ref 3.5–5)
ALBUMIN/GLOB SERPL: 1.3 (ref 1.1–2.2)
ALP SERPL-CCNC: 100 U/L (ref 45–117)
ALT SERPL-CCNC: 40 U/L (ref 12–78)
ANION GAP SERPL CALC-SCNC: 2 MMOL/L (ref 5–15)
AST SERPL-CCNC: 39 U/L (ref 15–37)
BASOPHILS # BLD: 0.1 K/UL (ref 0–0.1)
BASOPHILS NFR BLD: 1 % (ref 0–1)
BILIRUB SERPL-MCNC: 0.6 MG/DL (ref 0.2–1)
BNP SERPL-MCNC: 3944 PG/ML
BUN SERPL-MCNC: 22 MG/DL (ref 6–20)
BUN/CREAT SERPL: 17 (ref 12–20)
CALCIUM SERPL-MCNC: 9.1 MG/DL (ref 8.5–10.1)
CHLORIDE SERPL-SCNC: 108 MMOL/L (ref 97–108)
CO2 SERPL-SCNC: 28 MMOL/L (ref 21–32)
CREAT SERPL-MCNC: 1.33 MG/DL (ref 0.55–1.02)
DIFFERENTIAL METHOD BLD: ABNORMAL
EOSINOPHIL # BLD: 0.2 K/UL (ref 0–0.4)
EOSINOPHIL NFR BLD: 2 % (ref 0–7)
ERYTHROCYTE [DISTWIDTH] IN BLOOD BY AUTOMATED COUNT: 14 % (ref 11.5–14.5)
EST. AVERAGE GLUCOSE BLD GHB EST-MCNC: 123 MG/DL
GLOBULIN SER CALC-MCNC: 2.9 G/DL (ref 2–4)
GLUCOSE SERPL-MCNC: 110 MG/DL (ref 65–100)
HBA1C MFR BLD: 5.9 % (ref 4–5.6)
HCT VFR BLD AUTO: 37 % (ref 35–47)
HGB BLD-MCNC: 11.7 G/DL (ref 11.5–16)
IMM GRANULOCYTES # BLD AUTO: 0 K/UL (ref 0–0.04)
IMM GRANULOCYTES NFR BLD AUTO: 0 % (ref 0–0.5)
IRON SATN MFR SERPL: 10 % (ref 20–50)
IRON SERPL-MCNC: 28 UG/DL (ref 35–150)
LYMPHOCYTES # BLD: 1.5 K/UL (ref 0.8–3.5)
LYMPHOCYTES NFR BLD: 21 % (ref 12–49)
MCH RBC QN AUTO: 31.4 PG (ref 26–34)
MCHC RBC AUTO-ENTMCNC: 31.6 G/DL (ref 30–36.5)
MCV RBC AUTO: 99.2 FL (ref 80–99)
MONOCYTES # BLD: 0.7 K/UL (ref 0–1)
MONOCYTES NFR BLD: 10 % (ref 5–13)
NEUTS SEG # BLD: 4.7 K/UL (ref 1.8–8)
NEUTS SEG NFR BLD: 66 % (ref 32–75)
NRBC # BLD: 0 K/UL (ref 0–0.01)
NRBC BLD-RTO: 0 PER 100 WBC
PLATELET # BLD AUTO: 138 K/UL (ref 150–400)
PMV BLD AUTO: 11.8 FL (ref 8.9–12.9)
POTASSIUM SERPL-SCNC: 4.4 MMOL/L (ref 3.5–5.1)
PROT SERPL-MCNC: 6.7 G/DL (ref 6.4–8.2)
RBC # BLD AUTO: 3.73 M/UL (ref 3.8–5.2)
SODIUM SERPL-SCNC: 138 MMOL/L (ref 136–145)
T4 FREE SERPL-MCNC: 1.1 NG/DL (ref 0.8–1.5)
TIBC SERPL-MCNC: 288 UG/DL (ref 250–450)
TSH SERPL DL<=0.05 MIU/L-ACNC: 1.15 UIU/ML (ref 0.36–3.74)
WBC # BLD AUTO: 7.1 K/UL (ref 3.6–11)

## 2023-02-08 NOTE — PROGRESS NOTES
verified. Informed pt of message from provider regarding chest x-ray results. Pt verified understanding and had no further questions.

## 2023-02-09 DIAGNOSIS — I48.21 PERMANENT ATRIAL FIBRILLATION (HCC): ICD-10-CM

## 2023-02-09 DIAGNOSIS — I50.9 ACUTE ON CHRONIC CONGESTIVE HEART FAILURE, UNSPECIFIED HEART FAILURE TYPE (HCC): Primary | ICD-10-CM

## 2023-02-09 DIAGNOSIS — E61.1 IRON DEFICIENCY: ICD-10-CM

## 2023-02-09 RX ORDER — FUROSEMIDE 20 MG/1
20 TABLET ORAL DAILY
Qty: 90 TABLET | Refills: 1 | Status: SHIPPED | OUTPATIENT
Start: 2023-02-09

## 2023-02-09 NOTE — PROGRESS NOTES
Called patient. BNP elevated and + symptoms of CHF  Was going to increase her furosemide dosing for few days but upon calling patient she notes that she is not on furosemide. Will restart at 20 mg per day and have her follow up with me in one week on 2/16/23 at 11 am  UC is still pending. She is taking cipro    Iron low. Will start on Slow FE after she completes cipro.

## 2023-02-10 LAB
BACTERIA SPEC CULT: ABNORMAL
CC UR VC: ABNORMAL
SERVICE CMNT-IMP: ABNORMAL

## 2023-02-10 NOTE — PROGRESS NOTES
Urine culture confirms bacteria should be treated by the antibiotic that she is on. Let me know if her symptoms do not improve.

## 2023-02-16 ENCOUNTER — OFFICE VISIT (OUTPATIENT)
Dept: FAMILY MEDICINE CLINIC | Age: 88
End: 2023-02-16
Payer: MEDICARE

## 2023-02-16 VITALS
SYSTOLIC BLOOD PRESSURE: 109 MMHG | WEIGHT: 145.8 LBS | DIASTOLIC BLOOD PRESSURE: 70 MMHG | OXYGEN SATURATION: 97 % | HEART RATE: 65 BPM | RESPIRATION RATE: 20 BRPM | BODY MASS INDEX: 24.26 KG/M2 | TEMPERATURE: 97.7 F

## 2023-02-16 DIAGNOSIS — E11.22 TYPE 2 DIABETES MELLITUS WITH STAGE 3 CHRONIC KIDNEY DISEASE, WITHOUT LONG-TERM CURRENT USE OF INSULIN, UNSPECIFIED WHETHER STAGE 3A OR 3B CKD (HCC): ICD-10-CM

## 2023-02-16 DIAGNOSIS — I50.22 CHRONIC SYSTOLIC (CONGESTIVE) HEART FAILURE (HCC): Primary | ICD-10-CM

## 2023-02-16 DIAGNOSIS — Z95.0 CARDIAC PACEMAKER IN SITU: ICD-10-CM

## 2023-02-16 DIAGNOSIS — N39.0 URINARY TRACT INFECTION WITHOUT HEMATURIA, SITE UNSPECIFIED: ICD-10-CM

## 2023-02-16 DIAGNOSIS — N18.30 TYPE 2 DIABETES MELLITUS WITH STAGE 3 CHRONIC KIDNEY DISEASE, WITHOUT LONG-TERM CURRENT USE OF INSULIN, UNSPECIFIED WHETHER STAGE 3A OR 3B CKD (HCC): ICD-10-CM

## 2023-02-16 DIAGNOSIS — N39.0 CHRONIC UTI: ICD-10-CM

## 2023-02-16 PROCEDURE — 1090F PRES/ABSN URINE INCON ASSESS: CPT | Performed by: NURSE PRACTITIONER

## 2023-02-16 PROCEDURE — 3044F HG A1C LEVEL LT 7.0%: CPT | Performed by: NURSE PRACTITIONER

## 2023-02-16 PROCEDURE — G8536 NO DOC ELDER MAL SCRN: HCPCS | Performed by: NURSE PRACTITIONER

## 2023-02-16 PROCEDURE — 99213 OFFICE O/P EST LOW 20 MIN: CPT | Performed by: NURSE PRACTITIONER

## 2023-02-16 PROCEDURE — 1101F PT FALLS ASSESS-DOCD LE1/YR: CPT | Performed by: NURSE PRACTITIONER

## 2023-02-16 PROCEDURE — G8420 CALC BMI NORM PARAMETERS: HCPCS | Performed by: NURSE PRACTITIONER

## 2023-02-16 PROCEDURE — G8432 DEP SCR NOT DOC, RNG: HCPCS | Performed by: NURSE PRACTITIONER

## 2023-02-16 PROCEDURE — 1123F ACP DISCUSS/DSCN MKR DOCD: CPT | Performed by: NURSE PRACTITIONER

## 2023-02-16 PROCEDURE — G8427 DOCREV CUR MEDS BY ELIG CLIN: HCPCS | Performed by: NURSE PRACTITIONER

## 2023-02-16 NOTE — PROGRESS NOTES
Chief Complaint   Patient presents with    Follow-up     1 week follow up     1. Have you been to the ER, urgent care clinic since your last visit? Hospitalized since your last visit? No    2. Have you seen or consulted any other health care providers outside of the [address] [place] [address] since your last visit? Include any pap smears or colon screening.  No    Health Maintenance Due   Topic Date Due    COVID-19 Vaccine (4 - Booster for Thomas Peter series) 05/31/2022

## 2023-02-16 NOTE — PROGRESS NOTES
Subjective:     Chief Complaint   Patient presents with    Follow-up     1 week follow up        HPI:  80 y.o.  presents for 1 week follow up appointment. Feeling much better, says she did start taking the lasix as we discussed. Has lost-4-5 lbs  Shortness of breath is improving. Still feels tired but states that she is feeling much better  Has not called to scheduled appointment with cardiology. States that she did not realize that she was supposed to make a follow-up appointment. We discussed her needing to follow-up with cardiology when I called her with her results noting that her heart failure marker was significantly elevated. Finished antibiotic for UTI, no urinary symptoms               No hospital, ER or specialist visits since last primary care visit except as noted above. Past Medical History:   Diagnosis Date    Arthritis     hands/low back    Atrial fibrillation (HCC)     Congestive heart failure (HCC)     Diabetes (HCC)     borderline    GERD (gastroesophageal reflux disease)     Glaucoma     Hypercholesterolemia     Hypertension     Ill-defined condition     borderline anemia    Ill-defined condition     bladder infections    Impaired glucose tolerance     Long term current use of anticoagulant therapy     Menopause     Pacemaker     Thyroid disease     hypothyroidism    Valvular heart disease        Social History     Tobacco Use    Smoking status: Former     Packs/day: 1.00     Years: 15.00     Pack years: 15.00     Types: Cigarettes     Quit date: 1995     Years since quittin.1    Smokeless tobacco: Never   Vaping Use    Vaping Use: Never used   Substance Use Topics    Alcohol use:  Yes     Alcohol/week: 1.0 standard drink     Types: 1 Glasses of wine per week     Comment: Rare--maybe once a month    Drug use: No       Outpatient Medications Marked as Taking for the 23 encounter (Office Visit) with Coni Hoang NP   Medication Sig Dispense Refill    furosemide (LASIX) 20 mg tablet Take 1 Tablet by mouth daily. 90 Tablet 1    ferrous sulfate (SLOW FE) 142 mg (45 mg iron) ER tablet Take 1 Tablet by mouth Daily (before breakfast). Indications: anemia from inadequate iron 90 Tablet 2    levothyroxine (SYNTHROID) 75 mcg tablet TAKE ONE TABLET BY MOUTH EVERY DAY BEFORE BREAKFAST 90 Tablet 3    apixaban (Eliquis) 2.5 mg tablet TAKE ONE TABLET BY MOUTH 2 TIMES A  Tablet 4    omeprazole (PRILOSEC) 20 mg capsule Take 1 Capsule by mouth daily. 90 Capsule 4    lisinopriL (PRINIVIL, ZESTRIL) 2.5 mg tablet Take 1 Tablet by mouth daily. 90 Tablet 4    cyclobenzaprine (FLEXERIL) 10 mg tablet TAKE ONE TABLET BY MOUTH 2 TIMES A DAY AS NEEDED FOR MUSCLE SPASMS 60 Tablet 1    lovastatin (MEVACOR) 40 mg tablet TAKE 1 TABLET EVERY EVENING TO LOWER CHOLESTEROL 90 Tablet 4    phenazopyridine (PYRIDIUM) 100 mg tablet Take 100 mg by mouth three (3) times daily. meclizine (ANTIVERT) 12.5 mg tablet Take 12.5 mg by mouth three (3) times daily as needed for Dizziness. OTHER PREVGEN 1 TAB DAILY AS NEEDED      timolol (TIMOPTIC) 0.5 % ophthalmic solution Administer 1 Drop to both eyes two (2) times a day. acetaminophen/diphenhydramine (TYLENOL PM PO) Take 2 Tabs by mouth as needed. cranberry fruit extract (CRANBERRY CONCENTRATE PO) Take 2 Tabs by mouth daily. ACCU-CHEK MULTICLIX LANCET misc AS DIRECTED 100 Each PRN    latanoprost (XALATAN) 0.005 % ophthalmic solution Administer 1 Drop to both eyes nightly. DOCUSATE CALCIUM (STOOL SOFTENER PO) Take  by mouth daily. ascorbic acid, vitamin C, (VITAMIN C) 500 mg tablet Take  by mouth daily. cholecalciferol (VITAMIN D3) (1000 Units /25 mcg) tablet Take  by mouth daily. MULTIVITAMINS (MULTIVITAMIN PO) Take  by mouth daily.          Allergies   Allergen Reactions    Aspirin Other (comments)     Swelling shut of eyelids    Gabapentin Other (comments)     Vision change    Macrobid [Nitrofurantoin Monohyd/M-Cryst] Unknown (comments)    Pcn [Penicillins] Hives       Health Maintenance reviewed       ROS:  Gen: no fatigue, no fever, no chills, no unexplained weight loss or weight gain  Eyes: no excessive tearing, itching, or discharge  Nose: no rhinorrhea, no sinus pain  Mouth: no oral lesions, no sore throat, no difficulty swallowing  Resp: no shortness of breath, no wheezing, no cough  CV: no chest pain, no orthopnea, no paroxysmal nocturnal dyspnea, no lower extremity edema, no palpitations  Abd: no nausea, no heartburn, no diarrhea, no constipation, no abdominal pain  Neuro: no headaches, no syncope or presyncopal episodes  Endo: no polyuria, no polydipsia. : no hematuria, no dysuria, no frequency, no incontinence      PE:  Visit Vitals  /70 (BP 1 Location: Left upper arm, BP Patient Position: Sitting, BP Cuff Size: Adult)   Pulse 65   Temp 97.7 °F (36.5 °C) (Temporal)   Resp 20   Wt 145 lb 12.8 oz (66.1 kg)   LMP  (LMP Unknown)   SpO2 97%   BMI 24.26 kg/m²     Gen: alert, oriented, no acute distress  Head: normocephalic, atraumatic  Oral: moist mucus membranes, no oral lesions, no pharyngeal inflammation or exudate  Neck: symmetric normal sized thyroid, no carotid bruits, no jugular vein distention  Resp: no increase work of breathing, lung sounds are more audible, clear to ausculation bilaterally, no wheezing, rales or rhonchi  CV: Regular rate but irregular rhythm + murmurs, no rubs or gallops. Abd: soft, not tender, not distended. No hepatosplenomegaly. Normal bowel sounds. No hernias. No abdominal or renal bruits. Neuro: cranial nerves intact, normal strength and movement in all extremities, and sensation intact and symmetric. Skin: no lesion or rash  Extremities: no cyanosis or edema    No results found for this visit on 02/16/23. Assessment/Plan:  Differential diagnosis and treatment options reviewed with patient who is in agreement with treatment plan as outlined below. ICD-10-CM ICD-9-CM    1. Chronic systolic (congestive) heart failure  I50.22 428.22      428.0       2. Urinary tract infection without hematuria, site unspecified  N39.0 599.0       3. Chronic UTI  N39.0 599.0 CULTURE, URINE      CULTURE, URINE      4. Type 2 diabetes mellitus with stage 3 chronic kidney disease, without long-term current use of insulin, unspecified whether stage 3a or 3b CKD (HCC)  E11.22 250.40     N18.30 585.3       5. Cardiac pacemaker in situ  Z95.0 V45.01         Appears to be feeling much better, will continue on current dose of Lasix. I have encouraged her to call and make a follow-up appointment with her cardiologist as well. They may need to interrogate her pacemaker to make sure that it is functioning at adequate efficacy. Her blood sugars have been stable, encouraged her to drink enough water but not over hydrate, should try to drink about 4-6 regular glasses per day to avoid dehydration. Encouraged adequate nutrition as well. Will call if any dizziness occurs. Would like for her to follow-up with me in 2 weeks, will repeat labs then. Discussed BMI and healthy weight. Encouraged patient to work to implement changes including diet high in raw fruits and vegetables, lean protein and good fats. Limit refined, processed carbohydrates and sugar. I have discussed the diagnosis with the patient and the intended plan as seen in the above orders. The patient has received an after-visit summary and questions were answered concerning future plans. I have discussed medication side effects and warnings with the patient as well. The patient verbalizes understanding and agreement with the plan.

## 2023-02-17 PROBLEM — K57.30 SIGMOID DIVERTICULOSIS: Status: RESOLVED | Noted: 2018-11-20 | Resolved: 2023-02-17

## 2023-02-18 LAB
BACTERIA SPEC CULT: NORMAL
CC UR VC: NORMAL
SERVICE CMNT-IMP: NORMAL

## 2023-02-21 ENCOUNTER — APPOINTMENT (OUTPATIENT)
Dept: FAMILY MEDICINE CLINIC | Age: 88
End: 2023-02-21

## 2023-02-21 DIAGNOSIS — N39.0 RECURRENT UTI: ICD-10-CM

## 2023-02-21 DIAGNOSIS — N39.0 RECURRENT UTI: Primary | ICD-10-CM

## 2023-02-23 LAB
BACTERIA SPEC CULT: NORMAL
SERVICE CMNT-IMP: NORMAL

## 2023-03-06 ENCOUNTER — OFFICE VISIT (OUTPATIENT)
Dept: FAMILY MEDICINE CLINIC | Age: 88
End: 2023-03-06
Payer: MEDICARE

## 2023-03-06 VITALS
TEMPERATURE: 97.9 F | BODY MASS INDEX: 24.13 KG/M2 | WEIGHT: 145 LBS | OXYGEN SATURATION: 97 % | HEART RATE: 64 BPM | SYSTOLIC BLOOD PRESSURE: 150 MMHG | RESPIRATION RATE: 16 BRPM | DIASTOLIC BLOOD PRESSURE: 76 MMHG

## 2023-03-06 DIAGNOSIS — Z91.81 RISK FOR FALLS: ICD-10-CM

## 2023-03-06 DIAGNOSIS — I50.22 CHRONIC SYSTOLIC (CONGESTIVE) HEART FAILURE (HCC): Primary | ICD-10-CM

## 2023-03-06 DIAGNOSIS — D50.9 IRON DEFICIENCY ANEMIA, UNSPECIFIED IRON DEFICIENCY ANEMIA TYPE: ICD-10-CM

## 2023-03-06 DIAGNOSIS — I48.21 PERMANENT ATRIAL FIBRILLATION (HCC): ICD-10-CM

## 2023-03-06 DIAGNOSIS — D69.6 THROMBOCYTOPENIA, UNSPECIFIED (HCC): ICD-10-CM

## 2023-03-06 DIAGNOSIS — R53.1 WEAKNESS GENERALIZED: ICD-10-CM

## 2023-03-06 PROCEDURE — 1123F ACP DISCUSS/DSCN MKR DOCD: CPT | Performed by: NURSE PRACTITIONER

## 2023-03-06 PROCEDURE — 99213 OFFICE O/P EST LOW 20 MIN: CPT | Performed by: NURSE PRACTITIONER

## 2023-03-06 PROCEDURE — 1090F PRES/ABSN URINE INCON ASSESS: CPT | Performed by: NURSE PRACTITIONER

## 2023-03-06 PROCEDURE — G8432 DEP SCR NOT DOC, RNG: HCPCS | Performed by: NURSE PRACTITIONER

## 2023-03-06 PROCEDURE — G8427 DOCREV CUR MEDS BY ELIG CLIN: HCPCS | Performed by: NURSE PRACTITIONER

## 2023-03-06 PROCEDURE — G8536 NO DOC ELDER MAL SCRN: HCPCS | Performed by: NURSE PRACTITIONER

## 2023-03-06 PROCEDURE — 1101F PT FALLS ASSESS-DOCD LE1/YR: CPT | Performed by: NURSE PRACTITIONER

## 2023-03-06 PROCEDURE — G8420 CALC BMI NORM PARAMETERS: HCPCS | Performed by: NURSE PRACTITIONER

## 2023-03-06 RX ORDER — DICLOFENAC SODIUM 10 MG/G
GEL TOPICAL
COMMUNITY
Start: 2023-01-06

## 2023-03-06 NOTE — PROGRESS NOTES
Subjective:     Chief Complaint   Patient presents with    Follow-up     2 week follow up        HPI:  80 y.o.  presents for follow up appointment. Saw cardiology. She has echo ordered 23. No medication adjustments made. Still taking the lasix, which has helped. Shortness of breath much better. Cardiology did not check any labs. BP this morning was a little elevated. Patient says that she has been having a little \"disagreement\" with her  today and that has caused her to have elevated blood pressure. She had been borrowing her neighbors rollator for stability but they needed it back so now patient is asking for prescription of her own. No hospital, ER or specialist visits since last primary care visit except as noted above. Past Medical History:   Diagnosis Date    Arthritis     hands/low back    Atrial fibrillation (HCC)     Congestive heart failure (HCC)     Diabetes ([address]     borderline    GERD (gastroesophageal reflux disease)     Glaucoma     Hypercholesterolemia     Hypertension     Ill-defined condition     borderline anemia    Ill-defined condition     bladder infections    Impaired glucose tolerance     Long term current use of anticoagulant therapy     Menopause     Pacemaker     Sigmoid diverticulosis 2018    barium enema from 2016 that showed severe sigmoid diverticulosis with narrowing of colon lumen. Unable to advance pediatric scope during colonoscopy 2016    Thyroid disease     hypothyroidism    Valvular heart disease        Social History     Tobacco Use    Smoking status: Former     Packs/day: 1.00     Years: 15.00     Pack years: 15.00     Types: Cigarettes     Quit date: 1995     Years since quittin.1    Smokeless tobacco: Never   Vaping Use    Vaping Use: Never used   Substance Use Topics    Alcohol use: Yes     Alcohol/week: 1.0 standard drink     Types: 1 Glasses of wine per week     Comment: Rare--maybe once a month    Drug use:  No Outpatient Medications Marked as Taking for the 3/6/23 encounter (Office Visit) with Bridger Locke NP   Medication Sig Dispense Refill    diclofenac (VOLTAREN) 1 % gel diclofenac 1 % topical gel      furosemide (LASIX) 20 mg tablet Take 1 Tablet by mouth daily. 90 Tablet 1    ferrous sulfate (SLOW FE) 142 mg (45 mg iron) ER tablet Take 1 Tablet by mouth Daily (before breakfast). Indications: anemia from inadequate iron 90 Tablet 2    levothyroxine (SYNTHROID) 75 mcg tablet TAKE ONE TABLET BY MOUTH EVERY DAY BEFORE BREAKFAST 90 Tablet 3    apixaban (Eliquis) 2.5 mg tablet TAKE ONE TABLET BY MOUTH 2 TIMES A  Tablet 4    omeprazole (PRILOSEC) 20 mg capsule Take 1 Capsule by mouth daily. 90 Capsule 4    lisinopriL (PRINIVIL, ZESTRIL) 2.5 mg tablet Take 1 Tablet by mouth daily. 90 Tablet 4    cyclobenzaprine (FLEXERIL) 10 mg tablet TAKE ONE TABLET BY MOUTH 2 TIMES A DAY AS NEEDED FOR MUSCLE SPASMS 60 Tablet 1    lovastatin (MEVACOR) 40 mg tablet TAKE 1 TABLET EVERY EVENING TO LOWER CHOLESTEROL 90 Tablet 4    OTHER PREVGEN 1 TAB DAILY AS NEEDED      timolol (TIMOPTIC) 0.5 % ophthalmic solution Administer 1 Drop to both eyes two (2) times a day. acetaminophen/diphenhydramine (TYLENOL PM PO) Take 2 Tabs by mouth as needed. cranberry fruit extract (CRANBERRY CONCENTRATE PO) Take 2 Tabs by mouth daily. ACCU-CHEK MULTICLIX LANCET misc AS DIRECTED 100 Each PRN    latanoprost (XALATAN) 0.005 % ophthalmic solution Administer 1 Drop to both eyes nightly. DOCUSATE CALCIUM (STOOL SOFTENER PO) Take  by mouth daily. ascorbic acid, vitamin C, (VITAMIN C) 500 mg tablet Take  by mouth daily. cholecalciferol (VITAMIN D3) (1000 Units /25 mcg) tablet Take  by mouth daily. MULTIVITAMINS (MULTIVITAMIN PO) Take  by mouth daily.          Allergies   Allergen Reactions    Aspirin Other (comments)     Swelling shut of eyelids    Gabapentin Other (comments)     Vision change    Macrobid [Nitrofurantoin Monohyd/M-Cryst] Unknown (comments)    Pcn [Penicillins] Hives       Health Maintenance reviewed      ROS:  Gen: mild  fatigue, no fever, no chills, no unexplained weight loss or weight gain  Eyes: no excessive tearing, itching, or discharge  Nose: no rhinorrhea, no sinus pain  Mouth: no oral lesions, no sore throat, no difficulty swallowing  Resp: no shortness of breath, no wheezing, no cough  CV: no chest pain, no orthopnea, no paroxysmal nocturnal dyspnea, no lower extremity edema, no palpitations  Abd: no nausea, no heartburn, no diarrhea, no constipation, no abdominal pain  Neuro: no headaches, no syncope or presyncopal episodes  Endo: no polyuria, no polydipsia. : no hematuria, no dysuria, no frequency, no incontinence  Heme: no lymphadenopathy, no easy bruising or bleeding, no night sweats      PE:  Visit Vitals  BP (!) 150/76   Pulse 64   Temp 97.9 °F (36.6 °C) (Temporal)   Resp 16   Wt 145 lb (65.8 kg)   LMP  (LMP Unknown)   SpO2 97%   BMI 24.13 kg/m²     Gen: alert, oriented, no acute distress  Head: normocephalic, atraumatic  Oral: moist mucus membranes, no oral lesions, no pharyngeal inflammation or exudate  Neck: symmetric normal sized thyroid,  no jugular vein distention  Resp: no increase work of breathing, lungs clear to ausculation bilaterally, no wheezing, rales or rhonchi  CV: regular rate, irregular rhythm.  + murmurs, no rubs, or gallops. Abd: soft, not tender, not distended. No hepatosplenomegaly. Normal bowel sounds. No hernias. No abdominal or renal bruits. Neuro: cranial nerves intact, normal strength and movement in all extremities, and sensation intact and symmetric. Skin: no lesion or rash  Extremities: no cyanosis. Trace ankle edema    No results found for this visit on 03/06/23. Assessment/Plan:  Differential diagnosis and treatment options reviewed with patient who is in agreement with treatment plan as outlined below. ICD-10-CM ICD-9-CM    1.  Chronic systolic (congestive) heart failure (HCC)  N01.39 862.36 METABOLIC PANEL, BASIC     428.0 NT-PRO BNP      AMB SUPPLY ORDER      NT-PRO BNP      METABOLIC PANEL, BASIC      2. Permanent atrial fibrillation (HCC)  D94.15 472.80 METABOLIC PANEL, BASIC      NT-PRO BNP      AMB SUPPLY ORDER      NT-PRO BNP      METABOLIC PANEL, BASIC      3. Iron deficiency anemia, unspecified iron deficiency anemia type  D50.9 280.9 CBC WITH AUTOMATED DIFF      CBC WITH AUTOMATED DIFF      4. Thrombocytopenia, unspecified  D69.6 287.5       5. Risk for falls  Z91.81 V15.88 AMB SUPPLY ORDER      6. Weakness generalized  R53.1 780.79 AMB SUPPLY ORDER        repeat labs today, will let her know how they look and if any adjustments in her medicine needed. BP is up today, has not been up. Home BP have been normal  DASH diet and will monitor at home. Will recheck labs, should follow up for nurse visit to recheck her BP in few weeks. Discussed BMI and healthy weight. Encouraged patient to work to implement changes including diet high in raw fruits and vegetables, lean protein and good fats. Limit refined, processed carbohydrates and sugar. Fall precautions discussed. Rollator walker ordered. I have discussed the diagnosis with the patient and the intended plan as seen in the above orders. The patient has received an after-visit summary and questions were answered concerning future plans. I have discussed medication side effects and warnings with the patient as well. The patient verbalizes understanding and agreement with the plan.

## 2023-03-06 NOTE — PROGRESS NOTES
Chief Complaint   Patient presents with    Follow-up     2 week follow up     1. Have you been to the ER, urgent care clinic since your last visit? Hospitalized since your last visit? No    2. Have you seen or consulted any other health care providers outside of the [address] [place] [address] since your last visit? Include any pap smears or colon screening. [facility].     Health Maintenance Due   Topic Date Due    COVID-19 Vaccine (4 - Booster for Thomas Peter series) 05/31/2022

## 2023-03-07 LAB
ANION GAP SERPL CALC-SCNC: 1 MMOL/L (ref 5–15)
BASOPHILS # BLD: 0.1 K/UL (ref 0–0.1)
BASOPHILS NFR BLD: 2 % (ref 0–1)
BNP SERPL-MCNC: 1562 PG/ML
BUN SERPL-MCNC: 26 MG/DL (ref 6–20)
BUN/CREAT SERPL: 19 (ref 12–20)
CALCIUM SERPL-MCNC: 9.5 MG/DL (ref 8.5–10.1)
CHLORIDE SERPL-SCNC: 107 MMOL/L (ref 97–108)
CO2 SERPL-SCNC: 29 MMOL/L (ref 21–32)
CREAT SERPL-MCNC: 1.35 MG/DL (ref 0.55–1.02)
DIFFERENTIAL METHOD BLD: ABNORMAL
EOSINOPHIL # BLD: 0.3 K/UL (ref 0–0.4)
EOSINOPHIL NFR BLD: 6 % (ref 0–7)
ERYTHROCYTE [DISTWIDTH] IN BLOOD BY AUTOMATED COUNT: 13.8 % (ref 11.5–14.5)
GLUCOSE SERPL-MCNC: 139 MG/DL (ref 65–100)
HCT VFR BLD AUTO: 39.9 % (ref 35–47)
HGB BLD-MCNC: 12.3 G/DL (ref 11.5–16)
IMM GRANULOCYTES # BLD AUTO: 0 K/UL (ref 0–0.04)
IMM GRANULOCYTES NFR BLD AUTO: 0 % (ref 0–0.5)
LYMPHOCYTES # BLD: 1.4 K/UL (ref 0.8–3.5)
LYMPHOCYTES NFR BLD: 24 % (ref 12–49)
MCH RBC QN AUTO: 31.1 PG (ref 26–34)
MCHC RBC AUTO-ENTMCNC: 30.8 G/DL (ref 30–36.5)
MCV RBC AUTO: 101 FL (ref 80–99)
MONOCYTES # BLD: 0.5 K/UL (ref 0–1)
MONOCYTES NFR BLD: 9 % (ref 5–13)
NEUTS SEG # BLD: 3.4 K/UL (ref 1.8–8)
NEUTS SEG NFR BLD: 59 % (ref 32–75)
NRBC # BLD: 0 K/UL (ref 0–0.01)
NRBC BLD-RTO: 0 PER 100 WBC
PLATELET # BLD AUTO: 147 K/UL (ref 150–400)
PMV BLD AUTO: 11.6 FL (ref 8.9–12.9)
POTASSIUM SERPL-SCNC: 4.2 MMOL/L (ref 3.5–5.1)
RBC # BLD AUTO: 3.95 M/UL (ref 3.8–5.2)
SODIUM SERPL-SCNC: 137 MMOL/L (ref 136–145)
WBC # BLD AUTO: 5.8 K/UL (ref 3.6–11)

## 2023-03-09 NOTE — PROGRESS NOTES
verified. Informed pt of message from provider regarding lab results. Pt verified understanding and had no further questions.

## 2023-04-20 DIAGNOSIS — I48.21 PERMANENT ATRIAL FIBRILLATION (HCC): ICD-10-CM

## 2023-04-20 DIAGNOSIS — I50.9 ACUTE ON CHRONIC CONGESTIVE HEART FAILURE, UNSPECIFIED HEART FAILURE TYPE (HCC): ICD-10-CM

## 2023-04-21 RX ORDER — FUROSEMIDE 20 MG/1
TABLET ORAL
Qty: 90 TABLET | Refills: 0 | Status: SHIPPED | OUTPATIENT
Start: 2023-04-21

## 2023-04-25 ENCOUNTER — CLINICAL SUPPORT (OUTPATIENT)
Dept: FAMILY MEDICINE CLINIC | Age: 88
End: 2023-04-25
Payer: MEDICARE

## 2023-04-25 VITALS — DIASTOLIC BLOOD PRESSURE: 66 MMHG | SYSTOLIC BLOOD PRESSURE: 138 MMHG

## 2023-04-25 DIAGNOSIS — N39.0 RECURRENT UTI: ICD-10-CM

## 2023-04-25 DIAGNOSIS — Z01.30 BLOOD PRESSURE CHECK: Primary | ICD-10-CM

## 2023-04-25 LAB
BILIRUB UR QL STRIP: NEGATIVE
GLUCOSE UR-MCNC: NEGATIVE MG/DL
KETONES P FAST UR STRIP-MCNC: NEGATIVE MG/DL
PH UR STRIP: 5.5 [PH] (ref 4.6–8)
PROT UR QL STRIP: NEGATIVE
SP GR UR STRIP: 1.01 (ref 1–1.03)
UA UROBILINOGEN AMB POC: NORMAL (ref 0.2–1)
URINALYSIS CLARITY POC: CLEAR
URINALYSIS COLOR POC: YELLOW
URINE BLOOD POC: NEGATIVE
URINE LEUKOCYTES POC: NORMAL
URINE NITRITES POC: NEGATIVE

## 2023-04-25 PROCEDURE — 81002 URINALYSIS NONAUTO W/O SCOPE: CPT | Performed by: NURSE PRACTITIONER

## 2023-04-27 LAB
BACTERIA SPEC CULT: NORMAL
SERVICE CMNT-IMP: NORMAL

## 2023-05-03 ENCOUNTER — CLINICAL SUPPORT (OUTPATIENT)
Dept: FAMILY MEDICINE CLINIC | Age: 88
End: 2023-05-03

## 2023-05-03 DIAGNOSIS — R35.0 URINE FREQUENCY: Primary | ICD-10-CM

## 2023-05-03 DIAGNOSIS — N30.00 ACUTE CYSTITIS WITHOUT HEMATURIA: Primary | ICD-10-CM

## 2023-05-03 LAB
BILIRUB UR QL STRIP: NEGATIVE
GLUCOSE UR-MCNC: NEGATIVE MG/DL
KETONES P FAST UR STRIP-MCNC: NEGATIVE MG/DL
PH UR STRIP: 7 [PH] (ref 4.6–8)
PROT UR QL STRIP: NORMAL
SP GR UR STRIP: 1.02 (ref 1–1.03)
UA UROBILINOGEN AMB POC: NORMAL (ref 0.2–1)
URINALYSIS CLARITY POC: NORMAL
URINALYSIS COLOR POC: YELLOW
URINE BLOOD POC: NORMAL
URINE LEUKOCYTES POC: NORMAL
URINE NITRITES POC: NEGATIVE

## 2023-05-03 RX ORDER — SULFAMETHOXAZOLE AND TRIMETHOPRIM 800; 160 MG/1; MG/1
1 TABLET ORAL 2 TIMES DAILY
Qty: 14 TABLET | Refills: 0 | Status: SHIPPED | OUTPATIENT
Start: 2023-05-03 | End: 2023-05-10

## 2023-05-05 LAB
BACTERIA SPEC CULT: ABNORMAL
BACTERIA SPEC CULT: ABNORMAL
CC UR VC: ABNORMAL
SERVICE CMNT-IMP: ABNORMAL

## 2023-06-12 NOTE — PROGRESS NOTES
Labs are back and stable. CHF marker is much improved. Continue current therapy. Follow up in one month as we had planned. I performed a history and physical exam of the patient and discussed their management with the resident. I reviewed the resident's note and agree with the documented findings and plan of care, lexcept as noted.. My medical decision making and observations are found above.

## 2023-06-22 ENCOUNTER — TELEPHONE (OUTPATIENT)
Age: 88
End: 2023-06-22

## 2023-06-22 ENCOUNTER — NURSE ONLY (OUTPATIENT)
Age: 88
End: 2023-06-22
Payer: MEDICARE

## 2023-06-22 DIAGNOSIS — R35.0 URINARY FREQUENCY: Primary | ICD-10-CM

## 2023-06-22 DIAGNOSIS — R31.9 HEMATURIA, UNSPECIFIED TYPE: ICD-10-CM

## 2023-06-22 LAB
BILIRUBIN, URINE, POC: NORMAL
BLOOD URINE, POC: NORMAL
GLUCOSE URINE, POC: NEGATIVE
KETONES, URINE, POC: NORMAL
LEUKOCYTE ESTERASE, URINE, POC: NORMAL
NITRITE, URINE, POC: NEGATIVE
PH, URINE, POC: 5.5 (ref 4.6–8)
PROTEIN,URINE, POC: 300
SPECIFIC GRAVITY, URINE, POC: 1.02 (ref 1–1.03)
URINALYSIS CLARITY, POC: NORMAL
URINALYSIS COLOR, POC: YELLOW
UROBILINOGEN, POC: NORMAL

## 2023-06-22 PROCEDURE — 81001 URINALYSIS AUTO W/SCOPE: CPT | Performed by: NURSE PRACTITIONER

## 2023-06-22 PROCEDURE — PBSHW AMB POC URINALYSIS DIP STICK AUTO W/ MICRO: Performed by: NURSE PRACTITIONER

## 2023-06-22 RX ORDER — SULFAMETHOXAZOLE AND TRIMETHOPRIM 800; 160 MG/1; MG/1
1 TABLET ORAL 2 TIMES DAILY
Qty: 14 TABLET | Refills: 0 | Status: SHIPPED | OUTPATIENT
Start: 2023-06-22 | End: 2023-06-29

## 2023-06-25 LAB
BACTERIA SPEC CULT: ABNORMAL
CC UR VC: ABNORMAL
SERVICE CMNT-IMP: ABNORMAL

## 2023-07-14 RX ORDER — FUROSEMIDE 20 MG/1
TABLET ORAL
Qty: 90 TABLET | Refills: 0 | Status: SHIPPED | OUTPATIENT
Start: 2023-07-14

## 2023-07-19 ENCOUNTER — TELEPHONE (OUTPATIENT)
Age: 88
End: 2023-07-19

## 2023-07-20 ENCOUNTER — PATIENT MESSAGE (OUTPATIENT)
Age: 88
End: 2023-07-20

## 2023-07-21 RX ORDER — LISINOPRIL 5 MG/1
5 TABLET ORAL DAILY
Qty: 90 TABLET | Refills: 1 | Status: SHIPPED | OUTPATIENT
Start: 2023-07-21

## 2023-07-21 NOTE — TELEPHONE ENCOUNTER
From: Hector Arana  To: Analilia [place] Polo  Sent: 7/20/2023 2:24 PM EDT  Subject: lisinopril 2.5 MG tablet    This message is being sent by Eleni Shoemaker on behalf of Hector Arana. I was asked to start taking two blood pressure pills a day but the refill prescription sent to the pharmacy is for only one pill a day. Do I continue taking 2 pills a day or go back to 1 pill a day? Two pills have been working good. If so I need a prescription to two a day instead of 1 a day. Will run out in half the time. Please advise.  Thank you

## 2023-08-07 ENCOUNTER — TELEPHONE (OUTPATIENT)
Age: 88
End: 2023-08-07

## 2023-08-07 ENCOUNTER — NURSE ONLY (OUTPATIENT)
Age: 88
End: 2023-08-07
Payer: MEDICARE

## 2023-08-07 ENCOUNTER — NURSE TRIAGE (OUTPATIENT)
Dept: OTHER | Facility: CLINIC | Age: 88
End: 2023-08-07

## 2023-08-07 DIAGNOSIS — R35.0 URINARY FREQUENCY: Primary | ICD-10-CM

## 2023-08-07 LAB
BILIRUBIN, URINE, POC: NEGATIVE
BLOOD URINE, POC: NEGATIVE
GLUCOSE URINE, POC: NEGATIVE
KETONES, URINE, POC: NEGATIVE
LEUKOCYTE ESTERASE, URINE, POC: NORMAL
NITRITE, URINE, POC: NEGATIVE
PH, URINE, POC: 7 (ref 4.6–8)
PROTEIN,URINE, POC: NEGATIVE
SPECIFIC GRAVITY, URINE, POC: 1.02 (ref 1–1.03)
URINALYSIS CLARITY, POC: NORMAL
URINALYSIS COLOR, POC: YELLOW
UROBILINOGEN, POC: NORMAL

## 2023-08-07 PROCEDURE — 81001 URINALYSIS AUTO W/SCOPE: CPT | Performed by: NURSE PRACTITIONER

## 2023-08-07 PROCEDURE — PBSHW AMB POC URINALYSIS DIP STICK AUTO W/ MICRO: Performed by: NURSE PRACTITIONER

## 2023-08-07 NOTE — TELEPHONE ENCOUNTER
Location of patient: [facility] call from Gallo at [facility] with [employer]. Subjective: Caller states \"HTN- just started ABX today for UTI\"     Current Symptoms: 188/82 BP this morning before medication and /64 after taking medication- states headaches here and there- Worried about high BP readings and would like to speak to provider about possible medication adjustment    Onset: 2 weeks ago; slow    Associated Symptoms: irritability     Pain Severity: not really pain but uncomfortable     Temperature: no fevers     What has been tried: Rx medication, BP checks at home    LMP: NA Pregnant: NA    Recommended disposition:  PCP or nurse callback    Care advice provided, patient verbalizes understanding; denies any other questions or concerns; instructed to call back for any new or worsening symptoms. Patient/Caller agrees with recommended disposition; writer provided warm transfer to Vikas Gusman at [facility] for appointment scheduling    Attention Provider: Thank you for allowing me to participate in the care of your patient. The patient was connected to triage in response to information provided to the ECC/PSC. Please do not respond through this encounter as the response is not directed to a shared pool.         Reason for Disposition   Systolic BP >= 406 OR Diastolic >= 442    Protocols used: Blood Pressure - High-ADULT-OH

## 2023-08-07 NOTE — TELEPHONE ENCOUNTER
Would like to speak to Catia or Alison Elliott regarding the symptoms she is having. Will be returning a urine specimen for lab appt this afternoon, but states she wants to talk about it with the office.

## 2023-08-07 NOTE — TELEPHONE ENCOUNTER
01: 15 P. M.   Pt calling; pt states that she would like to speak with ABEL Dickinson; I advised the pt that she was in pt care; pt is requesting a call back from ABEL Dickinson clinical staff in regards to her BP; please call pt back       Thank You

## 2023-08-07 NOTE — TELEPHONE ENCOUNTER
NV scheduled for tomorrow. Do you want her to come in tomorrow or can she wait until Wednesday appt?

## 2023-08-07 NOTE — TELEPHONE ENCOUNTER
ECC: wanted to transfer call to office;  Pt released call before being transferred to   -[address] [place] [address] St states that pt was returning call with more alarming concerns about her BP; pt was questioning an adjustment to her medication being that it is still elevated  -Please call pt back as she is very concerned   (See previous triage note)       Thank You

## 2023-08-09 ENCOUNTER — OFFICE VISIT (OUTPATIENT)
Age: 88
End: 2023-08-09
Payer: MEDICARE

## 2023-08-09 VITALS
SYSTOLIC BLOOD PRESSURE: 176 MMHG | OXYGEN SATURATION: 96 % | WEIGHT: 144.6 LBS | BODY MASS INDEX: 24.09 KG/M2 | DIASTOLIC BLOOD PRESSURE: 80 MMHG | HEART RATE: 66 BPM | TEMPERATURE: 98.1 F | RESPIRATION RATE: 16 BRPM | HEIGHT: 65 IN

## 2023-08-09 DIAGNOSIS — I10 PRIMARY HYPERTENSION: ICD-10-CM

## 2023-08-09 DIAGNOSIS — N30.00 ACUTE CYSTITIS WITHOUT HEMATURIA: Primary | ICD-10-CM

## 2023-08-09 PROCEDURE — G8420 CALC BMI NORM PARAMETERS: HCPCS | Performed by: NURSE PRACTITIONER

## 2023-08-09 PROCEDURE — 1036F TOBACCO NON-USER: CPT | Performed by: NURSE PRACTITIONER

## 2023-08-09 PROCEDURE — 99213 OFFICE O/P EST LOW 20 MIN: CPT | Performed by: NURSE PRACTITIONER

## 2023-08-09 PROCEDURE — G8427 DOCREV CUR MEDS BY ELIG CLIN: HCPCS | Performed by: NURSE PRACTITIONER

## 2023-08-09 PROCEDURE — 1123F ACP DISCUSS/DSCN MKR DOCD: CPT | Performed by: NURSE PRACTITIONER

## 2023-08-09 PROCEDURE — 1090F PRES/ABSN URINE INCON ASSESS: CPT | Performed by: NURSE PRACTITIONER

## 2023-08-09 RX ORDER — SULFAMETHOXAZOLE AND TRIMETHOPRIM 800; 160 MG/1; MG/1
1 TABLET ORAL 2 TIMES DAILY
Qty: 14 TABLET | Refills: 0 | Status: SHIPPED | OUTPATIENT
Start: 2023-08-09 | End: 2023-08-10 | Stop reason: ALTCHOICE

## 2023-08-09 RX ORDER — LISINOPRIL 10 MG/1
10 TABLET ORAL DAILY
Qty: 90 TABLET | Refills: 1 | Status: SHIPPED | OUTPATIENT
Start: 2023-08-09

## 2023-08-09 ASSESSMENT — PATIENT HEALTH QUESTIONNAIRE - PHQ9
SUM OF ALL RESPONSES TO PHQ QUESTIONS 1-9: 0
1. LITTLE INTEREST OR PLEASURE IN DOING THINGS: 0
SUM OF ALL RESPONSES TO PHQ QUESTIONS 1-9: 0
2. FEELING DOWN, DEPRESSED OR HOPELESS: 0
SUM OF ALL RESPONSES TO PHQ9 QUESTIONS 1 & 2: 0

## 2023-08-09 NOTE — PROGRESS NOTES
Chief Complaint   Patient presents with    Hypertension    Altered Mental Status         Health Maintenance Due   Topic Date Due    DTaP/Tdap/Td vaccine (1 - Tdap) Never done    Pneumococcal 65+ years Vaccine (2 - PPSV23 if available, else PCV20) 12/09/2015    COVID-19 Vaccine (4 - Booster for Pfizer series) 05/31/2022    Flu vaccine (1) 08/01/2023           1. \"Have you been to the ER, urgent care clinic since your last visit? Hospitalized since your last visit? \" No    2. \"Have you seen or consulted any other health care providers outside of the [address] [place] [address] since your last visit? \" orthopedic     3. For patients aged 43-73: Has the patient had a colonoscopy / FIT/ Cologuard? NA - based on age      If the patient is female:    4. For patients aged 43-66: Has the patient had a mammogram within the past 2 years? NA - based on age or sex      11. For patients aged 21-65: Has the patient had a pap smear?  NA - based on age or sex

## 2023-08-09 NOTE — PROGRESS NOTES
Subjective:      Chief Complaint   Patient presents with    Hypertension    Altered Mental Status       Sheryl Santana is a 80 y.o. female with history of hypertension, here for follow up, complaints of higher BP readings and feeling off    Came in as a nurse visit on Monday and dropped off a urine, thought that she may have a UTI. UA only showed trace leuk only so sent for a UC. UC is showing prelim that she has >100,000 colonies but still in process. Feeling a little more confused, increased over the past week and a little sluggish. No fever  Admits she should drink a little more water   She says she is eating well but friend says she could eat more because sometimes they do not feel like making food, eats at least two meals per day and couple snacks. Hypertension ROS: taking medications as instructed, no medication side effects noted, home BP monitoring in range of 233-200'H systolic over 32-47'X diastolic, no TIA's, no chest pain on exertion, no dyspnea on exertion, no swelling of ankles,headaches, shortness of breath, vision change. she generally follows a low fat low cholesterol diet. ,  generally follows a low sodium diet. , and nonsmoker. No recent hospitalizations since last visit. Past Medical History:   Diagnosis Date    Arthritis     hands/low back    Atrial fibrillation (HCC)     Congestive heart failure (HCC)     Diabetes ([address])     borderline    GERD (gastroesophageal reflux disease)     Glaucoma     Hypercholesterolemia     Hypertension     Ill-defined condition     bladder infections    Ill-defined condition     borderline anemia    Impaired glucose tolerance     Long term current use of anticoagulant therapy     Menopause     Pacemaker     Sigmoid diverticulosis 11/20/2018    barium enema from 2016 that showed severe sigmoid diverticulosis with narrowing of colon lumen.   Unable to advance pediatric scope during colonoscopy 2016    Thyroid disease     hypothyroidism    Valvular heart

## 2023-08-10 RX ORDER — CIPROFLOXACIN 250 MG/1
250 TABLET, FILM COATED ORAL 2 TIMES DAILY
Qty: 14 TABLET | Refills: 0 | Status: SHIPPED | OUTPATIENT
Start: 2023-08-10 | End: 2023-08-17

## 2023-08-19 SDOH — ECONOMIC STABILITY: FOOD INSECURITY: WITHIN THE PAST 12 MONTHS, THE FOOD YOU BOUGHT JUST DIDN'T LAST AND YOU DIDN'T HAVE MONEY TO GET MORE.: NEVER TRUE

## 2023-08-19 SDOH — ECONOMIC STABILITY: TRANSPORTATION INSECURITY
IN THE PAST 12 MONTHS, HAS LACK OF TRANSPORTATION KEPT YOU FROM MEETINGS, WORK, OR FROM GETTING THINGS NEEDED FOR DAILY LIVING?: PATIENT DECLINED

## 2023-08-19 SDOH — ECONOMIC STABILITY: HOUSING INSECURITY
IN THE LAST 12 MONTHS, WAS THERE A TIME WHEN YOU DID NOT HAVE A STEADY PLACE TO SLEEP OR SLEPT IN A SHELTER (INCLUDING NOW)?: NO

## 2023-08-19 SDOH — ECONOMIC STABILITY: FOOD INSECURITY: WITHIN THE PAST 12 MONTHS, YOU WORRIED THAT YOUR FOOD WOULD RUN OUT BEFORE YOU GOT MONEY TO BUY MORE.: NEVER TRUE

## 2023-08-19 SDOH — ECONOMIC STABILITY: INCOME INSECURITY: HOW HARD IS IT FOR YOU TO PAY FOR THE VERY BASICS LIKE FOOD, HOUSING, MEDICAL CARE, AND HEATING?: NOT HARD AT ALL

## 2023-08-22 ENCOUNTER — OFFICE VISIT (OUTPATIENT)
Age: 88
End: 2023-08-22
Payer: MEDICARE

## 2023-08-22 VITALS
HEART RATE: 65 BPM | HEIGHT: 65 IN | BODY MASS INDEX: 25.12 KG/M2 | TEMPERATURE: 97.9 F | OXYGEN SATURATION: 95 % | SYSTOLIC BLOOD PRESSURE: 134 MMHG | RESPIRATION RATE: 16 BRPM | DIASTOLIC BLOOD PRESSURE: 82 MMHG | WEIGHT: 150.8 LBS

## 2023-08-22 DIAGNOSIS — E78.2 MIXED HYPERLIPIDEMIA: ICD-10-CM

## 2023-08-22 DIAGNOSIS — E53.8 B12 DEFICIENCY: ICD-10-CM

## 2023-08-22 DIAGNOSIS — I50.22 CHRONIC SYSTOLIC (CONGESTIVE) HEART FAILURE (HCC): ICD-10-CM

## 2023-08-22 DIAGNOSIS — N18.31 TYPE 2 DIABETES MELLITUS WITH STAGE 3A CHRONIC KIDNEY DISEASE, WITHOUT LONG-TERM CURRENT USE OF INSULIN (HCC): ICD-10-CM

## 2023-08-22 DIAGNOSIS — N39.0 RECURRENT UTI: Primary | ICD-10-CM

## 2023-08-22 DIAGNOSIS — D50.9 IRON DEFICIENCY ANEMIA, UNSPECIFIED IRON DEFICIENCY ANEMIA TYPE: ICD-10-CM

## 2023-08-22 DIAGNOSIS — I10 PRIMARY HYPERTENSION: ICD-10-CM

## 2023-08-22 DIAGNOSIS — E03.9 ACQUIRED HYPOTHYROIDISM: ICD-10-CM

## 2023-08-22 DIAGNOSIS — E11.22 TYPE 2 DIABETES MELLITUS WITH STAGE 3A CHRONIC KIDNEY DISEASE, WITHOUT LONG-TERM CURRENT USE OF INSULIN (HCC): ICD-10-CM

## 2023-08-22 DIAGNOSIS — R53.1 WEAKNESS: ICD-10-CM

## 2023-08-22 PROCEDURE — 99214 OFFICE O/P EST MOD 30 MIN: CPT | Performed by: NURSE PRACTITIONER

## 2023-08-22 PROCEDURE — G8427 DOCREV CUR MEDS BY ELIG CLIN: HCPCS | Performed by: NURSE PRACTITIONER

## 2023-08-22 PROCEDURE — 3044F HG A1C LEVEL LT 7.0%: CPT | Performed by: NURSE PRACTITIONER

## 2023-08-22 PROCEDURE — 1123F ACP DISCUSS/DSCN MKR DOCD: CPT | Performed by: NURSE PRACTITIONER

## 2023-08-22 PROCEDURE — 1090F PRES/ABSN URINE INCON ASSESS: CPT | Performed by: NURSE PRACTITIONER

## 2023-08-22 PROCEDURE — G8419 CALC BMI OUT NRM PARAM NOF/U: HCPCS | Performed by: NURSE PRACTITIONER

## 2023-08-22 PROCEDURE — 1036F TOBACCO NON-USER: CPT | Performed by: NURSE PRACTITIONER

## 2023-08-22 ASSESSMENT — PATIENT HEALTH QUESTIONNAIRE - PHQ9
2. FEELING DOWN, DEPRESSED OR HOPELESS: 0
SUM OF ALL RESPONSES TO PHQ QUESTIONS 1-9: 0
SUM OF ALL RESPONSES TO PHQ9 QUESTIONS 1 & 2: 0
1. LITTLE INTEREST OR PLEASURE IN DOING THINGS: 0
SUM OF ALL RESPONSES TO PHQ QUESTIONS 1-9: 0

## 2023-08-22 NOTE — PROGRESS NOTES
Subjective:     Chief Complaint   Patient presents with    Follow-up        HPI:  80 y.o.  presents for follow up appointment. Saw her on 23  BP was elevated then, increased her lisinopril to 10 mg daily at that time. She also had a UTI then. Put her on antibiotics then and then after culture resulted switched to cipro on 8/10, which she started on 23 for 7 days. Still has urinary burning and feeling \"not myself\"  Eating and drinking okay  Scratchy throat (has chronic PND) and feeling sluggish and a little more confused/slow        Cardiology appointment 23       No hospital, ER or specialist visits since last primary care visit except as noted above. Past Medical History:   Diagnosis Date    Arthritis     hands/low back    Atrial fibrillation (HCC)     Congestive heart failure (HCC)     Diabetes ([address])     borderline    GERD (gastroesophageal reflux disease)     Glaucoma     Hypercholesterolemia     Hypertension     Ill-defined condition     bladder infections    Ill-defined condition     borderline anemia    Impaired glucose tolerance     Long term current use of anticoagulant therapy     Menopause     Pacemaker     Sigmoid diverticulosis 2018    barium enema from 2016 that showed severe sigmoid diverticulosis with narrowing of colon lumen.   Unable to advance pediatric scope during colonoscopy 2016    Thyroid disease     hypothyroidism    Valvular heart disease        Social History     Tobacco Use    Smoking status: Former     Packs/day: 1.00     Types: Cigarettes     Quit date: 1995     Years since quittin.6    Smokeless tobacco: Never   Substance Use Topics    Alcohol use: Not Currently     Alcohol/week: 1.0 standard drink    Drug use: No       Allergies   Allergen Reactions    Aspirin Other (See Comments)     Swelling shut of eyelids    Gabapentin Other (See Comments)     Vision change    Nitrofurantoin      Other reaction(s): Unknown (comments)    Penicillins Hives

## 2023-08-22 NOTE — PROGRESS NOTES
Chief Complaint   Patient presents with    Follow-up         Health Maintenance Due   Topic Date Due    DTaP/Tdap/Td vaccine (1 - Tdap) Never done    Pneumococcal 65+ years Vaccine (2 - PPSV23 if available, else PCV20) 12/09/2015    COVID-19 Vaccine (4 - Booster for Pfizer series) 05/31/2022    Flu vaccine (1) 08/01/2023           1. \"Have you been to the ER, urgent care clinic since your last visit? Hospitalized since your last visit? \" No    2. \"Have you seen or consulted any other health care providers outside of the [address] [place] [address] since your last visit? \" Ortho    3. For patients aged 43-73: Has the patient had a colonoscopy / FIT/ Cologuard? NA - based on age      If the patient is female:    4. For patients aged 43-66: Has the patient had a mammogram within the past 2 years? NA - based on age or sex      11. For patients aged 21-65: Has the patient had a pap smear?  NA - based on age or sex

## 2023-08-23 LAB
ALBUMIN SERPL-MCNC: 4.2 G/DL (ref 3.5–5)
ALBUMIN/GLOB SERPL: 1.4 (ref 1.1–2.2)
ALP SERPL-CCNC: 85 U/L (ref 45–117)
ALT SERPL-CCNC: 22 U/L (ref 12–78)
ANION GAP SERPL CALC-SCNC: 3 MMOL/L (ref 5–15)
AST SERPL-CCNC: 28 U/L (ref 15–37)
BASOPHILS # BLD: 0.1 K/UL (ref 0–0.1)
BASOPHILS NFR BLD: 2 % (ref 0–1)
BILIRUB SERPL-MCNC: 0.7 MG/DL (ref 0.2–1)
BUN SERPL-MCNC: 27 MG/DL (ref 6–20)
BUN/CREAT SERPL: 18 (ref 12–20)
CALCIUM SERPL-MCNC: 9.6 MG/DL (ref 8.5–10.1)
CHLORIDE SERPL-SCNC: 101 MMOL/L (ref 97–108)
CHOLEST SERPL-MCNC: 201 MG/DL
CO2 SERPL-SCNC: 32 MMOL/L (ref 21–32)
COMMENT:: NORMAL
CREAT SERPL-MCNC: 1.5 MG/DL (ref 0.55–1.02)
DIFFERENTIAL METHOD BLD: ABNORMAL
EOSINOPHIL # BLD: 0.4 K/UL (ref 0–0.4)
EOSINOPHIL NFR BLD: 6 % (ref 0–7)
ERYTHROCYTE [DISTWIDTH] IN BLOOD BY AUTOMATED COUNT: 12.7 % (ref 11.5–14.5)
EST. AVERAGE GLUCOSE BLD GHB EST-MCNC: 128 MG/DL
GLOBULIN SER CALC-MCNC: 3 G/DL (ref 2–4)
GLUCOSE SERPL-MCNC: 111 MG/DL (ref 65–100)
HBA1C MFR BLD: 6.1 % (ref 4–5.6)
HCT VFR BLD AUTO: 41.5 % (ref 35–47)
HDLC SERPL-MCNC: 92 MG/DL
HDLC SERPL: 2.2 (ref 0–5)
HGB BLD-MCNC: 12.9 G/DL (ref 11.5–16)
IMM GRANULOCYTES # BLD AUTO: 0 K/UL (ref 0–0.04)
IMM GRANULOCYTES NFR BLD AUTO: 0 % (ref 0–0.5)
IRON SATN MFR SERPL: 28 % (ref 20–50)
IRON SERPL-MCNC: 96 UG/DL (ref 35–150)
LDLC SERPL CALC-MCNC: 76.4 MG/DL (ref 0–100)
LYMPHOCYTES # BLD: 1.4 K/UL (ref 0.8–3.5)
LYMPHOCYTES NFR BLD: 26 % (ref 12–49)
MCH RBC QN AUTO: 32.1 PG (ref 26–34)
MCHC RBC AUTO-ENTMCNC: 31.1 G/DL (ref 30–36.5)
MCV RBC AUTO: 103.2 FL (ref 80–99)
MONOCYTES # BLD: 0.5 K/UL (ref 0–1)
MONOCYTES NFR BLD: 8 % (ref 5–13)
NEUTS SEG # BLD: 3.1 K/UL (ref 1.8–8)
NEUTS SEG NFR BLD: 58 % (ref 32–75)
NRBC # BLD: 0 K/UL (ref 0–0.01)
NRBC BLD-RTO: 0 PER 100 WBC
NT PRO BNP: 1485 PG/ML
PLATELET # BLD AUTO: 186 K/UL (ref 150–400)
PMV BLD AUTO: 11.5 FL (ref 8.9–12.9)
POTASSIUM SERPL-SCNC: 4.4 MMOL/L (ref 3.5–5.1)
PROT SERPL-MCNC: 7.2 G/DL (ref 6.4–8.2)
RBC # BLD AUTO: 4.02 M/UL (ref 3.8–5.2)
SODIUM SERPL-SCNC: 136 MMOL/L (ref 136–145)
SPECIMEN HOLD: NORMAL
T4 FREE SERPL-MCNC: 1.1 NG/DL (ref 0.8–1.5)
TIBC SERPL-MCNC: 340 UG/DL (ref 250–450)
TRIGL SERPL-MCNC: 163 MG/DL
TSH SERPL DL<=0.05 MIU/L-ACNC: 7.57 UIU/ML (ref 0.36–3.74)
VIT B12 SERPL-MCNC: 622 PG/ML (ref 193–986)
VLDLC SERPL CALC-MCNC: 32.6 MG/DL
WBC # BLD AUTO: 5.5 K/UL (ref 3.6–11)

## 2023-08-24 LAB
BACTERIA SPEC CULT: NORMAL
CC UR VC: NORMAL
SERVICE CMNT-IMP: NORMAL

## 2023-08-25 ENCOUNTER — TELEPHONE (OUTPATIENT)
Age: 88
End: 2023-08-25

## 2023-08-25 RX ORDER — CIPROFLOXACIN 250 MG/1
250 TABLET, FILM COATED ORAL 2 TIMES DAILY
Qty: 10 TABLET | Refills: 0 | Status: SHIPPED | OUTPATIENT
Start: 2023-08-25 | End: 2023-08-30

## 2023-08-25 NOTE — TELEPHONE ENCOUNTER
Spoke with [address] and she verbalized understanding and will pick Pt's prescription up, I told her I will get back with her Tues when Colletta Chamber returns to go over Lab results since Colletta Chamber hasn't viewed them.

## 2023-08-25 NOTE — TELEPHONE ENCOUNTER
Pt's final  Urine culture is back and I was told to [employer]. She hasn't responded back to me yet and pt's POC is calling for results and to know when the antibiotic is being called in.

## 2023-08-25 NOTE — TELEPHONE ENCOUNTER
Reviewed urine culture results. \"30,000 colonies per milliliter   mixed urogenital chrissy    This is not convincing for a UTI. However it appears that she is having burning with urination.   We can retry an antibiotic to see if this provides some relief

## 2023-08-25 NOTE — TELEPHONE ENCOUNTER
Per Clinical staff will call pt's POC back when provider has red them; verbally understands       Thank You

## 2023-08-28 RX ORDER — LEVOTHYROXINE SODIUM 88 UG/1
88 TABLET ORAL DAILY
Qty: 90 TABLET | Refills: 1 | Status: SHIPPED | OUTPATIENT
Start: 2023-08-28

## 2023-09-05 ENCOUNTER — TELEPHONE (OUTPATIENT)
Age: 88
End: 2023-09-05

## 2023-09-05 ENCOUNTER — NURSE ONLY (OUTPATIENT)
Age: 88
End: 2023-09-05
Payer: MEDICARE

## 2023-09-05 DIAGNOSIS — N39.0 RECURRENT UTI: ICD-10-CM

## 2023-09-05 DIAGNOSIS — R35.0 URINARY FREQUENCY: Primary | ICD-10-CM

## 2023-09-05 LAB
BILIRUBIN, URINE, POC: NEGATIVE
BLOOD URINE, POC: ABNORMAL
GLUCOSE URINE, POC: NEGATIVE
KETONES, URINE, POC: NEGATIVE
LEUKOCYTE ESTERASE, URINE, POC: ABNORMAL
NITRITE, URINE, POC: POSITIVE
PH, URINE, POC: 7 (ref 4.6–8)
PROTEIN,URINE, POC: NEGATIVE
SPECIFIC GRAVITY, URINE, POC: 1.02 (ref 1–1.03)
URINALYSIS CLARITY, POC: ABNORMAL
URINALYSIS COLOR, POC: YELLOW
UROBILINOGEN, POC: ABNORMAL

## 2023-09-05 PROCEDURE — PBSHW AMB POC URINALYSIS DIP STICK MANUAL W/O MICRO: Performed by: NURSE PRACTITIONER

## 2023-09-05 PROCEDURE — 81002 URINALYSIS NONAUTO W/O SCOPE: CPT | Performed by: NURSE PRACTITIONER

## 2023-09-05 RX ORDER — DOXYCYCLINE HYCLATE 100 MG
100 TABLET ORAL 2 TIMES DAILY
Qty: 20 TABLET | Refills: 0 | Status: SHIPPED | OUTPATIENT
Start: 2023-09-05 | End: 2023-09-08 | Stop reason: ALTCHOICE

## 2023-09-05 NOTE — TELEPHONE ENCOUNTER
ANDRÉS verified with Kassie. Informed of antibiotic sent to pharmacy and urine culture pending. Say Mendoza verified understanding and had no further questions.

## 2023-09-05 NOTE — TELEPHONE ENCOUNTER
Pt's Urine brought in. Pt is having UTI symptoms again and seems to think the last antibiotic didn't/isn't working. Urinalysis ran in office and has large Leuk's, Positive Nitrates and Blood.

## 2023-09-08 LAB
BACTERIA SPEC CULT: ABNORMAL
CC UR VC: ABNORMAL
SERVICE CMNT-IMP: ABNORMAL

## 2023-09-08 RX ORDER — SULFAMETHOXAZOLE AND TRIMETHOPRIM 800; 160 MG/1; MG/1
1 TABLET ORAL 2 TIMES DAILY
Qty: 14 TABLET | Refills: 0 | Status: SHIPPED | OUTPATIENT
Start: 2023-09-08 | End: 2023-09-15

## 2023-09-20 ENCOUNTER — OFFICE VISIT (OUTPATIENT)
Age: 88
End: 2023-09-20
Payer: MEDICARE

## 2023-09-20 VITALS
HEIGHT: 65 IN | DIASTOLIC BLOOD PRESSURE: 74 MMHG | WEIGHT: 149 LBS | SYSTOLIC BLOOD PRESSURE: 160 MMHG | OXYGEN SATURATION: 97 % | RESPIRATION RATE: 16 BRPM | HEART RATE: 63 BPM | BODY MASS INDEX: 24.83 KG/M2 | TEMPERATURE: 98.3 F

## 2023-09-20 DIAGNOSIS — Z23 NEED FOR VACCINATION: ICD-10-CM

## 2023-09-20 DIAGNOSIS — I10 PRIMARY HYPERTENSION: Primary | ICD-10-CM

## 2023-09-20 DIAGNOSIS — I50.22 CHRONIC SYSTOLIC (CONGESTIVE) HEART FAILURE (HCC): ICD-10-CM

## 2023-09-20 DIAGNOSIS — N39.0 RECURRENT UTI: ICD-10-CM

## 2023-09-20 DIAGNOSIS — I48.0 PAROXYSMAL ATRIAL FIBRILLATION (HCC): ICD-10-CM

## 2023-09-20 PROCEDURE — 90694 VACC AIIV4 NO PRSRV 0.5ML IM: CPT | Performed by: NURSE PRACTITIONER

## 2023-09-20 PROCEDURE — 99214 OFFICE O/P EST MOD 30 MIN: CPT | Performed by: NURSE PRACTITIONER

## 2023-09-20 RX ORDER — LISINOPRIL 10 MG/1
20 TABLET ORAL DAILY
Qty: 90 TABLET | Refills: 1
Start: 2023-09-20

## 2023-09-20 ASSESSMENT — PATIENT HEALTH QUESTIONNAIRE - PHQ9
SUM OF ALL RESPONSES TO PHQ QUESTIONS 1-9: 0
SUM OF ALL RESPONSES TO PHQ QUESTIONS 1-9: 0
2. FEELING DOWN, DEPRESSED OR HOPELESS: 0
SUM OF ALL RESPONSES TO PHQ QUESTIONS 1-9: 0
1. LITTLE INTEREST OR PLEASURE IN DOING THINGS: 0
SUM OF ALL RESPONSES TO PHQ9 QUESTIONS 1 & 2: 0
SUM OF ALL RESPONSES TO PHQ QUESTIONS 1-9: 0

## 2023-09-20 NOTE — PROGRESS NOTES
Subjective:     Chief Complaint   Patient presents with    Follow-up     Urologist and Cardiology        HPI:  80 y.o.  presents for follow up appointment. Recurrent UTIs  Went to urologist, Dr Andreina Alejandra on 23. He told her that he did not think that she has UTIs   Wants her to have an examination with his NP, which she has scheduled for 10/19/23   Did a UA in office but at that time she had just finished antibiotic. Still not feeling herself  Says she feels tired and generally \"not myself\". CHF/Afib  Seen by cardiology, Dr Ju Alcantar and Dr Joann Matute on 23  Told all was fine. Does not need follow up until next year now  Pacemaker has 3 more years left. No changes made to therapy  Swelling controlled. No SOB but feels very tired still  BP has been a little high when checked at home. Eating and drinking okay  No N/V/D  No fevers   No falls. Memory seems to be worsening per friend that is with her. No hospital, ER or specialist visits since last primary care visit except as noted above. Past Medical History:   Diagnosis Date    Arthritis     hands/low back    Atrial fibrillation (HCC)     Congestive heart failure (HCC)     Diabetes ([address])     borderline    GERD (gastroesophageal reflux disease)     Glaucoma     Hypercholesterolemia     Hypertension     Ill-defined condition     bladder infections    Ill-defined condition     borderline anemia    Impaired glucose tolerance     Long term current use of anticoagulant therapy     Menopause     Pacemaker     Sigmoid diverticulosis 2018    barium enema from 2016 that showed severe sigmoid diverticulosis with narrowing of colon lumen.   Unable to advance pediatric scope during colonoscopy     Thyroid disease     hypothyroidism    Valvular heart disease        Social History     Tobacco Use    Smoking status: Former     Packs/day: 1     Types: Cigarettes     Quit date: 1995     Years since quittin.7    Smokeless tobacco: Never

## 2023-09-20 NOTE — PROGRESS NOTES
Chief Complaint   Patient presents with    Follow-up     Urologist and Cardiology         Health Maintenance Due   Topic Date Due    DTaP/Tdap/Td vaccine (1 - Tdap) Never done    Hepatitis B vaccine (1 of 3 - Risk 3-dose series) Never done    Pneumococcal 65+ years Vaccine (2 - PPSV23 or PCV20) 12/09/2015    COVID-19 Vaccine (4 - Pfizer series) 05/31/2022    Flu vaccine (1) 08/01/2023    Annual Wellness Visit (AWV)  09/27/2023           1. \"Have you been to the ER, urgent care clinic since your last visit? Hospitalized since your last visit? \" No    2. \"Have you seen or consulted any other health care providers outside of the [address] [place] [address] since your last visit? \"  Urology and Cardiology     3. For patients aged 43-73: Has the patient had a colonoscopy / FIT/ Cologuard? NA - based on age      If the patient is female:    4. For patients aged 43-66: Has the patient had a mammogram within the past 2 years? NA - based on age or sex      11. For patients aged 21-65: Has the patient had a pap smear?  NA - based on age or sex

## 2023-09-27 ENCOUNTER — TELEPHONE (OUTPATIENT)
Age: 88
End: 2023-09-27

## 2023-09-27 DIAGNOSIS — I10 PRIMARY HYPERTENSION: ICD-10-CM

## 2023-09-27 RX ORDER — LISINOPRIL 10 MG/1
10 TABLET ORAL DAILY
Qty: 90 TABLET | Refills: 0 | Status: SHIPPED | OUTPATIENT
Start: 2023-09-27

## 2023-09-27 RX ORDER — OMEPRAZOLE 20 MG/1
20 CAPSULE, DELAYED RELEASE ORAL DAILY
Qty: 90 CAPSULE | Refills: 0 | Status: SHIPPED | OUTPATIENT
Start: 2023-09-27

## 2023-09-27 RX ORDER — APIXABAN 2.5 MG/1
2.5 TABLET, FILM COATED ORAL 2 TIMES DAILY
Qty: 60 TABLET | Refills: 2 | Status: SHIPPED | OUTPATIENT
Start: 2023-09-27

## 2023-09-27 NOTE — TELEPHONE ENCOUNTER
----- Message from Candance Lory sent at 9/27/2023  3:02 PM EDT -----  Subject: Message to Provider    QUESTIONS  Information for Provider? IMPORTANT? Eli and her , Palak Amin, both   need nurse visits (via car). She needs a BP chk and he needs a flu shot. Please call [phone], friend Vickie Pantoja. Could not get through to office   on Wed. afternoon. Any day next week except for Tues would be good. Please   call Kassie brooke. Thank you.  ---------------------------------------------------------------------------  --------------  Maciej LITTLE  [phone]; OK to leave message on voicemail  ---------------------------------------------------------------------------  --------------  SCRIPT ANSWERS  Relationship to Patient? Other/Third Party  Representative Name? Bernardine Lennox  Is the representative on the Communication Release of Information (JACKIE)   form in Epic?  Yes

## 2023-10-04 ENCOUNTER — TELEPHONE (OUTPATIENT)
Age: 88
End: 2023-10-04

## 2023-10-04 NOTE — TELEPHONE ENCOUNTER
Pt is calling; and checking the status of the ordering her walker; please call pt back in regards to that       Thank You

## 2023-10-05 NOTE — TELEPHONE ENCOUNTER
Spoke with Eusebio Ram to let her know Radha Alvarez need's to have a  appt with Freddie Godfrey so we can order him a walker. Gerber Hawkins had a walker delivered to her.  Eusebio Ram will make an appt for [employer]

## 2023-10-18 ENCOUNTER — OFFICE VISIT (OUTPATIENT)
Age: 88
End: 2023-10-18
Payer: MEDICARE

## 2023-10-18 VITALS
RESPIRATION RATE: 18 BRPM | DIASTOLIC BLOOD PRESSURE: 60 MMHG | WEIGHT: 149.8 LBS | SYSTOLIC BLOOD PRESSURE: 148 MMHG | TEMPERATURE: 98.4 F | BODY MASS INDEX: 24.96 KG/M2 | HEART RATE: 70 BPM | HEIGHT: 65 IN | OXYGEN SATURATION: 97 %

## 2023-10-18 DIAGNOSIS — E03.9 ACQUIRED HYPOTHYROIDISM: ICD-10-CM

## 2023-10-18 DIAGNOSIS — I50.22 CHRONIC SYSTOLIC (CONGESTIVE) HEART FAILURE (HCC): ICD-10-CM

## 2023-10-18 DIAGNOSIS — I10 PRIMARY HYPERTENSION: ICD-10-CM

## 2023-10-18 DIAGNOSIS — Z23 NEED FOR PNEUMOCOCCAL VACCINATION: ICD-10-CM

## 2023-10-18 DIAGNOSIS — I48.0 PAROXYSMAL ATRIAL FIBRILLATION (HCC): ICD-10-CM

## 2023-10-18 DIAGNOSIS — Z00.00 MEDICARE ANNUAL WELLNESS VISIT, SUBSEQUENT: Primary | ICD-10-CM

## 2023-10-18 PROCEDURE — 1123F ACP DISCUSS/DSCN MKR DOCD: CPT | Performed by: NURSE PRACTITIONER

## 2023-10-18 PROCEDURE — G8427 DOCREV CUR MEDS BY ELIG CLIN: HCPCS | Performed by: NURSE PRACTITIONER

## 2023-10-18 PROCEDURE — G0439 PPPS, SUBSEQ VISIT: HCPCS | Performed by: NURSE PRACTITIONER

## 2023-10-18 PROCEDURE — PBSHW PR ADMIN PNEUMOCOCCAL VACCINE: Performed by: NURSE PRACTITIONER

## 2023-10-18 PROCEDURE — PBSHW PNEUMOCOCCAL, PCV20, PREVNAR 20, (AGE 6W+), IM, PF: Performed by: NURSE PRACTITIONER

## 2023-10-18 PROCEDURE — 90677 PCV20 VACCINE IM: CPT | Performed by: NURSE PRACTITIONER

## 2023-10-18 PROCEDURE — G8484 FLU IMMUNIZE NO ADMIN: HCPCS | Performed by: NURSE PRACTITIONER

## 2023-10-18 PROCEDURE — G8420 CALC BMI NORM PARAMETERS: HCPCS | Performed by: NURSE PRACTITIONER

## 2023-10-18 PROCEDURE — 1036F TOBACCO NON-USER: CPT | Performed by: NURSE PRACTITIONER

## 2023-10-18 PROCEDURE — 99214 OFFICE O/P EST MOD 30 MIN: CPT | Performed by: NURSE PRACTITIONER

## 2023-10-18 PROCEDURE — 1090F PRES/ABSN URINE INCON ASSESS: CPT | Performed by: NURSE PRACTITIONER

## 2023-10-18 RX ORDER — LISINOPRIL 30 MG/1
30 TABLET ORAL DAILY
Qty: 90 TABLET | Refills: 3 | Status: SHIPPED | OUTPATIENT
Start: 2023-10-18

## 2023-10-18 ASSESSMENT — PATIENT HEALTH QUESTIONNAIRE - PHQ9
SUM OF ALL RESPONSES TO PHQ QUESTIONS 1-9: 0
1. LITTLE INTEREST OR PLEASURE IN DOING THINGS: 0
SUM OF ALL RESPONSES TO PHQ QUESTIONS 1-9: 0
SUM OF ALL RESPONSES TO PHQ QUESTIONS 1-9: 0
SUM OF ALL RESPONSES TO PHQ9 QUESTIONS 1 & 2: 0
2. FEELING DOWN, DEPRESSED OR HOPELESS: 0
SUM OF ALL RESPONSES TO PHQ QUESTIONS 1-9: 0

## 2023-10-18 ASSESSMENT — LIFESTYLE VARIABLES
HOW OFTEN DO YOU HAVE A DRINK CONTAINING ALCOHOL: NEVER
HOW MANY STANDARD DRINKS CONTAINING ALCOHOL DO YOU HAVE ON A TYPICAL DAY: PATIENT DOES NOT DRINK

## 2023-10-18 NOTE — PROGRESS NOTES
Medicare Annual Wellness Visit    Hector Arana is here for Medicare AWV    Assessment & Plan   Medicare annual wellness visit, subsequent  Primary hypertension-increase lisinopril to 30 mg daily. DASH diet, monitor BP at home. Call if BP >140/90 consistently  -     lisinopril (PRINIVIL;ZESTRIL) 30 MG tablet; Take 1 tablet by mouth daily, Disp-90 tablet, R-3Normal  -     Basic Metabolic Panel; Future  Paroxysmal atrial fibrillation (HCC)-continue to follow with cardiology  Chronic systolic (congestive) heart failure (HCC)-daily weight, call if increase in weight >3lb. DASH diet  Acquired hypothyroidism-increased dose couple months ago. Recheck today   -     Basic Metabolic Panel; Future  -     TSH; Future  -     T4, Free; Future  Need for pneumococcal vaccination  -     Pneumococcal, PCV20, PREVNAR 20, (age 6w+), IM, PF  -     ID ADMIN PNEUMOCOCCAL VACCINE        recommendations for Preventive Services Due: see orders and patient instructions/AVS.  Recommended screening schedule for the next 5-10 years is provided to the patient in written form: see Patient Instructions/AVS.     Return in about 2 months (around 12/18/2023), or if symptoms worsen or fail to improve. Subjective   The following acute and/or chronic problems were also addressed today:      CHF/Afib  Seen by cardiology, Dr Richey Never and Dr Josephine Barney on 9/8/23  Told all was fine. Does not need follow up until next year now  Pacemaker has 3 more years left. No changes made to therapy  Swelling controlled. No SOB but feels very tired still  I increased her lisinopril last month to 20 mg daily. She is unsure of her BP at home readings        Recurrent UTI  Has appointment with urology tomorrow. No new symptoms right now. Patient's complete Health Risk Assessment and screening values have been reviewed and are found in Flowsheets.  The following problems were reviewed today and where indicated follow up appointments were made and/or referrals

## 2023-10-19 LAB
ANION GAP SERPL CALC-SCNC: 8 MMOL/L (ref 5–15)
BUN SERPL-MCNC: 22 MG/DL (ref 6–20)
BUN/CREAT SERPL: 16 (ref 12–20)
CALCIUM SERPL-MCNC: 9.3 MG/DL (ref 8.5–10.1)
CHLORIDE SERPL-SCNC: 106 MMOL/L (ref 97–108)
CO2 SERPL-SCNC: 29 MMOL/L (ref 21–32)
CREAT SERPL-MCNC: 1.41 MG/DL (ref 0.55–1.02)
GLUCOSE SERPL-MCNC: 201 MG/DL (ref 65–100)
POTASSIUM SERPL-SCNC: 3.9 MMOL/L (ref 3.5–5.1)
SODIUM SERPL-SCNC: 143 MMOL/L (ref 136–145)
T4 FREE SERPL-MCNC: 1.3 NG/DL (ref 0.8–1.5)
TSH SERPL DL<=0.05 MIU/L-ACNC: 0.41 UIU/ML (ref 0.36–3.74)

## 2023-10-25 RX ORDER — ACETAMINOPHEN 500 MG
TABLET ORAL
Qty: 90 TABLET | Refills: 0 | Status: SHIPPED | OUTPATIENT
Start: 2023-10-25

## 2023-12-08 RX ORDER — OMEPRAZOLE 20 MG/1
20 CAPSULE, DELAYED RELEASE ORAL DAILY
Qty: 90 CAPSULE | Refills: 0 | Status: SHIPPED | OUTPATIENT
Start: 2023-12-08

## 2023-12-26 RX ORDER — FUROSEMIDE 20 MG/1
TABLET ORAL
Qty: 90 TABLET | Refills: 0 | Status: SHIPPED | OUTPATIENT
Start: 2023-12-26

## 2023-12-26 RX ORDER — LOVASTATIN 40 MG/1
TABLET ORAL
Qty: 90 TABLET | Refills: 0 | Status: SHIPPED | OUTPATIENT
Start: 2023-12-26

## 2024-01-05 RX ORDER — SULFAMETHOXAZOLE AND TRIMETHOPRIM 800; 160 MG/1; MG/1
1 TABLET ORAL 2 TIMES DAILY
Qty: 14 TABLET | Refills: 0 | OUTPATIENT
Start: 2024-01-05 | End: 2024-01-12

## 2024-01-23 RX ORDER — APIXABAN 2.5 MG/1
2.5 TABLET, FILM COATED ORAL 2 TIMES DAILY
Qty: 60 TABLET | Refills: 0 | OUTPATIENT
Start: 2024-01-23

## 2024-01-23 RX ORDER — ACETAMINOPHEN 500 MG
1 TABLET ORAL DAILY
Qty: 90 TABLET | Refills: 0 | Status: SHIPPED | OUTPATIENT
Start: 2024-01-23

## 2024-01-23 NOTE — TELEPHONE ENCOUNTER
We received a fax refill request for Eli Eldridge.  Please escribe SLOW RELEASE IRON 45 MG TBCR  to their pharmacy.  The pharmacy is correct in the chart and they are requesting a 90 day supply.

## 2024-01-23 NOTE — TELEPHONE ENCOUNTER
Phone called to patient. Patient did not remember Dr. Hollis at all.  Noted patient haven't seen dr. Hollis since April 2022.    Also this nurse spoke with care giver who gave patient's niece phone number to call [phone].  This nurse called and spoke with ms Proctor, but she was also not familiar with medical treatment. Ms. Proctor will call a family member to see if Eliquis is needed of ms Eldridge is under the care of  Cardiologist.Noted patient was beatrice Gill and dr Crump in 2023.

## 2024-02-09 NOTE — PATIENT INSTRUCTIONS
Vaccine Information Statement    Influenza (Flu) Vaccine (Inactivated or Recombinant): What You Need to Know    Many vaccine information statements are available in Pashto and other languages. See www.immunize.org/vis. Hojas de información sobre vacunas están disponibles en español y en muchos otros idiomas. Visite www.immunize.org/vis. 1. Why get vaccinated? Influenza vaccine can prevent influenza (flu). Flu is a contagious disease that spreads around the [facility] every year, usually between October and May. Anyone can get the flu, but it is more dangerous for some people. Infants and young children, people 72 years and older, pregnant people, and people with certain health conditions or a weakened immune system are at greatest risk of flu complications. Pneumonia, bronchitis, sinus infections, and ear infections are examples of flu-related complications. If you have a medical condition, such as heart disease, cancer, or diabetes, flu can make it worse. Flu can cause fever and chills, sore throat, muscle aches, fatigue, cough, headache, and runny or stuffy nose. Some people may have vomiting and diarrhea, though this is more common in children than adults. In an average year, thousands of people in the [facility] die from flu, and many more are hospitalized. Flu vaccine prevents millions of illnesses and flu-related visits to the doctor each year. 2. Influenza vaccines     CDC recommends everyone 6 months and older get vaccinated every flu season. Children 6 months through 6years of age may need 2 doses during a single flu season. Everyone else needs only 1 dose each flu season. It takes about 2 weeks for protection to develop after vaccination. There are many flu viruses, and they are always changing. Each year a new flu vaccine is made to protect against the influenza viruses believed to be likely to cause disease in the upcoming flu season.  Even when the vaccine doesnt exactly match these viruses, it may still provide some protection. Influenza vaccine does not cause flu. Influenza vaccine may be given at the same time as other vaccines. 3. Talk with your health care provider    Tell your vaccination provider if the person getting the vaccine:  Has had an allergic reaction after a previous dose of influenza vaccine, or has any severe, life-threatening allergies   Has ever had Guillain-Barré Syndrome (also called GBS)    In some cases, your health care provider may decide to postpone influenza vaccination until a future visit. Influenza vaccine can be administered at any time during pregnancy. People who are or will be pregnant during influenza season should receive inactivated influenza vaccine. People with minor illnesses, such as a cold, may be vaccinated. People who are moderately or severely ill should usually wait until they recover before getting influenza vaccine. Your health care provider can give you more information. 4. Risks of a vaccine reaction    Soreness, redness, and swelling where the shot is given, fever, muscle aches, and headache can happen after influenza vaccination. There may be a very small increased risk of Guillain-Barré Syndrome (GBS) after inactivated influenza vaccine (the flu shot). Young Born children who get the flu shot along with pneumococcal vaccine (PCV13) and/or DTaP vaccine at the same time might be slightly more likely to have a seizure caused by fever. Tell your health care provider if a child who is getting flu vaccine has ever had a seizure. People sometimes faint after medical procedures, including vaccination. Tell your provider if you feel dizzy or have vision changes or ringing in the ears. As with any medicine, there is a very remote chance of a vaccine causing a severe allergic reaction, other serious injury, or death. 5. What if there is a serious problem?     An allergic reaction could occur after the vaccinated person leaves the clinic. If you see signs of a severe allergic reaction (hives, swelling of the face and throat, difficulty breathing, a fast heartbeat, dizziness, or weakness), call 9-1-1 and get the person to the nearest hospital.    For other signs that concern you, call your health care provider. Adverse reactions should be reported to the Vaccine Adverse Event Reporting System (VAERS). Your health care provider will usually file this report, or you can do it yourself. Visit the VAERS website at www.vaers. [facility].gov or call 0-858.957.1697. VAERS is only for reporting reactions, and VAERS staff members do not give medical advice. 6. The National Vaccine Injury Compensation Program    The [facility] Vaccine Injury Compensation Program (VICP) is a federal program that was created to compensate people who may have been injured by certain vaccines. Claims regarding alleged injury or death due to vaccination have a time limit for filing, which may be as short as two years. Visit the VICP website at www.[facility].gov/vaccinecompensation or call 7-654.137.7881 to learn about the program and about filing a claim. 7. How can I learn more? Ask your health care provider. Call your local or state health department. Visit the website of the Food and Drug Administration (FDA) for vaccine package inserts and additional information at www.fda.gov/vaccines-blood-biologics/vaccines. Contact the Centers for Disease Control and Prevention (CDC): Call 6-451.112.6474 (6-383-UPN-INFO) or  Visit CDCs influenza website at www.cdc.gov/flu. Vaccine Information Statement   Inactivated Influenza Vaccine   8/6/2021  42 JIGNESH Dangelo [id number]   Department of Health and Human Services  Centers for Disease Control and Prevention    Office Use Only      Medicare Wellness Visit, Female     The best way to live healthy is to have a lifestyle where you eat a well-balanced diet, exercise regularly, limit alcohol use, and quit all forms of tobacco/nicotine, if applicable. Regular preventive services are another way to keep healthy. Preventive services (vaccines, screening tests, monitoring & exams) can help personalize your care plan, which helps you manage your own care. Screening tests can find health problems at the earliest stages, when they are easiest to treat. [facility] follows the current, evidence-based guidelines published by the [facility] Michael Villalobos ([facility]) when recommending preventive services for our patients. Because we follow these guidelines, sometimes recommendations change over time as research supports it. (For example, mammograms used to be recommended annually. Even though Medicare will still pay for an annual mammogram, the newer guidelines recommend a mammogram every two years for women of average risk). Of course, you and your doctor may decide to screen more often for some diseases, based on your risk and your co-morbidities (chronic disease you are already diagnosed with). Preventive services for you include:  - Medicare offers their members a free annual wellness visit, which is time for you and your primary care provider to discuss and plan for your preventive service needs. Take advantage of this benefit every year!  -All adults over the age of 72 should receive the recommended pneumonia vaccines. Current USPSTF guidelines recommend a series of two vaccines for the best pneumonia protection.   -All adults should have a flu vaccine yearly and a tetanus vaccine every 10 years.   -All adults age 48 and older should receive the shingles vaccines (series of two vaccines).       -All adults age 38-68 who are overweight should have a diabetes screening test once every three years.   -All adults born between [birth year] and 1965 should be screened once for Hepatitis C.  -Other screening tests and preventive services for persons with diabetes include: an eye exam to screen for diabetic retinopathy, a kidney function test, a foot exam, and stricter control over your cholesterol.   -Cardiovascular screening for adults with routine risk involves an electrocardiogram (ECG) at intervals determined by your doctor.   -Colorectal cancer screenings should be done for adults age 54-65 with no increased risk factors for colorectal cancer. There are a number of acceptable methods of screening for this type of cancer. Each test has its own benefits and drawbacks. Discuss with your doctor what is most appropriate for you during your annual wellness visit. The different tests include: colonoscopy (considered the best screening method), a fecal occult blood test, a fecal DNA test, and sigmoidoscopy.    -A bone mass density test is recommended when a woman turns 65 to screen for osteoporosis. This test is only recommended one time, as a screening. Some providers will use this same test as a disease monitoring tool if you already have osteoporosis. -Breast cancer screenings are recommended every other year for women of normal risk, age 54-69.  -Cervical cancer screenings for women over age 72 are only recommended with certain risk factors.      Here is a list of your current Health Maintenance items (your personalized list of preventive services) with a due date:  Health Maintenance Due   Topic Date Due    Diabetic Foot Care  Never done    Eye Exam  Never done    Yearly Flu Vaccine (1) 08/01/2022    COVID-19 Vaccine (4 - Booster for Pfizer series) 08/05/2022 Initial (On Arrival) 24.5

## 2024-06-13 ENCOUNTER — NURSE ONLY (OUTPATIENT)
Age: 89
End: 2024-06-13
Payer: MEDICARE

## 2024-06-13 ENCOUNTER — TELEPHONE (OUTPATIENT)
Age: 89
End: 2024-06-13

## 2024-06-13 DIAGNOSIS — R35.0 URINE FREQUENCY: Primary | ICD-10-CM

## 2024-06-13 LAB
BILIRUBIN, URINE, POC: NEGATIVE
BLOOD URINE, POC: NEGATIVE
COMMENT:: NORMAL
GLUCOSE URINE, POC: NEGATIVE
KETONES, URINE, POC: NEGATIVE
LEUKOCYTE ESTERASE, URINE, POC: NORMAL
NITRITE, URINE, POC: POSITIVE
PH, URINE, POC: 6.5 (ref 4.6–8)
PROTEIN,URINE, POC: NEGATIVE
SPECIFIC GRAVITY, URINE, POC: 1.02 (ref 1–1.03)
SPECIMEN HOLD: NORMAL
URINALYSIS CLARITY, POC: NORMAL
URINALYSIS COLOR, POC: YELLOW
UROBILINOGEN, POC: NORMAL

## 2024-06-13 PROCEDURE — PBSHW AMB POC URINALYSIS DIP STICK AUTO W/ MICRO: Performed by: FAMILY MEDICINE

## 2024-06-13 PROCEDURE — 81001 URINALYSIS AUTO W/SCOPE: CPT | Performed by: FAMILY MEDICINE

## 2024-06-13 RX ORDER — CIPROFLOXACIN 500 MG/1
500 TABLET, FILM COATED ORAL 2 TIMES DAILY
Qty: 10 TABLET | Refills: 0 | Status: SHIPPED | OUTPATIENT
Start: 2024-06-13 | End: 2024-06-18

## 2024-06-13 NOTE — PROGRESS NOTES
Pt neighbor came in and dropped off urine for patient due to caregivers at home state patient is having urine frequency.

## 2024-06-13 NOTE — TELEPHONE ENCOUNTER
Pt neighbor dropped off patients urine due to patients caregiver stating patient has been having urine frequency. Ran U/A and sent for culture. Please review results.

## 2024-06-13 NOTE — TELEPHONE ENCOUNTER
verified with pt neighbor Kassie. Informed of medication sent to pharmacy and also urine was sent for culture and we will call once results come back as well. Kassie verified understanding and had no further questions.

## 2024-06-15 LAB
BACTERIA SPEC CULT: ABNORMAL
CC UR VC: ABNORMAL
SERVICE CMNT-IMP: ABNORMAL

## 2024-06-15 RX ORDER — SULFAMETHOXAZOLE AND TRIMETHOPRIM 800; 160 MG/1; MG/1
1 TABLET ORAL 2 TIMES DAILY
Qty: 14 TABLET | Refills: 0 | Status: SHIPPED | OUTPATIENT
Start: 2024-06-15 | End: 2024-06-22

## 2024-07-19 NOTE — PATIENT INSTRUCTIONS
Learning About Relief for Back Pain  What is back tension and strain? Back strain happens when you overstretch, or pull, a muscle in your back. You may hurt your back in an accident or when you exercise or lift something. Most back pain will get better with rest and time. You can take care of yourself at home to help your back heal.  What can you do first to relieve back pain? When you first feel back pain, try these steps:  · Walk. Take a short walk (10 to 20 minutes) on a level surface (no slopes, hills, or stairs) every 2 to 3 hours. Walk only distances you can manage without pain, especially leg pain. · Relax. Find a comfortable position for rest. Some people are comfortable on the floor or a medium-firm bed with a small pillow under their head and another under their knees. Some people prefer to lie on their side with a pillow between their knees. Don't stay in one position for too long. · Try heat or ice. Try using a heating pad on a low or medium setting, or take a warm shower, for 15 to 20 minutes every 2 to 3 hours. Or you can buy single-use heat wraps that last up to 8 hours. You can also try an ice pack for 10 to 15 minutes every 2 to 3 hours. You can use an ice pack or a bag of frozen vegetables wrapped in a thin towel. There is not strong evidence that either heat or ice will help, but you can try them to see if they help. You may also want to try switching between heat and cold. · Take pain medicine exactly as directed. ? If the doctor gave you a prescription medicine for pain, take it as prescribed. ? If you are not taking a prescription pain medicine, ask your doctor if you can take an over-the-counter medicine. What else can you do? · Stretch and exercise. Exercises that increase flexibility may relieve your pain and make it easier for your muscles to keep your spine in a good, neutral position. And don't forget to keep walking. · Do self-massage.  You can use self-massage to unwind Patient did not bring any personal clothes, jewelery, glasses, dentures/partials or hearing aids.   after work or school or to energize yourself in the morning. You can easily massage your feet, hands, or neck. Self-massage works best if you are in comfortable clothes and are sitting or lying in a comfortable position. Use oil or lotion to massage bare skin. · Reduce stress. Back pain can lead to a vicious [place]: Distress about the pain tenses the muscles in your back, which in turn causes more pain. Learn how to relax your mind and your muscles to lower your stress. Where can you learn more? Go to http://catracho-reynaldo.info/. Enter K772 in the search box to learn more about \"Learning About Relief for Back Pain. \"  Current as of: September 20, 2018  Content Version: 11.9  © 6757-0061 [email or web address]. Care instructions adapted under license by [employer] (which disclaims liability or warranty for this information). If you have questions about a medical condition or this instruction, always ask your healthcare professional. [place] [address] any warranty or liability for your use of this information. DASH Diet: Care Instructions  Your Care Instructions    The DASH diet is an eating plan that can help lower your blood pressure. DASH stands for Dietary Approaches to Stop Hypertension. Hypertension is high blood pressure. The DASH diet focuses on eating foods that are high in calcium, potassium, and magnesium. These nutrients can lower blood pressure. The foods that are highest in these nutrients are fruits, vegetables, low-fat dairy products, nuts, seeds, and legumes. But taking calcium, potassium, and magnesium supplements instead of eating foods that are high in those nutrients does not have the same effect. The DASH diet also includes whole grains, fish, and poultry. The DASH diet is one of several lifestyle changes your doctor may recommend to lower your high blood pressure.  Your doctor may also want you to decrease the amount of sodium in your diet. Lowering sodium while following the DASH diet can lower blood pressure even further than just the DASH diet alone. Follow-up care is a key part of your treatment and safety. Be sure to make and go to all appointments, and call your doctor if you are having problems. It's also a good idea to know your test results and keep a list of the medicines you take. How can you care for yourself at home? Following the DASH diet  · Eat 4 to 5 servings of fruit each day. A serving is 1 medium-sized piece of fruit, ½ cup chopped or canned fruit, 1/4 cup dried fruit, or 4 ounces (½ cup) of fruit juice. Choose fruit more often than fruit juice. · Eat 4 to 5 servings of vegetables each day. A serving is 1 cup of lettuce or raw leafy vegetables, ½ cup of chopped or cooked vegetables, or 4 ounces (½ cup) of vegetable juice. Choose vegetables more often than vegetable juice. · Get 2 to 3 servings of low-fat and fat-free dairy each day. A serving is 8 ounces of milk, 1 cup of yogurt, or 1 ½ ounces of cheese. · Eat 6 to 8 servings of grains each day. A serving is 1 slice of bread, 1 ounce of dry cereal, or ½ cup of cooked rice, pasta, or cooked cereal. Try to choose whole-grain products as much as possible. · Limit lean meat, poultry, and fish to 2 servings each day. A serving is 3 ounces, about the size of a deck of cards. · Eat 4 to 5 servings of nuts, seeds, and legumes (cooked dried beans, lentils, and split peas) each week. A serving is 1/3 cup of nuts, 2 tablespoons of seeds, or ½ cup of cooked beans or peas. · Limit fats and oils to 2 to 3 servings each day. A serving is 1 teaspoon of vegetable oil or 2 tablespoons of salad dressing. · Limit sweets and added sugars to 5 servings or less a week. A serving is 1 tablespoon jelly or jam, ½ cup sorbet, or 1 cup of lemonade. · Eat less than 2,300 milligrams (mg) of sodium a day.  If you limit your sodium to 1,500 mg a day, you can lower your blood pressure even more.  Tips for success  · Start small. Do not try to make dramatic changes to your diet all at once. You might feel that you are missing out on your favorite foods and then be more likely to not follow the plan. Make small changes, and stick with them. Once those changes become habit, add a few more changes. · Try some of the following:  ? Make it a goal to eat a fruit or vegetable at every meal and at snacks. This will make it easy to get the recommended amount of fruits and vegetables each day. ? Try yogurt topped with fruit and nuts for a snack or healthy dessert. ? Add lettuce, tomato, cucumber, and onion to sandwiches. ? Combine a ready-made pizza crust with low-fat mozzarella cheese and lots of vegetable toppings. Try using tomatoes, squash, spinach, broccoli, carrots, cauliflower, and onions. ? Have a variety of cut-up vegetables with a low-fat dip as an appetizer instead of chips and dip. ? Sprinkle sunflower seeds or chopped almonds over salads. Or try adding chopped walnuts or almonds to cooked vegetables. ? Try some vegetarian meals using beans and peas. Add garbanzo or kidney beans to salads. Make burritos and tacos with mashed haas beans or black beans. Where can you learn more? Go to http://catracho-reynaldo.info/. Enter W095 in the search box to learn more about \"DASH Diet: Care Instructions. \"  Current as of: July 22, 2018  Content Version: 11.9  © 7468-7385 [employer]. Care instructions adapted under license by [employer] (which disclaims liability or warranty for this information). If you have questions about a medical condition or this instruction, always ask your healthcare professional. [address] any warranty or liability for your use of this information.

## 2024-09-25 ENCOUNTER — NURSE ONLY (OUTPATIENT)
Age: 89
End: 2024-09-25
Payer: MEDICARE

## 2024-09-25 ENCOUNTER — TELEPHONE (OUTPATIENT)
Age: 89
End: 2024-09-25

## 2024-09-25 DIAGNOSIS — R39.15 URGENCY OF URINATION: ICD-10-CM

## 2024-09-25 DIAGNOSIS — R39.15 URGENCY OF URINATION: Primary | ICD-10-CM

## 2024-09-25 LAB
BILIRUBIN, URINE, POC: NEGATIVE
BLOOD URINE, POC: NEGATIVE
GLUCOSE URINE, POC: NEGATIVE
KETONES, URINE, POC: NEGATIVE
LEUKOCYTE ESTERASE, URINE, POC: NORMAL
NITRITE, URINE, POC: POSITIVE
PH, URINE, POC: 5 (ref 4.6–8)
PROTEIN,URINE, POC: NEGATIVE
SPECIFIC GRAVITY, URINE, POC: 1.01 (ref 1–1.03)
URINALYSIS CLARITY, POC: NORMAL
URINALYSIS COLOR, POC: NORMAL
UROBILINOGEN, POC: NORMAL

## 2024-09-25 PROCEDURE — PBSHW AMB POC URINALYSIS DIP STICK MANUAL W/O MICRO: Performed by: FAMILY MEDICINE

## 2024-09-25 PROCEDURE — 81002 URINALYSIS NONAUTO W/O SCOPE: CPT | Performed by: FAMILY MEDICINE

## 2024-09-25 RX ORDER — SULFAMETHOXAZOLE/TRIMETHOPRIM 800-160 MG
1 TABLET ORAL 2 TIMES DAILY
Qty: 14 TABLET | Refills: 0 | Status: SHIPPED | OUTPATIENT
Start: 2024-09-25 | End: 2024-10-02

## 2024-09-27 ENCOUNTER — OFFICE VISIT (OUTPATIENT)
Age: 89
End: 2024-09-27
Payer: MEDICARE

## 2024-09-27 VITALS
RESPIRATION RATE: 16 BRPM | HEIGHT: 65 IN | SYSTOLIC BLOOD PRESSURE: 134 MMHG | OXYGEN SATURATION: 97 % | BODY MASS INDEX: 24.66 KG/M2 | DIASTOLIC BLOOD PRESSURE: 80 MMHG | TEMPERATURE: 98.1 F | WEIGHT: 148 LBS | HEART RATE: 74 BPM

## 2024-09-27 DIAGNOSIS — I10 HYPERTENSION, UNSPECIFIED TYPE: ICD-10-CM

## 2024-09-27 DIAGNOSIS — E78.5 HYPERLIPIDEMIA, UNSPECIFIED HYPERLIPIDEMIA TYPE: ICD-10-CM

## 2024-09-27 DIAGNOSIS — D69.6 THROMBOCYTOPENIA, UNSPECIFIED (HCC): ICD-10-CM

## 2024-09-27 DIAGNOSIS — I48.91 ATRIAL FIBRILLATION, UNSPECIFIED TYPE (HCC): ICD-10-CM

## 2024-09-27 DIAGNOSIS — E03.9 HYPOTHYROIDISM, UNSPECIFIED TYPE: ICD-10-CM

## 2024-09-27 DIAGNOSIS — N39.0 CHRONIC UTI: ICD-10-CM

## 2024-09-27 DIAGNOSIS — I10 PRIMARY HYPERTENSION: ICD-10-CM

## 2024-09-27 DIAGNOSIS — D64.9 ANEMIA, UNSPECIFIED TYPE: ICD-10-CM

## 2024-09-27 DIAGNOSIS — Z76.89 ENCOUNTER TO ESTABLISH CARE WITH NEW DOCTOR: Primary | ICD-10-CM

## 2024-09-27 DIAGNOSIS — I50.22 CHRONIC SYSTOLIC (CONGESTIVE) HEART FAILURE (HCC): ICD-10-CM

## 2024-09-27 PROBLEM — E11.9 TYPE 2 DIABETES MELLITUS (HCC): Status: RESOLVED | Noted: 2022-03-14 | Resolved: 2024-09-27

## 2024-09-27 PROBLEM — N18.30 CHRONIC RENAL DISEASE, STAGE III (HCC): Status: RESOLVED | Noted: 2022-05-09 | Resolved: 2024-09-27

## 2024-09-27 PROBLEM — E11.22 TYPE 2 DIABETES MELLITUS WITH CHRONIC KIDNEY DISEASE (HCC): Status: RESOLVED | Noted: 2022-05-09 | Resolved: 2024-09-27

## 2024-09-27 PROCEDURE — 99214 OFFICE O/P EST MOD 30 MIN: CPT | Performed by: NURSE PRACTITIONER

## 2024-09-27 PROCEDURE — 1090F PRES/ABSN URINE INCON ASSESS: CPT | Performed by: NURSE PRACTITIONER

## 2024-09-27 PROCEDURE — 1123F ACP DISCUSS/DSCN MKR DOCD: CPT | Performed by: NURSE PRACTITIONER

## 2024-09-27 PROCEDURE — 1036F TOBACCO NON-USER: CPT | Performed by: NURSE PRACTITIONER

## 2024-09-27 PROCEDURE — G8420 CALC BMI NORM PARAMETERS: HCPCS | Performed by: NURSE PRACTITIONER

## 2024-09-27 PROCEDURE — G8427 DOCREV CUR MEDS BY ELIG CLIN: HCPCS | Performed by: NURSE PRACTITIONER

## 2024-09-27 RX ORDER — FUROSEMIDE 20 MG
20 TABLET ORAL DAILY
Qty: 90 TABLET | Refills: 1 | Status: SHIPPED | OUTPATIENT
Start: 2024-09-27

## 2024-09-27 RX ORDER — LEVOTHYROXINE SODIUM 88 UG/1
88 TABLET ORAL DAILY
Qty: 90 TABLET | Refills: 1 | Status: SHIPPED | OUTPATIENT
Start: 2024-09-27

## 2024-09-27 RX ORDER — ACETAMINOPHEN 500 MG
1 TABLET ORAL DAILY
Qty: 90 TABLET | Refills: 0 | Status: SHIPPED | OUTPATIENT
Start: 2024-09-27

## 2024-09-27 RX ORDER — DORZOLAMIDE HCL 20 MG/ML
2 SOLUTION/ DROPS OPHTHALMIC 3 TIMES DAILY
COMMUNITY

## 2024-09-27 RX ORDER — LOVASTATIN 40 MG
40 TABLET ORAL NIGHTLY
Qty: 90 TABLET | Refills: 1 | Status: SHIPPED | OUTPATIENT
Start: 2024-09-27

## 2024-09-27 RX ORDER — LISINOPRIL 30 MG/1
30 TABLET ORAL DAILY
Qty: 90 TABLET | Refills: 1 | Status: SHIPPED | OUTPATIENT
Start: 2024-09-27

## 2024-09-27 SDOH — ECONOMIC STABILITY: INCOME INSECURITY: HOW HARD IS IT FOR YOU TO PAY FOR THE VERY BASICS LIKE FOOD, HOUSING, MEDICAL CARE, AND HEATING?: NOT HARD AT ALL

## 2024-09-27 SDOH — ECONOMIC STABILITY: FOOD INSECURITY: WITHIN THE PAST 12 MONTHS, THE FOOD YOU BOUGHT JUST DIDN'T LAST AND YOU DIDN'T HAVE MONEY TO GET MORE.: NEVER TRUE

## 2024-09-27 SDOH — HEALTH STABILITY: PHYSICAL HEALTH: ON AVERAGE, HOW MANY DAYS PER WEEK DO YOU ENGAGE IN MODERATE TO STRENUOUS EXERCISE (LIKE A BRISK WALK)?: 0 DAYS

## 2024-09-27 SDOH — ECONOMIC STABILITY: FOOD INSECURITY: WITHIN THE PAST 12 MONTHS, YOU WORRIED THAT YOUR FOOD WOULD RUN OUT BEFORE YOU GOT MONEY TO BUY MORE.: NEVER TRUE

## 2024-09-27 ASSESSMENT — PATIENT HEALTH QUESTIONNAIRE - PHQ9
SUM OF ALL RESPONSES TO PHQ QUESTIONS 1-9: 0
2. FEELING DOWN, DEPRESSED OR HOPELESS: NOT AT ALL
1. LITTLE INTEREST OR PLEASURE IN DOING THINGS: NOT AT ALL
SUM OF ALL RESPONSES TO PHQ QUESTIONS 1-9: 0
SUM OF ALL RESPONSES TO PHQ9 QUESTIONS 1 & 2: 0

## 2024-09-27 ASSESSMENT — ENCOUNTER SYMPTOMS
BLOOD IN STOOL: 0
CONSTIPATION: 0
SHORTNESS OF BREATH: 0
CHEST TIGHTNESS: 0
ABDOMINAL PAIN: 0

## 2024-09-28 LAB
ALBUMIN SERPL-MCNC: 3.8 G/DL (ref 3.5–5)
ALBUMIN/GLOB SERPL: 1.1 (ref 1.1–2.2)
ALP SERPL-CCNC: 109 U/L (ref 45–117)
ALT SERPL-CCNC: 10 U/L (ref 12–78)
ANION GAP SERPL CALC-SCNC: 8 MMOL/L (ref 2–12)
AST SERPL-CCNC: 18 U/L (ref 15–37)
BACTERIA SPEC CULT: ABNORMAL
BACTERIA SPEC CULT: ABNORMAL
BASOPHILS # BLD: 0.1 K/UL (ref 0–0.1)
BASOPHILS NFR BLD: 1 % (ref 0–1)
BILIRUB SERPL-MCNC: 0.6 MG/DL (ref 0.2–1)
BUN SERPL-MCNC: 27 MG/DL (ref 6–20)
BUN/CREAT SERPL: 15 (ref 12–20)
CALCIUM SERPL-MCNC: 9.4 MG/DL (ref 8.5–10.1)
CC UR VC: ABNORMAL
CHLORIDE SERPL-SCNC: 105 MMOL/L (ref 97–108)
CO2 SERPL-SCNC: 26 MMOL/L (ref 21–32)
CREAT SERPL-MCNC: 1.85 MG/DL (ref 0.55–1.02)
DIFFERENTIAL METHOD BLD: ABNORMAL
EOSINOPHIL # BLD: 0.3 K/UL (ref 0–0.4)
EOSINOPHIL NFR BLD: 3 % (ref 0–7)
ERYTHROCYTE [DISTWIDTH] IN BLOOD BY AUTOMATED COUNT: 13.5 % (ref 11.5–14.5)
GLOBULIN SER CALC-MCNC: 3.5 G/DL (ref 2–4)
GLUCOSE SERPL-MCNC: 221 MG/DL (ref 65–100)
HCT VFR BLD AUTO: 39.9 % (ref 35–47)
HGB BLD-MCNC: 12.6 G/DL (ref 11.5–16)
IMM GRANULOCYTES # BLD AUTO: 0 K/UL (ref 0–0.04)
IMM GRANULOCYTES NFR BLD AUTO: 1 % (ref 0–0.5)
LYMPHOCYTES # BLD: 1.5 K/UL (ref 0.8–3.5)
LYMPHOCYTES NFR BLD: 17 % (ref 12–49)
MCH RBC QN AUTO: 32.1 PG (ref 26–34)
MCHC RBC AUTO-ENTMCNC: 31.6 G/DL (ref 30–36.5)
MCV RBC AUTO: 101.8 FL (ref 80–99)
MONOCYTES # BLD: 0.7 K/UL (ref 0–1)
MONOCYTES NFR BLD: 8 % (ref 5–13)
NEUTS SEG # BLD: 6.2 K/UL (ref 1.8–8)
NEUTS SEG NFR BLD: 70 % (ref 32–75)
NRBC # BLD: 0 K/UL (ref 0–0.01)
NRBC BLD-RTO: 0 PER 100 WBC
PLATELET # BLD AUTO: 202 K/UL (ref 150–400)
PMV BLD AUTO: 11.4 FL (ref 8.9–12.9)
POTASSIUM SERPL-SCNC: 4.3 MMOL/L (ref 3.5–5.1)
PROT SERPL-MCNC: 7.3 G/DL (ref 6.4–8.2)
RBC # BLD AUTO: 3.92 M/UL (ref 3.8–5.2)
SERVICE CMNT-IMP: ABNORMAL
SODIUM SERPL-SCNC: 139 MMOL/L (ref 136–145)
TSH SERPL DL<=0.05 MIU/L-ACNC: 0.29 UIU/ML (ref 0.36–3.74)
WBC # BLD AUTO: 8.8 K/UL (ref 3.6–11)

## 2024-09-30 ENCOUNTER — TELEPHONE (OUTPATIENT)
Age: 89
End: 2024-09-30

## 2024-09-30 DIAGNOSIS — E03.9 HYPOTHYROIDISM, UNSPECIFIED TYPE: Primary | ICD-10-CM

## 2024-09-30 DIAGNOSIS — N28.9 DECREASED RENAL FUNCTION: ICD-10-CM

## 2024-09-30 RX ORDER — LEVOTHYROXINE SODIUM 75 UG/1
75 TABLET ORAL DAILY
Qty: 60 TABLET | Refills: 0 | Status: SHIPPED | OUTPATIENT
Start: 2024-09-30

## 2024-09-30 NOTE — TELEPHONE ENCOUNTER
Decrease Synthroid from 88 mcg daily to 75 Will need labs again in approximately 4 to 6 weeks.  Also patient to hold her Lasix for 5 days and will need labs next week after she has held her Lasix for 5 days given her decline in renal function. GFR 25.

## 2024-10-08 ENCOUNTER — LAB (OUTPATIENT)
Age: 89
End: 2024-10-08

## 2024-10-08 ENCOUNTER — TELEPHONE (OUTPATIENT)
Age: 89
End: 2024-10-08

## 2024-10-08 DIAGNOSIS — N28.9 DECREASED RENAL FUNCTION: ICD-10-CM

## 2024-10-08 DIAGNOSIS — N39.0 CHRONIC UTI: Primary | ICD-10-CM

## 2024-10-08 DIAGNOSIS — N39.0 CHRONIC UTI: ICD-10-CM

## 2024-10-08 NOTE — TELEPHONE ENCOUNTER
Pt in for repeat CMP for Mini and care taker had a recent urine sample to send away to make sure antibiotic work for her recent UTI.  Urine culture ordered.

## 2024-10-09 LAB
ALBUMIN SERPL-MCNC: 4 G/DL (ref 3.5–5)
ALBUMIN/GLOB SERPL: 1.2 (ref 1.1–2.2)
ALP SERPL-CCNC: 97 U/L (ref 45–117)
ALT SERPL-CCNC: 16 U/L (ref 12–78)
ANION GAP SERPL CALC-SCNC: 5 MMOL/L (ref 2–12)
AST SERPL-CCNC: 24 U/L (ref 15–37)
BACTERIA SPEC CULT: NORMAL
BILIRUB SERPL-MCNC: 0.6 MG/DL (ref 0.2–1)
BUN SERPL-MCNC: 30 MG/DL (ref 6–20)
BUN/CREAT SERPL: 19 (ref 12–20)
CALCIUM SERPL-MCNC: 9.4 MG/DL (ref 8.5–10.1)
CHLORIDE SERPL-SCNC: 105 MMOL/L (ref 97–108)
CO2 SERPL-SCNC: 26 MMOL/L (ref 21–32)
CREAT SERPL-MCNC: 1.62 MG/DL (ref 0.55–1.02)
GLOBULIN SER CALC-MCNC: 3.4 G/DL (ref 2–4)
GLUCOSE SERPL-MCNC: 82 MG/DL (ref 65–100)
POTASSIUM SERPL-SCNC: 4.9 MMOL/L (ref 3.5–5.1)
PROT SERPL-MCNC: 7.4 G/DL (ref 6.4–8.2)
SERVICE CMNT-IMP: NORMAL
SODIUM SERPL-SCNC: 136 MMOL/L (ref 136–145)

## 2024-10-25 ENCOUNTER — OFFICE VISIT (OUTPATIENT)
Age: 89
End: 2024-10-25
Payer: MEDICARE

## 2024-10-25 VITALS
HEART RATE: 61 BPM | DIASTOLIC BLOOD PRESSURE: 78 MMHG | BODY MASS INDEX: 24.63 KG/M2 | SYSTOLIC BLOOD PRESSURE: 199 MMHG | OXYGEN SATURATION: 96 % | TEMPERATURE: 98.6 F | WEIGHT: 148 LBS

## 2024-10-25 DIAGNOSIS — I10 PRIMARY HYPERTENSION: ICD-10-CM

## 2024-10-25 DIAGNOSIS — B37.31 YEAST INFECTION INVOLVING THE VAGINA AND SURROUNDING AREA: Primary | ICD-10-CM

## 2024-10-25 PROCEDURE — 99214 OFFICE O/P EST MOD 30 MIN: CPT | Performed by: NURSE PRACTITIONER

## 2024-10-25 RX ORDER — FLUCONAZOLE 150 MG/1
150 TABLET ORAL ONCE
Qty: 1 TABLET | Refills: 1 | Status: SHIPPED | OUTPATIENT
Start: 2024-10-25 | End: 2024-10-25

## 2024-10-25 ASSESSMENT — PATIENT HEALTH QUESTIONNAIRE - PHQ9
2. FEELING DOWN, DEPRESSED OR HOPELESS: NOT AT ALL
SUM OF ALL RESPONSES TO PHQ QUESTIONS 1-9: 0
1. LITTLE INTEREST OR PLEASURE IN DOING THINGS: NOT AT ALL
SUM OF ALL RESPONSES TO PHQ QUESTIONS 1-9: 0
SUM OF ALL RESPONSES TO PHQ9 QUESTIONS 1 & 2: 0

## 2024-10-25 ASSESSMENT — ENCOUNTER SYMPTOMS
ABDOMINAL PAIN: 0
WHEEZING: 0
ANAL BLEEDING: 1
CHEST TIGHTNESS: 0
VOMITING: 0
BLOOD IN STOOL: 0
NAUSEA: 0
SHORTNESS OF BREATH: 0

## 2024-10-25 NOTE — PROGRESS NOTES
Chief Complaint   Patient presents with    Rash         Health Maintenance Due   Topic Date Due    DTaP/Tdap/Td vaccine (1 - Tdap) Never done    Respiratory Syncytial Virus (RSV) Pregnant or age 60 yrs+ (1 - 1-dose 60+ series) Never done    Annual Wellness Visit (Medicare Advantage)  01/01/2024    Flu vaccine (1) 08/01/2024    Lipids  08/22/2024    COVID-19 Vaccine (4 - 2023-24 season) 09/01/2024         \"Have you been to the ER, urgent care clinic since your last visit?  Hospitalized since your last visit?\"    NO    “Have you seen or consulted any other health care providers outside of [facility] since your last visit?”    NO            
Thought content normal.         Judgment: Judgment normal.           Assessment/Plan:     Assessment & Plan  Yeast infection involving the vagina and surrounding area   Uncontrolled  RX as outlined below  Call clinic on Monday if not resolved    Orders:    fluconazole (DIFLUCAN) 150 MG tablet; Take 1 tablet by mouth once for 1 dose    Primary hypertension  Uncontrolled  Isolated incident-strict monitor  BP is systolic in the 190's x 3 readings in the office  Nurse to is chaperoning the patient stable verify that she is taking her medications at home  Will monitor at home and reach out to the clinic on Monday with readings  Patient is asymptomatic discussed chest pain or shortness of breath getting the patient immediately to the emergency room  No signs or symptoms of acute distress while in the clinic                    No results found for any visits on 10/25/24.    No follow-ups on file.     I have reviewed the patient's medical history in detail and updated the computerized patient record.     We had a prolonged discussion about these complex clinical issues and went over the various important aspects to consider. All questions were answered.     She was given an after visit summary or informed of [employer] which includes patient instructions, diagnoses, current medications, & vitals.  She expressed understanding with the diagnosis and plan.     This note was created using voice recognition software. Despite editing, there may be syntax errors.     Signed,   Mini Rose MSN APRN POP-C

## 2024-10-25 NOTE — ASSESSMENT & PLAN NOTE
Uncontrolled  Isolated incident-strict monitor  BP is systolic in the 190's x 3 readings in the office  Nurse to is chaperoning the patient stable verify that she is taking her medications at home  Will monitor at home and reach out to the clinic on Monday with readings  Patient is asymptomatic discussed chest pain or shortness of breath getting the patient immediately to the emergency room  No signs or symptoms of acute distress while in the clinic

## 2024-10-25 NOTE — PATIENT INSTRUCTIONS
Lasix 20 mg daily and Lisinopril 30 mg daily.    Please let me know what her blood pressure is on Monday.

## 2024-11-04 SDOH — HEALTH STABILITY: PHYSICAL HEALTH: ON AVERAGE, HOW MANY DAYS PER WEEK DO YOU ENGAGE IN MODERATE TO STRENUOUS EXERCISE (LIKE A BRISK WALK)?: 0 DAYS

## 2024-11-04 SDOH — HEALTH STABILITY: PHYSICAL HEALTH: ON AVERAGE, HOW MANY MINUTES DO YOU ENGAGE IN EXERCISE AT THIS LEVEL?: 0 MIN

## 2024-11-04 ASSESSMENT — PATIENT HEALTH QUESTIONNAIRE - PHQ9
SUM OF ALL RESPONSES TO PHQ9 QUESTIONS 1 & 2: 1
SUM OF ALL RESPONSES TO PHQ QUESTIONS 1-9: 1
2. FEELING DOWN, DEPRESSED OR HOPELESS: NOT AT ALL
1. LITTLE INTEREST OR PLEASURE IN DOING THINGS: SEVERAL DAYS

## 2024-11-04 ASSESSMENT — LIFESTYLE VARIABLES
HOW MANY STANDARD DRINKS CONTAINING ALCOHOL DO YOU HAVE ON A TYPICAL DAY: PATIENT DOES NOT DRINK
HOW OFTEN DO YOU HAVE SIX OR MORE DRINKS ON ONE OCCASION: 1
HOW OFTEN DO YOU HAVE A DRINK CONTAINING ALCOHOL: NEVER
HOW OFTEN DO YOU HAVE A DRINK CONTAINING ALCOHOL: 1
HOW MANY STANDARD DRINKS CONTAINING ALCOHOL DO YOU HAVE ON A TYPICAL DAY: 0

## 2024-11-05 ENCOUNTER — OFFICE VISIT (OUTPATIENT)
Age: 89
End: 2024-11-05

## 2024-11-05 VITALS
BODY MASS INDEX: 25.83 KG/M2 | HEIGHT: 65 IN | TEMPERATURE: 98.6 F | OXYGEN SATURATION: 96 % | SYSTOLIC BLOOD PRESSURE: 132 MMHG | HEART RATE: 72 BPM | RESPIRATION RATE: 16 BRPM | WEIGHT: 155 LBS | DIASTOLIC BLOOD PRESSURE: 71 MMHG

## 2024-11-05 DIAGNOSIS — Z72.3 INACTIVITY: ICD-10-CM

## 2024-11-05 DIAGNOSIS — Z00.00 MEDICARE ANNUAL WELLNESS VISIT, SUBSEQUENT: Primary | ICD-10-CM

## 2024-11-05 NOTE — PROGRESS NOTES
Medicare Annual Wellness Visit    Eli Eldridge is here for Medicare AWV    Assessment & Plan   Medicare annual wellness visit, subsequent  Inactivity  Recommendations for Preventive Services Due: see orders and patient instructions/AVS.  Recommended screening schedule for the next 5-10 years is provided to the patient in written form: see Patient Instructions/AVS.     Return in 3 months (on 2/5/2025).     Subjective     Patient endorses doing well overall.     Patient's complete Health Risk Assessment and screening values have been reviewed and are found in Flowsheets. The following problems were reviewed today and where indicated follow up appointments were made and/or referrals ordered.    Positive Risk Factor Screenings with Interventions:                Inactivity:  On average, how many days per week do you engage in moderate to strenuous exercise (like a brisk walk)?: 0 days (!) Abnormal  On average, how many minutes do you engage in exercise at this level?: 0 min  Interventions:  Recommended patient to go outside to her porch at least 2-3 times per week for fresh air, meditation and natural vitamin D.  Encouraged to always continue to use her cane and not to be afraid to ever use a walker if she wants to ambulate more and be active.        Vision Screen:  Do you have difficulty driving, watching TV, or doing any of your daily activities because of your eyesight?: (!) Yes  Have you had an eye exam within the past year?: Yes  Interventions:   Patient comments: Patient has followed up with ophthalmology recently and has new eyeglasses that she follows with [facility].     ADL's:   Patient reports needing help with:  Select all that apply: (!) Dressing, Bathing  Select all that apply: (!) Laundry, Housekeeping, Banking/Finances, Shopping, Telephone Use, Food Preparation, Transportation, Taking Medications  Interventions:  Patient comments: Patient has full-time caregivers at her home.  She is never

## 2024-11-05 NOTE — PROGRESS NOTES
Chief Complaint   Patient presents with    Medicare AWV         Health Maintenance Due   Topic Date Due    DTaP/Tdap/Td vaccine (1 - Tdap) Never done    Respiratory Syncytial Virus (RSV) Pregnant or age 60 yrs+ (1 - 1-dose 60+ series) Never done    Annual Wellness Visit (Medicare Advantage)  01/01/2024    Flu vaccine (1) 08/01/2024    Lipids  08/22/2024    COVID-19 Vaccine (4 - 2023-24 season) 09/01/2024         \"Have you been to the ER, urgent care clinic since your last visit?  Hospitalized since your last visit?\"    NO    “Have you seen or consulted any other health care providers outside of [facility] since your last visit?”    Cardiology

## 2024-11-05 NOTE — PATIENT INSTRUCTIONS
Learning About Being Active as an Older Adult  Why is being active important as you get older?     Being active is one of the best things you can do for your health. And it's never too late to start. Being active--or getting active, if you aren't already--has definite benefits. It can:  Give you more energy,  Keep your mind sharp.  Improve balance to reduce your risk of falls.  Help you manage chronic illness with fewer medicines.  No matter how old you are, how fit you are, or what health problems you have, there is a form of activity that will work for you. And the more physical activity you can do, the better your overall health will be.  What kinds of activity can help you stay healthy?  Being more active will make your daily activities easier. Physical activity includes planned exercise and things you do in daily life. There are four types of activity:  Aerobic.  Doing aerobic activity makes your heart and lungs strong.  Includes walking, dancing, and gardening.  Aim for at least 2½ hours spread throughout the week.  It improves your energy and can help you sleep better.  Muscle-strengthening.  This type of activity can help maintain muscle and strengthen bones.  Includes climbing stairs, using resistance bands, and lifting or carrying heavy loads.  Aim for at least twice a week.  It can help protect the knees and other joints.  Stretching.  Stretching gives you better range of motion in joints and muscles.  Includes upper arm stretches, calf stretches, and gentle yoga.  Aim for at least twice a week, preferably after your muscles are warmed up from other activities.  It can help you function better in daily life.  Balancing.  This helps you stay coordinated and have good posture.  Includes heel-to-toe walking, roshni chi, and certain types of yoga.  Aim for at least 3 days a week.  It can reduce your risk of falling.  Even if you have a hard time meeting the recommendations, it's better to be more active

## 2024-11-14 ENCOUNTER — NURSE ONLY (OUTPATIENT)
Age: 89
End: 2024-11-14
Payer: MEDICARE

## 2024-11-14 DIAGNOSIS — N30.01 ACUTE CYSTITIS WITH HEMATURIA: Primary | ICD-10-CM

## 2024-11-14 DIAGNOSIS — R35.0 URINE FREQUENCY: Primary | ICD-10-CM

## 2024-11-14 LAB
BILIRUBIN, URINE, POC: NEGATIVE
BLOOD URINE, POC: ABNORMAL
GLUCOSE URINE, POC: 100
KETONES, URINE, POC: NEGATIVE
LEUKOCYTE ESTERASE, URINE, POC: ABNORMAL
NITRITE, URINE, POC: POSITIVE
PH, URINE, POC: 6 (ref 4.6–8)
PROTEIN,URINE, POC: ABNORMAL
SPECIFIC GRAVITY, URINE, POC: 1.02 (ref 1–1.03)
URINALYSIS CLARITY, POC: CLEAR
URINALYSIS COLOR, POC: YELLOW
UROBILINOGEN, POC: ABNORMAL

## 2024-11-14 PROCEDURE — 81003 URINALYSIS AUTO W/O SCOPE: CPT | Performed by: NURSE PRACTITIONER

## 2024-11-14 PROCEDURE — PBSHW AMB POC URINALYSIS DIP STICK AUTO W/O MICRO: Performed by: NURSE PRACTITIONER

## 2024-11-14 RX ORDER — CIPROFLOXACIN 250 MG/1
250 TABLET, FILM COATED ORAL 2 TIMES DAILY
Qty: 6 TABLET | Refills: 0 | Status: SHIPPED | OUTPATIENT
Start: 2024-11-14 | End: 2024-11-17

## 2024-11-14 NOTE — PROGRESS NOTES
Nitrites on dipstick sent in cipro to start now. Culture will be back by Saturday morning.     Mini

## 2024-11-17 LAB
BACTERIA SPEC CULT: ABNORMAL
CC UR VC: ABNORMAL
SERVICE CMNT-IMP: ABNORMAL

## 2024-11-18 DIAGNOSIS — E03.9 HYPOTHYROIDISM, UNSPECIFIED TYPE: ICD-10-CM

## 2024-11-18 RX ORDER — SULFAMETHOXAZOLE AND TRIMETHOPRIM 800; 160 MG/1; MG/1
1 TABLET ORAL 2 TIMES DAILY
Qty: 6 TABLET | Refills: 0 | Status: SHIPPED | OUTPATIENT
Start: 2024-11-18 | End: 2024-11-21

## 2024-11-18 RX ORDER — LEVOTHYROXINE SODIUM 75 UG/1
75 TABLET ORAL DAILY
Qty: 60 TABLET | Refills: 0 | Status: SHIPPED | OUTPATIENT
Start: 2024-11-18

## 2024-11-19 ENCOUNTER — APPOINTMENT (OUTPATIENT)
Facility: HOSPITAL | Age: 89
End: 2024-11-19
Payer: MEDICARE

## 2024-11-19 ENCOUNTER — HOSPITAL ENCOUNTER (EMERGENCY)
Facility: HOSPITAL | Age: 89
Discharge: HOME | End: 2024-11-19
Attending: STUDENT IN AN ORGANIZED HEALTH CARE EDUCATION/TRAINING PROGRAM
Payer: MEDICARE

## 2024-11-19 VITALS
OXYGEN SATURATION: 97 % | BODY MASS INDEX: 23.32 KG/M2 | HEIGHT: 65 IN | RESPIRATION RATE: 24 BRPM | WEIGHT: 140 LBS | TEMPERATURE: 97.9 F | HEART RATE: 65 BPM | SYSTOLIC BLOOD PRESSURE: 154 MMHG | DIASTOLIC BLOOD PRESSURE: 122 MMHG
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
## 2024-11-19 DIAGNOSIS — S09.90XA INJURY OF HEAD, INITIAL ENCOUNTER: ICD-10-CM

## 2024-11-19 LAB
ALBUMIN SERPL-MCNC: 3.9 G/DL (ref 3.5–5)
ALBUMIN/GLOB SERPL: 1 (ref 1.1–2.2)
ALP SERPL-CCNC: 107 U/L (ref 45–117)
ALT SERPL-CCNC: 24 U/L (ref 12–78)
ANION GAP SERPL CALC-SCNC: 3 MMOL/L (ref 2–12)
AST SERPL-CCNC: 27 U/L (ref 15–37)
BASOPHILS # BLD: 0.1 K/UL (ref 0–0.1)
BASOPHILS NFR BLD: 1 % (ref 0–1)
BILIRUB SERPL-MCNC: 0.5 MG/DL (ref 0.2–1)
BUN SERPL-MCNC: 22 MG/DL (ref 6–20)
BUN/CREAT SERPL: 13 (ref 12–20)
CALCIUM SERPL-MCNC: 9.5 MG/DL (ref 8.5–10.1)
CHLORIDE SERPL-SCNC: 108 MMOL/L (ref 97–108)
CO2 SERPL-SCNC: 29 MMOL/L (ref 21–32)
COMMENT:: NORMAL
CREAT SERPL-MCNC: 1.68 MG/DL (ref 0.55–1.02)
DIFFERENTIAL METHOD BLD: ABNORMAL
EOSINOPHIL # BLD: 0.4 K/UL (ref 0–0.4)
EOSINOPHIL NFR BLD: 4 % (ref 0–7)
ERYTHROCYTE [DISTWIDTH] IN BLOOD BY AUTOMATED COUNT: 13.5 % (ref 11.5–14.5)
GLOBULIN SER CALC-MCNC: 3.8 G/DL (ref 2–4)
GLUCOSE SERPL-MCNC: 126 MG/DL (ref 65–100)
HCT VFR BLD AUTO: 40.5 % (ref 35–47)
HGB BLD-MCNC: 12.9 G/DL (ref 11.5–16)
IMM GRANULOCYTES # BLD AUTO: 0.1 K/UL (ref 0–0.04)
IMM GRANULOCYTES NFR BLD AUTO: 1 % (ref 0–0.5)
INR PPP: 1.2 (ref 0.9–1.1)
LYMPHOCYTES # BLD: 1.5 K/UL (ref 0.8–3.5)
LYMPHOCYTES NFR BLD: 16 % (ref 12–49)
MCH RBC QN AUTO: 32.8 PG (ref 26–34)
MCHC RBC AUTO-ENTMCNC: 31.9 G/DL (ref 30–36.5)
MCV RBC AUTO: 103.1 FL (ref 80–99)
MONOCYTES # BLD: 0.5 K/UL (ref 0–1)
MONOCYTES NFR BLD: 6 % (ref 5–13)
NEUTS SEG # BLD: 7.2 K/UL (ref 1.8–8)
NEUTS SEG NFR BLD: 72 % (ref 32–75)
NRBC # BLD: 0 K/UL (ref 0–0.01)
NRBC BLD-RTO: 0 PER 100 WBC
PLATELET # BLD AUTO: 171 K/UL (ref 150–400)
PMV BLD AUTO: 10.9 FL (ref 8.9–12.9)
POTASSIUM SERPL-SCNC: 4.3 MMOL/L (ref 3.5–5.1)
PROT SERPL-MCNC: 7.7 G/DL (ref 6.4–8.2)
PROTHROMBIN TIME: 12 SEC (ref 9–11.1)
RBC # BLD AUTO: 3.93 M/UL (ref 3.8–5.2)
SODIUM SERPL-SCNC: 140 MMOL/L (ref 136–145)
SPECIMEN HOLD: NORMAL
WBC # BLD AUTO: 9.7 K/UL (ref 3.6–11)

## 2024-11-19 PROCEDURE — 93005 ELECTROCARDIOGRAM TRACING: CPT | Performed by: STUDENT IN AN ORGANIZED HEALTH CARE EDUCATION/TRAINING PROGRAM

## 2024-11-19 PROCEDURE — 71045 X-RAY EXAM CHEST 1 VIEW: CPT

## 2024-11-19 PROCEDURE — 85610 PROTHROMBIN TIME: CPT

## 2024-11-19 PROCEDURE — 85025 COMPLETE CBC W/AUTO DIFF WBC: CPT

## 2024-11-19 PROCEDURE — 6370000000 HC RX 637 (ALT 250 FOR IP): Performed by: STUDENT IN AN ORGANIZED HEALTH CARE EDUCATION/TRAINING PROGRAM

## 2024-11-19 PROCEDURE — 99285 EMERGENCY DEPT VISIT HI MDM: CPT

## 2024-11-19 PROCEDURE — 36415 COLL VENOUS BLD VENIPUNCTURE: CPT

## 2024-11-19 PROCEDURE — 73502 X-RAY EXAM HIP UNI 2-3 VIEWS: CPT

## 2024-11-19 PROCEDURE — 80053 COMPREHEN METABOLIC PANEL: CPT

## 2024-11-19 PROCEDURE — 72125 CT NECK SPINE W/O DYE: CPT

## 2024-11-19 PROCEDURE — 70450 CT HEAD/BRAIN W/O DYE: CPT

## 2024-11-19 RX ORDER — METHOCARBAMOL 500 MG/1
500 TABLET, FILM COATED ORAL ONCE
Status: COMPLETED | OUTPATIENT
Start: 2024-11-19 | End: 2024-11-19

## 2024-11-19 RX ORDER — ACETAMINOPHEN 500 MG
500 TABLET ORAL
Status: COMPLETED | OUTPATIENT
Start: 2024-11-19 | End: 2024-11-19

## 2024-11-19 RX ADMIN — ACETAMINOPHEN 500 MG: 500 TABLET ORAL at 19:21

## 2024-11-19 RX ADMIN — METHOCARBAMOL TABLETS 500 MG: 500 TABLET, COATED ORAL at 19:21

## 2024-11-19 NOTE — ED PROVIDER NOTES
SYNTHROID  Take 1 tablet by mouth daily     lisinopril 30 MG tablet  Commonly known as: PRINIVIL;ZESTRIL  Take 1 tablet by mouth daily     lovastatin 40 MG tablet  Commonly known as: MEVACOR  Take 1 tablet by mouth nightly     omeprazole 20 MG delayed release capsule  Commonly known as: PRILOSEC  Take 1 capsule by mouth daily     Slow Release Iron 45 MG Tbcr  Generic drug: Ferrous Sulfate Dried ER  Take 1 tablet by mouth daily     sulfamethoxazole-trimethoprim 800-160 MG per tablet  Commonly known as: BACTRIM DS;SEPTRA DS  Take 1 tablet by mouth 2 times daily for 3 days  Ask about: Should I take this medication?     timolol 0.5 % ophthalmic solution  Commonly known as: TIMOPTIC                DISCONTINUED MEDICATIONS:  Discharge Medication List as of 11/19/2024  7:50 PM          I am the Primary Clinician of Record. Fred Hutchison MD (electronically signed)    (Please note that parts of this dictation were completed with voice recognition software. Quite often unanticipated grammatical, syntax, homophones, and other interpretive errors are inadvertently transcribed by the computer software. Please disregards these errors. Please excuse any errors that have escaped final proofreading.)        Fred Hutchison MD  11/22/24 [id number]

## 2024-11-20 LAB
EKG ATRIAL RATE: 53 BPM
EKG DIAGNOSIS: NORMAL
EKG Q-T INTERVAL: 480 MS
EKG QRS DURATION: 138 MS
EKG QTC CALCULATION (BAZETT): 507 MS
EKG R AXIS: 264 DEGREES
EKG T AXIS: 76 DEGREES
EKG VENTRICULAR RATE: 67 BPM

## 2024-11-20 NOTE — ED NOTES
Bedside and Verbal shift change report given to Sunitha (oncoming nurse) by Laverne (offgoing nurse). Report included the following information Nurse Handoff Report, ED SBAR, Adult Overview, MAR, Recent Results, and Neuro Assessment.

## 2024-12-04 ENCOUNTER — NURSE ONLY (OUTPATIENT)
Age: 88
End: 2024-12-04

## 2024-12-04 DIAGNOSIS — N39.0 CHRONIC UTI: Primary | ICD-10-CM

## 2024-12-06 LAB
BACTERIA SPEC CULT: ABNORMAL
CC UR VC: ABNORMAL
SERVICE CMNT-IMP: ABNORMAL

## 2024-12-06 RX ORDER — SULFAMETHOXAZOLE AND TRIMETHOPRIM 800; 160 MG/1; MG/1
1 TABLET ORAL 2 TIMES DAILY
Qty: 14 TABLET | Refills: 0 | Status: SHIPPED | OUTPATIENT
Start: 2024-12-06 | End: 2024-12-13

## 2024-12-06 NOTE — PATIENT INSTRUCTIONS

## 2024-12-09 ENCOUNTER — PATIENT MESSAGE (OUTPATIENT)
Age: 88
End: 2024-12-09

## 2024-12-09 RX ORDER — FLUCONAZOLE 150 MG/1
150 TABLET ORAL ONCE
Qty: 1 TABLET | Refills: 1 | Status: SHIPPED | OUTPATIENT
Start: 2024-12-09 | End: 2024-12-09

## 2024-12-29 ENCOUNTER — APPOINTMENT (OUTPATIENT)
Facility: HOSPITAL | Age: 88
DRG: 208 | End: 2024-12-29
Payer: MEDICARE

## 2024-12-29 ENCOUNTER — HOSPITAL ENCOUNTER (INPATIENT)
Facility: HOSPITAL | Age: 88
LOS: 1 days | DRG: 208 | End: 2024-12-30
Attending: EMERGENCY MEDICINE | Admitting: INTERNAL MEDICINE
Payer: MEDICARE

## 2024-12-29 DIAGNOSIS — N39.0 COMPLICATED UTI (URINARY TRACT INFECTION): ICD-10-CM

## 2024-12-29 DIAGNOSIS — J10.1 INFLUENZA A: ICD-10-CM

## 2024-12-29 DIAGNOSIS — R55 SYNCOPE AND COLLAPSE: Primary | ICD-10-CM

## 2024-12-29 DIAGNOSIS — J93.9 PNEUMOTHORAX ON RIGHT: ICD-10-CM

## 2024-12-29 DIAGNOSIS — I47.20 V-TACH (HCC): ICD-10-CM

## 2024-12-29 DIAGNOSIS — E87.70 HYPERVOLEMIA, UNSPECIFIED HYPERVOLEMIA TYPE: ICD-10-CM

## 2024-12-29 DIAGNOSIS — F03.90 DEMENTIA, UNSPECIFIED DEMENTIA SEVERITY, UNSPECIFIED DEMENTIA TYPE, UNSPECIFIED WHETHER BEHAVIORAL, PSYCHOTIC, OR MOOD DISTURBANCE OR ANXIETY (HCC): ICD-10-CM

## 2024-12-29 LAB
ALBUMIN SERPL-MCNC: 3.5 G/DL (ref 3.5–5)
ALBUMIN/GLOB SERPL: 1 (ref 1.1–2.2)
ALP SERPL-CCNC: 82 U/L (ref 45–117)
ALT SERPL-CCNC: 12 U/L (ref 12–78)
ANION GAP SERPL CALC-SCNC: 7 MMOL/L (ref 2–12)
AST SERPL-CCNC: 23 U/L (ref 15–37)
BASOPHILS # BLD: 0.1 K/UL (ref 0–0.1)
BASOPHILS NFR BLD: 1 % (ref 0–1)
BILIRUB SERPL-MCNC: 0.7 MG/DL (ref 0.2–1)
BUN SERPL-MCNC: 18 MG/DL (ref 6–20)
BUN/CREAT SERPL: 14 (ref 12–20)
CALCIUM SERPL-MCNC: 9.3 MG/DL (ref 8.5–10.1)
CHLORIDE SERPL-SCNC: 104 MMOL/L (ref 97–108)
CO2 SERPL-SCNC: 24 MMOL/L (ref 21–32)
CREAT SERPL-MCNC: 1.31 MG/DL (ref 0.55–1.02)
DIFFERENTIAL METHOD BLD: ABNORMAL
EOSINOPHIL # BLD: 0.1 K/UL (ref 0–0.4)
EOSINOPHIL NFR BLD: 1 % (ref 0–7)
ERYTHROCYTE [DISTWIDTH] IN BLOOD BY AUTOMATED COUNT: 13.1 % (ref 11.5–14.5)
GLOBULIN SER CALC-MCNC: 3.5 G/DL (ref 2–4)
GLUCOSE SERPL-MCNC: 179 MG/DL (ref 65–100)
HCT VFR BLD AUTO: 34.8 % (ref 35–47)
HGB BLD-MCNC: 11.4 G/DL (ref 11.5–16)
IMM GRANULOCYTES # BLD AUTO: 0 K/UL (ref 0–0.04)
IMM GRANULOCYTES NFR BLD AUTO: 0 % (ref 0–0.5)
LYMPHOCYTES # BLD: 0.4 K/UL (ref 0.8–3.5)
LYMPHOCYTES NFR BLD: 6 % (ref 12–49)
MCH RBC QN AUTO: 32.5 PG (ref 26–34)
MCHC RBC AUTO-ENTMCNC: 32.8 G/DL (ref 30–36.5)
MCV RBC AUTO: 99.1 FL (ref 80–99)
MONOCYTES # BLD: 0.7 K/UL (ref 0–1)
MONOCYTES NFR BLD: 9 % (ref 5–13)
NEUTS SEG # BLD: 6 K/UL (ref 1.8–8)
NEUTS SEG NFR BLD: 83 % (ref 32–75)
NRBC # BLD: 0 K/UL (ref 0–0.01)
NRBC BLD-RTO: 0 PER 100 WBC
NT PRO BNP: 3995 PG/ML
PLATELET # BLD AUTO: 146 K/UL (ref 150–400)
PMV BLD AUTO: 10.8 FL (ref 8.9–12.9)
POTASSIUM SERPL-SCNC: 3.5 MMOL/L (ref 3.5–5.1)
PROT SERPL-MCNC: 7 G/DL (ref 6.4–8.2)
RBC # BLD AUTO: 3.51 M/UL (ref 3.8–5.2)
RBC MORPH BLD: ABNORMAL
SODIUM SERPL-SCNC: 135 MMOL/L (ref 136–145)
TROPONIN I SERPL HS-MCNC: 35 NG/L (ref 0–51)
WBC # BLD AUTO: 7.3 K/UL (ref 3.6–11)

## 2024-12-29 PROCEDURE — 36415 COLL VENOUS BLD VENIPUNCTURE: CPT

## 2024-12-29 PROCEDURE — 87636 SARSCOV2 & INF A&B AMP PRB: CPT

## 2024-12-29 PROCEDURE — 31500 INSERT EMERGENCY AIRWAY: CPT

## 2024-12-29 PROCEDURE — 93005 ELECTROCARDIOGRAM TRACING: CPT | Performed by: EMERGENCY MEDICINE

## 2024-12-29 PROCEDURE — 99285 EMERGENCY DEPT VISIT HI MDM: CPT

## 2024-12-29 PROCEDURE — 85025 COMPLETE CBC W/AUTO DIFF WBC: CPT

## 2024-12-29 PROCEDURE — 0D9670Z DRAINAGE OF STOMACH WITH DRAINAGE DEVICE, VIA NATURAL OR ARTIFICIAL OPENING: ICD-10-PCS | Performed by: EMERGENCY MEDICINE

## 2024-12-29 PROCEDURE — 84484 ASSAY OF TROPONIN QUANT: CPT

## 2024-12-29 PROCEDURE — 3E033XZ INTRODUCTION OF VASOPRESSOR INTO PERIPHERAL VEIN, PERCUTANEOUS APPROACH: ICD-10-PCS | Performed by: EMERGENCY MEDICINE

## 2024-12-29 PROCEDURE — 71045 X-RAY EXAM CHEST 1 VIEW: CPT

## 2024-12-29 PROCEDURE — 83880 ASSAY OF NATRIURETIC PEPTIDE: CPT

## 2024-12-29 PROCEDURE — 70450 CT HEAD/BRAIN W/O DYE: CPT

## 2024-12-29 PROCEDURE — 80053 COMPREHEN METABOLIC PANEL: CPT

## 2024-12-29 ASSESSMENT — PAIN SCALES - GENERAL: PAINLEVEL_OUTOF10: 0

## 2024-12-30 ENCOUNTER — APPOINTMENT (OUTPATIENT)
Facility: HOSPITAL | Age: 88
DRG: 208 | End: 2024-12-30
Payer: MEDICARE

## 2024-12-30 VITALS
DIASTOLIC BLOOD PRESSURE: 13 MMHG | TEMPERATURE: 98.2 F | BODY MASS INDEX: 25.71 KG/M2 | OXYGEN SATURATION: 99 % | HEIGHT: 65 IN | SYSTOLIC BLOOD PRESSURE: 89 MMHG | WEIGHT: 154.32 LBS

## 2024-12-30 PROBLEM — J96.02 ACUTE RESPIRATORY FAILURE WITH HYPOXIA AND HYPERCAPNIA: Status: ACTIVE | Noted: 2024-12-30

## 2024-12-30 PROBLEM — J96.01 ACUTE RESPIRATORY FAILURE WITH HYPOXIA AND HYPERCAPNIA: Status: ACTIVE | Noted: 2024-12-30

## 2024-12-30 LAB
ANION GAP SERPL CALC-SCNC: 8 MMOL/L (ref 2–12)
APPEARANCE UR: ABNORMAL
BACTERIA URNS QL MICRO: ABNORMAL /HPF
BASE EXCESS BLD CALC-SCNC: 0 MMOL/L
BDY SITE: ABNORMAL
BILIRUB UR QL: NEGATIVE
BUN SERPL-MCNC: 20 MG/DL (ref 6–20)
BUN/CREAT SERPL: 14 (ref 12–20)
CALCIUM SERPL-MCNC: 9.3 MG/DL (ref 8.5–10.1)
CHLORIDE SERPL-SCNC: 103 MMOL/L (ref 97–108)
CO2 SERPL-SCNC: 25 MMOL/L (ref 21–32)
COLOR UR: ABNORMAL
CREAT SERPL-MCNC: 1.4 MG/DL (ref 0.55–1.02)
EPITH CASTS URNS QL MICRO: ABNORMAL /LPF
FLUAV RNA SPEC QL NAA+PROBE: DETECTED
FLUBV RNA SPEC QL NAA+PROBE: NOT DETECTED
GAS FLOW.O2 O2 DELIVERY SYS: ABNORMAL
GAS FLOW.O2 SETTING OXYMISER: 16 BPM
GLUCOSE BLD STRIP.AUTO-MCNC: 227 MG/DL (ref 65–117)
GLUCOSE SERPL-MCNC: 230 MG/DL (ref 65–100)
GLUCOSE UR STRIP.AUTO-MCNC: NEGATIVE MG/DL
HCO3 BLD-SCNC: 26 MMOL/L (ref 21–28)
HGB UR QL STRIP: NEGATIVE
KETONES UR QL STRIP.AUTO: ABNORMAL MG/DL
LEUKOCYTE ESTERASE UR QL STRIP.AUTO: ABNORMAL
MAGNESIUM SERPL-MCNC: 2.4 MG/DL (ref 1.6–2.4)
NITRITE UR QL STRIP.AUTO: NEGATIVE
O2/TOTAL GAS SETTING VFR VENT: 100 %
PCO2 BLD: 46.7 MMHG (ref 35–48)
PEEP RESPIRATORY: 5 CMH2O
PH BLD: 7.35 (ref 7.35–7.45)
PH UR STRIP: 5.5 (ref 5–8)
PO2 BLD: 300 MMHG (ref 83–108)
POTASSIUM SERPL-SCNC: 4 MMOL/L (ref 3.5–5.1)
PROT UR STRIP-MCNC: 30 MG/DL
RBC #/AREA URNS HPF: ABNORMAL /HPF (ref 0–5)
SAO2 % BLD: 99.9 % (ref 92–97)
SARS-COV-2 RNA RESP QL NAA+PROBE: NOT DETECTED
SERVICE CMNT-IMP: ABNORMAL
SODIUM SERPL-SCNC: 136 MMOL/L (ref 136–145)
SOURCE: ABNORMAL
SP GR UR REFRACTOMETRY: 1.02
SPECIMEN TYPE: ABNORMAL
URINE CULTURE IF INDICATED: ABNORMAL
UROBILINOGEN UR QL STRIP.AUTO: 1 EU/DL (ref 0.2–1)
VENTILATION MODE VENT: ABNORMAL
VT SETTING VENT: 330 ML
WBC URNS QL MICRO: ABNORMAL /HPF (ref 0–4)

## 2024-12-30 PROCEDURE — 82962 GLUCOSE BLOOD TEST: CPT

## 2024-12-30 PROCEDURE — 94002 VENT MGMT INPAT INIT DAY: CPT

## 2024-12-30 PROCEDURE — 6360000002 HC RX W HCPCS: Performed by: EMERGENCY MEDICINE

## 2024-12-30 PROCEDURE — 87088 URINE BACTERIA CULTURE: CPT

## 2024-12-30 PROCEDURE — 36415 COLL VENOUS BLD VENIPUNCTURE: CPT

## 2024-12-30 PROCEDURE — 36600 WITHDRAWAL OF ARTERIAL BLOOD: CPT

## 2024-12-30 PROCEDURE — 2580000003 HC RX 258: Performed by: EMERGENCY MEDICINE

## 2024-12-30 PROCEDURE — 96375 TX/PRO/DX INJ NEW DRUG ADDON: CPT

## 2024-12-30 PROCEDURE — 82803 BLOOD GASES ANY COMBINATION: CPT

## 2024-12-30 PROCEDURE — 2000000000 HC ICU R&B

## 2024-12-30 PROCEDURE — 6360000002 HC RX W HCPCS: Performed by: NURSE PRACTITIONER

## 2024-12-30 PROCEDURE — 5A1935Z RESPIRATORY VENTILATION, LESS THAN 24 CONSECUTIVE HOURS: ICD-10-PCS | Performed by: INTERNAL MEDICINE

## 2024-12-30 PROCEDURE — 87186 SC STD MICRODIL/AGAR DIL: CPT

## 2024-12-30 PROCEDURE — 2500000003 HC RX 250 WO HCPCS: Performed by: EMERGENCY MEDICINE

## 2024-12-30 PROCEDURE — 6370000000 HC RX 637 (ALT 250 FOR IP): Performed by: INTERNAL MEDICINE

## 2024-12-30 PROCEDURE — 2500000003 HC RX 250 WO HCPCS: Performed by: NURSE PRACTITIONER

## 2024-12-30 PROCEDURE — 93005 ELECTROCARDIOGRAM TRACING: CPT | Performed by: INTERNAL MEDICINE

## 2024-12-30 PROCEDURE — 92950 HEART/LUNG RESUSCITATION CPR: CPT

## 2024-12-30 PROCEDURE — 96374 THER/PROPH/DIAG INJ IV PUSH: CPT

## 2024-12-30 PROCEDURE — 2580000003 HC RX 258: Performed by: NURSE PRACTITIONER

## 2024-12-30 PROCEDURE — 6370000000 HC RX 637 (ALT 250 FOR IP): Performed by: EMERGENCY MEDICINE

## 2024-12-30 PROCEDURE — 81001 URINALYSIS AUTO W/SCOPE: CPT

## 2024-12-30 PROCEDURE — 80048 BASIC METABOLIC PNL TOTAL CA: CPT

## 2024-12-30 PROCEDURE — 87086 URINE CULTURE/COLONY COUNT: CPT

## 2024-12-30 PROCEDURE — 94640 AIRWAY INHALATION TREATMENT: CPT

## 2024-12-30 PROCEDURE — 71045 X-RAY EXAM CHEST 1 VIEW: CPT

## 2024-12-30 PROCEDURE — 6370000000 HC RX 637 (ALT 250 FOR IP): Performed by: NURSE PRACTITIONER

## 2024-12-30 PROCEDURE — 0BH17EZ INSERTION OF ENDOTRACHEAL AIRWAY INTO TRACHEA, VIA NATURAL OR ARTIFICIAL OPENING: ICD-10-PCS | Performed by: INTERNAL MEDICINE

## 2024-12-30 PROCEDURE — 83735 ASSAY OF MAGNESIUM: CPT

## 2024-12-30 RX ORDER — ATORVASTATIN CALCIUM 10 MG/1
10 TABLET, FILM COATED ORAL DAILY
Status: DISCONTINUED | OUTPATIENT
Start: 2024-12-30 | End: 2024-12-30 | Stop reason: HOSPADM

## 2024-12-30 RX ORDER — ROCURONIUM BROMIDE 50 MG/5 ML
70 SYRINGE (ML) INTRAVENOUS ONCE
Status: COMPLETED | OUTPATIENT
Start: 2024-12-30 | End: 2024-12-30

## 2024-12-30 RX ORDER — ONDANSETRON 4 MG/1
4 TABLET, ORALLY DISINTEGRATING ORAL EVERY 8 HOURS PRN
Status: DISCONTINUED | OUTPATIENT
Start: 2024-12-30 | End: 2024-12-30 | Stop reason: HOSPADM

## 2024-12-30 RX ORDER — OSELTAMIVIR PHOSPHATE 30 MG/1
30 CAPSULE ORAL
Status: DISCONTINUED | OUTPATIENT
Start: 2024-12-30 | End: 2024-12-30

## 2024-12-30 RX ORDER — POLYETHYLENE GLYCOL 3350 17 G/17G
17 POWDER, FOR SOLUTION ORAL DAILY PRN
Status: DISCONTINUED | OUTPATIENT
Start: 2024-12-30 | End: 2024-12-30 | Stop reason: HOSPADM

## 2024-12-30 RX ORDER — GLUCAGON 1 MG/ML
1 KIT INJECTION PRN
Status: DISCONTINUED | OUTPATIENT
Start: 2024-12-30 | End: 2024-12-30 | Stop reason: HOSPADM

## 2024-12-30 RX ORDER — FENTANYL CITRATE-0.9 % NACL/PF 20 MCG/2ML
50 SYRINGE (ML) INTRAVENOUS EVERY 30 MIN PRN
Status: DISCONTINUED | OUTPATIENT
Start: 2024-12-30 | End: 2024-12-30 | Stop reason: HOSPADM

## 2024-12-30 RX ORDER — ACETAMINOPHEN 325 MG/1
650 TABLET ORAL EVERY 4 HOURS PRN
Status: DISCONTINUED | OUTPATIENT
Start: 2024-12-30 | End: 2024-12-30 | Stop reason: HOSPADM

## 2024-12-30 RX ORDER — DORZOLAMIDE HCL 20 MG/ML
2 SOLUTION/ DROPS OPHTHALMIC 3 TIMES DAILY
Status: DISCONTINUED | OUTPATIENT
Start: 2024-12-30 | End: 2024-12-30 | Stop reason: HOSPADM

## 2024-12-30 RX ORDER — FENTANYL CITRATE-0.9 % NACL/PF 10 MCG/ML
25-200 PLASTIC BAG, INJECTION (ML) INTRAVENOUS CONTINUOUS
Status: DISCONTINUED | OUTPATIENT
Start: 2024-12-30 | End: 2024-12-30 | Stop reason: HOSPADM

## 2024-12-30 RX ORDER — TIMOLOL MALEATE 5 MG/ML
1 SOLUTION/ DROPS OPHTHALMIC 2 TIMES DAILY
Status: DISCONTINUED | OUTPATIENT
Start: 2024-12-30 | End: 2024-12-30 | Stop reason: HOSPADM

## 2024-12-30 RX ORDER — LEVOTHYROXINE SODIUM 75 UG/1
75 TABLET ORAL DAILY
Status: DISCONTINUED | OUTPATIENT
Start: 2024-12-30 | End: 2024-12-30 | Stop reason: HOSPADM

## 2024-12-30 RX ORDER — FUROSEMIDE 10 MG/ML
20 INJECTION INTRAMUSCULAR; INTRAVENOUS ONCE
Status: COMPLETED | OUTPATIENT
Start: 2024-12-30 | End: 2024-12-30

## 2024-12-30 RX ORDER — OSELTAMIVIR PHOSPHATE 6 MG/ML
30 FOR SUSPENSION ORAL DAILY
Status: DISCONTINUED | OUTPATIENT
Start: 2024-12-31 | End: 2024-12-30 | Stop reason: HOSPADM

## 2024-12-30 RX ORDER — LANSOPRAZOLE 30 MG/1
30 TABLET, ORALLY DISINTEGRATING, DELAYED RELEASE ORAL
Status: DISCONTINUED | OUTPATIENT
Start: 2024-12-30 | End: 2024-12-30 | Stop reason: HOSPADM

## 2024-12-30 RX ORDER — INSULIN LISPRO 100 [IU]/ML
0-4 INJECTION, SOLUTION INTRAVENOUS; SUBCUTANEOUS EVERY 6 HOURS
Status: DISCONTINUED | OUTPATIENT
Start: 2024-12-30 | End: 2024-12-30 | Stop reason: HOSPADM

## 2024-12-30 RX ORDER — IPRATROPIUM BROMIDE AND ALBUTEROL SULFATE 2.5; .5 MG/3ML; MG/3ML
1 SOLUTION RESPIRATORY (INHALATION) EVERY 4 HOURS PRN
Status: DISCONTINUED | OUTPATIENT
Start: 2024-12-30 | End: 2024-12-30 | Stop reason: HOSPADM

## 2024-12-30 RX ORDER — PROPOFOL 10 MG/ML
5-50 INJECTION, EMULSION INTRAVENOUS CONTINUOUS
Status: DISCONTINUED | OUTPATIENT
Start: 2024-12-30 | End: 2024-12-30 | Stop reason: HOSPADM

## 2024-12-30 RX ORDER — SODIUM CHLORIDE, SODIUM LACTATE, POTASSIUM CHLORIDE, CALCIUM CHLORIDE 600; 310; 30; 20 MG/100ML; MG/100ML; MG/100ML; MG/100ML
INJECTION, SOLUTION INTRAVENOUS CONTINUOUS
Status: DISCONTINUED | OUTPATIENT
Start: 2024-12-30 | End: 2024-12-30 | Stop reason: HOSPADM

## 2024-12-30 RX ORDER — SODIUM CHLORIDE 9 MG/ML
INJECTION, SOLUTION INTRAVENOUS PRN
Status: DISCONTINUED | OUTPATIENT
Start: 2024-12-30 | End: 2024-12-30 | Stop reason: HOSPADM

## 2024-12-30 RX ORDER — OSELTAMIVIR PHOSPHATE 6 MG/ML
75 FOR SUSPENSION ORAL DAILY
Status: DISCONTINUED | OUTPATIENT
Start: 2024-12-30 | End: 2024-12-30

## 2024-12-30 RX ORDER — LANSOPRAZOLE
30 KIT
Status: DISCONTINUED | OUTPATIENT
Start: 2024-12-30 | End: 2024-12-30

## 2024-12-30 RX ORDER — ENOXAPARIN SODIUM 100 MG/ML
30 INJECTION SUBCUTANEOUS DAILY
Status: DISCONTINUED | OUTPATIENT
Start: 2024-12-30 | End: 2024-12-30 | Stop reason: HOSPADM

## 2024-12-30 RX ORDER — OSELTAMIVIR PHOSPHATE 30 MG/1
30 CAPSULE ORAL
Status: COMPLETED | OUTPATIENT
Start: 2024-12-30 | End: 2024-12-30

## 2024-12-30 RX ORDER — CHLORHEXIDINE GLUCONATE ORAL RINSE 1.2 MG/ML
15 SOLUTION DENTAL 2 TIMES DAILY
Status: DISCONTINUED | OUTPATIENT
Start: 2024-12-30 | End: 2024-12-30 | Stop reason: HOSPADM

## 2024-12-30 RX ORDER — ALBUTEROL SULFATE 0.83 MG/ML
2.5 SOLUTION RESPIRATORY (INHALATION)
Status: DISPENSED | OUTPATIENT
Start: 2024-12-30 | End: 2024-12-30

## 2024-12-30 RX ORDER — ACETAMINOPHEN 325 MG/1
650 TABLET ORAL EVERY 6 HOURS PRN
Status: DISCONTINUED | OUTPATIENT
Start: 2024-12-30 | End: 2024-12-30 | Stop reason: HOSPADM

## 2024-12-30 RX ORDER — DEXTROSE MONOHYDRATE 100 MG/ML
INJECTION, SOLUTION INTRAVENOUS CONTINUOUS PRN
Status: DISCONTINUED | OUTPATIENT
Start: 2024-12-30 | End: 2024-12-30 | Stop reason: HOSPADM

## 2024-12-30 RX ORDER — ONDANSETRON 2 MG/ML
4 INJECTION INTRAMUSCULAR; INTRAVENOUS EVERY 6 HOURS PRN
Status: DISCONTINUED | OUTPATIENT
Start: 2024-12-30 | End: 2024-12-30 | Stop reason: HOSPADM

## 2024-12-30 RX ORDER — EPINEPHRINE IN SOD CHLOR,ISO 1 MG/10 ML
SYRINGE (ML) INTRAVENOUS DAILY PRN
Status: COMPLETED | OUTPATIENT
Start: 2024-12-30 | End: 2024-12-30

## 2024-12-30 RX ORDER — SODIUM CHLORIDE 0.9 % (FLUSH) 0.9 %
5-40 SYRINGE (ML) INJECTION EVERY 12 HOURS SCHEDULED
Status: DISCONTINUED | OUTPATIENT
Start: 2024-12-30 | End: 2024-12-30 | Stop reason: HOSPADM

## 2024-12-30 RX ORDER — ACETAMINOPHEN 650 MG/1
650 SUPPOSITORY RECTAL EVERY 6 HOURS PRN
Status: DISCONTINUED | OUTPATIENT
Start: 2024-12-30 | End: 2024-12-30 | Stop reason: HOSPADM

## 2024-12-30 RX ORDER — LATANOPROST 50 UG/ML
1 SOLUTION/ DROPS OPHTHALMIC NIGHTLY
Status: DISCONTINUED | OUTPATIENT
Start: 2024-12-30 | End: 2024-12-30 | Stop reason: HOSPADM

## 2024-12-30 RX ORDER — ETOMIDATE 2 MG/ML
20 INJECTION INTRAVENOUS ONCE
Status: COMPLETED | OUTPATIENT
Start: 2024-12-30 | End: 2024-12-30

## 2024-12-30 RX ORDER — SODIUM CHLORIDE 0.9 % (FLUSH) 0.9 %
5-40 SYRINGE (ML) INJECTION PRN
Status: DISCONTINUED | OUTPATIENT
Start: 2024-12-30 | End: 2024-12-30 | Stop reason: HOSPADM

## 2024-12-30 RX ADMIN — Medication 70 MG: at 03:36

## 2024-12-30 RX ADMIN — EPINEPHRINE 1 MG: 0.1 INJECTION, SOLUTION ENDOTRACHEAL; INTRACARDIAC; INTRAVENOUS at 03:26

## 2024-12-30 RX ADMIN — SODIUM CHLORIDE, PRESERVATIVE FREE 10 ML: 5 INJECTION INTRAVENOUS at 08:40

## 2024-12-30 RX ADMIN — ATORVASTATIN CALCIUM 10 MG: 10 TABLET, FILM COATED ORAL at 09:00

## 2024-12-30 RX ADMIN — ENOXAPARIN SODIUM 30 MG: 100 INJECTION SUBCUTANEOUS at 09:00

## 2024-12-30 RX ADMIN — SODIUM CHLORIDE, POTASSIUM CHLORIDE, SODIUM LACTATE AND CALCIUM CHLORIDE: 600; 310; 30; 20 INJECTION, SOLUTION INTRAVENOUS at 04:42

## 2024-12-30 RX ADMIN — AMIODARONE HYDROCHLORIDE 1 MG/MIN: 50 INJECTION, SOLUTION INTRAVENOUS at 03:33

## 2024-12-30 RX ADMIN — OSELTAMIVIR PHOSPHATE 30 MG: 30 CAPSULE ORAL at 01:34

## 2024-12-30 RX ADMIN — LEVOTHYROXINE SODIUM 75 MCG: 0.07 TABLET ORAL at 09:00

## 2024-12-30 RX ADMIN — INSULIN LISPRO 1 UNITS: 100 INJECTION, SOLUTION INTRAVENOUS; SUBCUTANEOUS at 06:04

## 2024-12-30 RX ADMIN — FUROSEMIDE 20 MG: 10 INJECTION, SOLUTION INTRAMUSCULAR; INTRAVENOUS at 01:35

## 2024-12-30 RX ADMIN — WATER 1000 MG: 1 INJECTION INTRAMUSCULAR; INTRAVENOUS; SUBCUTANEOUS at 01:37

## 2024-12-30 RX ADMIN — PROPOFOL 20 MCG/KG/MIN: 10 INJECTION, EMULSION INTRAVENOUS at 04:38

## 2024-12-30 RX ADMIN — ETOMIDATE 20 MG: 2 INJECTION, SOLUTION INTRAVENOUS at 03:35

## 2024-12-30 RX ADMIN — ALBUTEROL SULFATE 2.5 MG: 2.5 SOLUTION RESPIRATORY (INHALATION) at 03:59

## 2024-12-30 RX ADMIN — Medication 1 AMPULE: at 08:39

## 2024-12-30 RX ADMIN — 0.12% CHLORHEXIDINE GLUCONATE 15 ML: 1.2 RINSE ORAL at 09:02

## 2024-12-30 RX ADMIN — Medication 50 MCG/HR: at 04:53

## 2024-12-30 ASSESSMENT — PULMONARY FUNCTION TESTS
PIF_VALUE: 26
PIF_VALUE: 19

## 2024-12-30 NOTE — ED NOTES
Pt to bedside commode to void with this RN and Robyn Tech. Unsteady but approx 80% independent. Back to bed in position of comfort.

## 2024-12-30 NOTE — ED NOTES
This RN rounded on the patient to see if she had voided using the pwick. She had already pulled her blankets off and stated, \"I need to get into bed soon.\" I reminded the patient that she is already in bed, educated her again on the call bell, and I adjusted the pwick because she had been pulling on it. She continues to be pleasant, talkative, follows commands. I left the door open att due to patient no longer having visitors at the bedside and is seemingly more restless than she had been. VS stable.

## 2024-12-30 NOTE — H&P
[facility]    ICU TEAM H & P Note    Name: Eli Eldridge   : 3/5/1932   MRN: [id number]   Date: 2024        Subjective:     Reason for ICU Admission: This is a 89 y/o F with a pMHx of Dementia, Afib s/p PPM on Eliquis, CHF, HTN, HLD, Sigmoid diverticulosis, thyroid disease and DMT2 with a recent fall 2024 for which she was hospitalized and chronic UTIs. Last evening 24 she presented to ER after a syncopal event, alert and pleasant with reported wheezing per her caregiver. In ER workup showed a Head CT NEG for hemorrhage, no acute processes on initial PCXR and PPM was interrogated by [employer] rep. Labs were notable for acceptable CHEM7 with BNP 3,995. CBC sowed a ALC 0.4, with respiratory viral panel + for INF A. UA was + for bacteria and small LE with urine cx pending. She received ceftriaxone, tamiflu and Lasix while in ER with While in ER she ws noted to have VTACH arrest. She was given CPR with EPI x 1, amiodorone 300 mg IV followed by infusion with ROSC achieved. Post resuscitation she was intubated for airway protection with ETOM and WLILIAM with hemodynamics stable, slightly hypertensive. RAUL Bronson was notified by ER provider who was updated on overnight events. Patient was made DNR. ICU was consulted for admit.   All information received via chart review.    Past Medical History:      has a past medical history of Arthritis, Atrial fibrillation (HCC), Congestive heart failure (HCC), Diabetes (HCC), GERD (gastroesophageal reflux disease), Glaucoma, Hypercholesterolemia, Hypertension, Ill-defined condition, Ill-defined condition, Impaired glucose tolerance, Long term current use of anticoagulant therapy, Menopause, Pacemaker, Sigmoid diverticulosis, Thyroid disease, Type 2 diabetes mellitus with chronic kidney disease (HCC), and Valvular heart disease.    Past Surgical History:      has a past surgical history that includes orthopedic surgery; Breast biopsy (Bilateral,

## 2024-12-30 NOTE — ED NOTES
Indwelling mcdowell second attempt unsuccessful by Tsering ALEXANDRE ([facility] assist).

## 2024-12-30 NOTE — PROGRESS NOTES
Spiritual Health History and Assessment/Progress Note  [facility]    Follow-up, Code (comment), End of Life,      Name: Eli Eldridge MRN: [id number]    Age: 89 y.o.     Sex: female   Language: English   Sabianist: Uatsdin   Acute respiratory failure with hypoxia and hypercapnia     Date: 2024            Total Time Calculated: 59 min              Spiritual Assessment continued in [facility]        Referral/Consult From: Nurse   Encounter Overview/Reason: Follow-up  Service Provided For: Patient not available, Family    Amada, Belief, Meaning:   Patient unable to assess at this time  Family/Friends identify as spiritual, are connected with a amada tradition or spiritual practice, and have beliefs or practices that help with coping during difficult times      Importance and Influence:  Patient unable to assess at this time  Family/Friends have spiritual/personal beliefs that influence decisions regarding the patient's health    Community:  Patient Other: Unable to assess  Family/Friends No family/friends present    Assessment and Plan of Care:      received a code blue daytime page.  met with the friends of Ms. Eldridge.   POA/Primary Contact Mrs. Samia Hastings [phone]     [facility]  [address] [address] [phone]    Friends expressed gratitude for the care given to Ms. Elrdidge.       Patient Interventions include: Other: Unable to assess  Family/Friends Interventions include: Facilitated expression of thoughts and feelings and Affirmed coping skills/support systems    Patient Plan of Care: Spiritual Care available upon further referral  Family/Friends Plan of Care: Spiritual Care available upon further referral    Electronically signed by MARION Freeman on 2024 at 10:15 AM     PETRA Garzon BCC  [facility]   Paging Service [phone])

## 2024-12-30 NOTE — PROGRESS NOTES
Name: Eli Eldridge  Date: 12/30/2024  Location: ICU    Physical exam findings:   Neuro: Pupils fixed and dilated.  No corneal reflex.  CVS: Carotid, femoral, or radial pulses not palpable. No cardiac sounds by ascultation.  Resp: No visible inspiration. No respiratory sounds on ascultation.     Date of Death: 12/30/2024  Time of Death: 10 am  Primary Cause of Death -  Influenza A infection.    Family at bedside      Riki Espino MD  10:19 AM  12/30/2024

## 2024-12-30 NOTE — PROGRESS NOTES
Spiritual Health History and Assessment/Progress Note  [facility]    Crisis,  ,  ,      Name: Eli Eldridge MRN: [id number]    Age: 89 y.o.     Sex: female   Language: English   Jehovah's witness: Buddhism   Acute respiratory failure with hypoxia and hypercapnia     Date: 12/30/2024            Total Time Calculated: 64 min              Spiritual Assessment began in [facility] EMERGENCY DEPT        Referral/Consult From: Nurse   Encounter Overview/Reason: Crisis  Service Provided For: Patient not available    Amada, Belief, Meaning:   Patient unable to assess at this time  Family/Friends No family/friends present      Importance and Influence:  Patient unable to assess at this time  Family/Friends No family/friends present    Community:  Patient Other: Unable to assess  Family/Friends feel well-supported. Support system includes: Friends    Assessment and Plan of Care:      received an on call page from -16 CODE BLUE.  coordinated services with the staff and Dr. Tam.  provided care for friend/POA for Mrs. Eldridge.     Spiritual Health Services are available 24 hours a day as requested.     Patient Interventions include: Other: Unable to assess  Family/Friends Interventions include: Affirmed coping skills/support systems    Patient Plan of Care: Spiritual Care available upon further referral  Family/Friends Plan of Care: Spiritual Care available upon further referral    Electronically signed by MARION Freeman on 12/30/2024 at 4:18 AM     Rev. PETRA Freeman BCC  [facility]   Paging Service [phone])

## 2024-12-30 NOTE — ED PROVIDER NOTES
the time of this note:     XR CHEST PORTABLE   Final Result   Endotracheal tube and enteric tube in expected position. Interval   development of flash pulmonary edema and small right pneumothorax.      The findings were called to Dr. Waller on 12/30/2024 at 04:27 by myself.  [phone]         Electronically signed by Florencio Canales      CT HEAD WO CONTRAST   Final Result   No acute intracranial hemorrhage, mass or infarct. Stable pattern of   atrophy and chronic white matter change most compatible with small vessel   ischemic and/or senescent change. Intracranial atherosclerosis.      Electronically signed by Florencio Canales      XR CHEST PORTABLE   Final Result      No acute process on portable chest. No change.         Electronically signed by Tom Melissa           PROCEDURES   Unless otherwise noted below, none  Procedures     Procedure Note - Orotracheal Intubation:   3:49 AM EST  Performed by: aMx Tam MD    Indication for procedure: impending respiratory failure and impending airway compromise  RSI performed.   The patient was sedated with 20 mg of etomidate and paralyzed with 70 mg of rocuronium (Zemuron). Orotracheally intubated with a 7.5 cuffed Tamazight endotracheal tube using a MAC 4 blade with direct visualization. Tube was advanced to 25 cm at the teeth. ETT location confirmed by bilateral, symmetric breath sounds, good end-tidal CO2 detector color change , and no breath sounds over stomach.    Number of attempts: 1  Complications: none  RSI was used.  The procedure took 1-15 minutes, and pt tolerated well.    Procedure Note - CPR Efforts:   3:49 AM EST  I supervised and directed all CPR efforts.  Procedure Time: 7 minutes    CRITICAL CARE TIME     CRITICAL CARE NOTE :    5:05 AM    IMPENDING DETERIORATION -Cardiovascular  ASSOCIATED RISK FACTORS - Hypoxia and Dysrhythmia  MANAGEMENT- Bedside Assessment  INTERPRETATION -  Xrays, CT Scan, Blood Gases, ECG, and Blood Pressure  INTERVENTIONS - hemodynamic

## 2024-12-30 NOTE — PROGRESS NOTES
0830 patient unresponsive to voice, pain. Propofol turned off.     0900 fentanyl drip decreased from 50 to 25.     0915 patient in VT arrest. Code blue called. Cpr started, then terminated after 1 miute due to DNR status.   0920 family t bedside.  present  1000 asystole. Pronounced by Dr. Espino. [facility] notified.

## 2024-12-30 NOTE — DISCHARGE SUMMARY
Death Discharge Summary      Patient ID:  Eli Eldridge  89 y.o.  3/5/1932  [id number]    Admit Date: 12/29/2024    Attending Physician:  Riki Espino MD    Hospital course:      The patient was admitted with V.fib cardiac arrest. Intubated in ED. She was also found to have influenza A infection. She had another cardiac arrest on 12/30. Family decided to do compassionate extubation.     Chief Complaint   Patient presents with    Loss of Consciousness     BIBEMS from home after witnessed syncopal episode on scene. Home health aid reports wheezing today. No vomiting. Pt has dementia and arrives alert, pleasant, and talkative.        Problem List:    Patient Active Problem List    Diagnosis Date Noted    Acute respiratory failure with hypoxia and hypercapnia 12/30/2024    Thrombocytopenia, unspecified (HCC) 03/06/2023    Chronic systolic (congestive) heart failure ([facility]) 02/16/2023    Cardiac pacemaker in situ 06/09/2022    Varicose veins of both legs with edema 10/06/2020    Atrial fibrillation ([facility]) 12/05/2018    Mitral regurgitation 10/31/2018    Hypercholesterolemia     Osteoarthritis of hip 11/20/2017    PAD (peripheral artery disease) ([facility]) 04/04/2017    Anemia 02/06/2017    Chronic UTI 02/06/2017    Venous (peripheral) insufficiency 01/08/2015    Hyperlipidemia 06/11/2012    Arthritis 06/11/2012    Hypothyroid 06/11/2012    Hypertension 06/11/2012    Restless leg syndrome 06/30/2011       Death Diagnosis:  Influenza A    Time of death:10 AM    Date of death:12/30/24      Riki Espino MD  [facility] Critical Care

## 2024-12-30 NOTE — ED NOTES
Comprehensive SBAR and ED summary given to receiving CCU RN (Dwain). Nurse was also informed of tasks and interventions left pending. I provided the opportunity to ask all questions.       - Pt transported to the IP room by CCU team and RT  - Care transitioned at the bedside with receiving staff    Personal belongings include:   - Eye glasses  - Pair of shoes  - Underwear  - Two rings (still on left ring finger)  - Pajamas (these were cut)

## 2024-12-30 NOTE — CODE DOCUMENTATION
Prior to code documentation beginning, myself and others responded to a code blue alarm. Alarm called by Dr. Tam who saw toursades v tach on the monitor. No pulse palpable upon staff response, compressions begun by Karina HUGO RN.

## 2024-12-30 NOTE — PROGRESS NOTES
Patient admitted by my colleague this morning for V.fib arrest. She was found to have influenza A infection.    She had another V.fib with loss of pulse. Family was at bedside, reiterated DNR status and requested compassionate extubation to comfort measures.     Will proceed with comfort measures, no resuscitation.     Riki Espino MD, MARTINEZ, FCCP, FCCM, ATSF, FACP, DAABIP  Interventional Pulmonology/Critical Care Medicine  [facility]  [facility]

## 2024-12-31 LAB
EKG ATRIAL RATE: 73 BPM
EKG DIAGNOSIS: NORMAL
EKG DIAGNOSIS: NORMAL
EKG Q-T INTERVAL: 480 MS
EKG Q-T INTERVAL: 502 MS
EKG QRS DURATION: 142 MS
EKG QRS DURATION: 148 MS
EKG QTC CALCULATION (BAZETT): 505 MS
EKG QTC CALCULATION (BAZETT): 540 MS
EKG R AXIS: 244 DEGREES
EKG R AXIS: 270 DEGREES
EKG T AXIS: 137 DEGREES
EKG T AXIS: 54 DEGREES
EKG VENTRICULAR RATE: 61 BPM
EKG VENTRICULAR RATE: 76 BPM

## 2025-01-01 LAB
BACTERIA SPEC CULT: ABNORMAL
CC UR VC: ABNORMAL
SERVICE CMNT-IMP: ABNORMAL

## (undated) DEVICE — SLIM BODY SKIN STAPLER: Brand: APPOSE ULC

## (undated) DEVICE — SOLUTION IRRIG 1000ML H2O STRL BLT

## (undated) DEVICE — Z DISCONTINUED USE 2717541 SUTURE STRATAFIX SZ 3-0 L30CM NONABSORBABLE UD L26MM FS 3/8

## (undated) DEVICE — SOLUTION IV 1000ML 0.9% SOD CHL

## (undated) DEVICE — FLOSEAL MATRIX IS INDICATED IN SURGICAL PROCEDURES (OTHER THAN IN OPHTHALMIC) AS AN ADJUNCT TO HEMOSTASIS WHEN CONTROL OF BLEEDING BY LIGATURE OR CONVENTIONALPROCEDURES IS INEFFECTIVE OR IMPRACTICAL.: Brand: FLOSEAL HEMOSTATIC MATRIX

## (undated) DEVICE — STERILE POLYISOPRENE POWDER-FREE SURGICAL GLOVES: Brand: PROTEXIS

## (undated) DEVICE — REM POLYHESIVE ADULT PATIENT RETURN ELECTRODE: Brand: VALLEYLAB

## (undated) DEVICE — PRECISION FALCON OSCILLATING TIP SAW CARTRIDGE: Brand: PRECISION FALCON

## (undated) DEVICE — 3000CC GUARDIAN II: Brand: GUARDIAN

## (undated) DEVICE — SUTURE VCRL SZ 0 L27IN ABSRB UD L36MM CT-1 1/2 CIR J260H

## (undated) DEVICE — SUTURE ABSORBABLE MONOFILAMENT 2-0 WND CLOSURE GRN V-LOC 180 VLOCL0315

## (undated) DEVICE — (D)PREP SKN CHLRAPRP APPL 26ML -- CONVERT TO ITEM 371833

## (undated) DEVICE — SUTURE STRATAFIX SYMMETRIC SZ 1 L18IN ABSRB VLT CT1 L36CM SXPP1A404

## (undated) DEVICE — SYSTEM SKIN CLSR 22CM DERMBND PRINEO

## (undated) DEVICE — STERILE POLYISOPRENE POWDER-FREE SURGICAL GLOVES WITH EMOLLIENT COATING: Brand: PROTEXIS

## (undated) DEVICE — 3M™ IOBAN™ 2 ANTIMICROBIAL INCISE DRAPE 6651EZ: Brand: IOBAN™ 2

## (undated) DEVICE — ELECTRODE BLDE L4IN NONINSULATED EDGE

## (undated) DEVICE — SMOKE EVACUATION PENCIL: Brand: VALLEYLAB

## (undated) DEVICE — TOWEL SURG W17XL27IN STD BLU COT NONFENESTRATED PREWASHED

## (undated) DEVICE — HANDLE LT SNAP ON ULT DURABLE LENS FOR TRUMPF ALC DISPOSABLE

## (undated) DEVICE — DEVON™ KNEE AND BODY STRAP 60" X 3" (1.5 M X 7.6 CM): Brand: DEVON

## (undated) DEVICE — 4-PORT MANIFOLD: Brand: NEPTUNE 2

## (undated) DEVICE — PIN EXT FIX SCHNZ 3 MM

## (undated) DEVICE — INFECTION CONTROL KIT SYS

## (undated) DEVICE — WATERPROOF, BACTERIA PROOF DRESSING WITH ABSORBENT SEE THROUGH PAD: Brand: OPSITE POST-OP VISIBLE 20X10CM CTN 20

## (undated) DEVICE — Z CONVERTED USE 2107985 COVER FLROSCP W36XL28IN 4 SIDE ADH

## (undated) DEVICE — KENDALL SCD EXPRESS SLEEVES, KNEE LENGTH, MEDIUM: Brand: KENDALL SCD

## (undated) DEVICE — SYR 50ML LR LCK 1ML GRAD NSAF --

## (undated) DEVICE — Device

## (undated) DEVICE — DRAPE,REIN 53X77,STERILE: Brand: MEDLINE

## (undated) DEVICE — NDL SPNE QNCKE 18GX3.5IN LF --